# Patient Record
Sex: FEMALE | Race: WHITE | NOT HISPANIC OR LATINO | Employment: FULL TIME | URBAN - METROPOLITAN AREA
[De-identification: names, ages, dates, MRNs, and addresses within clinical notes are randomized per-mention and may not be internally consistent; named-entity substitution may affect disease eponyms.]

---

## 2018-01-15 ENCOUNTER — OFFICE VISIT (OUTPATIENT)
Dept: URGENT CARE | Facility: CLINIC | Age: 43
End: 2018-01-15
Payer: COMMERCIAL

## 2018-01-15 VITALS
BODY MASS INDEX: 27.32 KG/M2 | WEIGHT: 170 LBS | RESPIRATION RATE: 16 BRPM | HEART RATE: 88 BPM | TEMPERATURE: 98 F | OXYGEN SATURATION: 99 % | DIASTOLIC BLOOD PRESSURE: 84 MMHG | SYSTOLIC BLOOD PRESSURE: 129 MMHG | HEIGHT: 66 IN

## 2018-01-15 DIAGNOSIS — Z20.828 EXPOSURE TO THE FLU: Primary | ICD-10-CM

## 2018-01-15 PROCEDURE — 99214 OFFICE O/P EST MOD 30 MIN: CPT | Mod: S$GLB,,, | Performed by: FAMILY MEDICINE

## 2018-01-15 RX ORDER — BENZONATATE 100 MG/1
100 CAPSULE ORAL EVERY 6 HOURS PRN
Qty: 30 CAPSULE | Refills: 1 | Status: SHIPPED | OUTPATIENT
Start: 2018-01-15 | End: 2019-01-15

## 2018-01-15 RX ORDER — GABAPENTIN 400 MG/1
CAPSULE ORAL
Refills: 1 | COMMUNITY
Start: 2017-12-24 | End: 2020-12-18 | Stop reason: CLARIF

## 2018-01-15 RX ORDER — OSELTAMIVIR PHOSPHATE 75 MG/1
75 CAPSULE ORAL 2 TIMES DAILY
Qty: 10 CAPSULE | Refills: 0 | Status: SHIPPED | OUTPATIENT
Start: 2018-01-15 | End: 2018-01-25

## 2018-01-15 RX ORDER — PROMETHAZINE HYDROCHLORIDE 6.25 MG/5ML
SYRUP ORAL
Qty: 120 ML | Refills: 1 | Status: SHIPPED | OUTPATIENT
Start: 2018-01-15 | End: 2020-12-18 | Stop reason: CLARIF

## 2018-01-15 NOTE — PROGRESS NOTES
"Subjective:       Patient ID: Thai Villalba is a 42 y.o. female.    Vitals:  height is 5' 6" (1.676 m) and weight is 77.1 kg (170 lb). Her oral temperature is 97.6 °F (36.4 °C). Her blood pressure is 129/84 and her pulse is 88. Her respiration is 16 and oxygen saturation is 99%.     Chief Complaint: Cold Exposure (exposure to flu)    Influenza   This is a new problem. The current episode started yesterday. The problem has been unchanged. Pertinent negatives include no abdominal pain, chest pain, chills, congestion, coughing, fever, headaches, myalgias, nausea or sore throat. Nothing aggravates the symptoms. She has tried nothing for the symptoms.     Review of Systems   Constitution: Negative for chills, fever and malaise/fatigue.   HENT: Negative for congestion, ear pain, hoarse voice and sore throat.    Eyes: Negative for discharge and redness.   Cardiovascular: Negative for chest pain, dyspnea on exertion and leg swelling.   Respiratory: Negative for cough, shortness of breath, sputum production and wheezing.    Musculoskeletal: Negative for myalgias.   Gastrointestinal: Negative for abdominal pain and nausea.   Neurological: Negative for headaches.       Objective:      Physical Exam   Constitutional: She is oriented to person, place, and time. She appears well-developed and well-nourished. She is cooperative.  Non-toxic appearance. She does not appear ill. No distress.   HENT:   Head: Normocephalic and atraumatic.   Right Ear: Hearing, tympanic membrane, external ear and ear canal normal.   Left Ear: Hearing, tympanic membrane, external ear and ear canal normal.   Nose: Nose normal. No mucosal edema, rhinorrhea or nasal deformity. No epistaxis. Right sinus exhibits no maxillary sinus tenderness and no frontal sinus tenderness. Left sinus exhibits no maxillary sinus tenderness and no frontal sinus tenderness.   Mouth/Throat: Uvula is midline, oropharynx is clear and moist and mucous membranes are normal. No " trismus in the jaw. Normal dentition. No uvula swelling. No posterior oropharyngeal erythema.   Eyes: Conjunctivae and lids are normal. No scleral icterus.   Sclera clear bilat   Neck: Trachea normal, full passive range of motion without pain and phonation normal. Neck supple.   Cardiovascular: Normal rate, regular rhythm, normal heart sounds, intact distal pulses and normal pulses.    Pulmonary/Chest: Effort normal and breath sounds normal. No respiratory distress.   Abdominal: Normal appearance. She exhibits no distension. There is no tenderness.   Musculoskeletal: Normal range of motion. She exhibits no edema or deformity.   Neurological: She is alert and oriented to person, place, and time. She exhibits normal muscle tone. Coordination normal.   Skin: Skin is warm, dry and intact. She is not diaphoretic. No pallor.   Psychiatric: She has a normal mood and affect. Her speech is normal and behavior is normal. Judgment and thought content normal. Cognition and memory are normal.   Nursing note and vitals reviewed.      Assessment:       1. Exposure to the flu        Plan:         Exposure to the flu    Other orders  -     oseltamivir (TAMIFLU) 75 MG capsule; Take 1 capsule (75 mg total) by mouth 2 (two) times daily.  Dispense: 10 capsule; Refill: 0  -     promethazine (PHENERGAN) 6.25 mg/5 mL syrup; 10mL at night as needed  Dispense: 120 mL; Refill: 1  -     benzonatate (TESSALON PERLES) 100 MG capsule; Take 1 capsule (100 mg total) by mouth every 6 (six) hours as needed for Cough.  Dispense: 30 capsule; Refill: 1

## 2020-12-22 PROBLEM — D25.9 UTERINE LEIOMYOMA: Status: ACTIVE | Noted: 2020-12-22

## 2020-12-23 PROBLEM — K66.1 HEMOPERITONEUM: Status: ACTIVE | Noted: 2020-12-23

## 2021-05-06 ENCOUNTER — PATIENT MESSAGE (OUTPATIENT)
Dept: RESEARCH | Facility: HOSPITAL | Age: 46
End: 2021-05-06

## 2021-07-01 ENCOUNTER — PATIENT MESSAGE (OUTPATIENT)
Dept: ADMINISTRATIVE | Facility: OTHER | Age: 46
End: 2021-07-01

## 2022-07-07 ENCOUNTER — TELEPHONE (OUTPATIENT)
Dept: FAMILY MEDICINE | Facility: CLINIC | Age: 47
End: 2022-07-07

## 2022-07-07 NOTE — TELEPHONE ENCOUNTER
----- Message from Basilia Rubio sent at 7/7/2022 10:15 AM CDT -----  Contact: Giuliana Walters in [place], [place]  Type:  RX Refill Request    Who Called:  Giuliana Walters    Refill or New Rx:  New    RX Name and Strength:  EEMT FS    Preferred Pharmacy with phone number:    [facility]  [place], [place]    Best Call Back Number:  [phone]    Additional Information:  Please call back.  Thanks.

## 2023-12-08 ENCOUNTER — TELEPHONE (OUTPATIENT)
Dept: HEMATOLOGY/ONCOLOGY | Facility: CLINIC | Age: 48
End: 2023-12-08
Payer: COMMERCIAL

## 2023-12-08 ENCOUNTER — PATIENT MESSAGE (OUTPATIENT)
Dept: HEMATOLOGY/ONCOLOGY | Facility: CLINIC | Age: 48
End: 2023-12-08
Payer: COMMERCIAL

## 2023-12-08 NOTE — TELEPHONE ENCOUNTER
----- Message from Christopher Prasad sent at 12/8/2023 12:01 PM CST -----  Type: Need Medical Advice  Who Called: Giuliana Harden callback number:   Additional Information: Called to see if the patient can follow up with in a week of finishing up her treatments in [place], she had transplant 09/21 and her last treatment will be 12/19 patient will then return to [place] and have to follow up with Dr Hurt call to further assist, Thanks.

## 2023-12-08 NOTE — TELEPHONE ENCOUNTER
Returned call to Giuliana , Post Transplant nurse coordinator at [facility].  She stated she was helping the pt get set up appts with BMT here in [place].  The pt has last appt in [place] Dec 19th. Pt then moving back to [place].   Informed her we do not have a BMT team here in [facility]; that we re-faxed the referral to the BMT at [facility] for proper scheduling.    From there they can coordinate the pt's care plan, even if she is able to have a hemonc provider here in [place].    She verbalized understanding.  I gave her the phone number to [facility] BMT dept.

## 2023-12-11 ENCOUNTER — TELEPHONE (OUTPATIENT)
Dept: HEMATOLOGY/ONCOLOGY | Facility: CLINIC | Age: 48
End: 2023-12-11
Payer: COMMERCIAL

## 2023-12-11 DIAGNOSIS — C92.00 AML (ACUTE MYELOID LEUKEMIA): Primary | ICD-10-CM

## 2023-12-11 NOTE — TELEPHONE ENCOUNTER
----- Message from Josh Ballesteros sent at 12/8/2023  3:42 PM CST -----  Regarding: RE: POST ALLO TRANSPLANT NEEDING EST CARE  I have verified medical and transplant benefit for patient at [facility] only.    [facility]   ----- Message -----  From: Saran Pina, ROSY  Sent: 12/8/2023   3:03 PM CST  To: Josh Favian  Subject: RE: POST ALLO TRANSPLANT NEEDING EST CARE        2021  ----- Message -----  From: Josh Ballesteros  Sent: 12/8/2023   2:24 PM CST  To: Saran Pina RN  Subject: RE: POST ALLO TRANSPLANT NEEDING EST CARE        When did patient received his Allo transplant?    [facility]   ----- Message -----  From: Saran Pina, ROSY  Sent: 12/8/2023   2:09 PM CST  To: Josh Favian  Subject: RE: POST ALLO TRANSPLANT NEEDING EST CARE        Please verify allo benefits  ----- Message -----  From: Hernan Mckeon  Sent: 12/7/2023   4:04 PM CST  To: Saran Pina RN  Subject: POST ALLO TRANSPLANT NEEDING EST CARE            Referral in Media

## 2023-12-13 ENCOUNTER — TELEPHONE (OUTPATIENT)
Dept: HEMATOLOGY/ONCOLOGY | Facility: CLINIC | Age: 48
End: 2023-12-13
Payer: COMMERCIAL

## 2024-01-08 ENCOUNTER — OFFICE VISIT (OUTPATIENT)
Dept: HEMATOLOGY/ONCOLOGY | Facility: CLINIC | Age: 49
End: 2024-01-08
Payer: COMMERCIAL

## 2024-01-08 DIAGNOSIS — Z94.84 HISTORY OF ALLOGENEIC STEM CELL TRANSPLANT: Primary | ICD-10-CM

## 2024-01-08 DIAGNOSIS — C92.01 ACUTE MYELOID LEUKEMIA IN REMISSION: ICD-10-CM

## 2024-01-08 DIAGNOSIS — Z79.899 IMMUNODEFICIENCY DUE TO DRUG THERAPY: ICD-10-CM

## 2024-01-08 DIAGNOSIS — D69.6 THROMBOCYTOPENIA: ICD-10-CM

## 2024-01-08 DIAGNOSIS — D84.822 IMMUNODEFICIENCY DUE TO EXTERNAL CAUSE: ICD-10-CM

## 2024-01-08 DIAGNOSIS — D84.821 IMMUNODEFICIENCY DUE TO DRUG THERAPY: ICD-10-CM

## 2024-01-08 PROCEDURE — 99205 OFFICE O/P NEW HI 60 MIN: CPT | Mod: 95,,, | Performed by: INTERNAL MEDICINE

## 2024-01-08 PROCEDURE — 1160F RVW MEDS BY RX/DR IN RCRD: CPT | Mod: CPTII,95,, | Performed by: INTERNAL MEDICINE

## 2024-01-08 PROCEDURE — 1159F MED LIST DOCD IN RCRD: CPT | Mod: CPTII,95,, | Performed by: INTERNAL MEDICINE

## 2024-01-08 RX ORDER — PANTOPRAZOLE SODIUM 40 MG/1
40 TABLET, DELAYED RELEASE ORAL DAILY
COMMUNITY

## 2024-01-08 RX ORDER — SULFAMETHOXAZOLE AND TRIMETHOPRIM 800; 160 MG/1; MG/1
1 TABLET ORAL
COMMUNITY
Start: 2023-10-21 | End: 2024-02-20

## 2024-01-08 RX ORDER — ISAVUCONAZONIUM SULFATE 186 MG/1
2 CAPSULE ORAL
COMMUNITY
Start: 2023-11-15 | End: 2024-01-08

## 2024-01-08 RX ORDER — LEVOFLOXACIN 500 MG/1
500 TABLET, FILM COATED ORAL
COMMUNITY
Start: 2023-08-11 | End: 2024-01-08

## 2024-01-08 RX ORDER — ALLOPURINOL 300 MG/1
300 TABLET ORAL
COMMUNITY
Start: 2023-11-24

## 2024-01-08 RX ORDER — ACYCLOVIR 400 MG/1
800 TABLET ORAL 2 TIMES DAILY
COMMUNITY
Start: 2023-08-11 | End: 2024-02-20

## 2024-01-08 RX ORDER — LANOLIN ALCOHOL/MO/W.PET/CERES
400 CREAM (GRAM) TOPICAL 2 TIMES DAILY
COMMUNITY

## 2024-01-08 RX ORDER — CEFPODOXIME PROXETIL 200 MG/1
200 TABLET, FILM COATED ORAL 2 TIMES DAILY
COMMUNITY
Start: 2023-11-15 | End: 2024-01-08

## 2024-01-08 RX ORDER — ESTERIFIED ESTROGEN AND METHYLTESTOSTERONE 1.25; 2.5 MG/1; MG/1
1 TABLET ORAL DAILY
COMMUNITY
Start: 2023-07-05

## 2024-01-08 RX ORDER — CYCLOSPORINE 100 MG/1
75 CAPSULE, GELATIN COATED ORAL EVERY 12 HOURS
COMMUNITY
End: 2024-02-20

## 2024-01-08 RX ORDER — MIRTAZAPINE 15 MG/1
15 TABLET, FILM COATED ORAL
COMMUNITY
Start: 2023-11-28 | End: 2024-01-22

## 2024-01-08 RX ORDER — FOLIC ACID/MULTIVIT,IRON,MINER 0.4MG-18MG
1000 TABLET ORAL
COMMUNITY

## 2024-01-08 NOTE — PROGRESS NOTES
"HEMATOLOGIC MALIGNANCIES CONSULT NOTE    IDENTIFYING STATEMENT   Thai Coburn) is a 48 y.o. female with a  of 1975 from [place], [place], referred by Dr. 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         Each patient to whom he or she provides medical services by telemedicine is:  (1) informed of the relationship between the physician and patient and the respective role of any other health care provider with respect to management of the patient; and (2) notified that he or she may decline to receive medical services by telemedicine and may withdraw from such care at any time.    Notes:       HISTORY OF PRESENT ILLNESS:      Ms. Villalba is 48 and presents to establish care for follow-up of AML and history of allogeneic stem cell transplant.     Had moved to [place] for 's job - was already planning to go back to [place], but then she got diagnosed with AML.     See detailed summary in "impression" of AML clinical course.    Currently, she reports the following:  - Stomach is doing better  - cyclosporine - 75 mg PO BID with planned taper  - gilteritinib is causing trouble sleeping (planned 2 years maintenance) and also neuropathy; more trouble with " cold    - appetite/nutrition - decreased appetite overall but eating 3x/day  - no pruritis, jaundice, rash, diarrhea  - brisk walks around house and yard    - 16-month old grandchild  - son is  in [place], [place]    - no more central lines    Past Medical History:   Diagnosis Date    ADHD (attention deficit hyperactivity disorder)     Anxiety     General anesthetics causing adverse effect in therapeutic use     REFLUX WITH SURGERY AND ASPIRATED, ZOFRAN GIVEN IV       Family History   Problem Relation Age of Onset    Cancer Mother         Breast    Breast cancer Mother         age unknown    Cancer Maternal Grandmother         Breast    Breast cancer Maternal Grandmother         70s and 80s    COPD Maternal Grandfather     Heart disease Paternal Grandfather        Social History     Socioeconomic History    Marital status:    Tobacco Use    Smoking status: Former     Current packs/day: 0.00     Types: Cigarettes     Quit date: 2011     Years since quittin.5    Smokeless tobacco: Never   Substance and Sexual Activity    Alcohol use: Yes     Alcohol/week: 0.0 - 0.8 standard drinks of alcohol     Comment: socially    Drug use: No     Social Determinants of Health     Financial Resource Strain: Patient Declined (2024)    Overall Financial Resource Strain (CARDIA)     Difficulty of Paying Living Expenses: Patient declined   Food Insecurity: Patient Declined (2024)    Hunger Vital Sign     Worried About Running Out of Food in the Last Year: Patient declined     Ran Out of Food in the Last Year: Patient declined   Transportation Needs: No Transportation Needs (2024)    PRAPARE - Transportation     Lack of Transportation (Medical): No     Lack of Transportation (Non-Medical): No   Physical Activity: Unknown (2024)    Exercise Vital Sign     Days of Exercise per Week: 2 days   Stress: No Stress Concern Present (2024)    [employer] - Occupational  Stress Questionnaire     Feeling of Stress : Only a little   Social Connections: Unknown (1/8/2024)    Social Connection and Isolation Panel [NHANES]     Frequency of Communication with Friends and Family: Once a week     Frequency of Social Gatherings with Friends and Family: Patient declined     Active Member of Clubs or Organizations: No     Attends Club or Organization Meetings: Never     Marital Status:    Housing Stability: High Risk (1/8/2024)    Housing Stability Vital Sign     Unable to Pay for Housing in the Last Year: No     Number of Places Lived in the Last Year: 3     Unstable Housing in the Last Year: No         MEDICATIONS:     Current Outpatient Medications on File Prior to Visit   Medication Sig Dispense Refill    acyclovir (ZOVIRAX) 400 MG tablet Take 800 mg by mouth 2 (two) times daily.      allopurinoL (ZYLOPRIM) 300 MG tablet Take 300 mg by mouth.      estrogens,esterified,-methyltestosterone 1.25-2.5mg (ESTRATEST) per tablet Take 1 tablet by mouth once daily.      gilteritinib 40 mg Tab Take 80 mg by mouth Daily.      letermovir 240 mg Tab Take 240 mg by mouth.      mirtazapine (REMERON) 15 MG tablet Take 15 mg by mouth.      sulfamethoxazole-trimethoprim 800-160mg (BACTRIM DS) 800-160 mg Tab Take 1 tablet by mouth.      cholecalciferol, vitamin D3, 10 mcg (400 unit) Chew Take 1,000 Int'l Units by mouth.      cycloSPORINE (SANDIMMUNE) 100 MG Cap Take 75 mg by mouth every 12 (twelve) hours.      gabapentin (NEURONTIN) 300 MG capsule Take 1 capsule by mouth three times a day 90 capsule 3    magnesium oxide (MAG-OX) 400 mg (241.3 mg magnesium) tablet Take 400 mg by mouth 2 (two) times daily.      [START ON 2/2/2024] oxyCODONE (ROXICODONE) 5 MG immediate release tablet 1 tablet by mouth three times a day as needed for pain 90 tablet 0    oxyCODONE (ROXICODONE) 5 MG immediate release tablet Take 1 tablet by mouth three times a day as needed for pain 90 tablet 0    oxyCODONE-acetaminophen  (PERCOCET) 5-325 mg per tablet Take 1 tablet by mouth every 4 (four) hours as needed. 30 tablet 0    pantoprazole (PROTONIX) 40 MG tablet Take 40 mg by mouth once daily.       No current facility-administered medications on file prior to visit.       ALLERGIES:   Review of patient's allergies indicates:   Allergen Reactions    Penicillins      Other reaction(s): Unknown        ROS:       Review of Systems   Constitutional:  Positive for appetite change. Negative for diaphoresis, fatigue, fever and unexpected weight change.   HENT:   Negative for lump/mass and sore throat.    Eyes:  Negative for icterus.   Respiratory:  Negative for cough and shortness of breath.    Cardiovascular:  Negative for chest pain and palpitations.   Gastrointestinal:  Negative for abdominal distention, constipation, diarrhea, nausea and vomiting.   Genitourinary:  Negative for dysuria and frequency.    Musculoskeletal:  Negative for arthralgias, gait problem and myalgias.   Skin:  Negative for rash.   Neurological:  Negative for dizziness, gait problem and headaches.   Hematological:  Negative for adenopathy. Does not bruise/bleed easily.   Psychiatric/Behavioral:  The patient is not nervous/anxious.        PHYSICAL EXAM:  There were no vitals filed for this visit.    Physical Exam  Head and neck visualized. No abnormalities seen.     LAB:   Results for orders placed or performed in visit on 12/18/23   Specimen to Pathology Other (bmt)   Result Value Ref Range    Final Pathologic Diagnosis       Received from the outside are 8 slides labeled  [id number]:   Uterus and cervix weighing 437 g showing:   Weakly proliferative endometrium with adenomyosis  The cervix shows focal chronic inflammation and atrophic changes  Multiple submucosal and intramural leiomyomas are identified      Gross See outside report for gross description     Disclaimer       Unless the case is a 'gross only' or additional testing only, the final diagnosis for each  specimen is based on a microscopic examination of appropriate tissue sections.       PROBLEMS ASSESSED THIS VISIT:    1. History of allogeneic stem cell transplant    2. Acute myeloid leukemia in remission    3. Immunodeficiency due to drug therapy    4. Immunodeficiency due to external cause    5. Thrombocytopenia        IMPRESSION:    1. Acute myeloid leukemia s/p allogeneic stem cell transplant (dual cord blood transplant)   A. 5/26/2023: Presented to hospital in [place] with 2 week h/o fevers, night sweats, weight loss, lymphadenopathy, myalgias, stomatitis, bruising, sore throat and found to have WBC 333K with 80% blasts c/f AML vs CML in acute blast crisis    B. 5/28/2023: Bone marrow biopsy - AML, FLT3 positive.    C. 5/30/2023: Began 7+3+midostaurin induction. Subsequent D14 and D28 BMBxs both showed no evidence of persistent leukemia, in CR1    D. 8/4/2023: BMBx showed recurrent AML with flow showing 23% myeloblasts.    E. 8/5/2023: Began azacitidine-venetoclax-gilteritinib (off protocol)   F. 8/31/2023: Bone marrow biopsy showed CR2   G. 9/21/2023: Dual cord blood allogeneic stem cell transplant with Flu/Cy/TT/TBI (4 Gy) conditioning. GVHD ppx with cyclosporine/MMF.    H. 10/19/2023: Bone marrow biopsy on day +28 - IRENE by path and hematologics flow.    I. 12/5/2023: Day +75 bone marrow biopsy - MRD-neg by flow, NPM1 ddPCR, FLT3 pcr. Full donor chimerisms     2. Attention deficit disorder  3. Uterine leiomyoma    PLAN:       Acute myeloid leukemia  Diagnosed with FLT3 mutated AML. She achieved CR1 with 7+3+midostaurin induction but relapsed prior to allogeneic stem cell transplant. She achieved CR2 with aza/ameya/gilteritinib. Ms. Villalba received dual cord blood allogeneic stem cell transplant and has maintained CR on follow-up bone marrow biopsy.   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 due to anorexia/nausea, treated with prednisone (now off)  - No symptoms reported of aGVHD at this time  - On cyclosporine with the following taper planned:  - ~1/15/24: decrease CSA to 50 mg BID  - ~2/5/24: decrease to CSA 25 mg BID  - ~2/26/24: decrease to CSA 25 mg daily   - ~3/21/24: stop CSA     Infectious Disease  - see GVHD above for current immunosuppressive regimen  - antimicrobial ppx:   - Acyclovir 800 mg BID until 12 months post tx, then decrease to 400 mg BID until 5 year post-transplant jose  - Bactrim DS 1 tab BID on M/Th until 12 months post tx and off IST  - Letermovir ppx until 6 months post tx.   - we discussed consideration of antifungal ppx as per our institutional standard - will prescribe posaconazole   - Prior/resolved infections:   - 9/29/2023: C. Diff - treated with fidaxomicin   - 10/7/2023: Suspected fungal infection based on abnormal CT, treated with isavuconazonium sulfate   - 11/6/2023: UTI - treated with levofloxacin   - 11/8/2023: BK virus   - 11/13/2023: Pneumonia diagnosed on CXR - treated with cefpodoxime    Cytopenias  - Last labs showed resolution of all cytopenias except thrombocytopenia (plt 149)  - Continue to monitor with routine labs monitoring    Chronic pain  - Neuropathic pain secondary to lumbar radiculopathy    - gabapentin 300 mg PO TID   - Oxycodone 10-15 mg q4prn   - will need pain specialist and/or neurologist    Hormone replacement therapy  - on oral estrogen    Follow-up  Resume weekly lab  monitoring  Return to clinic in 2 weeks    Shemar Franco MD  Hematologic Malignancies, Stem Cell Transplant, and Cellular Therapy    Advance Care Planning     Date: 01/08/2024    [facility]  I engaged the patient and family in a voluntary conversation about advance care planning and we specifically addressed what the goals of care would be moving forward, in light of the patient's history of allogeneic stem cell transplant.  We did specifically address the patient's likely prognosis, which is fair .  We explored the patient's values and preferences for future care.  The patient and family endorses that what is most important right now is to focus on curative/life-prolongation (regardless of treatment burdens)    Accordingly, we have decided that the best plan to meet the patient's goals includes continuing with treatment    I did not explain the role for hospice care at this stage of the patient's illness, including its ability to help the patient live with the best quality of life possible.  We will not be making a hospice referral.    I spent a total of 5 minutes engaging the patient in this advance care planning discussion.

## 2024-01-08 NOTE — PROGRESS NOTES
Route Chart for Scheduling    BMT Chart Routing  Urgent    Follow up with physician . 1/16 at 3:40 PM (ok to overbook) and on 1/22 and 1/29.   Follow up with KENDLAL    Provider visit type Malignant hem   Infusion scheduling note    Injection scheduling note    Labs CBC, CMP, magnesium, phosphorus, CMV and EBV   Scheduling:  Preferred lab:  Lab interval: once a week  Please also include cyclosporine level. All labs in [place]. Must be in AM because of cyclosporine level.   Imaging    Pharmacy appointment    Other referrals

## 2024-01-11 PROBLEM — C92.00 AML (ACUTE MYELOBLASTIC LEUKEMIA): Status: ACTIVE | Noted: 2023-05-30

## 2024-01-11 PROBLEM — K66.1 HEMOPERITONEUM: Status: RESOLVED | Noted: 2020-12-23 | Resolved: 2024-01-11

## 2024-01-11 RX ORDER — POSACONAZOLE 100 MG/1
300 TABLET, DELAYED RELEASE ORAL DAILY
Qty: 90 TABLET | Refills: 5 | Status: SHIPPED | OUTPATIENT
Start: 2024-01-11 | End: 2024-01-17 | Stop reason: DRUGHIGH

## 2024-01-16 ENCOUNTER — LAB VISIT (OUTPATIENT)
Dept: LAB | Facility: HOSPITAL | Age: 49
End: 2024-01-16
Attending: INTERNAL MEDICINE
Payer: COMMERCIAL

## 2024-01-16 DIAGNOSIS — Z94.84 HISTORY OF ALLOGENEIC STEM CELL TRANSPLANT: ICD-10-CM

## 2024-01-16 LAB
ALBUMIN SERPL BCP-MCNC: 3.3 G/DL (ref 3.5–5.2)
ALP SERPL-CCNC: 73 U/L (ref 55–135)
ALT SERPL W/O P-5'-P-CCNC: 29 U/L (ref 10–44)
ANION GAP SERPL CALC-SCNC: 8 MMOL/L (ref 8–16)
AST SERPL-CCNC: 22 U/L (ref 10–40)
BASOPHILS # BLD AUTO: 0.03 K/UL (ref 0–0.2)
BASOPHILS NFR BLD: 0.4 % (ref 0–1.9)
BILIRUB SERPL-MCNC: 0.3 MG/DL (ref 0.1–1)
BUN SERPL-MCNC: 22 MG/DL (ref 6–20)
CALCIUM SERPL-MCNC: 8.9 MG/DL (ref 8.7–10.5)
CHLORIDE SERPL-SCNC: 111 MMOL/L (ref 95–110)
CO2 SERPL-SCNC: 21 MMOL/L (ref 23–29)
CREAT SERPL-MCNC: 1.3 MG/DL (ref 0.5–1.4)
DIFFERENTIAL METHOD BLD: ABNORMAL
EOSINOPHIL # BLD AUTO: 0.8 K/UL (ref 0–0.5)
EOSINOPHIL NFR BLD: 11.9 % (ref 0–8)
ERYTHROCYTE [DISTWIDTH] IN BLOOD BY AUTOMATED COUNT: 12.9 % (ref 11.5–14.5)
EST. GFR  (NO RACE VARIABLE): 50.4 ML/MIN/1.73 M^2
GLUCOSE SERPL-MCNC: 85 MG/DL (ref 70–110)
HCT VFR BLD AUTO: 38.3 % (ref 37–48.5)
HGB BLD-MCNC: 13.6 G/DL (ref 12–16)
IMM GRANULOCYTES # BLD AUTO: 0.03 K/UL (ref 0–0.04)
IMM GRANULOCYTES NFR BLD AUTO: 0.4 % (ref 0–0.5)
LYMPHOCYTES # BLD AUTO: 1.3 K/UL (ref 1–4.8)
LYMPHOCYTES NFR BLD: 18.6 % (ref 18–48)
MAGNESIUM SERPL-MCNC: 1.9 MG/DL (ref 1.6–2.6)
MCH RBC QN AUTO: 36.8 PG (ref 27–31)
MCHC RBC AUTO-ENTMCNC: 35.5 G/DL (ref 32–36)
MCV RBC AUTO: 104 FL (ref 82–98)
MONOCYTES # BLD AUTO: 0.9 K/UL (ref 0.3–1)
MONOCYTES NFR BLD: 13.1 % (ref 4–15)
NEUTROPHILS # BLD AUTO: 3.8 K/UL (ref 1.8–7.7)
NEUTROPHILS NFR BLD: 55.6 % (ref 38–73)
NRBC BLD-RTO: 0 /100 WBC
PHOSPHATE SERPL-MCNC: 4.2 MG/DL (ref 2.7–4.5)
PLATELET # BLD AUTO: 217 K/UL (ref 150–450)
PMV BLD AUTO: 8.9 FL (ref 9.2–12.9)
POTASSIUM SERPL-SCNC: 4.8 MMOL/L (ref 3.5–5.1)
PROT SERPL-MCNC: 6 G/DL (ref 6–8.4)
RBC # BLD AUTO: 3.7 M/UL (ref 4–5.4)
SODIUM SERPL-SCNC: 140 MMOL/L (ref 136–145)
WBC # BLD AUTO: 6.87 K/UL (ref 3.9–12.7)

## 2024-01-16 PROCEDURE — 80053 COMPREHEN METABOLIC PANEL: CPT | Mod: PN | Performed by: INTERNAL MEDICINE

## 2024-01-16 PROCEDURE — 85025 COMPLETE CBC W/AUTO DIFF WBC: CPT | Mod: PN | Performed by: INTERNAL MEDICINE

## 2024-01-16 PROCEDURE — 83735 ASSAY OF MAGNESIUM: CPT | Mod: PN | Performed by: INTERNAL MEDICINE

## 2024-01-16 PROCEDURE — 36415 COLL VENOUS BLD VENIPUNCTURE: CPT | Mod: PN | Performed by: INTERNAL MEDICINE

## 2024-01-16 PROCEDURE — 87799 DETECT AGENT NOS DNA QUANT: CPT | Performed by: INTERNAL MEDICINE

## 2024-01-16 PROCEDURE — 80158 DRUG ASSAY CYCLOSPORINE: CPT | Performed by: INTERNAL MEDICINE

## 2024-01-16 PROCEDURE — 84100 ASSAY OF PHOSPHORUS: CPT | Mod: PN | Performed by: INTERNAL MEDICINE

## 2024-01-17 DIAGNOSIS — C92.01 ACUTE MYELOID LEUKEMIA IN REMISSION: Primary | ICD-10-CM

## 2024-01-17 DIAGNOSIS — Z94.84 HISTORY OF ALLOGENEIC STEM CELL TRANSPLANT: Primary | ICD-10-CM

## 2024-01-17 DIAGNOSIS — D69.6 THROMBOCYTOPENIA: ICD-10-CM

## 2024-01-17 LAB
CMV DNA SPEC QL NAA+PROBE: NORMAL
CYCLOSPORINE BLD LC/MS/MS-MCNC: 100 NG/ML (ref 100–400)
CYTOMEGALOVIRUS PCR, QUANT: NOT DETECTED IU/ML
EPSTEIN-BARR VIRUS DNA: NORMAL
EPSTEIN-BARR VIRUS PCR, QUANT: NOT DETECTED IU/ML

## 2024-01-18 RX ORDER — CYCLOSPORINE 25 MG/1
CAPSULE, LIQUID FILLED ORAL
Qty: 60 CAPSULE | Refills: 0 | Status: SHIPPED | OUTPATIENT
Start: 2024-01-18 | End: 2024-02-20 | Stop reason: SDUPTHER

## 2024-01-22 ENCOUNTER — OFFICE VISIT (OUTPATIENT)
Dept: HEMATOLOGY/ONCOLOGY | Facility: CLINIC | Age: 49
End: 2024-01-22
Payer: COMMERCIAL

## 2024-01-22 DIAGNOSIS — Z94.84 HISTORY OF ALLOGENEIC STEM CELL TRANSPLANT: Primary | ICD-10-CM

## 2024-01-22 DIAGNOSIS — C92.01 ACUTE MYELOID LEUKEMIA IN REMISSION: ICD-10-CM

## 2024-01-22 PROCEDURE — 1160F RVW MEDS BY RX/DR IN RCRD: CPT | Mod: CPTII,95,, | Performed by: INTERNAL MEDICINE

## 2024-01-22 PROCEDURE — 99214 OFFICE O/P EST MOD 30 MIN: CPT | Mod: 95,,, | Performed by: INTERNAL MEDICINE

## 2024-01-22 PROCEDURE — 1159F MED LIST DOCD IN RCRD: CPT | Mod: CPTII,95,, | Performed by: INTERNAL MEDICINE

## 2024-01-22 NOTE — PROGRESS NOTES
HEMATOLOGIC MALIGNANCIES PROGRESS NOTE    IDENTIFYING STATEMENT   Thai Coburn) is a 48 y.o. female with a  of 1975 from [place], [place] with the diagnosis of acute myeloie leukemia.      TELEMEDICINE DOCUMENTATION     The patient location is: [place]  The chief complaint leading to consultation is: acute myeloid leukemia s/p allogeneic stem cell transplant    Visit type: audiovisual    Face to Face time with patient: 8 minutes  30 minutes of total time spent on the encounter, which includes face to face time and non-face to face time preparing to see the patient (eg, review of tests), Obtaining and/or reviewing separately obtained history, Documenting clinical information in the electronic or other health record, Independently interpreting results (not separately reported) and communicating results to the patient/family/caregiver, or Care coordination (not separately reported).         Each patient to whom he or she provides medical services by telemedicine is:  (1) informed of the relationship between the physician and patient and the respective role of any other health care provider with respect to management of the patient; and (2) notified that he or she may decline to receive medical services by telemedicine and may withdraw from such care at any time.    Notes:       ONCOLOGY HISTORY:    1. 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 2023: Presented to hospital in [place] with 2 week h/o fevers, night sweats, weight loss, lymphadenopathy, myalgias, stomatitis, bruising, sore throat and found to have WBC 333K with 80% blasts c/f AML vs CML in acute blast crisis               B. 2023: Bone marrow biopsy - AML, FLT3 positive.               C. 2023: Began 7+3+midostaurin induction. Subsequent D14 and D28 BMBxs both showed no evidence of persistent leukemia, in CR1               D. 2023: BMBx showed recurrent AML with flow  showing 23% myeloblasts.               E. 8/5/2023: Began azacitidine-venetoclax-gilteritinib (off protocol)              F. 8/31/2023: Bone marrow biopsy showed CR2              G. 9/21/2023: Dual cord blood allogeneic stem cell transplant with Flu/Cy/TT/TBI (4 Gy) conditioning. GVHD ppx with cyclosporine/MMF.               H. 10/19/2023: Bone marrow biopsy on day +28 - IRENE by path and hematologics flow.               I. 12/5/2023: Day +75 bone marrow biopsy - MRD-neg by flow, NPM1 ddPCR, FLT3 pcr. Full donor chimerisms      2. Attention deficit disorder  3. Uterine leiomyoma    INTERVAL HISTORY:      Ms. Villalba is seen in follow-up of AML with history of dual cord blood allogeneic stem cell transplant. She reports the following:    - redness on face that developed yesterday within an hour of new facial moisturizer - not like prior GVHD rash  - stopped mirtazapine and still having good appetite    Past Medical History, Past Social History and Past Family History have been reviewed and are unchanged except as noted in the interval history.    MEDICATIONS:     Current Outpatient Medications on File Prior to Visit   Medication Sig Dispense Refill    acyclovir (ZOVIRAX) 200 MG capsule Take 4 capsules twice a day for prophylaxis 240 capsule 9    acyclovir (ZOVIRAX) 400 MG tablet Take 800 mg by mouth 2 (two) times daily.      allopurinoL (ZYLOPRIM) 300 MG tablet Take 300 mg by mouth.      cholecalciferol, vitamin D3, 10 mcg (400 unit) Chew Take 1,000 Int'l Units by mouth.      cycloSPORINE (SANDIMMUNE) 100 MG Cap Take 75 mg by mouth every 12 (twelve) hours.      cycloSPORINE modified, NEORAL, (NEORAL) 25 MG capsule Take 3 capsules twice a day for prevention of graft vesus host disease 120 capsule 4    cycloSPORINE modified, NEORAL, (NEORAL) 25 MG capsule Take 3 capsules twice a day for prevention of graft vesus host disease 60 capsule 0    estrogens,esterified,-methyltestosterone 1.25-2.5mg (ESTRATEST) per tablet Take 1  tablet by mouth once daily.      gabapentin (NEURONTIN) 300 MG capsule Take 1 capsule by mouth three times a day 90 capsule 3    gilteritinib 40 mg Tab Take 2 tablets (80 mg) by mouth Daily. 60 tablet 11    isavuconazonium sulfate (CRESEMBA) 186 mg Cap Take 2 capsules by mouth three times a day for the first 2 days, then take 2 capsules by mouth once a day. 70 capsule 0    letermovir (PREVYMIS) 240 mg Tab Take 1 tablet daily for prevention of cytomegalovirus 28 tablet 2    letermovir 240 mg Tab Take 240 mg by mouth.      magnesium oxide (MAG-OX) 400 mg (241.3 mg magnesium) tablet Take 400 mg by mouth 2 (two) times daily.      magnesium oxide (MAG-OX) 400 mg (241.3 mg magnesium) tablet Take 1 tablet twice a day 180 tablet 2    mirtazapine (REMERON) 15 MG tablet Take 15 mg by mouth.      mirtazapine (REMERON) 15 MG tablet Take 1 tablet at bedtime for appetite stimulant 90 tablet 3    [START ON 2/2/2024] oxyCODONE (ROXICODONE) 5 MG immediate release tablet 1 tablet by mouth three times a day as needed for pain 90 tablet 0    oxyCODONE (ROXICODONE) 5 MG immediate release tablet Take 1 tablet by mouth three times a day as needed for pain 90 tablet 0    oxyCODONE-acetaminophen (PERCOCET) 5-325 mg per tablet Take 1 tablet by mouth every 4 (four) hours as needed. 30 tablet 0    pantoprazole (PROTONIX) 40 MG tablet Take 40 mg by mouth once daily.      pantoprazole (PROTONIX) 40 MG tablet Take 1 tablet twice a day for gerd. 180 tablet 1    sulfamethoxazole-trimethoprim 800-160mg (BACTRIM DS) 800-160 mg Tab Take 1 tablet by mouth.      sulfamethoxazole-trimethoprim 800-160mg (BACTRIM DS) 800-160 mg Tab Take 1 tablet twice a day for  2 days a week for pneumocyctis jiroveci pneumonia prevention 12 tablet 15     No current facility-administered medications on file prior to visit.       ALLERGIES:   Review of patient's allergies indicates:   Allergen Reactions    Penicillins      Other reaction(s): Unknown        ROS:       Review  of Systems   Constitutional:  Negative for appetite change, diaphoresis, fatigue, fever and unexpected weight change.   HENT:   Negative for lump/mass and sore throat.    Eyes:  Negative for icterus.   Respiratory:  Negative for cough and shortness of breath.    Cardiovascular:  Negative for chest pain and palpitations.   Gastrointestinal:  Negative for abdominal distention, constipation, diarrhea, nausea and vomiting.   Genitourinary:  Negative for dysuria and frequency.    Musculoskeletal:  Negative for arthralgias, gait problem and myalgias.   Skin:  Negative for rash.   Neurological:  Negative for dizziness, gait problem and headaches.   Hematological:  Negative for adenopathy. Does not bruise/bleed easily.   Psychiatric/Behavioral:  The patient is not nervous/anxious.        PHYSICAL EXAM:  There were no vitals filed for this visit.    Physical Exam  Head and neck visualized and no abnormalities    LAB:   Results for orders placed or performed in visit on 01/16/24   CBC Auto Differential   Result Value Ref Range    WBC 6.87 3.90 - 12.70 K/uL    RBC 3.70 (L) 4.00 - 5.40 M/uL    Hemoglobin 13.6 12.0 - 16.0 g/dL    Hematocrit 38.3 37.0 - 48.5 %     (H) 82 - 98 fL    MCH 36.8 (H) 27.0 - 31.0 pg    MCHC 35.5 32.0 - 36.0 g/dL    RDW 12.9 11.5 - 14.5 %    Platelets 217 150 - 450 K/uL    MPV 8.9 (L) 9.2 - 12.9 fL    Immature Granulocytes 0.4 0.0 - 0.5 %    Gran # (ANC) 3.8 1.8 - 7.7 K/uL    Immature Grans (Abs) 0.03 0.00 - 0.04 K/uL    Lymph # 1.3 1.0 - 4.8 K/uL    Mono # 0.9 0.3 - 1.0 K/uL    Eos # 0.8 (H) 0.0 - 0.5 K/uL    Baso # 0.03 0.00 - 0.20 K/uL    nRBC 0 0 /100 WBC    Gran % 55.6 38.0 - 73.0 %    Lymph % 18.6 18.0 - 48.0 %    Mono % 13.1 4.0 - 15.0 %    Eosinophil % 11.9 (H) 0.0 - 8.0 %    Basophil % 0.4 0.0 - 1.9 %    Differential Method Automated    Comprehensive Metabolic Panel   Result Value Ref Range    Sodium 140 136 - 145 mmol/L    Potassium 4.8 3.5 - 5.1 mmol/L    Chloride 111 (H) 95 - 110  mmol/L    CO2 21 (L) 23 - 29 mmol/L    Glucose 85 70 - 110 mg/dL    BUN 22 (H) 6 - 20 mg/dL    Creatinine 1.3 0.5 - 1.4 mg/dL    Calcium 8.9 8.7 - 10.5 mg/dL    Total Protein 6.0 6.0 - 8.4 g/dL    Albumin 3.3 (L) 3.5 - 5.2 g/dL    Total Bilirubin 0.3 0.1 - 1.0 mg/dL    Alkaline Phosphatase 73 55 - 135 U/L    AST 22 10 - 40 U/L    ALT 29 10 - 44 U/L    eGFR 50.4 (A) >60 mL/min/1.73 m^2    Anion Gap 8 8 - 16 mmol/L   Magnesium   Result Value Ref Range    Magnesium 1.9 1.6 - 2.6 mg/dL   PHOSPHORUS   Result Value Ref Range    Phosphorus 4.2 2.7 - 4.5 mg/dL   CMV DNA, QUANTITATIVE, PCR   Result Value Ref Range    Cytomegalovirus PCR, Quant Not Detected <50 IU/mL    Cytomegalovirus DNA CMV DNA not detected Not Detected   EMERITA-BALDWIN VIRUS DNA, QUANTITATIVE (PCR)   Result Value Ref Range    Emerita-Barr Virus PCR, Quant Not Detected <12 IU/mL    Emerita-Barr Virus DNA EBV DNA not detected Not Detected   CYCLOSPORINE LEVEL   Result Value Ref Range    Cyclosporine, LC/ 100 - 400 ng/mL       PROBLEMS ASSESSED THIS VISIT:    No diagnosis found.    PLAN:       Acute myeloid leukemia  Diagnosed with FLT3 mutated AML. She achieved CR1 with 7+3+midostaurin induction but relapsed prior to allogeneic stem cell transplant. She achieved CR2 with aza/ameya/gilteritinib. Ms. Villalba received dual cord blood allogeneic stem cell transplant and has maintained CR on follow-up bone marrow biopsy.   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 due to anorexia/nausea, treated with prednisone (now off)  - No symptoms reported of aGVHD at this time  - On cyclosporine with the following taper planned:  - ~1/15/24: decrease CSA to 50 mg BID  - ~2/5/24: decrease to CSA 25 mg BID  - ~2/26/24: decrease to CSA 25 mg daily   - ~3/21/24: stop CSA      Infectious Disease  - see GVHD above for current immunosuppressive regimen  - antimicrobial ppx:   - Acyclovir 800 mg BID until 12 months post tx, then decrease to 400 mg BID until 5 year post-transplant jose  - Bactrim DS 1 tab BID on M/Th until 12 months post tx and off IST  - Letermovir ppx until 6 months post tx.   - we discussed consideration of antifungal ppx as per our institutional standard - will prescribe posaconazole   - Prior/resolved infections:              - 9/29/2023: C. Diff - treated with fidaxomicin              - 10/7/2023: Suspected fungal infection based on abnormal CT, treated with isavuconazonium sulfate              - 11/6/2023: UTI - treated with levofloxacin              - 11/8/2023: BK virus              - 11/13/2023: Pneumonia diagnosed on CXR - treated with cefpodoxime     Cytopenias  - Last labs showed resolution of all cytopenias   - Continue to monitor with routine labs monitoring     Chronic pain  - Neuropathic pain secondary to lumbar radiculopathy               - gabapentin 300 mg PO TID              - Oxycodone 10-15 mg q4prn              - will need pain specialist and/or neurologist     Hormone replacement therapy  - on oral estrogen     Follow-up  Weekly labs monitoring  - Visit in person on 1/29  - Multi-D clinic on 2/8    Route Chart for Scheduling    BMT Chart Routing  Urgent    Follow up with physician . Already scheduled   Follow up with KENDALL    Provider visit type Malignant hem   Infusion scheduling note    Injection scheduling note    Labs CBC, CMP, magnesium, phosphorus, CMV and EBV    Scheduling:  Preferred lab:  Lab interval:  Please also include cyclosporine level. Labs before visit on 1/29.   Imaging    Pharmacy appointment    Other referrals                       Shemar Franco MD  Hematology and Stem Cell Transplant

## 2024-01-23 ENCOUNTER — LAB VISIT (OUTPATIENT)
Dept: LAB | Facility: HOSPITAL | Age: 49
End: 2024-01-23
Attending: INTERNAL MEDICINE
Payer: COMMERCIAL

## 2024-01-23 DIAGNOSIS — Z94.84 HISTORY OF ALLOGENEIC STEM CELL TRANSPLANT: ICD-10-CM

## 2024-01-23 LAB
ALBUMIN SERPL BCP-MCNC: 3.5 G/DL (ref 3.5–5.2)
ALP SERPL-CCNC: 76 U/L (ref 55–135)
ALT SERPL W/O P-5'-P-CCNC: 49 U/L (ref 10–44)
ANION GAP SERPL CALC-SCNC: 9 MMOL/L (ref 8–16)
AST SERPL-CCNC: 38 U/L (ref 10–40)
BASOPHILS # BLD AUTO: 0.02 K/UL (ref 0–0.2)
BASOPHILS NFR BLD: 0.3 % (ref 0–1.9)
BILIRUB SERPL-MCNC: 0.2 MG/DL (ref 0.1–1)
BUN SERPL-MCNC: 20 MG/DL (ref 6–20)
CALCIUM SERPL-MCNC: 9.1 MG/DL (ref 8.7–10.5)
CHLORIDE SERPL-SCNC: 108 MMOL/L (ref 95–110)
CO2 SERPL-SCNC: 21 MMOL/L (ref 23–29)
CREAT SERPL-MCNC: 1.3 MG/DL (ref 0.5–1.4)
DIFFERENTIAL METHOD BLD: ABNORMAL
EOSINOPHIL # BLD AUTO: 0.8 K/UL (ref 0–0.5)
EOSINOPHIL NFR BLD: 11.8 % (ref 0–8)
ERYTHROCYTE [DISTWIDTH] IN BLOOD BY AUTOMATED COUNT: 12.9 % (ref 11.5–14.5)
EST. GFR  (NO RACE VARIABLE): 50 ML/MIN/1.73 M^2
GLUCOSE SERPL-MCNC: 79 MG/DL (ref 70–110)
HCT VFR BLD AUTO: 39.2 % (ref 37–48.5)
HGB BLD-MCNC: 13.7 G/DL (ref 12–16)
IMM GRANULOCYTES # BLD AUTO: 0.02 K/UL (ref 0–0.04)
IMM GRANULOCYTES NFR BLD AUTO: 0.3 % (ref 0–0.5)
LYMPHOCYTES # BLD AUTO: 1.3 K/UL (ref 1–4.8)
LYMPHOCYTES NFR BLD: 20.8 % (ref 18–48)
MAGNESIUM SERPL-MCNC: 1.8 MG/DL (ref 1.6–2.6)
MCH RBC QN AUTO: 35.8 PG (ref 27–31)
MCHC RBC AUTO-ENTMCNC: 34.9 G/DL (ref 32–36)
MCV RBC AUTO: 102 FL (ref 82–98)
MONOCYTES # BLD AUTO: 1 K/UL (ref 0.3–1)
MONOCYTES NFR BLD: 16.2 % (ref 4–15)
NEUTROPHILS # BLD AUTO: 3.2 K/UL (ref 1.8–7.7)
NEUTROPHILS NFR BLD: 50.6 % (ref 38–73)
NRBC BLD-RTO: 0 /100 WBC
PHOSPHATE SERPL-MCNC: 3.7 MG/DL (ref 2.7–4.5)
PLATELET # BLD AUTO: 243 K/UL (ref 150–450)
PMV BLD AUTO: 9.5 FL (ref 9.2–12.9)
POTASSIUM SERPL-SCNC: 4.4 MMOL/L (ref 3.5–5.1)
PROT SERPL-MCNC: 6.1 G/DL (ref 6–8.4)
RBC # BLD AUTO: 3.83 M/UL (ref 4–5.4)
SODIUM SERPL-SCNC: 138 MMOL/L (ref 136–145)
WBC # BLD AUTO: 6.36 K/UL (ref 3.9–12.7)

## 2024-01-23 PROCEDURE — 85025 COMPLETE CBC W/AUTO DIFF WBC: CPT | Mod: PN | Performed by: INTERNAL MEDICINE

## 2024-01-23 PROCEDURE — 84100 ASSAY OF PHOSPHORUS: CPT | Mod: PO | Performed by: INTERNAL MEDICINE

## 2024-01-23 PROCEDURE — 87799 DETECT AGENT NOS DNA QUANT: CPT | Performed by: INTERNAL MEDICINE

## 2024-01-23 PROCEDURE — 83735 ASSAY OF MAGNESIUM: CPT | Mod: PO | Performed by: INTERNAL MEDICINE

## 2024-01-23 PROCEDURE — 36415 COLL VENOUS BLD VENIPUNCTURE: CPT | Mod: PN | Performed by: INTERNAL MEDICINE

## 2024-01-23 PROCEDURE — 80158 DRUG ASSAY CYCLOSPORINE: CPT | Performed by: INTERNAL MEDICINE

## 2024-01-23 PROCEDURE — 80053 COMPREHEN METABOLIC PANEL: CPT | Mod: PO | Performed by: INTERNAL MEDICINE

## 2024-01-24 LAB
CMV DNA SPEC QL NAA+PROBE: NORMAL
CYCLOSPORINE BLD LC/MS/MS-MCNC: 72 NG/ML (ref 100–400)
CYTOMEGALOVIRUS PCR, QUANT: NOT DETECTED IU/ML
EPSTEIN-BARR VIRUS DNA: NORMAL
EPSTEIN-BARR VIRUS PCR, QUANT: NOT DETECTED IU/ML

## 2024-01-25 PROBLEM — Z94.84 HISTORY OF ALLOGENEIC STEM CELL TRANSPLANT: Status: ACTIVE | Noted: 2024-01-25

## 2024-01-26 ENCOUNTER — LAB VISIT (OUTPATIENT)
Dept: LAB | Facility: HOSPITAL | Age: 49
End: 2024-01-26
Attending: INTERNAL MEDICINE
Payer: COMMERCIAL

## 2024-01-26 DIAGNOSIS — Z94.84 HISTORY OF ALLOGENEIC STEM CELL TRANSPLANT: ICD-10-CM

## 2024-01-26 LAB
ALBUMIN SERPL BCP-MCNC: 3.6 G/DL (ref 3.5–5.2)
ALP SERPL-CCNC: 92 U/L (ref 55–135)
ALT SERPL W/O P-5'-P-CCNC: 98 U/L (ref 10–44)
ANION GAP SERPL CALC-SCNC: 7 MMOL/L (ref 8–16)
AST SERPL-CCNC: 63 U/L (ref 10–40)
BASOPHILS # BLD AUTO: 0.01 K/UL (ref 0–0.2)
BASOPHILS NFR BLD: 0.1 % (ref 0–1.9)
BILIRUB SERPL-MCNC: 0.4 MG/DL (ref 0.1–1)
BUN SERPL-MCNC: 23 MG/DL (ref 6–20)
CALCIUM SERPL-MCNC: 9 MG/DL (ref 8.7–10.5)
CHLORIDE SERPL-SCNC: 109 MMOL/L (ref 95–110)
CO2 SERPL-SCNC: 23 MMOL/L (ref 23–29)
CREAT SERPL-MCNC: 1.4 MG/DL (ref 0.5–1.4)
DIFFERENTIAL METHOD BLD: ABNORMAL
EOSINOPHIL # BLD AUTO: 0.8 K/UL (ref 0–0.5)
EOSINOPHIL NFR BLD: 10.9 % (ref 0–8)
ERYTHROCYTE [DISTWIDTH] IN BLOOD BY AUTOMATED COUNT: 12.8 % (ref 11.5–14.5)
EST. GFR  (NO RACE VARIABLE): 46.1 ML/MIN/1.73 M^2
GLUCOSE SERPL-MCNC: 90 MG/DL (ref 70–110)
HCT VFR BLD AUTO: 39.8 % (ref 37–48.5)
HGB BLD-MCNC: 14.2 G/DL (ref 12–16)
IMM GRANULOCYTES # BLD AUTO: 0.02 K/UL (ref 0–0.04)
IMM GRANULOCYTES NFR BLD AUTO: 0.3 % (ref 0–0.5)
LYMPHOCYTES # BLD AUTO: 1.6 K/UL (ref 1–4.8)
LYMPHOCYTES NFR BLD: 22.7 % (ref 18–48)
MAGNESIUM SERPL-MCNC: 2.1 MG/DL (ref 1.6–2.6)
MCH RBC QN AUTO: 35.8 PG (ref 27–31)
MCHC RBC AUTO-ENTMCNC: 35.7 G/DL (ref 32–36)
MCV RBC AUTO: 100 FL (ref 82–98)
MONOCYTES # BLD AUTO: 1.2 K/UL (ref 0.3–1)
MONOCYTES NFR BLD: 16.5 % (ref 4–15)
NEUTROPHILS # BLD AUTO: 3.5 K/UL (ref 1.8–7.7)
NEUTROPHILS NFR BLD: 49.5 % (ref 38–73)
NRBC BLD-RTO: 0 /100 WBC
PHOSPHATE SERPL-MCNC: 3.9 MG/DL (ref 2.7–4.5)
PLATELET # BLD AUTO: 261 K/UL (ref 150–450)
PMV BLD AUTO: 9.3 FL (ref 9.2–12.9)
POTASSIUM SERPL-SCNC: 4.9 MMOL/L (ref 3.5–5.1)
PROT SERPL-MCNC: 6.3 G/DL (ref 6–8.4)
RBC # BLD AUTO: 3.97 M/UL (ref 4–5.4)
SODIUM SERPL-SCNC: 139 MMOL/L (ref 136–145)
WBC # BLD AUTO: 6.97 K/UL (ref 3.9–12.7)

## 2024-01-26 PROCEDURE — 80158 DRUG ASSAY CYCLOSPORINE: CPT | Performed by: INTERNAL MEDICINE

## 2024-01-26 PROCEDURE — 87799 DETECT AGENT NOS DNA QUANT: CPT | Performed by: INTERNAL MEDICINE

## 2024-01-26 PROCEDURE — 83735 ASSAY OF MAGNESIUM: CPT | Mod: PN | Performed by: INTERNAL MEDICINE

## 2024-01-26 PROCEDURE — 80053 COMPREHEN METABOLIC PANEL: CPT | Mod: PN | Performed by: INTERNAL MEDICINE

## 2024-01-26 PROCEDURE — 85025 COMPLETE CBC W/AUTO DIFF WBC: CPT | Mod: PN | Performed by: INTERNAL MEDICINE

## 2024-01-26 PROCEDURE — 84100 ASSAY OF PHOSPHORUS: CPT | Mod: PN | Performed by: INTERNAL MEDICINE

## 2024-01-27 LAB — CYCLOSPORINE BLD LC/MS/MS-MCNC: 75 NG/ML (ref 100–400)

## 2024-01-29 ENCOUNTER — OFFICE VISIT (OUTPATIENT)
Dept: HEMATOLOGY/ONCOLOGY | Facility: CLINIC | Age: 49
End: 2024-01-29
Payer: COMMERCIAL

## 2024-01-29 VITALS
WEIGHT: 150.69 LBS | SYSTOLIC BLOOD PRESSURE: 117 MMHG | BODY MASS INDEX: 25.73 KG/M2 | OXYGEN SATURATION: 99 % | HEIGHT: 64 IN | DIASTOLIC BLOOD PRESSURE: 68 MMHG | TEMPERATURE: 98 F | HEART RATE: 70 BPM

## 2024-01-29 DIAGNOSIS — Z94.84 HISTORY OF ALLOGENEIC STEM CELL TRANSPLANT: Primary | ICD-10-CM

## 2024-01-29 DIAGNOSIS — C92.01 ACUTE MYELOID LEUKEMIA IN REMISSION: ICD-10-CM

## 2024-01-29 LAB
CMV DNA SPEC QL NAA+PROBE: NORMAL
CYTOMEGALOVIRUS PCR, QUANT: NOT DETECTED IU/ML
EPSTEIN-BARR VIRUS DNA: NORMAL
EPSTEIN-BARR VIRUS PCR, QUANT: NOT DETECTED IU/ML

## 2024-01-29 PROCEDURE — 3008F BODY MASS INDEX DOCD: CPT | Mod: CPTII,S$GLB,, | Performed by: INTERNAL MEDICINE

## 2024-01-29 PROCEDURE — 99214 OFFICE O/P EST MOD 30 MIN: CPT | Mod: S$GLB,,, | Performed by: INTERNAL MEDICINE

## 2024-01-29 PROCEDURE — 99999 PR PBB SHADOW E&M-EST. PATIENT-LVL IV: CPT | Mod: PBBFAC,,, | Performed by: INTERNAL MEDICINE

## 2024-01-29 PROCEDURE — 3078F DIAST BP <80 MM HG: CPT | Mod: CPTII,S$GLB,, | Performed by: INTERNAL MEDICINE

## 2024-01-29 PROCEDURE — 1159F MED LIST DOCD IN RCRD: CPT | Mod: CPTII,S$GLB,, | Performed by: INTERNAL MEDICINE

## 2024-01-29 PROCEDURE — 3074F SYST BP LT 130 MM HG: CPT | Mod: CPTII,S$GLB,, | Performed by: INTERNAL MEDICINE

## 2024-01-29 NOTE — PROGRESS NOTES
Route Chart for Scheduling    BMT Chart Routing      Follow up with physician 3 weeks. In person.   Follow up with KENDALL    Provider visit type Malignant hem   Infusion scheduling note    Injection scheduling note    Labs CBC, CMP, magnesium, phosphorus, CMV and EBV   Scheduling:  Preferred lab:  Lab interval: once a week  Please also include cyclosporine. Labs must be in AM. All labs at [place].   Imaging    Pharmacy appointment    Other referrals

## 2024-01-29 NOTE — PROGRESS NOTES
HEMATOLOGIC MALIGNANCIES PROGRESS NOTE    IDENTIFYING STATEMENT   Thai Coburn) is a 48 y.o. female with a  of 1975 from [place], [place] with the diagnosis of acute myeloie leukemia.      ONCOLOGY HISTORY:    1. 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 2023: Presented to hospital in [place] with 2 week h/o fevers, night sweats, weight loss, lymphadenopathy, myalgias, stomatitis, bruising, sore throat and found to have WBC 333K with 80% blasts c/f AML vs CML in acute blast crisis               B. 2023: Bone marrow biopsy - AML, FLT3 positive.               C. 2023: Began 7+3+midostaurin induction. Subsequent D14 and D28 BMBxs both showed no evidence of persistent leukemia, in CR1               D. 2023: BMBx showed recurrent AML with flow showing 23% myeloblasts.               E. 2023: Began azacitidine-venetoclax-gilteritinib (off protocol)              F. 2023: Bone marrow biopsy showed CR2              G. 2023: Dual cord blood allogeneic stem cell transplant with Flu/Cy/TT/TBI (4 Gy) conditioning. GVHD ppx with cyclosporine/MMF.               H. 10/19/2023: Bone marrow biopsy on day +28 - IRENE by path and hematologics flow.               I. 2023: Day +75 bone marrow biopsy - MRD-neg by flow, NPM1 ddPCR, FLT3 pcr. Full donor chimerisms      2. Attention deficit disorder  3. Uterine leiomyoma    INTERVAL HISTORY:      Ms. Villalba is seen in follow-up of AML with history of dual cord blood allogeneic stem cell transplant. Today is day +130 after alloSCT.     No new symptoms. She is feeling well overall.     Past Medical History, Past Social History and Past Family History have been reviewed and are unchanged except as noted in the interval history.    MEDICATIONS:     Current Outpatient Medications on File Prior to Visit   Medication Sig Dispense Refill    acyclovir (ZOVIRAX) 200 MG capsule Take 4 capsules  twice a day for prophylaxis 240 capsule 9    acyclovir (ZOVIRAX) 400 MG tablet Take 800 mg by mouth 2 (two) times daily.      allopurinoL (ZYLOPRIM) 300 MG tablet Take 300 mg by mouth.      cholecalciferol, vitamin D3, 10 mcg (400 unit) Chew Take 1,000 Int'l Units by mouth.      cycloSPORINE (SANDIMMUNE) 100 MG Cap Take 75 mg by mouth every 12 (twelve) hours.      cycloSPORINE modified, NEORAL, (NEORAL) 25 MG capsule Take 3 capsules twice a day for prevention of graft vesus host disease 120 capsule 4    cycloSPORINE modified, NEORAL, (NEORAL) 25 MG capsule Take 3 capsules twice a day for prevention of graft vesus host disease 60 capsule 0    estrogens,esterified,-methyltestosterone 1.25-2.5mg (ESTRATEST) per tablet Take 1 tablet by mouth once daily.      gabapentin (NEURONTIN) 300 MG capsule Take 1 capsule by mouth three times a day 90 capsule 3    gilteritinib 40 mg Tab Take 2 tablets (80 mg) by mouth Daily. 60 tablet 11    isavuconazonium sulfate (CRESEMBA) 186 mg Cap Take 2 capsules by mouth three times a day for the first 2 days, then take 2 capsules by mouth once a day. 70 capsule 0    letermovir (PREVYMIS) 240 mg Tab Take 1 tablet daily for prevention of cytomegalovirus 28 tablet 2    letermovir 240 mg Tab Take 240 mg by mouth.      magnesium oxide (MAG-OX) 400 mg (241.3 mg magnesium) tablet Take 400 mg by mouth 2 (two) times daily.      magnesium oxide (MAG-OX) 400 mg (241.3 mg magnesium) tablet Take 1 tablet twice a day 180 tablet 2    [START ON 2/2/2024] oxyCODONE (ROXICODONE) 5 MG immediate release tablet 1 tablet by mouth three times a day as needed for pain 90 tablet 0    oxyCODONE (ROXICODONE) 5 MG immediate release tablet Take 1 tablet by mouth three times a day as needed for pain 90 tablet 0    oxyCODONE-acetaminophen (PERCOCET) 5-325 mg per tablet Take 1 tablet by mouth every 4 (four) hours as needed. 30 tablet 0    pantoprazole (PROTONIX) 40 MG tablet Take 40 mg by mouth once daily.       "pantoprazole (PROTONIX) 40 MG tablet Take 1 tablet twice a day for gerd. 180 tablet 1    sulfamethoxazole-trimethoprim 800-160mg (BACTRIM DS) 800-160 mg Tab Take 1 tablet twice a day for  2 days a week for pneumocyctis jiroveci pneumonia prevention 12 tablet 15    sulfamethoxazole-trimethoprim 800-160mg (BACTRIM DS) 800-160 mg Tab Take 1 tablet by mouth.       No current facility-administered medications on file prior to visit.       ALLERGIES:   Review of patient's allergies indicates:   Allergen Reactions    Promethazine Nausea And Vomiting     Other Reaction(s): VOMITING    Penicillin Hives    Penicillins      Other reaction(s): Unknown    Melatonin Anxiety     Other Reaction(s): FLUSHING        ROS:       Review of Systems   Constitutional:  Negative for appetite change, diaphoresis, fatigue, fever and unexpected weight change.   HENT:   Negative for lump/mass and sore throat.    Eyes:  Negative for icterus.   Respiratory:  Negative for cough and shortness of breath.    Cardiovascular:  Negative for chest pain and palpitations.   Gastrointestinal:  Negative for abdominal distention, constipation, diarrhea, nausea and vomiting.   Genitourinary:  Negative for dysuria and frequency.    Musculoskeletal:  Negative for arthralgias, gait problem and myalgias.   Skin:  Negative for rash.   Neurological:  Negative for dizziness, gait problem and headaches.   Hematological:  Negative for adenopathy. Does not bruise/bleed easily.   Psychiatric/Behavioral:  The patient is not nervous/anxious.        PHYSICAL EXAM:  Vitals:    01/29/24 1535   BP: 117/68   Pulse: 70   Temp: 97.9 °F (36.6 °C)   TempSrc: Oral   SpO2: 99%   Weight: 68.3 kg (150 lb 11 oz)   Height: 5' 4" (1.626 m)   PainSc:   4   PainLoc: Back       Physical Exam  Constitutional:       General: She is not in acute distress.     Appearance: She is well-developed.   HENT:      Head: Normocephalic and atraumatic.      Mouth/Throat:      Mouth: No oral lesions. "   Eyes:      Conjunctiva/sclera: Conjunctivae normal.   Neck:      Thyroid: No thyromegaly.   Cardiovascular:      Rate and Rhythm: Normal rate and regular rhythm.      Heart sounds: Normal heart sounds. No murmur heard.  Pulmonary:      Breath sounds: Normal breath sounds. No wheezing or rales.   Abdominal:      General: There is no distension.      Palpations: Abdomen is soft. There is no hepatomegaly, splenomegaly or mass.      Tenderness: There is no abdominal tenderness.   Lymphadenopathy:      Cervical: No cervical adenopathy.      Right cervical: No deep cervical adenopathy.     Left cervical: No deep cervical adenopathy.   Skin:     Findings: No rash.   Neurological:      Mental Status: She is alert and oriented to person, place, and time.      Cranial Nerves: No cranial nerve deficit.      Coordination: Coordination normal.      Deep Tendon Reflexes: Reflexes are normal and symmetric.         LAB:   Results for orders placed or performed in visit on 01/26/24   CBC Auto Differential   Result Value Ref Range    WBC 6.97 3.90 - 12.70 K/uL    RBC 3.97 (L) 4.00 - 5.40 M/uL    Hemoglobin 14.2 12.0 - 16.0 g/dL    Hematocrit 39.8 37.0 - 48.5 %     (H) 82 - 98 fL    MCH 35.8 (H) 27.0 - 31.0 pg    MCHC 35.7 32.0 - 36.0 g/dL    RDW 12.8 11.5 - 14.5 %    Platelets 261 150 - 450 K/uL    MPV 9.3 9.2 - 12.9 fL    Immature Granulocytes 0.3 0.0 - 0.5 %    Gran # (ANC) 3.5 1.8 - 7.7 K/uL    Immature Grans (Abs) 0.02 0.00 - 0.04 K/uL    Lymph # 1.6 1.0 - 4.8 K/uL    Mono # 1.2 (H) 0.3 - 1.0 K/uL    Eos # 0.8 (H) 0.0 - 0.5 K/uL    Baso # 0.01 0.00 - 0.20 K/uL    nRBC 0 0 /100 WBC    Gran % 49.5 38.0 - 73.0 %    Lymph % 22.7 18.0 - 48.0 %    Mono % 16.5 (H) 4.0 - 15.0 %    Eosinophil % 10.9 (H) 0.0 - 8.0 %    Basophil % 0.1 0.0 - 1.9 %    Differential Method Automated    Comprehensive Metabolic Panel   Result Value Ref Range    Sodium 139 136 - 145 mmol/L    Potassium 4.9 3.5 - 5.1 mmol/L    Chloride 109 95 - 110 mmol/L     CO2 23 23 - 29 mmol/L    Glucose 90 70 - 110 mg/dL    BUN 23 (H) 6 - 20 mg/dL    Creatinine 1.4 0.5 - 1.4 mg/dL    Calcium 9.0 8.7 - 10.5 mg/dL    Total Protein 6.3 6.0 - 8.4 g/dL    Albumin 3.6 3.5 - 5.2 g/dL    Total Bilirubin 0.4 0.1 - 1.0 mg/dL    Alkaline Phosphatase 92 55 - 135 U/L    AST 63 (H) 10 - 40 U/L    ALT 98 (H) 10 - 44 U/L    eGFR 46.1 (A) >60 mL/min/1.73 m^2    Anion Gap 7 (L) 8 - 16 mmol/L   Magnesium   Result Value Ref Range    Magnesium 2.1 1.6 - 2.6 mg/dL   PHOSPHORUS   Result Value Ref Range    Phosphorus 3.9 2.7 - 4.5 mg/dL   CYCLOSPORINE LEVEL   Result Value Ref Range    Cyclosporine, LC/MS 75 (L) 100 - 400 ng/mL       PROBLEMS ASSESSED THIS VISIT:    No diagnosis found.    PLAN:       Acute myeloid leukemia  Diagnosed with FLT3 mutated AML. She achieved CR1 with 7+3+midostaurin induction but relapsed prior to allogeneic stem cell transplant. She achieved CR2 with aza/ameya/gilteritinib. Ms. Villalba received dual cord blood allogeneic stem cell transplant and has maintained CR on follow-up bone marrow biopsy.   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 due to anorexia/nausea, treated with prednisone (now off)  - No symptoms reported of aGVHD at this time  - On cyclosporine with the following taper planned:  - ~1/15/24: decrease CSA to 50 mg BID  - ~2/5/24: decrease to CSA 25 mg BID  - ~2/26/24: decrease to CSA  25 mg daily   - ~3/21/24: stop CSA      Infectious Disease  - see GVHD above for current immunosuppressive regimen  - antimicrobial ppx:   - Acyclovir 800 mg BID until 12 months post tx, then decrease to 400 mg BID until 5 year post-transplant jose  - Bactrim DS 1 tab BID on M/Th until 12 months post tx and off IST  - Letermovir ppx until 6 months post tx.   - we discussed consideration of antifungal ppx as per our institutional standard - will prescribe posaconazole   - Prior/resolved infections:              - 9/29/2023: C. Diff - treated with fidaxomicin              - 10/7/2023: Suspected fungal infection based on abnormal CT, treated with isavuconazonium sulfate              - 11/6/2023: UTI - treated with levofloxacin              - 11/8/2023: BK virus              - 11/13/2023: Pneumonia diagnosed on CXR - treated with cefpodoxime     Cytopenias  - Last labs showed resolution of all cytopenias   - Continue to monitor with routine labs monitoring     Chronic pain  - Neuropathic pain secondary to lumbar radiculopathy               - gabapentin 300 mg PO TID              - Oxycodone 10-15 mg q4prn              - will need pain specialist and/or neurologist     Hormone replacement therapy  - on oral estrogen     Follow-up  Weekly labs monitoring  Clinic visit in 3 weeks    Shemar Franco MD  Hematology and Stem Cell Transplant

## 2024-02-01 ENCOUNTER — LAB VISIT (OUTPATIENT)
Dept: LAB | Facility: HOSPITAL | Age: 49
End: 2024-02-01
Attending: INTERNAL MEDICINE
Payer: COMMERCIAL

## 2024-02-01 DIAGNOSIS — Z94.84 HISTORY OF ALLOGENEIC STEM CELL TRANSPLANT: ICD-10-CM

## 2024-02-01 LAB
ALBUMIN SERPL BCP-MCNC: 3.6 G/DL (ref 3.5–5.2)
ALP SERPL-CCNC: 176 U/L (ref 55–135)
ALT SERPL W/O P-5'-P-CCNC: 395 U/L (ref 10–44)
ANION GAP SERPL CALC-SCNC: 6 MMOL/L (ref 8–16)
AST SERPL-CCNC: 200 U/L (ref 10–40)
BASOPHILS # BLD AUTO: 0.02 K/UL (ref 0–0.2)
BASOPHILS NFR BLD: 0.3 % (ref 0–1.9)
BILIRUB SERPL-MCNC: 0.4 MG/DL (ref 0.1–1)
BUN SERPL-MCNC: 20 MG/DL (ref 6–20)
CALCIUM SERPL-MCNC: 8.9 MG/DL (ref 8.7–10.5)
CHLORIDE SERPL-SCNC: 109 MMOL/L (ref 95–110)
CO2 SERPL-SCNC: 24 MMOL/L (ref 23–29)
CREAT SERPL-MCNC: 1.3 MG/DL (ref 0.5–1.4)
DIFFERENTIAL METHOD BLD: ABNORMAL
EOSINOPHIL # BLD AUTO: 0.7 K/UL (ref 0–0.5)
EOSINOPHIL NFR BLD: 10.5 % (ref 0–8)
ERYTHROCYTE [DISTWIDTH] IN BLOOD BY AUTOMATED COUNT: 12.8 % (ref 11.5–14.5)
EST. GFR  (NO RACE VARIABLE): 50.4 ML/MIN/1.73 M^2
GLUCOSE SERPL-MCNC: 107 MG/DL (ref 70–110)
HCT VFR BLD AUTO: 40.6 % (ref 37–48.5)
HGB BLD-MCNC: 14.3 G/DL (ref 12–16)
IMM GRANULOCYTES # BLD AUTO: 0.02 K/UL (ref 0–0.04)
IMM GRANULOCYTES NFR BLD AUTO: 0.3 % (ref 0–0.5)
LYMPHOCYTES # BLD AUTO: 1.5 K/UL (ref 1–4.8)
LYMPHOCYTES NFR BLD: 22.4 % (ref 18–48)
MAGNESIUM SERPL-MCNC: 2.1 MG/DL (ref 1.6–2.6)
MCH RBC QN AUTO: 35.7 PG (ref 27–31)
MCHC RBC AUTO-ENTMCNC: 35.2 G/DL (ref 32–36)
MCV RBC AUTO: 101 FL (ref 82–98)
MONOCYTES # BLD AUTO: 1 K/UL (ref 0.3–1)
MONOCYTES NFR BLD: 16.1 % (ref 4–15)
NEUTROPHILS # BLD AUTO: 3.3 K/UL (ref 1.8–7.7)
NEUTROPHILS NFR BLD: 50.4 % (ref 38–73)
NRBC BLD-RTO: 0 /100 WBC
PHOSPHATE SERPL-MCNC: 3.9 MG/DL (ref 2.7–4.5)
PLATELET # BLD AUTO: 258 K/UL (ref 150–450)
PMV BLD AUTO: 9.4 FL (ref 9.2–12.9)
POTASSIUM SERPL-SCNC: 4.6 MMOL/L (ref 3.5–5.1)
PROT SERPL-MCNC: 6.3 G/DL (ref 6–8.4)
RBC # BLD AUTO: 4.01 M/UL (ref 4–5.4)
SODIUM SERPL-SCNC: 139 MMOL/L (ref 136–145)
WBC # BLD AUTO: 6.46 K/UL (ref 3.9–12.7)

## 2024-02-01 PROCEDURE — 85025 COMPLETE CBC W/AUTO DIFF WBC: CPT | Mod: PN | Performed by: INTERNAL MEDICINE

## 2024-02-01 PROCEDURE — 84100 ASSAY OF PHOSPHORUS: CPT | Mod: PN | Performed by: INTERNAL MEDICINE

## 2024-02-01 PROCEDURE — 87799 DETECT AGENT NOS DNA QUANT: CPT | Performed by: INTERNAL MEDICINE

## 2024-02-01 PROCEDURE — 80053 COMPREHEN METABOLIC PANEL: CPT | Mod: PN | Performed by: INTERNAL MEDICINE

## 2024-02-01 PROCEDURE — 80158 DRUG ASSAY CYCLOSPORINE: CPT | Performed by: INTERNAL MEDICINE

## 2024-02-01 PROCEDURE — 83735 ASSAY OF MAGNESIUM: CPT | Mod: PN | Performed by: INTERNAL MEDICINE

## 2024-02-02 ENCOUNTER — LAB VISIT (OUTPATIENT)
Dept: LAB | Facility: HOSPITAL | Age: 49
End: 2024-02-02
Attending: INTERNAL MEDICINE
Payer: COMMERCIAL

## 2024-02-02 ENCOUNTER — TELEPHONE (OUTPATIENT)
Dept: HEMATOLOGY/ONCOLOGY | Facility: CLINIC | Age: 49
End: 2024-02-02
Payer: COMMERCIAL

## 2024-02-02 DIAGNOSIS — R74.01 TRANSAMINITIS: ICD-10-CM

## 2024-02-02 DIAGNOSIS — Z94.84 HISTORY OF ALLOGENEIC STEM CELL TRANSPLANT: ICD-10-CM

## 2024-02-02 DIAGNOSIS — Z94.84 HISTORY OF ALLOGENEIC STEM CELL TRANSPLANT: Primary | ICD-10-CM

## 2024-02-02 LAB
ALBUMIN SERPL BCP-MCNC: 3.6 G/DL (ref 3.5–5.2)
ALP SERPL-CCNC: 170 U/L (ref 55–135)
ALT SERPL W/O P-5'-P-CCNC: 348 U/L (ref 10–44)
ANION GAP SERPL CALC-SCNC: 7 MMOL/L (ref 8–16)
AST SERPL-CCNC: 160 U/L (ref 10–40)
BILIRUB SERPL-MCNC: 0.4 MG/DL (ref 0.1–1)
BUN SERPL-MCNC: 18 MG/DL (ref 6–20)
CALCIUM SERPL-MCNC: 8.7 MG/DL (ref 8.7–10.5)
CHLORIDE SERPL-SCNC: 107 MMOL/L (ref 95–110)
CMV DNA SPEC QL NAA+PROBE: NORMAL
CO2 SERPL-SCNC: 23 MMOL/L (ref 23–29)
CREAT SERPL-MCNC: 1.2 MG/DL (ref 0.5–1.4)
CYCLOSPORINE BLD LC/MS/MS-MCNC: 49 NG/ML (ref 100–400)
CYTOMEGALOVIRUS PCR, QUANT: NOT DETECTED IU/ML
EPSTEIN-BARR VIRUS DNA: NORMAL
EPSTEIN-BARR VIRUS PCR, QUANT: NOT DETECTED IU/ML
EST. GFR  (NO RACE VARIABLE): 55.5 ML/MIN/1.73 M^2
FERRITIN SERPL-MCNC: 1618 NG/ML (ref 20–300)
GLUCOSE SERPL-MCNC: 103 MG/DL (ref 70–110)
IRON SERPL-MCNC: 85 UG/DL (ref 30–160)
POTASSIUM SERPL-SCNC: 4.7 MMOL/L (ref 3.5–5.1)
PROT SERPL-MCNC: 6.3 G/DL (ref 6–8.4)
SATURATED IRON: 21 % (ref 20–50)
SODIUM SERPL-SCNC: 137 MMOL/L (ref 136–145)
TOTAL IRON BINDING CAPACITY: 413 UG/DL (ref 250–450)
TRANSFERRIN SERPL-MCNC: 279 MG/DL (ref 200–375)

## 2024-02-02 PROCEDURE — 87532 HHV-6 DNA AMP PROBE: CPT | Performed by: INTERNAL MEDICINE

## 2024-02-02 PROCEDURE — 87798 DETECT AGENT NOS DNA AMP: CPT | Performed by: INTERNAL MEDICINE

## 2024-02-02 PROCEDURE — 80053 COMPREHEN METABOLIC PANEL: CPT | Mod: PN | Performed by: INTERNAL MEDICINE

## 2024-02-02 PROCEDURE — 36415 COLL VENOUS BLD VENIPUNCTURE: CPT | Mod: PN | Performed by: INTERNAL MEDICINE

## 2024-02-02 PROCEDURE — 82728 ASSAY OF FERRITIN: CPT | Performed by: INTERNAL MEDICINE

## 2024-02-02 PROCEDURE — 83540 ASSAY OF IRON: CPT | Performed by: INTERNAL MEDICINE

## 2024-02-02 NOTE — TELEPHONE ENCOUNTER
"----- Message from Fabricio Wilde sent at 2/2/2024  2:37 PM CST -----  Consult/Advisory:      Name Of Caller: Self      Contact Preference?: [phone]       What is the nature of the call?: Returning call to office about going in for blood work today at [facility]      Additional Notes:  "Thank you for all that you do for our patients"         "

## 2024-02-06 LAB — HADV DNA SERPL QL NAA+PROBE: NEGATIVE

## 2024-02-07 LAB — HHV6 DNA SER QL NAA+PROBE: NEGATIVE

## 2024-02-08 ENCOUNTER — LAB VISIT (OUTPATIENT)
Dept: LAB | Facility: HOSPITAL | Age: 49
End: 2024-02-08
Attending: INTERNAL MEDICINE
Payer: COMMERCIAL

## 2024-02-08 DIAGNOSIS — Z94.84 HISTORY OF ALLOGENEIC STEM CELL TRANSPLANT: ICD-10-CM

## 2024-02-08 LAB
ALBUMIN SERPL BCP-MCNC: 3.5 G/DL (ref 3.5–5.2)
ALP SERPL-CCNC: 180 U/L (ref 55–135)
ALT SERPL W/O P-5'-P-CCNC: 264 U/L (ref 10–44)
ANION GAP SERPL CALC-SCNC: 8 MMOL/L (ref 8–16)
AST SERPL-CCNC: 90 U/L (ref 10–40)
BASOPHILS # BLD AUTO: 0.02 K/UL (ref 0–0.2)
BASOPHILS NFR BLD: 0.3 % (ref 0–1.9)
BILIRUB SERPL-MCNC: 0.5 MG/DL (ref 0.1–1)
BUN SERPL-MCNC: 13 MG/DL (ref 6–20)
CALCIUM SERPL-MCNC: 8.8 MG/DL (ref 8.7–10.5)
CHLORIDE SERPL-SCNC: 108 MMOL/L (ref 95–110)
CO2 SERPL-SCNC: 23 MMOL/L (ref 23–29)
CREAT SERPL-MCNC: 1.2 MG/DL (ref 0.5–1.4)
DIFFERENTIAL METHOD BLD: ABNORMAL
EOSINOPHIL # BLD AUTO: 0.7 K/UL (ref 0–0.5)
EOSINOPHIL NFR BLD: 12.2 % (ref 0–8)
ERYTHROCYTE [DISTWIDTH] IN BLOOD BY AUTOMATED COUNT: 12.9 % (ref 11.5–14.5)
EST. GFR  (NO RACE VARIABLE): 55.5 ML/MIN/1.73 M^2
GLUCOSE SERPL-MCNC: 89 MG/DL (ref 70–110)
HCT VFR BLD AUTO: 40.8 % (ref 37–48.5)
HGB BLD-MCNC: 14.1 G/DL (ref 12–16)
IMM GRANULOCYTES # BLD AUTO: 0.02 K/UL (ref 0–0.04)
IMM GRANULOCYTES NFR BLD AUTO: 0.3 % (ref 0–0.5)
LYMPHOCYTES # BLD AUTO: 1.3 K/UL (ref 1–4.8)
LYMPHOCYTES NFR BLD: 21.9 % (ref 18–48)
MAGNESIUM SERPL-MCNC: 2 MG/DL (ref 1.6–2.6)
MCH RBC QN AUTO: 34.7 PG (ref 27–31)
MCHC RBC AUTO-ENTMCNC: 34.6 G/DL (ref 32–36)
MCV RBC AUTO: 101 FL (ref 82–98)
MONOCYTES # BLD AUTO: 0.8 K/UL (ref 0.3–1)
MONOCYTES NFR BLD: 14 % (ref 4–15)
NEUTROPHILS # BLD AUTO: 3 K/UL (ref 1.8–7.7)
NEUTROPHILS NFR BLD: 51.3 % (ref 38–73)
NRBC BLD-RTO: 0 /100 WBC
PHOSPHATE SERPL-MCNC: 3.6 MG/DL (ref 2.7–4.5)
PLATELET # BLD AUTO: 260 K/UL (ref 150–450)
PMV BLD AUTO: 9 FL (ref 9.2–12.9)
POTASSIUM SERPL-SCNC: 4.8 MMOL/L (ref 3.5–5.1)
PROT SERPL-MCNC: 6.1 G/DL (ref 6–8.4)
RBC # BLD AUTO: 4.06 M/UL (ref 4–5.4)
SODIUM SERPL-SCNC: 139 MMOL/L (ref 136–145)
WBC # BLD AUTO: 5.8 K/UL (ref 3.9–12.7)

## 2024-02-08 PROCEDURE — 84100 ASSAY OF PHOSPHORUS: CPT | Mod: PN | Performed by: INTERNAL MEDICINE

## 2024-02-08 PROCEDURE — 36415 COLL VENOUS BLD VENIPUNCTURE: CPT | Mod: PN | Performed by: INTERNAL MEDICINE

## 2024-02-08 PROCEDURE — 83735 ASSAY OF MAGNESIUM: CPT | Mod: PN | Performed by: INTERNAL MEDICINE

## 2024-02-08 PROCEDURE — 87799 DETECT AGENT NOS DNA QUANT: CPT | Performed by: INTERNAL MEDICINE

## 2024-02-08 PROCEDURE — 80053 COMPREHEN METABOLIC PANEL: CPT | Mod: PN | Performed by: INTERNAL MEDICINE

## 2024-02-08 PROCEDURE — 85025 COMPLETE CBC W/AUTO DIFF WBC: CPT | Mod: PN | Performed by: INTERNAL MEDICINE

## 2024-02-08 PROCEDURE — 80158 DRUG ASSAY CYCLOSPORINE: CPT | Performed by: INTERNAL MEDICINE

## 2024-02-09 LAB
CMV DNA SPEC QL NAA+PROBE: NORMAL
CYCLOSPORINE BLD LC/MS/MS-MCNC: 44 NG/ML (ref 100–400)
CYTOMEGALOVIRUS PCR, QUANT: NOT DETECTED IU/ML
EPSTEIN-BARR VIRUS DNA: NORMAL
EPSTEIN-BARR VIRUS PCR, QUANT: NOT DETECTED IU/ML

## 2024-02-15 ENCOUNTER — LAB VISIT (OUTPATIENT)
Dept: LAB | Facility: HOSPITAL | Age: 49
End: 2024-02-15
Attending: INTERNAL MEDICINE

## 2024-02-15 DIAGNOSIS — Z94.84 HISTORY OF ALLOGENEIC STEM CELL TRANSPLANT: ICD-10-CM

## 2024-02-15 LAB
ALBUMIN SERPL BCP-MCNC: 3.7 G/DL (ref 3.5–5.2)
ALP SERPL-CCNC: 225 U/L (ref 55–135)
ALT SERPL W/O P-5'-P-CCNC: 200 U/L (ref 10–44)
ANION GAP SERPL CALC-SCNC: 9 MMOL/L (ref 8–16)
AST SERPL-CCNC: 58 U/L (ref 10–40)
BASOPHILS # BLD AUTO: 0.01 K/UL (ref 0–0.2)
BASOPHILS NFR BLD: 0.1 % (ref 0–1.9)
BILIRUB SERPL-MCNC: 0.4 MG/DL (ref 0.1–1)
BUN SERPL-MCNC: 21 MG/DL (ref 6–20)
CALCIUM SERPL-MCNC: 8.8 MG/DL (ref 8.7–10.5)
CHLORIDE SERPL-SCNC: 107 MMOL/L (ref 95–110)
CO2 SERPL-SCNC: 23 MMOL/L (ref 23–29)
CREAT SERPL-MCNC: 1.2 MG/DL (ref 0.5–1.4)
DIFFERENTIAL METHOD BLD: ABNORMAL
EOSINOPHIL # BLD AUTO: 0.6 K/UL (ref 0–0.5)
EOSINOPHIL NFR BLD: 8.8 % (ref 0–8)
ERYTHROCYTE [DISTWIDTH] IN BLOOD BY AUTOMATED COUNT: 12.7 % (ref 11.5–14.5)
EST. GFR  (NO RACE VARIABLE): 55.5 ML/MIN/1.73 M^2
GLUCOSE SERPL-MCNC: 101 MG/DL (ref 70–110)
HCT VFR BLD AUTO: 40.5 % (ref 37–48.5)
HGB BLD-MCNC: 14.3 G/DL (ref 12–16)
IMM GRANULOCYTES # BLD AUTO: 0.03 K/UL (ref 0–0.04)
IMM GRANULOCYTES NFR BLD AUTO: 0.4 % (ref 0–0.5)
LYMPHOCYTES # BLD AUTO: 1.7 K/UL (ref 1–4.8)
LYMPHOCYTES NFR BLD: 24.9 % (ref 18–48)
MAGNESIUM SERPL-MCNC: 2 MG/DL (ref 1.6–2.6)
MCH RBC QN AUTO: 35.4 PG (ref 27–31)
MCHC RBC AUTO-ENTMCNC: 35.3 G/DL (ref 32–36)
MCV RBC AUTO: 100 FL (ref 82–98)
MONOCYTES # BLD AUTO: 1 K/UL (ref 0.3–1)
MONOCYTES NFR BLD: 14.8 % (ref 4–15)
NEUTROPHILS # BLD AUTO: 3.4 K/UL (ref 1.8–7.7)
NEUTROPHILS NFR BLD: 51 % (ref 38–73)
NRBC BLD-RTO: 0 /100 WBC
PHOSPHATE SERPL-MCNC: 3.9 MG/DL (ref 2.7–4.5)
PLATELET # BLD AUTO: 258 K/UL (ref 150–450)
PMV BLD AUTO: 9.3 FL (ref 9.2–12.9)
POTASSIUM SERPL-SCNC: 4.3 MMOL/L (ref 3.5–5.1)
PROT SERPL-MCNC: 6.4 G/DL (ref 6–8.4)
RBC # BLD AUTO: 4.04 M/UL (ref 4–5.4)
SODIUM SERPL-SCNC: 139 MMOL/L (ref 136–145)
WBC # BLD AUTO: 6.74 K/UL (ref 3.9–12.7)

## 2024-02-15 PROCEDURE — 80053 COMPREHEN METABOLIC PANEL: CPT | Mod: PN | Performed by: INTERNAL MEDICINE

## 2024-02-15 PROCEDURE — 83735 ASSAY OF MAGNESIUM: CPT | Mod: PN | Performed by: INTERNAL MEDICINE

## 2024-02-15 PROCEDURE — 87799 DETECT AGENT NOS DNA QUANT: CPT | Performed by: INTERNAL MEDICINE

## 2024-02-15 PROCEDURE — 80158 DRUG ASSAY CYCLOSPORINE: CPT | Performed by: INTERNAL MEDICINE

## 2024-02-15 PROCEDURE — 85025 COMPLETE CBC W/AUTO DIFF WBC: CPT | Mod: PN | Performed by: INTERNAL MEDICINE

## 2024-02-15 PROCEDURE — 84100 ASSAY OF PHOSPHORUS: CPT | Mod: PN | Performed by: INTERNAL MEDICINE

## 2024-02-16 DIAGNOSIS — Z94.84 HISTORY OF ALLOGENEIC STEM CELL TRANSPLANT: ICD-10-CM

## 2024-02-16 LAB
CMV DNA SPEC QL NAA+PROBE: NORMAL
CYCLOSPORINE BLD LC/MS/MS-MCNC: 43 NG/ML (ref 100–400)
CYTOMEGALOVIRUS PCR, QUANT: NOT DETECTED IU/ML
EPSTEIN-BARR VIRUS DNA: NORMAL
EPSTEIN-BARR VIRUS PCR, QUANT: NOT DETECTED IU/ML

## 2024-02-16 RX ORDER — ISAVUCONAZONIUM SULFATE 186 MG/1
372 CAPSULE ORAL SEE ADMIN INSTRUCTIONS
Qty: 70 CAPSULE | Refills: 0 | Status: SHIPPED | OUTPATIENT
Start: 2024-02-16 | End: 2024-02-20 | Stop reason: SDUPTHER

## 2024-02-16 RX ORDER — ISAVUCONAZONIUM SULFATE 186 MG/1
372 CAPSULE ORAL SEE ADMIN INSTRUCTIONS
Qty: 70 CAPSULE | Refills: 0 | Status: CANCELLED | OUTPATIENT
Start: 2024-02-16

## 2024-02-20 ENCOUNTER — OFFICE VISIT (OUTPATIENT)
Dept: HEMATOLOGY/ONCOLOGY | Facility: CLINIC | Age: 49
End: 2024-02-20
Payer: COMMERCIAL

## 2024-02-20 VITALS
HEART RATE: 76 BPM | TEMPERATURE: 98 F | RESPIRATION RATE: 18 BRPM | BODY MASS INDEX: 25.18 KG/M2 | SYSTOLIC BLOOD PRESSURE: 107 MMHG | WEIGHT: 147.5 LBS | HEIGHT: 64 IN | OXYGEN SATURATION: 99 % | DIASTOLIC BLOOD PRESSURE: 75 MMHG

## 2024-02-20 DIAGNOSIS — K13.79 MOUTH SORES: ICD-10-CM

## 2024-02-20 DIAGNOSIS — R74.01 TRANSAMINITIS: ICD-10-CM

## 2024-02-20 DIAGNOSIS — D89.813 GVHD (GRAFT VERSUS HOST DISEASE): ICD-10-CM

## 2024-02-20 DIAGNOSIS — Z94.84 HISTORY OF ALLOGENEIC STEM CELL TRANSPLANT: Primary | ICD-10-CM

## 2024-02-20 DIAGNOSIS — C92.01 ACUTE MYELOID LEUKEMIA IN REMISSION: ICD-10-CM

## 2024-02-20 PROCEDURE — 3008F BODY MASS INDEX DOCD: CPT | Mod: CPTII,S$GLB,, | Performed by: INTERNAL MEDICINE

## 2024-02-20 PROCEDURE — 3078F DIAST BP <80 MM HG: CPT | Mod: CPTII,S$GLB,, | Performed by: INTERNAL MEDICINE

## 2024-02-20 PROCEDURE — 99215 OFFICE O/P EST HI 40 MIN: CPT | Mod: S$GLB,,, | Performed by: INTERNAL MEDICINE

## 2024-02-20 PROCEDURE — 1159F MED LIST DOCD IN RCRD: CPT | Mod: CPTII,S$GLB,, | Performed by: INTERNAL MEDICINE

## 2024-02-20 PROCEDURE — 1160F RVW MEDS BY RX/DR IN RCRD: CPT | Mod: CPTII,S$GLB,, | Performed by: INTERNAL MEDICINE

## 2024-02-20 PROCEDURE — 3074F SYST BP LT 130 MM HG: CPT | Mod: CPTII,S$GLB,, | Performed by: INTERNAL MEDICINE

## 2024-02-20 PROCEDURE — 99999 PR PBB SHADOW E&M-EST. PATIENT-LVL IV: CPT | Mod: PBBFAC,,, | Performed by: INTERNAL MEDICINE

## 2024-02-20 RX ORDER — TRIAMCINOLONE ACETONIDE 1 MG/G
CREAM TOPICAL 2 TIMES DAILY
Qty: 80 G | Refills: 2 | Status: SHIPPED | OUTPATIENT
Start: 2024-02-20

## 2024-02-20 RX ORDER — ISAVUCONAZONIUM SULFATE 186 MG/1
372 CAPSULE ORAL SEE ADMIN INSTRUCTIONS
Qty: 70 CAPSULE | Refills: 5 | Status: SHIPPED | OUTPATIENT
Start: 2024-02-20

## 2024-02-20 RX ORDER — DEXAMETHASONE 0.5 MG/5ML
1 SOLUTION ORAL EVERY 12 HOURS
Qty: 500 ML | Refills: 1 | Status: SHIPPED | OUTPATIENT
Start: 2024-02-20

## 2024-02-20 NOTE — PROGRESS NOTES
HEMATOLOGIC MALIGNANCIES PROGRESS NOTE    IDENTIFYING STATEMENT   Thai Villalba (Thai) is a 48 y.o. female with a  of 1975 from [place], [place] with the diagnosis of acute myeloie leukemia.      ONCOLOGY HISTORY:    1. 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 2023: Presented to hospital in [place] with 2 week h/o fevers, night sweats, weight loss, lymphadenopathy, myalgias, stomatitis, bruising, sore throat and found to have WBC 333K with 80% blasts c/f AML vs CML in acute blast crisis               B. 2023: Bone marrow biopsy - AML, FLT3 positive.               C. 2023: Began 7+3+midostaurin induction. Subsequent D14 and D28 BMBxs both showed no evidence of persistent leukemia, in CR1               D. 2023: BMBx showed recurrent AML with flow showing 23% myeloblasts.               E. 2023: Began azacitidine-venetoclax-gilteritinib (off protocol)              F. 2023: Bone marrow biopsy showed CR2              G. 2023: Dual cord blood allogeneic stem cell transplant with Flu/Cy/TT/TBI (4 Gy) conditioning. GVHD ppx with cyclosporine/MMF.               H. 10/19/2023: Bone marrow biopsy on day +28 - IRENE by path and hematologics flow.               I. 2023: Day +75 bone marrow biopsy - MRD-neg by flow, NPM1 ddPCR, FLT3 pcr. Full donor chimerisms      2. Attention deficit disorder  3. Uterine leiomyoma    INTERVAL HISTORY:      Ms. Villalba is seen in follow-up of AML with history of dual cord blood allogeneic stem cell transplant. Today is day +152 after alloSCT. She reports the following:    -Dizzy spells - equilibrium off balance daily - had fainting spell in [place]  - Dry patches/rash on skin (near neck)  - Blisters on tongue  - Chapped lips   - has questions about     Past Medical History, Past Social History and Past Family History have been reviewed and are unchanged except as noted in the interval  history.    MEDICATIONS:     Current Outpatient Medications on File Prior to Visit   Medication Sig Dispense Refill    acyclovir (ZOVIRAX) 200 MG capsule Take 4 capsules twice a day for prophylaxis 240 capsule 9    allopurinoL (ZYLOPRIM) 300 MG tablet Take 300 mg by mouth.      cholecalciferol, vitamin D3, 10 mcg (400 unit) Chew Take 1,000 Int'l Units by mouth.      cycloSPORINE (SANDIMMUNE) 100 MG Cap Take 75 mg by mouth every 12 (twelve) hours.      cycloSPORINE modified, NEORAL, (NEORAL) 25 MG capsule Take 3 capsules twice a day for prevention of graft vesus host disease 120 capsule 4    estradioL (VIVELLE-DOT) 0.1 mg/24 hr PTSW Place 1 patch onto the skin twice a week. 28 patch 0    gabapentin (NEURONTIN) 300 MG capsule Take 1 capsule by mouth three times a day 90 capsule 3    gabapentin (NEURONTIN) 300 MG capsule Take 2 capsule by mouth three times a day 180 capsule 1    gilteritinib 40 mg Tab Take 2 tablets (80 mg) by mouth Daily. 60 tablet 11    letermovir (PREVYMIS) 240 mg Tab Take 1 tablet daily for prevention of cytomegalovirus 28 tablet 2    magnesium oxide (MAG-OX) 400 mg (241.3 mg magnesium) tablet Take 400 mg by mouth 2 (two) times daily.      oxyCODONE (ROXICODONE) 5 MG immediate release tablet 1 tablet by mouth three times a day as needed for pain 90 tablet 0    pantoprazole (PROTONIX) 40 MG tablet Take 40 mg by mouth once daily.      [DISCONTINUED] sulfamethoxazole-trimethoprim 800-160mg (BACTRIM DS) 800-160 mg Tab Take 1 tablet by mouth.      acyclovir (ZOVIRAX) 400 MG tablet Take 800 mg by mouth 2 (two) times daily.      cycloSPORINE modified, NEORAL, (NEORAL) 25 MG capsule Take 3 capsules twice a day for prevention of graft vesus host disease 60 capsule 0    estrogens,esterified,-methyltestosterone 1.25-2.5mg (ESTRATEST) per tablet Take 1 tablet by mouth once daily.      oxyCODONE-acetaminophen (PERCOCET) 5-325 mg per tablet Take 1 tablet by mouth every 4 (four) hours as needed.  30 tablet 0    sulfamethoxazole-trimethoprim 800-160mg (BACTRIM DS) 800-160 mg Tab Take 1 tablet twice a day for  2 days a week for pneumocyctis jiroveci pneumonia prevention 12 tablet 15    [DISCONTINUED] isavuconazonium sulfate (CRESEMBA) 186 mg Cap Take 2 capsules by mouth three times a day for the first 2 days, then 2 capsules once a day thereafter (Patient not taking: Reported on 2/20/2024) 70 capsule 0    [DISCONTINUED] letermovir 240 mg Tab Take 240 mg by mouth.      [DISCONTINUED] magnesium oxide (MAG-OX) 400 mg (241.3 mg magnesium) tablet Take 1 tablet twice a day 180 tablet 2    [DISCONTINUED] oxyCODONE (ROXICODONE) 5 MG immediate release tablet Take 1 tablet by mouth three times a day as needed for pain 90 tablet 0    [DISCONTINUED] oxyCODONE (ROXICODONE) 5 MG immediate release tablet 1 tablet by mouth three times a day as needed for pain 90 tablet 0    [DISCONTINUED] oxyCODONE (ROXICODONE) 5 MG immediate release tablet 1 tablet by mouth three times a day as needed for pain 90 tablet 0    [DISCONTINUED] pantoprazole (PROTONIX) 40 MG tablet Take 1 tablet twice a day for gerd. 180 tablet 1     No current facility-administered medications on file prior to visit.       ALLERGIES:   Review of patient's allergies indicates:   Allergen Reactions    Promethazine Nausea And Vomiting     Other Reaction(s): VOMITING    Penicillin Hives    Penicillins      Other reaction(s): Unknown    Melatonin Anxiety     Other Reaction(s): FLUSHING        ROS:       Review of Systems   Constitutional:  Negative for appetite change, diaphoresis, fatigue, fever and unexpected weight change.   HENT:   Negative for lump/mass and sore throat.    Eyes:  Negative for icterus.   Respiratory:  Negative for cough and shortness of breath.    Cardiovascular:  Negative for chest pain and palpitations.   Gastrointestinal:  Negative for abdominal distention, constipation, diarrhea, nausea and vomiting.   Genitourinary:  Negative for  "dysuria and frequency.    Musculoskeletal:  Negative for arthralgias, gait problem and myalgias.   Skin:  Negative for rash.   Neurological:  Negative for dizziness, gait problem and headaches.   Hematological:  Negative for adenopathy. Does not bruise/bleed easily.   Psychiatric/Behavioral:  The patient is not nervous/anxious.        PHYSICAL EXAM:  Vitals:    02/20/24 1318   BP: 107/75   Pulse: 76   Resp: 18   Temp: 97.9 °F (36.6 °C)   TempSrc: Oral   SpO2: 99%   Weight: 66.9 kg (147 lb 7.8 oz)   Height: 5' 4" (1.626 m)   PainSc: 0-No pain       Physical Exam  Constitutional:       General: She is not in acute distress.     Appearance: She is well-developed.   HENT:      Head: Normocephalic and atraumatic.      Mouth/Throat:      Mouth: No oral lesions.   Eyes:      Conjunctiva/sclera: Conjunctivae normal.   Neck:      Thyroid: No thyromegaly.   Cardiovascular:      Rate and Rhythm: Normal rate and regular rhythm.      Heart sounds: Normal heart sounds. No murmur heard.  Pulmonary:      Breath sounds: Normal breath sounds. No wheezing or rales.   Abdominal:      General: There is no distension.      Palpations: Abdomen is soft. There is no hepatomegaly, splenomegaly or mass.      Tenderness: There is no abdominal tenderness.   Lymphadenopathy:      Cervical: No cervical adenopathy.      Right cervical: No deep cervical adenopathy.     Left cervical: No deep cervical adenopathy.   Skin:     Findings: No rash.   Neurological:      Mental Status: She is alert and oriented to person, place, and time.      Cranial Nerves: No cranial nerve deficit.      Coordination: Coordination normal.      Deep Tendon Reflexes: Reflexes are normal and symmetric.       LAB:   Results for orders placed or performed in visit on 02/15/24   CBC Auto Differential   Result Value Ref Range    WBC 6.74 3.90 - 12.70 K/uL    RBC 4.04 4.00 - 5.40 M/uL    Hemoglobin 14.3 12.0 - 16.0 g/dL    Hematocrit 40.5 37.0 - 48.5 %     (H) 82 - 98 fL "    MCH 35.4 (H) 27.0 - 31.0 pg    MCHC 35.3 32.0 - 36.0 g/dL    RDW 12.7 11.5 - 14.5 %    Platelets 258 150 - 450 K/uL    MPV 9.3 9.2 - 12.9 fL    Immature Granulocytes 0.4 0.0 - 0.5 %    Gran # (ANC) 3.4 1.8 - 7.7 K/uL    Immature Grans (Abs) 0.03 0.00 - 0.04 K/uL    Lymph # 1.7 1.0 - 4.8 K/uL    Mono # 1.0 0.3 - 1.0 K/uL    Eos # 0.6 (H) 0.0 - 0.5 K/uL    Baso # 0.01 0.00 - 0.20 K/uL    nRBC 0 0 /100 WBC    Gran % 51.0 38.0 - 73.0 %    Lymph % 24.9 18.0 - 48.0 %    Mono % 14.8 4.0 - 15.0 %    Eosinophil % 8.8 (H) 0.0 - 8.0 %    Basophil % 0.1 0.0 - 1.9 %    Differential Method Automated    Comprehensive Metabolic Panel   Result Value Ref Range    Sodium 139 136 - 145 mmol/L    Potassium 4.3 3.5 - 5.1 mmol/L    Chloride 107 95 - 110 mmol/L    CO2 23 23 - 29 mmol/L    Glucose 101 70 - 110 mg/dL    BUN 21 (H) 6 - 20 mg/dL    Creatinine 1.2 0.5 - 1.4 mg/dL    Calcium 8.8 8.7 - 10.5 mg/dL    Total Protein 6.4 6.0 - 8.4 g/dL    Albumin 3.7 3.5 - 5.2 g/dL    Total Bilirubin 0.4 0.1 - 1.0 mg/dL    Alkaline Phosphatase 225 (H) 55 - 135 U/L    AST 58 (H) 10 - 40 U/L     (H) 10 - 44 U/L    eGFR 55.5 (A) >60 mL/min/1.73 m^2    Anion Gap 9 8 - 16 mmol/L   Magnesium   Result Value Ref Range    Magnesium 2.0 1.6 - 2.6 mg/dL   PHOSPHORUS   Result Value Ref Range    Phosphorus 3.9 2.7 - 4.5 mg/dL   CMV DNA, QUANTITATIVE, PCR   Result Value Ref Range    Cytomegalovirus PCR, Quant Not Detected <50 IU/mL    Cytomegalovirus DNA CMV DNA not detected Not Detected   EMERITA-BALDWIN VIRUS DNA, QUANTITATIVE (PCR)   Result Value Ref Range    Emerita-Barr Virus PCR, Quant Not Detected <12 IU/mL    Emerita-Barr Virus DNA EBV DNA not detected Not Detected   CYCLOSPORINE LEVEL   Result Value Ref Range    Cyclosporine, LC/MS 43 (L) 100 - 400 ng/mL       PROBLEMS ASSESSED THIS VISIT:    1. History of allogeneic stem cell transplant        PLAN:       Acute myeloid leukemia  Diagnosed with FLT3 mutated AML. She achieved CR1 with  7+3+midostaurin induction but relapsed prior to allogeneic stem cell transplant. She achieved CR2 with aza/ameya/gilteritinib. Ms. Villalba received dual cord blood allogeneic stem cell transplant and has maintained CR on follow-up bone marrow biopsy.   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 due to anorexia/nausea, treated with prednisone (now off)  - Now with findings that may represent cGVHD (oral ulcers, dry rash, hyperpigmentation, transaminitis)  - increase cyclosporine to 50 mg PO BID  - will consider glucocorticoids if symptoms worsen  - obtain RUQ U/S for transaminitis and consider hepatology consult (ddx of transaminitis also includes medication effect as she is on isavuconazonium sulfate)     Infectious Disease  - see GVHD above for current immunosuppressive regimen  - antimicrobial ppx:   - Acyclovir 800 mg BID until 12 months post tx, then decrease to 400 mg BID until 5 year post-transplant jose  - Bactrim DS 1 tab BID on M/Th until 12 months post tx and off IST  - Letermovir ppx until 6 months post tx.   - we discussed consideration of antifungal ppx as per our institutional standard - continue isavuconazonium sulfate  - Prior/resolved infections:              - 9/29/2023: C. Diff - treated with fidaxomicin              - 10/7/2023: Suspected fungal infection based on abnormal CT, treated with isavuconazonium sulfate               - 11/6/2023: UTI - treated with levofloxacin              - 11/8/2023: BK virus              - 11/13/2023: Pneumonia diagnosed on CXR - treated with cefpodoxime     Cytopenias  - Last labs showed resolution of all cytopenias   - Continue to monitor with routine labs monitoring     Chronic pain  - Neuropathic pain secondary to lumbar radiculopathy               - gabapentin 300 mg PO TID              - Oxycodone 10-15 mg q4prn              - will need pain specialist and/or neurologist     Hormone replacement therapy  - on oral estrogen     Follow-up  Weekly labs monitoring  Bone marrow biopsy at ~day+180  Follow-up after bone marrow biopsy    Route Chart for Scheduling    BMT Chart Routing      Follow up with physician    Follow up with KENDALL    Provider visit type    Infusion scheduling note    Injection scheduling note    Labs    Imaging   Please schedule abdominal ultrasound as soon as possible on [facility]   Pharmacy appointment    Other referrals                 Shemar Franco MD  Hematology and Stem Cell Transplant

## 2024-02-20 NOTE — PROGRESS NOTES
Route Chart for Scheduling    BMT Chart Routing      Follow up with physician . Please schedule bone marrow biopsy in endoscopy on March 6th. Follow-up appt any time after March 12th   Follow up with KENDALL    Provider visit type Malignant hem   Infusion scheduling note    Injection scheduling note    Labs CBC, CMP, magnesium, phosphorus, CMV and EBV   Scheduling:  Preferred lab:  Lab interval: once a week  Please include cyclosporine as well. Please schedule all labs at [facility] in the am because of cyclosporine level   Imaging    Pharmacy appointment    Other referrals

## 2024-02-22 ENCOUNTER — LAB VISIT (OUTPATIENT)
Dept: LAB | Facility: HOSPITAL | Age: 49
End: 2024-02-22
Attending: INTERNAL MEDICINE
Payer: COMMERCIAL

## 2024-02-22 DIAGNOSIS — Z94.84 HISTORY OF ALLOGENEIC STEM CELL TRANSPLANT: ICD-10-CM

## 2024-02-22 LAB
ALBUMIN SERPL BCP-MCNC: 3.7 G/DL (ref 3.5–5.2)
ALP SERPL-CCNC: 244 U/L (ref 55–135)
ALT SERPL W/O P-5'-P-CCNC: 253 U/L (ref 10–44)
ANION GAP SERPL CALC-SCNC: 10 MMOL/L (ref 8–16)
AST SERPL-CCNC: 128 U/L (ref 10–40)
BASOPHILS # BLD AUTO: 0.01 K/UL (ref 0–0.2)
BASOPHILS NFR BLD: 0.2 % (ref 0–1.9)
BILIRUB SERPL-MCNC: 0.4 MG/DL (ref 0.1–1)
BUN SERPL-MCNC: 21 MG/DL (ref 6–20)
CALCIUM SERPL-MCNC: 9.2 MG/DL (ref 8.7–10.5)
CHLORIDE SERPL-SCNC: 107 MMOL/L (ref 95–110)
CO2 SERPL-SCNC: 21 MMOL/L (ref 23–29)
CREAT SERPL-MCNC: 1.1 MG/DL (ref 0.5–1.4)
DIFFERENTIAL METHOD BLD: ABNORMAL
EOSINOPHIL # BLD AUTO: 0.5 K/UL (ref 0–0.5)
EOSINOPHIL NFR BLD: 8.7 % (ref 0–8)
ERYTHROCYTE [DISTWIDTH] IN BLOOD BY AUTOMATED COUNT: 12.6 % (ref 11.5–14.5)
EST. GFR  (NO RACE VARIABLE): >60 ML/MIN/1.73 M^2
GLUCOSE SERPL-MCNC: 76 MG/DL (ref 70–110)
HCT VFR BLD AUTO: 40.4 % (ref 37–48.5)
HGB BLD-MCNC: 14.2 G/DL (ref 12–16)
IMM GRANULOCYTES # BLD AUTO: 0.02 K/UL (ref 0–0.04)
IMM GRANULOCYTES NFR BLD AUTO: 0.4 % (ref 0–0.5)
LYMPHOCYTES # BLD AUTO: 1.5 K/UL (ref 1–4.8)
LYMPHOCYTES NFR BLD: 28 % (ref 18–48)
MAGNESIUM SERPL-MCNC: 1.9 MG/DL (ref 1.6–2.6)
MCH RBC QN AUTO: 35.3 PG (ref 27–31)
MCHC RBC AUTO-ENTMCNC: 35.1 G/DL (ref 32–36)
MCV RBC AUTO: 101 FL (ref 82–98)
MONOCYTES # BLD AUTO: 0.8 K/UL (ref 0.3–1)
MONOCYTES NFR BLD: 13.8 % (ref 4–15)
NEUTROPHILS # BLD AUTO: 2.7 K/UL (ref 1.8–7.7)
NEUTROPHILS NFR BLD: 48.9 % (ref 38–73)
NRBC BLD-RTO: 0 /100 WBC
PHOSPHATE SERPL-MCNC: 3.6 MG/DL (ref 2.7–4.5)
PLATELET # BLD AUTO: 269 K/UL (ref 150–450)
PMV BLD AUTO: 9.6 FL (ref 9.2–12.9)
POTASSIUM SERPL-SCNC: 4.8 MMOL/L (ref 3.5–5.1)
PROT SERPL-MCNC: 6.5 G/DL (ref 6–8.4)
RBC # BLD AUTO: 4.02 M/UL (ref 4–5.4)
SODIUM SERPL-SCNC: 138 MMOL/L (ref 136–145)
WBC # BLD AUTO: 5.43 K/UL (ref 3.9–12.7)

## 2024-02-22 PROCEDURE — 84100 ASSAY OF PHOSPHORUS: CPT | Mod: PN | Performed by: INTERNAL MEDICINE

## 2024-02-22 PROCEDURE — 80158 DRUG ASSAY CYCLOSPORINE: CPT | Performed by: INTERNAL MEDICINE

## 2024-02-22 PROCEDURE — 87799 DETECT AGENT NOS DNA QUANT: CPT | Performed by: INTERNAL MEDICINE

## 2024-02-22 PROCEDURE — 83735 ASSAY OF MAGNESIUM: CPT | Mod: PN | Performed by: INTERNAL MEDICINE

## 2024-02-22 PROCEDURE — 85025 COMPLETE CBC W/AUTO DIFF WBC: CPT | Mod: PN | Performed by: INTERNAL MEDICINE

## 2024-02-22 PROCEDURE — 80053 COMPREHEN METABOLIC PANEL: CPT | Mod: PN | Performed by: INTERNAL MEDICINE

## 2024-02-23 LAB
CMV DNA SPEC QL NAA+PROBE: NORMAL
CYCLOSPORINE BLD LC/MS/MS-MCNC: 32 NG/ML (ref 100–400)
CYTOMEGALOVIRUS PCR, QUANT: NOT DETECTED IU/ML
EPSTEIN-BARR VIRUS DNA: NORMAL
EPSTEIN-BARR VIRUS PCR, QUANT: NOT DETECTED IU/ML

## 2024-02-29 ENCOUNTER — LAB VISIT (OUTPATIENT)
Dept: LAB | Facility: HOSPITAL | Age: 49
End: 2024-02-29
Attending: INTERNAL MEDICINE
Payer: COMMERCIAL

## 2024-02-29 DIAGNOSIS — Z94.84 HISTORY OF ALLOGENEIC STEM CELL TRANSPLANT: ICD-10-CM

## 2024-02-29 LAB
ALBUMIN SERPL BCP-MCNC: 3.6 G/DL (ref 3.5–5.2)
ALP SERPL-CCNC: 294 U/L (ref 55–135)
ALT SERPL W/O P-5'-P-CCNC: 297 U/L (ref 10–44)
ANION GAP SERPL CALC-SCNC: 9 MMOL/L (ref 8–16)
AST SERPL-CCNC: 120 U/L (ref 10–40)
BASOPHILS # BLD AUTO: 0.01 K/UL (ref 0–0.2)
BASOPHILS NFR BLD: 0.2 % (ref 0–1.9)
BILIRUB SERPL-MCNC: 0.5 MG/DL (ref 0.1–1)
BUN SERPL-MCNC: 23 MG/DL (ref 6–20)
CALCIUM SERPL-MCNC: 9.1 MG/DL (ref 8.7–10.5)
CHLORIDE SERPL-SCNC: 107 MMOL/L (ref 95–110)
CO2 SERPL-SCNC: 23 MMOL/L (ref 23–29)
CREAT SERPL-MCNC: 1.2 MG/DL (ref 0.5–1.4)
DIFFERENTIAL METHOD BLD: ABNORMAL
EOSINOPHIL # BLD AUTO: 0.5 K/UL (ref 0–0.5)
EOSINOPHIL NFR BLD: 10.1 % (ref 0–8)
ERYTHROCYTE [DISTWIDTH] IN BLOOD BY AUTOMATED COUNT: 12.7 % (ref 11.5–14.5)
EST. GFR  (NO RACE VARIABLE): 55.5 ML/MIN/1.73 M^2
GLUCOSE SERPL-MCNC: 109 MG/DL (ref 70–110)
HCT VFR BLD AUTO: 39.1 % (ref 37–48.5)
HGB BLD-MCNC: 13.5 G/DL (ref 12–16)
IMM GRANULOCYTES # BLD AUTO: 0.01 K/UL (ref 0–0.04)
IMM GRANULOCYTES NFR BLD AUTO: 0.2 % (ref 0–0.5)
LYMPHOCYTES # BLD AUTO: 1.6 K/UL (ref 1–4.8)
LYMPHOCYTES NFR BLD: 33.3 % (ref 18–48)
MAGNESIUM SERPL-MCNC: 1.8 MG/DL (ref 1.6–2.6)
MCH RBC QN AUTO: 34.2 PG (ref 27–31)
MCHC RBC AUTO-ENTMCNC: 34.5 G/DL (ref 32–36)
MCV RBC AUTO: 99 FL (ref 82–98)
MONOCYTES # BLD AUTO: 0.6 K/UL (ref 0.3–1)
MONOCYTES NFR BLD: 13.1 % (ref 4–15)
NEUTROPHILS # BLD AUTO: 2.1 K/UL (ref 1.8–7.7)
NEUTROPHILS NFR BLD: 43.1 % (ref 38–73)
NRBC BLD-RTO: 0 /100 WBC
PHOSPHATE SERPL-MCNC: 4 MG/DL (ref 2.7–4.5)
PLATELET # BLD AUTO: 267 K/UL (ref 150–450)
PMV BLD AUTO: 9.5 FL (ref 9.2–12.9)
POTASSIUM SERPL-SCNC: 4.3 MMOL/L (ref 3.5–5.1)
PROT SERPL-MCNC: 6.2 G/DL (ref 6–8.4)
RBC # BLD AUTO: 3.95 M/UL (ref 4–5.4)
SODIUM SERPL-SCNC: 139 MMOL/L (ref 136–145)
WBC # BLD AUTO: 4.87 K/UL (ref 3.9–12.7)

## 2024-02-29 PROCEDURE — 80053 COMPREHEN METABOLIC PANEL: CPT | Mod: PN | Performed by: INTERNAL MEDICINE

## 2024-02-29 PROCEDURE — 84100 ASSAY OF PHOSPHORUS: CPT | Mod: PN | Performed by: INTERNAL MEDICINE

## 2024-02-29 PROCEDURE — 85025 COMPLETE CBC W/AUTO DIFF WBC: CPT | Mod: PN | Performed by: INTERNAL MEDICINE

## 2024-02-29 PROCEDURE — 83735 ASSAY OF MAGNESIUM: CPT | Mod: PN | Performed by: INTERNAL MEDICINE

## 2024-02-29 PROCEDURE — 87799 DETECT AGENT NOS DNA QUANT: CPT | Performed by: INTERNAL MEDICINE

## 2024-02-29 PROCEDURE — 80158 DRUG ASSAY CYCLOSPORINE: CPT | Performed by: INTERNAL MEDICINE

## 2024-03-01 LAB
CMV DNA SPEC QL NAA+PROBE: NORMAL
CYCLOSPORINE BLD LC/MS/MS-MCNC: 41 NG/ML (ref 100–400)
CYTOMEGALOVIRUS PCR, QUANT: NOT DETECTED IU/ML
EPSTEIN-BARR VIRUS DNA: NORMAL
EPSTEIN-BARR VIRUS PCR, QUANT: NOT DETECTED IU/ML

## 2024-03-05 ENCOUNTER — PATIENT MESSAGE (OUTPATIENT)
Dept: HEMATOLOGY/ONCOLOGY | Facility: CLINIC | Age: 49
End: 2024-03-05
Payer: COMMERCIAL

## 2024-03-05 ENCOUNTER — TELEPHONE (OUTPATIENT)
Dept: HEMATOLOGY/ONCOLOGY | Facility: CLINIC | Age: 49
End: 2024-03-05
Payer: COMMERCIAL

## 2024-03-05 NOTE — TELEPHONE ENCOUNTER
Justina- You have never prescribed for pt. Please refill as you see fit.     Spoke with pt in regards to bone marrow biopsy that was scheduled for tomorrow. Pt is aware that her insurance coverage is not currently active. Pt decided to cancel appt for tmrw due to possible out of pocket cost. She will get with her insurance and keep us updated once her coverage is active, will reschedule after.

## 2024-03-06 ENCOUNTER — TELEPHONE (OUTPATIENT)
Dept: HEMATOLOGY/ONCOLOGY | Facility: CLINIC | Age: 49
End: 2024-03-06
Payer: COMMERCIAL

## 2024-03-06 NOTE — TELEPHONE ENCOUNTER
"----- Message from Fabricio Wilde sent at 3/6/2024  9:40 AM CST -----  Reschedule Existing Appointment      Appt Date: 3/6    Type of appt: pamela    Physician: Salvador    Reason for rescheduling? Requesting to r/s for soonest available    Caller: Quinn Villalba (Spouse)    Contact Preference?: [phone]      Additional Information:  "Thank you for all that you do for our patients"      "

## 2024-03-07 ENCOUNTER — LAB VISIT (OUTPATIENT)
Dept: LAB | Facility: HOSPITAL | Age: 49
End: 2024-03-07
Attending: INTERNAL MEDICINE
Payer: COMMERCIAL

## 2024-03-07 DIAGNOSIS — Z94.84 HISTORY OF ALLOGENEIC STEM CELL TRANSPLANT: ICD-10-CM

## 2024-03-07 LAB
ALBUMIN SERPL BCP-MCNC: 3.7 G/DL (ref 3.5–5.2)
ALP SERPL-CCNC: 298 U/L (ref 55–135)
ALT SERPL W/O P-5'-P-CCNC: 215 U/L (ref 10–44)
ANION GAP SERPL CALC-SCNC: 10 MMOL/L (ref 8–16)
AST SERPL-CCNC: 104 U/L (ref 10–40)
BASOPHILS # BLD AUTO: 0.01 K/UL (ref 0–0.2)
BASOPHILS NFR BLD: 0.2 % (ref 0–1.9)
BILIRUB SERPL-MCNC: 0.4 MG/DL (ref 0.1–1)
BUN SERPL-MCNC: 23 MG/DL (ref 6–20)
CALCIUM SERPL-MCNC: 9.2 MG/DL (ref 8.7–10.5)
CHLORIDE SERPL-SCNC: 106 MMOL/L (ref 95–110)
CO2 SERPL-SCNC: 22 MMOL/L (ref 23–29)
CREAT SERPL-MCNC: 1.2 MG/DL (ref 0.5–1.4)
DIFFERENTIAL METHOD BLD: ABNORMAL
EOSINOPHIL # BLD AUTO: 0.5 K/UL (ref 0–0.5)
EOSINOPHIL NFR BLD: 9.4 % (ref 0–8)
ERYTHROCYTE [DISTWIDTH] IN BLOOD BY AUTOMATED COUNT: 12.8 % (ref 11.5–14.5)
EST. GFR  (NO RACE VARIABLE): 55.5 ML/MIN/1.73 M^2
GLUCOSE SERPL-MCNC: 96 MG/DL (ref 70–110)
HCT VFR BLD AUTO: 39.5 % (ref 37–48.5)
HGB BLD-MCNC: 13.9 G/DL (ref 12–16)
IMM GRANULOCYTES # BLD AUTO: 0 K/UL (ref 0–0.04)
IMM GRANULOCYTES NFR BLD AUTO: 0 % (ref 0–0.5)
LYMPHOCYTES # BLD AUTO: 1.6 K/UL (ref 1–4.8)
LYMPHOCYTES NFR BLD: 28.3 % (ref 18–48)
MAGNESIUM SERPL-MCNC: 1.9 MG/DL (ref 1.6–2.6)
MCH RBC QN AUTO: 35.1 PG (ref 27–31)
MCHC RBC AUTO-ENTMCNC: 35.2 G/DL (ref 32–36)
MCV RBC AUTO: 100 FL (ref 82–98)
MONOCYTES # BLD AUTO: 0.9 K/UL (ref 0.3–1)
MONOCYTES NFR BLD: 16.4 % (ref 4–15)
NEUTROPHILS # BLD AUTO: 2.6 K/UL (ref 1.8–7.7)
NEUTROPHILS NFR BLD: 45.7 % (ref 38–73)
NRBC BLD-RTO: 0 /100 WBC
PHOSPHATE SERPL-MCNC: 3.8 MG/DL (ref 2.7–4.5)
PLATELET # BLD AUTO: 262 K/UL (ref 150–450)
PMV BLD AUTO: 9.1 FL (ref 9.2–12.9)
POTASSIUM SERPL-SCNC: 4.3 MMOL/L (ref 3.5–5.1)
PROT SERPL-MCNC: 6.5 G/DL (ref 6–8.4)
RBC # BLD AUTO: 3.96 M/UL (ref 4–5.4)
SODIUM SERPL-SCNC: 138 MMOL/L (ref 136–145)
WBC # BLD AUTO: 5.66 K/UL (ref 3.9–12.7)

## 2024-03-07 PROCEDURE — 80158 DRUG ASSAY CYCLOSPORINE: CPT | Performed by: INTERNAL MEDICINE

## 2024-03-07 PROCEDURE — 87799 DETECT AGENT NOS DNA QUANT: CPT | Performed by: INTERNAL MEDICINE

## 2024-03-07 PROCEDURE — 80053 COMPREHEN METABOLIC PANEL: CPT | Mod: PN | Performed by: INTERNAL MEDICINE

## 2024-03-07 PROCEDURE — 36415 COLL VENOUS BLD VENIPUNCTURE: CPT | Mod: PN | Performed by: INTERNAL MEDICINE

## 2024-03-07 PROCEDURE — 84100 ASSAY OF PHOSPHORUS: CPT | Mod: PN | Performed by: INTERNAL MEDICINE

## 2024-03-07 PROCEDURE — 85025 COMPLETE CBC W/AUTO DIFF WBC: CPT | Mod: PN | Performed by: INTERNAL MEDICINE

## 2024-03-07 PROCEDURE — 83735 ASSAY OF MAGNESIUM: CPT | Mod: PN | Performed by: INTERNAL MEDICINE

## 2024-03-08 ENCOUNTER — HOSPITAL ENCOUNTER (OUTPATIENT)
Dept: RADIOLOGY | Facility: HOSPITAL | Age: 49
Discharge: HOME | End: 2024-03-08
Attending: INTERNAL MEDICINE
Payer: COMMERCIAL

## 2024-03-08 DIAGNOSIS — D89.813 GVHD (GRAFT VERSUS HOST DISEASE): ICD-10-CM

## 2024-03-08 DIAGNOSIS — R74.01 TRANSAMINITIS: ICD-10-CM

## 2024-03-08 LAB
CMV DNA SPEC QL NAA+PROBE: NORMAL
CYCLOSPORINE BLD LC/MS/MS-MCNC: 33 NG/ML (ref 100–400)
CYTOMEGALOVIRUS PCR, QUANT: NOT DETECTED IU/ML
EPSTEIN-BARR VIRUS DNA: NORMAL
EPSTEIN-BARR VIRUS PCR, QUANT: NOT DETECTED IU/ML

## 2024-03-08 PROCEDURE — 76700 US EXAM ABDOM COMPLETE: CPT | Mod: 26,,, | Performed by: RADIOLOGY

## 2024-03-08 PROCEDURE — 76700 US EXAM ABDOM COMPLETE: CPT | Mod: TC,PO

## 2024-03-12 DIAGNOSIS — K83.8 ACQUIRED DILATION OF BILE DUCT: Primary | ICD-10-CM

## 2024-03-12 NOTE — PROGRESS NOTES
Route Chart for Scheduling    BMT Chart Routing  Urgent    Follow up with physician    Follow up with KENDALL    Provider visit type    Infusion scheduling note    Injection scheduling note    Labs    Imaging   Please schedule CT abdomen/pelvis on [facility] - next available.   Pharmacy appointment    Other referrals

## 2024-03-15 ENCOUNTER — TELEPHONE (OUTPATIENT)
Dept: HEMATOLOGY/ONCOLOGY | Facility: CLINIC | Age: 49
End: 2024-03-15
Payer: COMMERCIAL

## 2024-03-15 DIAGNOSIS — Z94.84 HISTORY OF ALLOGENEIC STEM CELL TRANSPLANT: Primary | ICD-10-CM

## 2024-03-15 DIAGNOSIS — C92.00 ACUTE MYELOID LEUKEMIA NOT HAVING ACHIEVED REMISSION: ICD-10-CM

## 2024-03-15 NOTE — TELEPHONE ENCOUNTER
----- Message from Aimee Flores sent at 3/15/2024  9:48 AM CDT -----  Regarding: FW: Appt  Contact: Pt  [phone]  Please advise    ----- Message -----  From: Paola Allen  Sent: 3/15/2024   9:21 AM CDT  To: [facility]  Pool  Subject: Appt                                                         Current Appt date:  03/18/24     Type of Appt: Biopsy     Physician: Shemar Franco MD     Reason for rescheduling:  Wants appt scheduled for a Wed to be able to get sedation     Caller: Quinn lopez's spouse     Contact Preference:  [phone]

## 2024-03-17 ENCOUNTER — PATIENT MESSAGE (OUTPATIENT)
Dept: HEMATOLOGY/ONCOLOGY | Facility: CLINIC | Age: 49
End: 2024-03-17
Payer: COMMERCIAL

## 2024-03-20 ENCOUNTER — LAB VISIT (OUTPATIENT)
Dept: LAB | Facility: HOSPITAL | Age: 49
End: 2024-03-20
Attending: INTERNAL MEDICINE
Payer: COMMERCIAL

## 2024-03-20 DIAGNOSIS — Z94.84 HISTORY OF ALLOGENEIC STEM CELL TRANSPLANT: ICD-10-CM

## 2024-03-20 LAB
ALBUMIN SERPL BCP-MCNC: 3.6 G/DL (ref 3.5–5.2)
ALP SERPL-CCNC: 329 U/L (ref 55–135)
ALT SERPL W/O P-5'-P-CCNC: 222 U/L (ref 10–44)
ANION GAP SERPL CALC-SCNC: 8 MMOL/L (ref 8–16)
AST SERPL-CCNC: 145 U/L (ref 10–40)
BASOPHILS # BLD AUTO: 0.01 K/UL (ref 0–0.2)
BASOPHILS NFR BLD: 0.2 % (ref 0–1.9)
BILIRUB SERPL-MCNC: 0.4 MG/DL (ref 0.1–1)
BUN SERPL-MCNC: 21 MG/DL (ref 6–20)
CALCIUM SERPL-MCNC: 9.4 MG/DL (ref 8.7–10.5)
CHLORIDE SERPL-SCNC: 108 MMOL/L (ref 95–110)
CO2 SERPL-SCNC: 23 MMOL/L (ref 23–29)
CREAT SERPL-MCNC: 1.5 MG/DL (ref 0.5–1.4)
DIFFERENTIAL METHOD BLD: ABNORMAL
EOSINOPHIL # BLD AUTO: 0.3 K/UL (ref 0–0.5)
EOSINOPHIL NFR BLD: 6.8 % (ref 0–8)
ERYTHROCYTE [DISTWIDTH] IN BLOOD BY AUTOMATED COUNT: 13 % (ref 11.5–14.5)
EST. GFR  (NO RACE VARIABLE): 42.5 ML/MIN/1.73 M^2
GLUCOSE SERPL-MCNC: 102 MG/DL (ref 70–110)
HCT VFR BLD AUTO: 41.5 % (ref 37–48.5)
HGB BLD-MCNC: 14 G/DL (ref 12–16)
IMM GRANULOCYTES # BLD AUTO: 0.01 K/UL (ref 0–0.04)
IMM GRANULOCYTES NFR BLD AUTO: 0.2 % (ref 0–0.5)
LYMPHOCYTES # BLD AUTO: 1.7 K/UL (ref 1–4.8)
LYMPHOCYTES NFR BLD: 33 % (ref 18–48)
MAGNESIUM SERPL-MCNC: 1.9 MG/DL (ref 1.6–2.6)
MCH RBC QN AUTO: 33.4 PG (ref 27–31)
MCHC RBC AUTO-ENTMCNC: 33.7 G/DL (ref 32–36)
MCV RBC AUTO: 99 FL (ref 82–98)
MONOCYTES # BLD AUTO: 0.8 K/UL (ref 0.3–1)
MONOCYTES NFR BLD: 16.1 % (ref 4–15)
NEUTROPHILS # BLD AUTO: 2.2 K/UL (ref 1.8–7.7)
NEUTROPHILS NFR BLD: 43.7 % (ref 38–73)
NRBC BLD-RTO: 0 /100 WBC
PHOSPHATE SERPL-MCNC: 3.9 MG/DL (ref 2.7–4.5)
PLATELET # BLD AUTO: 264 K/UL (ref 150–450)
PMV BLD AUTO: 9.2 FL (ref 9.2–12.9)
POTASSIUM SERPL-SCNC: 4.4 MMOL/L (ref 3.5–5.1)
PROT SERPL-MCNC: 6.4 G/DL (ref 6–8.4)
RBC # BLD AUTO: 4.19 M/UL (ref 4–5.4)
SODIUM SERPL-SCNC: 139 MMOL/L (ref 136–145)
WBC # BLD AUTO: 5.03 K/UL (ref 3.9–12.7)

## 2024-03-20 PROCEDURE — 36415 COLL VENOUS BLD VENIPUNCTURE: CPT | Mod: PN | Performed by: INTERNAL MEDICINE

## 2024-03-20 PROCEDURE — 80053 COMPREHEN METABOLIC PANEL: CPT | Mod: PN | Performed by: INTERNAL MEDICINE

## 2024-03-20 PROCEDURE — 84100 ASSAY OF PHOSPHORUS: CPT | Mod: PN | Performed by: INTERNAL MEDICINE

## 2024-03-20 PROCEDURE — 85025 COMPLETE CBC W/AUTO DIFF WBC: CPT | Mod: PN | Performed by: INTERNAL MEDICINE

## 2024-03-20 PROCEDURE — 80158 DRUG ASSAY CYCLOSPORINE: CPT | Performed by: INTERNAL MEDICINE

## 2024-03-20 PROCEDURE — 83735 ASSAY OF MAGNESIUM: CPT | Mod: PN | Performed by: INTERNAL MEDICINE

## 2024-03-20 PROCEDURE — 87799 DETECT AGENT NOS DNA QUANT: CPT | Performed by: INTERNAL MEDICINE

## 2024-03-21 ENCOUNTER — TELEPHONE (OUTPATIENT)
Dept: ENDOSCOPY | Facility: HOSPITAL | Age: 49
End: 2024-03-21
Payer: COMMERCIAL

## 2024-03-21 LAB
CMV DNA SPEC QL NAA+PROBE: NORMAL
CYCLOSPORINE BLD LC/MS/MS-MCNC: 39 NG/ML (ref 100–400)
CYTOMEGALOVIRUS PCR, QUANT: NOT DETECTED IU/ML
EPSTEIN-BARR VIRUS DNA: NORMAL
EPSTEIN-BARR VIRUS PCR, QUANT: NOT DETECTED IU/ML

## 2024-03-24 ENCOUNTER — PATIENT MESSAGE (OUTPATIENT)
Dept: HEMATOLOGY/ONCOLOGY | Facility: CLINIC | Age: 49
End: 2024-03-24
Payer: COMMERCIAL

## 2024-03-25 ENCOUNTER — TELEPHONE (OUTPATIENT)
Dept: ENDOSCOPY | Facility: HOSPITAL | Age: 49
End: 2024-03-25
Payer: COMMERCIAL

## 2024-03-25 RX ORDER — LETERMOVIR 240 MG/1
TABLET, FILM COATED ORAL
Qty: 28 TABLET | Refills: 2 | Status: CANCELLED | OUTPATIENT
Start: 2024-03-25

## 2024-03-25 NOTE — TELEPHONE ENCOUNTER
Contacted Pt for endoscopy pre-call for upcoming procedure. Pre-call complete, Pt does not have any further questions.

## 2024-03-26 ENCOUNTER — LAB VISIT (OUTPATIENT)
Dept: LAB | Facility: HOSPITAL | Age: 49
End: 2024-03-26
Attending: INTERNAL MEDICINE
Payer: COMMERCIAL

## 2024-03-26 DIAGNOSIS — C92.00 ACUTE MYELOID LEUKEMIA NOT HAVING ACHIEVED REMISSION: Primary | ICD-10-CM

## 2024-03-26 DIAGNOSIS — Z94.84 HISTORY OF ALLOGENEIC STEM CELL TRANSPLANT: ICD-10-CM

## 2024-03-26 LAB
ALBUMIN SERPL BCP-MCNC: 3.7 G/DL (ref 3.5–5.2)
ALP SERPL-CCNC: 333 U/L (ref 55–135)
ALT SERPL W/O P-5'-P-CCNC: 245 U/L (ref 10–44)
ANION GAP SERPL CALC-SCNC: 11 MMOL/L (ref 8–16)
AST SERPL-CCNC: 168 U/L (ref 10–40)
BASOPHILS # BLD AUTO: 0.02 K/UL (ref 0–0.2)
BASOPHILS NFR BLD: 0.4 % (ref 0–1.9)
BILIRUB SERPL-MCNC: 0.3 MG/DL (ref 0.1–1)
BUN SERPL-MCNC: 21 MG/DL (ref 6–20)
CALCIUM SERPL-MCNC: 9.1 MG/DL (ref 8.7–10.5)
CHLORIDE SERPL-SCNC: 105 MMOL/L (ref 95–110)
CO2 SERPL-SCNC: 22 MMOL/L (ref 23–29)
CREAT SERPL-MCNC: 1.4 MG/DL (ref 0.5–1.4)
DIFFERENTIAL METHOD BLD: ABNORMAL
EOSINOPHIL # BLD AUTO: 0.3 K/UL (ref 0–0.5)
EOSINOPHIL NFR BLD: 5 % (ref 0–8)
ERYTHROCYTE [DISTWIDTH] IN BLOOD BY AUTOMATED COUNT: 13 % (ref 11.5–14.5)
EST. GFR  (NO RACE VARIABLE): 46.1 ML/MIN/1.73 M^2
GLUCOSE SERPL-MCNC: 84 MG/DL (ref 70–110)
HCT VFR BLD AUTO: 39.1 % (ref 37–48.5)
HGB BLD-MCNC: 13.6 G/DL (ref 12–16)
IMM GRANULOCYTES # BLD AUTO: 0.01 K/UL (ref 0–0.04)
IMM GRANULOCYTES NFR BLD AUTO: 0.2 % (ref 0–0.5)
LYMPHOCYTES # BLD AUTO: 1.6 K/UL (ref 1–4.8)
LYMPHOCYTES NFR BLD: 30.1 % (ref 18–48)
MAGNESIUM SERPL-MCNC: 1.7 MG/DL (ref 1.6–2.6)
MCH RBC QN AUTO: 34.3 PG (ref 27–31)
MCHC RBC AUTO-ENTMCNC: 34.8 G/DL (ref 32–36)
MCV RBC AUTO: 99 FL (ref 82–98)
MONOCYTES # BLD AUTO: 0.8 K/UL (ref 0.3–1)
MONOCYTES NFR BLD: 16.2 % (ref 4–15)
NEUTROPHILS # BLD AUTO: 2.5 K/UL (ref 1.8–7.7)
NEUTROPHILS NFR BLD: 48.1 % (ref 38–73)
NRBC BLD-RTO: 0 /100 WBC
PHOSPHATE SERPL-MCNC: 4 MG/DL (ref 2.7–4.5)
PLATELET # BLD AUTO: 225 K/UL (ref 150–450)
PMV BLD AUTO: 9.7 FL (ref 9.2–12.9)
POTASSIUM SERPL-SCNC: 4.2 MMOL/L (ref 3.5–5.1)
PROT SERPL-MCNC: 6.5 G/DL (ref 6–8.4)
RBC # BLD AUTO: 3.97 M/UL (ref 4–5.4)
SODIUM SERPL-SCNC: 138 MMOL/L (ref 136–145)
WBC # BLD AUTO: 5.19 K/UL (ref 3.9–12.7)

## 2024-03-26 PROCEDURE — 84100 ASSAY OF PHOSPHORUS: CPT | Mod: PN | Performed by: INTERNAL MEDICINE

## 2024-03-26 PROCEDURE — 80053 COMPREHEN METABOLIC PANEL: CPT | Mod: PN | Performed by: INTERNAL MEDICINE

## 2024-03-26 PROCEDURE — 85025 COMPLETE CBC W/AUTO DIFF WBC: CPT | Mod: PN | Performed by: INTERNAL MEDICINE

## 2024-03-26 PROCEDURE — 80158 DRUG ASSAY CYCLOSPORINE: CPT | Performed by: INTERNAL MEDICINE

## 2024-03-26 PROCEDURE — 83735 ASSAY OF MAGNESIUM: CPT | Mod: PN | Performed by: INTERNAL MEDICINE

## 2024-03-26 PROCEDURE — 87799 DETECT AGENT NOS DNA QUANT: CPT | Performed by: INTERNAL MEDICINE

## 2024-03-27 ENCOUNTER — ANESTHESIA (OUTPATIENT)
Dept: ENDOSCOPY | Facility: HOSPITAL | Age: 49
End: 2024-03-27
Payer: COMMERCIAL

## 2024-03-27 ENCOUNTER — ANESTHESIA EVENT (OUTPATIENT)
Dept: ENDOSCOPY | Facility: HOSPITAL | Age: 49
End: 2024-03-27
Payer: COMMERCIAL

## 2024-03-27 ENCOUNTER — HOSPITAL ENCOUNTER (OUTPATIENT)
Facility: HOSPITAL | Age: 49
Discharge: HOME | End: 2024-03-27
Attending: INTERNAL MEDICINE | Admitting: INTERNAL MEDICINE
Payer: COMMERCIAL

## 2024-03-27 VITALS
TEMPERATURE: 98 F | RESPIRATION RATE: 18 BRPM | SYSTOLIC BLOOD PRESSURE: 91 MMHG | OXYGEN SATURATION: 99 % | HEIGHT: 64 IN | HEART RATE: 61 BPM | WEIGHT: 145 LBS | DIASTOLIC BLOOD PRESSURE: 54 MMHG | BODY MASS INDEX: 24.75 KG/M2

## 2024-03-27 DIAGNOSIS — C92.00 ACUTE MYELOID LEUKEMIA NOT HAVING ACHIEVED REMISSION: ICD-10-CM

## 2024-03-27 DIAGNOSIS — C92.01 ACUTE MYELOID LEUKEMIA IN REMISSION: Primary | ICD-10-CM

## 2024-03-27 LAB
CMV DNA SPEC QL NAA+PROBE: NORMAL
CYCLOSPORINE BLD LC/MS/MS-MCNC: 300 NG/ML (ref 100–400)
CYTOMEGALOVIRUS PCR, QUANT: NOT DETECTED IU/ML
EPSTEIN-BARR VIRUS DNA: NORMAL
EPSTEIN-BARR VIRUS PCR, QUANT: NOT DETECTED IU/ML

## 2024-03-27 PROCEDURE — 37000009 HC ANESTHESIA EA ADD 15 MINS: Performed by: INTERNAL MEDICINE

## 2024-03-27 PROCEDURE — 38222 DX BONE MARROW BX & ASPIR: CPT | Mod: RT,,, | Performed by: NURSE PRACTITIONER

## 2024-03-27 PROCEDURE — 88184 FLOWCYTOMETRY/ TC 1 MARKER: CPT | Performed by: PATHOLOGY

## 2024-03-27 PROCEDURE — 85097 BONE MARROW INTERPRETATION: CPT | Mod: ,,, | Performed by: PATHOLOGY

## 2024-03-27 PROCEDURE — 63600175 PHARM REV CODE 636 W HCPCS: Performed by: NURSE ANESTHETIST, CERTIFIED REGISTERED

## 2024-03-27 PROCEDURE — 88185 FLOWCYTOMETRY/TC ADD-ON: CPT | Mod: 59 | Performed by: PATHOLOGY

## 2024-03-27 PROCEDURE — E9220 PRA ENDO ANESTHESIA: HCPCS | Mod: ,,, | Performed by: NURSE ANESTHETIST, CERTIFIED REGISTERED

## 2024-03-27 PROCEDURE — 81245 FLT3 GENE: CPT | Mod: 59 | Performed by: NURSE PRACTITIONER

## 2024-03-27 PROCEDURE — 88342 IMHCHEM/IMCYTCHM 1ST ANTB: CPT | Mod: 26,59,, | Performed by: PATHOLOGY

## 2024-03-27 PROCEDURE — 88311 DECALCIFY TISSUE: CPT | Performed by: PATHOLOGY

## 2024-03-27 PROCEDURE — 88264 CHROMOSOME ANALYSIS 20-25: CPT | Performed by: NURSE PRACTITIONER

## 2024-03-27 PROCEDURE — 88275 CYTOGENETICS 100-300: CPT | Mod: 59 | Performed by: NURSE PRACTITIONER

## 2024-03-27 PROCEDURE — 88305 TISSUE EXAM BY PATHOLOGIST: CPT | Mod: 26,,, | Performed by: PATHOLOGY

## 2024-03-27 PROCEDURE — 88313 SPECIAL STAINS GROUP 2: CPT | Performed by: PATHOLOGY

## 2024-03-27 PROCEDURE — 88305 TISSUE EXAM BY PATHOLOGIST: CPT | Mod: 59 | Performed by: PATHOLOGY

## 2024-03-27 PROCEDURE — 88341 IMHCHEM/IMCYTCHM EA ADD ANTB: CPT | Mod: 26,,, | Performed by: PATHOLOGY

## 2024-03-27 PROCEDURE — 25000003 PHARM REV CODE 250: Performed by: NURSE ANESTHETIST, CERTIFIED REGISTERED

## 2024-03-27 PROCEDURE — 81450 HL NEO GSAP 5-50DNA/DNA&RNA: CPT | Performed by: NURSE PRACTITIONER

## 2024-03-27 PROCEDURE — 88341 IMHCHEM/IMCYTCHM EA ADD ANTB: CPT | Performed by: PATHOLOGY

## 2024-03-27 PROCEDURE — 37000008 HC ANESTHESIA 1ST 15 MINUTES: Performed by: INTERNAL MEDICINE

## 2024-03-27 PROCEDURE — 88189 FLOWCYTOMETRY/READ 16 & >: CPT | Mod: ,,, | Performed by: PATHOLOGY

## 2024-03-27 PROCEDURE — 38222 DX BONE MARROW BX & ASPIR: CPT | Performed by: INTERNAL MEDICINE

## 2024-03-27 PROCEDURE — 88313 SPECIAL STAINS GROUP 2: CPT | Mod: 26,,, | Performed by: PATHOLOGY

## 2024-03-27 PROCEDURE — 88237 TISSUE CULTURE BONE MARROW: CPT | Performed by: NURSE PRACTITIONER

## 2024-03-27 PROCEDURE — 88342 IMHCHEM/IMCYTCHM 1ST ANTB: CPT | Performed by: PATHOLOGY

## 2024-03-27 RX ORDER — PROPOFOL 10 MG/ML
VIAL (ML) INTRAVENOUS
Status: DISCONTINUED | OUTPATIENT
Start: 2024-03-27 | End: 2024-03-27

## 2024-03-27 RX ORDER — PROPOFOL 10 MG/ML
VIAL (ML) INTRAVENOUS CONTINUOUS PRN
Status: DISCONTINUED | OUTPATIENT
Start: 2024-03-27 | End: 2024-03-27

## 2024-03-27 RX ORDER — SODIUM CHLORIDE 9 MG/ML
INJECTION, SOLUTION INTRAVENOUS CONTINUOUS
Status: DISCONTINUED | OUTPATIENT
Start: 2024-03-27 | End: 2024-03-27 | Stop reason: HOSPADM

## 2024-03-27 RX ORDER — LIDOCAINE HYDROCHLORIDE 20 MG/ML
INJECTION INTRAVENOUS
Status: DISCONTINUED | OUTPATIENT
Start: 2024-03-27 | End: 2024-03-27

## 2024-03-27 RX ADMIN — PROPOFOL 50 MG: 10 INJECTION, EMULSION INTRAVENOUS at 09:03

## 2024-03-27 RX ADMIN — LIDOCAINE HYDROCHLORIDE 30 MG: 20 INJECTION INTRAVENOUS at 09:03

## 2024-03-27 RX ADMIN — SODIUM CHLORIDE: 0.9 INJECTION, SOLUTION INTRAVENOUS at 09:03

## 2024-03-27 RX ADMIN — PROPOFOL 150 MCG/KG/MIN: 10 INJECTION, EMULSION INTRAVENOUS at 09:03

## 2024-03-27 NOTE — ANESTHESIA POSTPROCEDURE EVALUATION
Anesthesia Post Evaluation    Patient: Thai Villalba    Procedure(s) Performed: Procedure(s) (LRB):  Biopsy-bone marrow (Left)    Final Anesthesia Type: general      Patient location during evaluation: PACU  Patient participation: Yes- Able to Participate  Level of consciousness: awake and alert and oriented  Post-procedure vital signs: reviewed and stable  Pain management: adequate  Airway patency: patent    PONV status at discharge: No PONV  Anesthetic complications: no      Cardiovascular status: stable  Respiratory status: unassisted, spontaneous ventilation and room air  Hydration status: euvolemic  Follow-up not needed.              Vitals Value Taken Time   BP 91/54 03/27/24 1003   Temp 36.5 °C (97.7 °F) 03/27/24 0944   Pulse 61 03/27/24 1003   Resp 18 03/27/24 1003   SpO2 99 % 03/27/24 1003         No case tracking events are documented in the log.      Pain/Maritza Score: Maritza Score: 10 (3/27/2024 10:11 AM)

## 2024-03-27 NOTE — DISCHARGE INSTRUCTIONS
Discharge instructions for having a Bone Marrow Aspiration / Biopsy    Keep Bandage in place for 24 hours.  - Do not shower or take a tube bath during this time. (you may sponge bathe).  - Call the nurse or physician for excessive bleeding or pain at biopsy site.  - You may take Tylenol as needed for pain.    You have received medication to sedate you.  - Do not drive a car or operate heavy machinery for the rest of the day.  - You may resume other activities as tolerated.    You can call [phone] for any problems during the hours of 8:00 AM-5:00PM.    For an emergency after 5:00 PM you can call [phone] and have the  page the Hematologist / Oncologist on call.

## 2024-03-27 NOTE — DISCHARGE SUMMARY
[facility] 4th Floor  Hematology  Bone Marrow Transplant  Discharge Summary      Patient Name: Thai Villalba  MRN: [id number]  Admission Date: 3/27/2024  Hospital Length of Stay: 0 days  Discharge Date and Time: 3/27/2024 10:19 AM  Attending Physician: Evangelina att. providers found   Discharging Provider: Savanah Keita NP  Primary Care Provider: Evangelina, Primary Doctor    HPI: 48 yr old female with history of AML in remission presents for day +180 post cord blood transplant restaging with bone marrow aspiration and biopsy with sedation.    Procedure(s) (LRB):  Biopsy-bone marrow (Left)     Hospital Course: Patient admitted to endoscopy today for a bone marrow aspiration and biopsy. Consent was obtained for a bone marrow biopsy. Patient was sedated per anesthesia and a bone marrow biopsy and aspiration was performed in endo suite 5. Patient was then transferred to post op and discharged home when appropriate per anesthesia.      Goals of Care Treatment Preferences:             What is most important right now is to focus on curative/life-prolongation (regardless of treatment burdens).  Accordingly, we have decided that the best plan to meet the patient's goals includes continuing with treatment.          Significant Diagnostic Studies: N/A    Pending Diagnostic Studies:       Procedure Component Value Units Date/Time    AML FISH, Bone Marrow (Ages over 30 yrs) [[id number]] Collected: 03/27/24 0913    Order Status: Sent Lab Status: In process Updated: 03/27/24 0913    Specimen: Bone Marrow     Chromosome Analysis, Bone Marrow Left Posterior Iliac Crest [[id number]] Collected: 03/27/24 0913    Order Status: Sent Lab Status: In process Updated: 03/27/24 0913    Specimen: Bone Marrow     FLT3 Mutation Testing, Bone Marrow [[id number]] Collected: 03/27/24 0913    Order Status: Sent Lab Status: In process Updated: 03/27/24 0913    Specimen: Bone Marrow     Leukemia/Lymphoma Screen - Bone Marrow Left Posterior Iliac  Crest [[id number]] Collected: 03/27/24 0913    Order Status: Sent Lab Status: In process Updated: 03/27/24 0913    Specimen: Bone Marrow     OncoHeme (NGS) Hematologic Neoplasms, BM Diagnosis or Indication for test: AML s/p allo Day +167 [[id number]] Collected: 03/27/24 0913    Order Status: Sent Lab Status: In process Updated: 03/27/24 0913    Specimen: Bone Marrow     Specimen to Pathology, Bone Marrow Aspiration/Biopsy [[id number]] Collected: 03/27/24 0913    Order Status: Sent Lab Status: In process Updated: 03/27/24 0913    Specimen: Bone Marrow           There are no hospital problems to display for this patient.     Discharged Condition: good    Disposition: Home or Self Care    Follow Up:   Future Appointments   Date Time Provider Department Center   4/3/2024  8:45 AM LAB, [facility]HS DRAW STATION [facility] LAB [facility] at [facility]   4/4/2024 10:45 AM [facility] CT1 LIMIT 500 LBS [facility] CT SCAN [place]        Patient Instructions:      Notify your health care provider if you experience any of the following:  temperature >100.4     Notify your health care provider if you experience any of the following:  persistent nausea and vomiting or diarrhea     Notify your health care provider if you experience any of the following:  severe uncontrolled pain     Notify your health care provider if you experience any of the following:  redness, tenderness, or signs of infection (pain, swelling, redness, odor or green/yellow discharge around incision site)     Notify your health care provider if you experience any of the following:  difficulty breathing or increased cough     Notify your health care provider if you experience any of the following:  severe persistent headache     Notify your health care provider if you experience any of the following:  persistent dizziness, light-headedness, or visual disturbances     Remove dressing in 24 hours     Activity as tolerated     Shower on day dressing removed (No bath)     Medications:  Reconciled  Home Medications:      Medication List        Ask your doctor about these medications      acyclovir 200 MG capsule  Commonly known as: ZOVIRAX  Take 4 capsules twice a day for prophylaxis     allopurinoL 300 MG tablet  Commonly known as: ZYLOPRIM  Take 300 mg by mouth.     cholecalciferol (vitamin D3) 10 mcg (400 unit) Chew  Take 1,000 Int'l Units by mouth.     CRESEMBA 186 mg Cap  Generic drug: isavuconazonium sulfate  Take 2 capsules by mouth three times a day for the first 2 days, then 2 capsules once a day thereafter     cycloSPORINE modified (NEORAL) 25 MG capsule  Commonly known as: NEORAL  Take 3 capsules twice a day for prevention of graft vesus host disease     dexAMETHasone 0.5 mg/5 mL Soln solution  Take 10 mLs (1 mg total) by mouth every 12 (twelve) hours. Swish and spit. Do not swallow.     ALVINO 0.1 mg/24 hr Ptsw  Generic drug: estradioL  Place 1 patch onto the skin twice a week.     DUKE'S SOLUTION (BENADRYL 30 ML, MYLANTA 30 ML, LIDOCAINE 30 ML, NYSTATIN 30 ML)  Take 10 mLs by mouth 4 (four) times daily.     estrogens(esterified)-methyltestosterone 1.25-2.5mg per tablet  Commonly known as: ESTRATEST  Take 1 tablet by mouth once daily.     * gabapentin 300 MG capsule  Commonly known as: NEURONTIN  Take 1 capsule by mouth three times a day     * gabapentin 300 MG capsule  Commonly known as: NEURONTIN  Take 2 capsule by mouth three times a day     * magnesium oxide 400 mg (241.3 mg magnesium) tablet  Commonly known as: MAG-OX  Take 400 mg by mouth 2 (two) times daily.     * magnesium oxide 400 mg (241.3 mg magnesium) tablet  Commonly known as: MAG-OX  Take 1 tablet twice a day     * oxyCODONE 5 MG immediate release tablet  Commonly known as: ROXICODONE  1 tablet by mouth three times a day as needed for pain     * oxyCODONE 5 MG immediate release tablet  Commonly known as: ROXICODONE  1 tablet by mouth three times a day as needed for pain     * oxyCODONE 5 MG immediate release tablet  Commonly known as:  ROXICODONE  1 tablet by mouth three times a day as needed for pain  Start taking on: March 28, 2024     oxyCODONE-acetaminophen 5-325 mg per tablet  Commonly known as: PERCOCET  Take 1 tablet by mouth every 4 (four) hours as needed.     * pantoprazole 40 MG tablet  Commonly known as: PROTONIX  Take 40 mg by mouth once daily.     * pantoprazole 40 MG tablet  Commonly known as: PROTONIX  Take 1 tablet twice a day for gerd.     PREVYMIS 240 mg Tab  Generic drug: letermovir  Take 1 tablet by mouth daily for prevention of cytomegalovirus     sulfamethoxazole-trimethoprim 800-160mg 800-160 mg Tab  Commonly known as: BACTRIM DS  Take 1 tablet twice a day for  2 days a week for pneumocyctis jiroveci pneumonia prevention     tacrolimus 0.1 % ointment  Commonly known as: PROTOPIC  Apply topically 2 times daily for Atopic Dermatitis, can apply to lips or inside mouth if needed.     triamcinolone acetonide 0.1% 0.1 % cream  Commonly known as: KENALOG  Apply topically 2 (two) times daily. To areas of rash/dry skin.     XOSPATA 40 mg Tab  Generic drug: gilteritinib  Take 2 tablets (80 mg) by mouth Daily.      * This list has 9 medication(s) that are the same as other medications prescribed for you. Read the directions carefully, and ask your doctor or other care provider to review them with you.         Savanah Keita NP  Bone Marrow Transplant  [facility] 4th [facility]

## 2024-03-27 NOTE — H&P
[facility] 4th Floor  Hematology/Oncology  H&P    Patient Name: Thai Villalba  MRN: [id number]  Admission Date: 3/27/2024  Code Status: No Order   Attending Provider: Shemar Franco MD  Primary Care Physician: No, Primary Doctor  Principal Problem:<principal problem not specified>    Subjective:     HPI: Ms. 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 Today is day +188 after alloSCT. Presents today for day +180 restaging bone marrow biopsy with sedation. She has no complaints today.     Oncology Treatment Plan:   [No matching plan found]    Medications:  Continuous Infusions:   sodium chloride 0.9%       Scheduled Meds:  PRN Meds:     Review of patient's allergies indicates:   Allergen Reactions    Promethazine Nausea And Vomiting     Other Reaction(s): VOMITING    Penicillin Hives    Penicillins      Other reaction(s): Unknown    Melatonin Anxiety     Other Reaction(s): FLUSHING        Past Medical History:   Diagnosis Date    ADHD (attention deficit hyperactivity disorder)     Anxiety     General anesthetics causing adverse effect in therapeutic use     REFLUX WITH SURGERY AND ASPIRATED, ZOFRAN GIVEN IV     Past Surgical History:   Procedure Laterality Date    BREAST RECONSTRUCTION Bilateral 2007    BREAST SURGERY      CHOLECYSTECTOMY      DIAGNOSTIC LAPAROSCOPY N/A 12/23/2020    Procedure: LAPAROSCOPY, DIAGNOSTIC;  Surgeon: Ed Troy MD;  Location: [facility] OR;  Service: OB/GYN;  Laterality: N/A;    DILATION AND CURETTAGE OF UTERUS      EVACUATION OF HEMATOMA Left 12/23/2020    Procedure: EVACUATION, HEMATOMA;  Surgeon: Ed Troy MD;  Location: [facility] OR;  Service: OB/GYN;  Laterality: Left;    HYSTERECTOMY  12/2020    partial    LAPAROSCOPY-ASSISTED VAGINAL HYSTERECTOMY N/A 12/22/2020    Procedure: HYSTERECTOMY, VAGINAL, LAPAROSCOPY-ASSISTED;  Surgeon: Ed Troy MD;  Location: [facility] OR;  Service: OB/GYN;  Laterality:  N/A;    LEFT KNEE      LEFT MENISCUS REPAIR    TONSILLECTOMY       Family History       Problem Relation (Age of Onset)    Breast cancer Mother, Maternal Grandmother    COPD Maternal Grandfather    Cancer Mother, Maternal Grandmother    Heart disease Paternal Grandfather          Tobacco Use    Smoking status: Former     Current packs/day: 0.00     Types: Cigarettes     Quit date: 2011     Years since quittin.7    Smokeless tobacco: Never   Substance and Sexual Activity    Alcohol use: Yes     Alcohol/week: 0.0 - 0.8 standard drinks of alcohol     Comment: socially    Drug use: No    Sexual activity: Not on file       Review of Systems   Constitutional:  Negative for activity change, appetite change, chills, diaphoresis, fatigue, fever and unexpected weight change.   HENT:  Negative for congestion, dental problem, mouth sores, nosebleeds, postnasal drip, rhinorrhea, sinus pressure, sore throat and trouble swallowing.    Eyes:  Negative for photophobia and visual disturbance.   Respiratory:  Negative for cough and shortness of breath.    Cardiovascular:  Negative for chest pain, palpitations and leg swelling.   Gastrointestinal:  Negative for abdominal distention, abdominal pain, blood in stool, constipation, diarrhea, nausea and vomiting.   Genitourinary:  Negative for difficulty urinating, dysuria, frequency, hematuria, menstrual problem, urgency and vaginal bleeding.   Musculoskeletal:  Negative for arthralgias, back pain and myalgias.   Skin:  Negative for pallor and rash.   Allergic/Immunologic: Positive for immunocompromised state.   Neurological:  Negative for dizziness, syncope, weakness, numbness and headaches.   Hematological:  Negative for adenopathy. Does not bruise/bleed easily.   Psychiatric/Behavioral:  Negative for confusion. The patient is not nervous/anxious.      Objective:     Vital Signs (Most Recent):  Temp: 97.7 °F (36.5 °C) (2439)  Pulse: 65 (24)  Resp: 16  (03/27/24 0839)  BP: (Abnormal) 104/57 (03/27/24 0839)  SpO2: 99 % (03/27/24 0839) Vital Signs (24h Range):  Temp:  [97.7 °F (36.5 °C)] 97.7 °F (36.5 °C)  Pulse:  [65] 65  Resp:  [16] 16  SpO2:  [99 %] 99 %  BP: (104)/(57) 104/57     Weight: 65.8 kg (145 lb)  Body mass index is 24.89 kg/m².  Body surface area is 1.72 meters squared.    No intake or output data in the 24 hours ending 03/27/24 0907    Physical Exam  Vitals reviewed.   Constitutional:       General: She is not in acute distress.     Appearance: She is well-developed.   HENT:      Mouth/Throat:      Pharynx: No oropharyngeal exudate or posterior oropharyngeal erythema.   Eyes:      General: No scleral icterus.     Conjunctiva/sclera: Conjunctivae normal.      Pupils: Pupils are equal, round, and reactive to light.   Neck:      Thyroid: No thyromegaly.   Cardiovascular:      Rate and Rhythm: Normal rate and regular rhythm.   Pulmonary:      Effort: Pulmonary effort is normal. No respiratory distress.   Abdominal:      General: There is no distension.      Palpations: Abdomen is soft. There is no hepatomegaly or splenomegaly.   Musculoskeletal:         General: No tenderness. Normal range of motion.      Cervical back: Normal range of motion and neck supple.   Skin:     Coloration: Skin is not pale.      Findings: No rash.      Nails: There is no clubbing.   Neurological:      Mental Status: She is alert and oriented to person, place, and time.      Cranial Nerves: No cranial nerve deficit.      Coordination: Coordination normal.   Psychiatric:         Behavior: Behavior normal.         Thought Content: Thought content normal.         Significant Labs:   CBC:   Recent Labs   Lab 03/26/24  0918   WBC 5.19   HGB 13.6   HCT 39.1       and CMP:   Recent Labs   Lab 03/26/24 0918      K 4.2      CO2 22*   GLU 84   BUN 21*   CREATININE 1.4   CALCIUM 9.1   PROT 6.5   ALBUMIN 3.7   BILITOT 0.3   ALKPHOS 333*   *   *   ANIONGAP 11        Diagnostic Results:  None    Assessment/Plan:     No contraindications to proceeding with bone marrow aspiration and biopsy today. Consent obtained. Risk vs benefit explained. Questions answered to her satisfaction.     There are no hospital problems to display for this patient.        Savanah Keita NP  Hematology/Oncology  [facility] 4[facility]

## 2024-03-27 NOTE — TRANSFER OF CARE
"Anesthesia Transfer of Care Note    Patient: Thai Villalba    Procedure(s) Performed: Procedure(s) (LRB):  Biopsy-bone marrow (Left)    Patient location: PACU    Anesthesia Type: general    Transport from OR: Transported from OR on room air with adequate spontaneous ventilation    Post pain: adequate analgesia    Post assessment: no apparent anesthetic complications and tolerated procedure well    Post vital signs: stable    Level of consciousness: awake, alert and oriented    Nausea/Vomiting: no nausea/vomiting    Complications: none    Transfer of care protocol was followed      Last vitals: Visit Vitals  BP (!) 83/49   Pulse (!) 59   Temp 36.5 °C (97.7 °F)   Resp 18   Ht 5' 4" (1.626 m)   Wt 65.8 kg (145 lb)   LMP  (LMP Unknown)   SpO2 95%   Breastfeeding No   BMI 24.89 kg/m²     "

## 2024-03-27 NOTE — ANESTHESIA PREPROCEDURE EVALUATION
03/27/2024  Thai Villalba is a 48 y.o., female.    Pre-operative evaluation for Procedure(s) (LRB):  Biopsy-bone marrow (Left)      No diagnosis found.    Review of patient's allergies indicates:   Allergen Reactions    Promethazine Nausea And Vomiting     Other Reaction(s): VOMITING    Penicillin Hives    Penicillins      Other reaction(s): Unknown    Melatonin Anxiety     Other Reaction(s): FLUSHING       No medications prior to admission.            No current facility-administered medications on file prior to encounter.     Current Outpatient Medications on File Prior to Encounter   Medication Sig Dispense Refill    acyclovir (ZOVIRAX) 200 MG capsule Take 4 capsules twice a day for prophylaxis 240 capsule 9    allopurinoL (ZYLOPRIM) 300 MG tablet Take 300 mg by mouth.      cholecalciferol, vitamin D3, 10 mcg (400 unit) Chew Take 1,000 Int'l Units by mouth.      cycloSPORINE modified, NEORAL, (NEORAL) 25 MG capsule Take 3 capsules twice a day for prevention of graft vesus host disease 120 capsule 4    dexAMETHasone 0.5 mg/5 mL Soln solution Take 10 mLs (1 mg total) by mouth every 12 (twelve) hours. Swish and spit. Do not swallow. 500 mL 1    duke's soln (benadryl 30 mL, mylanta 30 mL, LIDOcaine 30 mL, nystatin 30 mL) 120mL Take 10 mLs by mouth 4 (four) times daily. 120 mL 0    estradioL (VIVELLE-DOT) 0.1 mg/24 hr PTSW Place 1 patch onto the skin twice a week. 28 patch 0    estrogens,esterified,-methyltestosterone 1.25-2.5mg (ESTRATEST) per tablet Take 1 tablet by mouth once daily.      gabapentin (NEURONTIN) 300 MG capsule Take 1 capsule by mouth three times a day 90 capsule 3    gabapentin (NEURONTIN) 300 MG capsule Take 2 capsule by mouth three times a day 180 capsule 1    gilteritinib 40 mg Tab Take 2 tablets (80 mg) by mouth Daily. 60 tablet 11    isavuconazonium sulfate (CRESEMBA) 186 mg Cap Take 2  capsules by mouth three times a day for the first 2 days, then 2 capsules once a day thereafter 70 capsule 5    letermovir (PREVYMIS) 240 mg Tab Take 1 tablet by mouth daily for prevention of cytomegalovirus 28 tablet 2    magnesium oxide (MAG-OX) 400 mg (241.3 mg magnesium) tablet Take 400 mg by mouth 2 (two) times daily.      magnesium oxide (MAG-OX) 400 mg (241.3 mg magnesium) tablet Take 1 tablet twice a day 180 tablet 2    oxyCODONE (ROXICODONE) 5 MG immediate release tablet 1 tablet by mouth three times a day as needed for pain 90 tablet 0    [START ON 3/28/2024] oxyCODONE (ROXICODONE) 5 MG immediate release tablet 1 tablet by mouth three times a day as needed for pain 90 tablet 0    oxyCODONE (ROXICODONE) 5 MG immediate release tablet 1 tablet by mouth three times a day as needed for pain 90 tablet 0    oxyCODONE-acetaminophen (PERCOCET) 5-325 mg per tablet Take 1 tablet by mouth every 4 (four) hours as needed. 30 tablet 0    pantoprazole (PROTONIX) 40 MG tablet Take 40 mg by mouth once daily.      pantoprazole (PROTONIX) 40 MG tablet Take 1 tablet twice a day for gerd. 180 tablet 1    sulfamethoxazole-trimethoprim 800-160mg (BACTRIM DS) 800-160 mg Tab Take 1 tablet twice a day for  2 days a week for pneumocyctis jiroveci pneumonia prevention 12 tablet 15    tacrolimus (PROTOPIC) 0.1 % ointment Apply topically 2 times daily for Atopic Dermatitis, can apply to lips or inside mouth if needed. 100 g 1    triamcinolone acetonide 0.1% (KENALOG) 0.1 % cream Apply topically 2 (two) times daily. To areas of rash/dry skin. 80 g 2       Past Medical History:  No date: ADHD (attention deficit hyperactivity disorder)  No date: Anxiety  No date: General anesthetics causing adverse effect in therapeutic use      Comment:  REFLUX WITH SURGERY AND ASPIRATED, ZOFRAN GIVEN IV    Past Surgical History:   Procedure Laterality Date    BREAST RECONSTRUCTION Bilateral 2007    BREAST SURGERY      CHOLECYSTECTOMY      DIAGNOSTIC  "LAPAROSCOPY N/A 2020    Procedure: LAPAROSCOPY, DIAGNOSTIC;  Surgeon: Ed Troy MD;  Location: [facility] OR;  Service: OB/GYN;  Laterality: N/A;    DILATION AND CURETTAGE OF UTERUS      EVACUATION OF HEMATOMA Left 2020    Procedure: EVACUATION, HEMATOMA;  Surgeon: Ed Troy MD;  Location: [facility] OR;  Service: OB/GYN;  Laterality: Left;    HYSTERECTOMY  2020    partial    LAPAROSCOPY-ASSISTED VAGINAL HYSTERECTOMY N/A 2020    Procedure: HYSTERECTOMY, VAGINAL, LAPAROSCOPY-ASSISTED;  Surgeon: Ed Troy MD;  Location: [facility] OR;  Service: OB/GYN;  Laterality: N/A;    LEFT KNEE      LEFT MENISCUS REPAIR    TONSILLECTOMY         Social History     Tobacco Use   Smoking Status Former    Current packs/day: 0.00    Types: Cigarettes    Quit date: 2011    Years since quittin.7   Smokeless Tobacco Never       Social History     Substance and Sexual Activity   Alcohol Use Yes    Alcohol/week: 0.0 - 0.8 standard drinks of alcohol    Comment: socially       Physical Activity: Unknown (2024)    Exercise Vital Sign     Days of Exercise per Week: 2 days     Minutes of Exercise per Session: Not on file         Recent Labs     24  0918   HCT 39.1     Recent Labs     24  0918        Recent Labs     24  0918   K 4.2     Recent Labs     24  0918   CREATININE 1.4     Recent Labs     24  0918   GLU 84     No results for input(s): "PT" in the last 72 hours.  There were no vitals filed for this visit.                    Pre-op Assessment          Review of Systems  Anesthesia Hx:  No problems with previous Anesthesia                Hematology/Oncology:  Hematology Normal   Oncology Normal                               Oncology Comments: THIS Only     Cardiovascular:  Cardiovascular Normal     Denies Hypertension.   Denies MI.        Denies Angina.                                  Pulmonary:  Pulmonary Normal   Denies COPD.  Denies Asthma.   Denies " Shortness of breath.                  Renal/:   Denies Chronic Renal Disease.                Hepatic/GI:      Denies Liver Disease.            Neurological:  Denies TIA.  Denies CVA.    Denies Seizures.                                Endocrine:  Denies Diabetes.           Psych:  Psychiatric History                  Physical Exam  General: Well nourished, Cooperative, Alert and Oriented    Airway:  Mallampati: II   Mouth Opening: Normal  TM Distance: Normal  Tongue: Normal  Neck ROM: Normal ROM        Anesthesia Plan  Type of Anesthesia, risks & benefits discussed:    Anesthesia Type: Gen Natural Airway  Intra-op Monitoring Plan: Standard ASA Monitors  Post Op Pain Control Plan: IV/PO Opioids PRN  Induction:  IV  Informed Consent: Informed consent signed with the Patient and all parties understand the risks and agree with anesthesia plan.  All questions answered.   ASA Score: 2  Day of Surgery Review of History & Physical: H&P Update referred to the surgeon/provider.    Ready For Surgery From Anesthesia Perspective.     .  ntubation  Performed by: Suzanna Barnard CRNA  Authorized by: Jacqueline Volpi-Abadie, MD      Intubation:     Induction:  Intravenous    Intubated:  Postinduction    Mask Ventilation:  Easy with oral airway    Attempts:  1    Attempted By:  CRNA    Method of Intubation:  Video laryngoscopy    Blade:  [place] 3    Laryngeal View Grade: Grade I - full view of chords      Difficult Airway Encountered?: No      Complications:  None    Airway Device:  Oral endotracheal tube    Airway Device Size:  7.5    Style/Cuff Inflation:  Cuffed    Inflation Amount (mL):  6    Tube secured:  20    Secured at:  The lips    Placement Verified By:  Capnometry    Complicating Factors:  None    Findings Post-Intubation:  BS equal bilateral

## 2024-03-27 NOTE — PROCEDURES
"Thai Villalba is a 48 y.o. female patient.    Temp: 97.7 °F (36.5 °C) (03/27/24 0944)  Pulse: 61 (03/27/24 1003)  Resp: 18 (03/27/24 1003)  BP: (Abnormal) 91/54 (03/27/24 1003)  SpO2: 99 % (03/27/24 1003)  Weight: 65.8 kg (145 lb) (03/27/24 0839)  Height: 5' 4" (162.6 cm) (03/27/24 0839)       Bone marrow    Date/Time: 3/27/2024 11:00 AM    Performed by: Savanah Keita NP  Authorized by: Savanah Keita NP    Aspiration?: Yes   Biopsy?: Yes      PROCEDURE NOTE:  Bone Marrow aspiration and biopsy  Indication:  day +180 restaging AML  Consent: Informed consent was obtained from patient.  Timeout: Done and documented.  Position: left lateral   Site: right posterior illiac crest.  Prep: Betadine.  Needle used: 11 gauge Jamshidi needle.  Anesthetic: 2% lidocaine 5 cc.  Biopsy: The biopsy needle was introduced into the marrow cavity and 25 cc's of aspirate was obtained without complications and sent for flow, DNA hold, NGS, AML FISH, FLT 3. Core biopsy obtained without difficulty and sent for routine histologic examination.  Complications: None.  EBL: minimal  Disposition: The patient was discharged home per anesthesia protocol.    Savanah Keita NP  Hematology/BM  3/27/2024    "

## 2024-03-28 RX ORDER — LETERMOVIR 240 MG/1
TABLET, FILM COATED ORAL
Qty: 28 TABLET | Refills: 2 | Status: SHIPPED | OUTPATIENT
Start: 2024-03-28 | End: 2024-04-01 | Stop reason: SDUPTHER

## 2024-03-29 LAB
AML FISH REASON FOR REFERRAL (BM): NORMAL
ANNOTATION COMMENT IMP: NORMAL
CELLS W CYTOGENETIC ABNL BLD/T: NORMAL
CHROM ANALY RESULT (ISCN): NORMAL
CLINICAL CYTOGENETICIST REVIEW: NORMAL
LAB TEST METHOD: NORMAL
MOL DX INTERP BLD/T QL: NORMAL
PROVIDER SIGNING NAME: NORMAL
SPECIMEN SOURCE: NORMAL
TEST PERFORMANCE INFO SPEC: NORMAL

## 2024-03-30 LAB
BODY SITE - BONE MARROW: NORMAL
CLINICAL DIAGNOSIS - BONE MARROW: NORMAL
FLOW CYTOMETRY ANTIBODIES ANALYZED - BONE MARROW: NORMAL
FLOW CYTOMETRY COMMENT - BONE MARROW: NORMAL
FLOW CYTOMETRY INTERPRETATION - BONE MARROW: NORMAL
FLT3 RESULT: NORMAL
PATH REPORT.FINAL DX SPEC: NORMAL
SPECIMEN TYPE: NORMAL

## 2024-04-01 LAB
CHROM BANDING METHOD: NORMAL
CHROMOSOME ANALYSIS BM ADDITIONAL INFORMATION: NORMAL
CHROMOSOME ANALYSIS BM RELEASED BY: NORMAL
CHROMOSOME ANALYSIS BM RESULT SUMMARY: NORMAL
CLINICAL CYTOGENETICIST REVIEW: NORMAL
KARYOTYP MAR: NORMAL
REASON FOR REFERRAL (NARRATIVE): NORMAL
REF LAB TEST METHOD: NORMAL
SPECIMEN SOURCE: NORMAL
SPECIMEN: NORMAL

## 2024-04-01 RX ORDER — LETERMOVIR 240 MG/1
240 TABLET, FILM COATED ORAL DAILY
Qty: 28 TABLET | Refills: 2 | Status: ACTIVE | OUTPATIENT
Start: 2024-04-01 | End: 2024-05-24 | Stop reason: ALTCHOICE

## 2024-04-03 ENCOUNTER — LAB VISIT (OUTPATIENT)
Dept: LAB | Facility: HOSPITAL | Age: 49
End: 2024-04-03
Attending: INTERNAL MEDICINE
Payer: COMMERCIAL

## 2024-04-03 DIAGNOSIS — Z94.84 HISTORY OF ALLOGENEIC STEM CELL TRANSPLANT: ICD-10-CM

## 2024-04-03 LAB
ALBUMIN SERPL BCP-MCNC: 3.5 G/DL (ref 3.5–5.2)
ALP SERPL-CCNC: 334 U/L (ref 55–135)
ALT SERPL W/O P-5'-P-CCNC: 212 U/L (ref 10–44)
ANION GAP SERPL CALC-SCNC: 8 MMOL/L (ref 8–16)
AST SERPL-CCNC: 134 U/L (ref 10–40)
BASOPHILS # BLD AUTO: 0.01 K/UL (ref 0–0.2)
BASOPHILS NFR BLD: 0.2 % (ref 0–1.9)
BILIRUB SERPL-MCNC: 0.3 MG/DL (ref 0.1–1)
BUN SERPL-MCNC: 22 MG/DL (ref 6–20)
CALCIUM SERPL-MCNC: 8.9 MG/DL (ref 8.7–10.5)
CHLORIDE SERPL-SCNC: 106 MMOL/L (ref 95–110)
CO2 SERPL-SCNC: 23 MMOL/L (ref 23–29)
CREAT SERPL-MCNC: 1.3 MG/DL (ref 0.5–1.4)
DIFFERENTIAL METHOD BLD: ABNORMAL
EOSINOPHIL # BLD AUTO: 0.4 K/UL (ref 0–0.5)
EOSINOPHIL NFR BLD: 7.7 % (ref 0–8)
ERYTHROCYTE [DISTWIDTH] IN BLOOD BY AUTOMATED COUNT: 13 % (ref 11.5–14.5)
EST. GFR  (NO RACE VARIABLE): 50.4 ML/MIN/1.73 M^2
GLUCOSE SERPL-MCNC: 96 MG/DL (ref 70–110)
HCT VFR BLD AUTO: 38.5 % (ref 37–48.5)
HGB BLD-MCNC: 13.5 G/DL (ref 12–16)
IMM GRANULOCYTES # BLD AUTO: 0.02 K/UL (ref 0–0.04)
IMM GRANULOCYTES NFR BLD AUTO: 0.4 % (ref 0–0.5)
LYMPHOCYTES # BLD AUTO: 1.4 K/UL (ref 1–4.8)
LYMPHOCYTES NFR BLD: 27.5 % (ref 18–48)
MAGNESIUM SERPL-MCNC: 1.8 MG/DL (ref 1.6–2.6)
MCH RBC QN AUTO: 34.4 PG (ref 27–31)
MCHC RBC AUTO-ENTMCNC: 35.1 G/DL (ref 32–36)
MCV RBC AUTO: 98 FL (ref 82–98)
MONOCYTES # BLD AUTO: 0.8 K/UL (ref 0.3–1)
MONOCYTES NFR BLD: 16 % (ref 4–15)
NEUTROPHILS # BLD AUTO: 2.4 K/UL (ref 1.8–7.7)
NEUTROPHILS NFR BLD: 48.2 % (ref 38–73)
NRBC BLD-RTO: 0 /100 WBC
PHOSPHATE SERPL-MCNC: 3.4 MG/DL (ref 2.7–4.5)
PLATELET # BLD AUTO: 232 K/UL (ref 150–450)
PMV BLD AUTO: 9.6 FL (ref 9.2–12.9)
POTASSIUM SERPL-SCNC: 5 MMOL/L (ref 3.5–5.1)
PROT SERPL-MCNC: 6.1 G/DL (ref 6–8.4)
RBC # BLD AUTO: 3.93 M/UL (ref 4–5.4)
SODIUM SERPL-SCNC: 137 MMOL/L (ref 136–145)
WBC # BLD AUTO: 4.95 K/UL (ref 3.9–12.7)

## 2024-04-03 PROCEDURE — 83735 ASSAY OF MAGNESIUM: CPT | Mod: PN | Performed by: INTERNAL MEDICINE

## 2024-04-03 PROCEDURE — 87799 DETECT AGENT NOS DNA QUANT: CPT | Performed by: INTERNAL MEDICINE

## 2024-04-03 PROCEDURE — 80158 DRUG ASSAY CYCLOSPORINE: CPT | Performed by: INTERNAL MEDICINE

## 2024-04-03 PROCEDURE — 85025 COMPLETE CBC W/AUTO DIFF WBC: CPT | Mod: PN | Performed by: INTERNAL MEDICINE

## 2024-04-03 PROCEDURE — 80053 COMPREHEN METABOLIC PANEL: CPT | Mod: PN | Performed by: INTERNAL MEDICINE

## 2024-04-03 PROCEDURE — 84100 ASSAY OF PHOSPHORUS: CPT | Mod: PN | Performed by: INTERNAL MEDICINE

## 2024-04-04 ENCOUNTER — HOSPITAL ENCOUNTER (OUTPATIENT)
Dept: RADIOLOGY | Facility: HOSPITAL | Age: 49
Discharge: HOME | End: 2024-04-04
Attending: INTERNAL MEDICINE
Payer: COMMERCIAL

## 2024-04-04 DIAGNOSIS — K83.8 ACQUIRED DILATION OF BILE DUCT: ICD-10-CM

## 2024-04-04 LAB
CMV DNA SPEC QL NAA+PROBE: NORMAL
CYCLOSPORINE BLD LC/MS/MS-MCNC: 25 NG/ML (ref 100–400)
CYTOMEGALOVIRUS PCR, QUANT: NOT DETECTED IU/ML
EPSTEIN-BARR VIRUS DNA: NORMAL
EPSTEIN-BARR VIRUS PCR, QUANT: NOT DETECTED IU/ML

## 2024-04-04 PROCEDURE — 25500020 PHARM REV CODE 255: Mod: PO | Performed by: INTERNAL MEDICINE

## 2024-04-04 PROCEDURE — 74177 CT ABD & PELVIS W/CONTRAST: CPT | Mod: TC,PO

## 2024-04-04 PROCEDURE — 74177 CT ABD & PELVIS W/CONTRAST: CPT | Mod: 26,,, | Performed by: RADIOLOGY

## 2024-04-04 RX ADMIN — IOHEXOL 75 ML: 350 INJECTION, SOLUTION INTRAVENOUS at 10:04

## 2024-04-08 ENCOUNTER — PATIENT MESSAGE (OUTPATIENT)
Dept: HEMATOLOGY/ONCOLOGY | Facility: CLINIC | Age: 49
End: 2024-04-08
Payer: COMMERCIAL

## 2024-04-11 ENCOUNTER — LAB VISIT (OUTPATIENT)
Dept: LAB | Facility: HOSPITAL | Age: 49
End: 2024-04-11
Attending: INTERNAL MEDICINE
Payer: COMMERCIAL

## 2024-04-11 DIAGNOSIS — Z94.84 HISTORY OF ALLOGENEIC STEM CELL TRANSPLANT: ICD-10-CM

## 2024-04-11 LAB
ALBUMIN SERPL BCP-MCNC: 3.8 G/DL (ref 3.5–5.2)
ALP SERPL-CCNC: 320 U/L (ref 55–135)
ALT SERPL W/O P-5'-P-CCNC: 216 U/L (ref 10–44)
ANION GAP SERPL CALC-SCNC: 10 MMOL/L (ref 8–16)
AST SERPL-CCNC: 107 U/L (ref 10–40)
BILIRUB SERPL-MCNC: 0.4 MG/DL (ref 0.1–1)
BUN SERPL-MCNC: 21 MG/DL (ref 6–20)
CALCIUM SERPL-MCNC: 9.6 MG/DL (ref 8.7–10.5)
CHLORIDE SERPL-SCNC: 106 MMOL/L (ref 95–110)
CO2 SERPL-SCNC: 24 MMOL/L (ref 23–29)
CREAT SERPL-MCNC: 1.4 MG/DL (ref 0.5–1.4)
EST. GFR  (NO RACE VARIABLE): 46.1 ML/MIN/1.73 M^2
GLUCOSE SERPL-MCNC: 89 MG/DL (ref 70–110)
POTASSIUM SERPL-SCNC: 5.1 MMOL/L (ref 3.5–5.1)
PROT SERPL-MCNC: 6.8 G/DL (ref 6–8.4)
SODIUM SERPL-SCNC: 140 MMOL/L (ref 136–145)

## 2024-04-11 PROCEDURE — 80053 COMPREHEN METABOLIC PANEL: CPT | Mod: PN | Performed by: INTERNAL MEDICINE

## 2024-04-11 PROCEDURE — 36415 COLL VENOUS BLD VENIPUNCTURE: CPT | Mod: PN | Performed by: INTERNAL MEDICINE

## 2024-04-14 ENCOUNTER — PATIENT MESSAGE (OUTPATIENT)
Dept: HEMATOLOGY/ONCOLOGY | Facility: CLINIC | Age: 49
End: 2024-04-14
Payer: COMMERCIAL

## 2024-04-17 ENCOUNTER — LAB VISIT (OUTPATIENT)
Dept: LAB | Facility: HOSPITAL | Age: 49
End: 2024-04-17
Attending: INTERNAL MEDICINE
Payer: COMMERCIAL

## 2024-04-17 DIAGNOSIS — Z94.84 HISTORY OF ALLOGENEIC STEM CELL TRANSPLANT: ICD-10-CM

## 2024-04-17 LAB
ALBUMIN SERPL BCP-MCNC: 3.4 G/DL (ref 3.5–5.2)
ALP SERPL-CCNC: 312 U/L (ref 55–135)
ALT SERPL W/O P-5'-P-CCNC: 154 U/L (ref 10–44)
ANION GAP SERPL CALC-SCNC: 8 MMOL/L (ref 8–16)
AST SERPL-CCNC: 73 U/L (ref 10–40)
BASOPHILS # BLD AUTO: 0.01 K/UL (ref 0–0.2)
BASOPHILS NFR BLD: 0.2 % (ref 0–1.9)
BILIRUB SERPL-MCNC: 0.3 MG/DL (ref 0.1–1)
BUN SERPL-MCNC: 24 MG/DL (ref 6–20)
CALCIUM SERPL-MCNC: 9.3 MG/DL (ref 8.7–10.5)
CHLORIDE SERPL-SCNC: 105 MMOL/L (ref 95–110)
CO2 SERPL-SCNC: 25 MMOL/L (ref 23–29)
CREAT SERPL-MCNC: 1.3 MG/DL (ref 0.5–1.4)
DIFFERENTIAL METHOD BLD: ABNORMAL
EOSINOPHIL # BLD AUTO: 0.3 K/UL (ref 0–0.5)
EOSINOPHIL NFR BLD: 7.5 % (ref 0–8)
ERYTHROCYTE [DISTWIDTH] IN BLOOD BY AUTOMATED COUNT: 13.6 % (ref 11.5–14.5)
EST. GFR  (NO RACE VARIABLE): 50.4 ML/MIN/1.73 M^2
GLUCOSE SERPL-MCNC: 87 MG/DL (ref 70–110)
HCT VFR BLD AUTO: 38.3 % (ref 37–48.5)
HGB BLD-MCNC: 13.1 G/DL (ref 12–16)
IMM GRANULOCYTES # BLD AUTO: 0.01 K/UL (ref 0–0.04)
IMM GRANULOCYTES NFR BLD AUTO: 0.2 % (ref 0–0.5)
LYMPHOCYTES # BLD AUTO: 1.2 K/UL (ref 1–4.8)
LYMPHOCYTES NFR BLD: 26.9 % (ref 18–48)
MAGNESIUM SERPL-MCNC: 1.9 MG/DL (ref 1.6–2.6)
MCH RBC QN AUTO: 33.5 PG (ref 27–31)
MCHC RBC AUTO-ENTMCNC: 34.2 G/DL (ref 32–36)
MCV RBC AUTO: 98 FL (ref 82–98)
MONOCYTES # BLD AUTO: 0.7 K/UL (ref 0.3–1)
MONOCYTES NFR BLD: 16 % (ref 4–15)
NEUTROPHILS # BLD AUTO: 2.2 K/UL (ref 1.8–7.7)
NEUTROPHILS NFR BLD: 49.2 % (ref 38–73)
NRBC BLD-RTO: 0 /100 WBC
PHOSPHATE SERPL-MCNC: 3.8 MG/DL (ref 2.7–4.5)
PLATELET # BLD AUTO: 235 K/UL (ref 150–450)
PMV BLD AUTO: 9.5 FL (ref 9.2–12.9)
POTASSIUM SERPL-SCNC: 4.4 MMOL/L (ref 3.5–5.1)
PROT SERPL-MCNC: 6.3 G/DL (ref 6–8.4)
RBC # BLD AUTO: 3.91 M/UL (ref 4–5.4)
SODIUM SERPL-SCNC: 138 MMOL/L (ref 136–145)
WBC # BLD AUTO: 4.38 K/UL (ref 3.9–12.7)

## 2024-04-17 PROCEDURE — 80053 COMPREHEN METABOLIC PANEL: CPT | Mod: PN | Performed by: INTERNAL MEDICINE

## 2024-04-17 PROCEDURE — 84100 ASSAY OF PHOSPHORUS: CPT | Mod: PN | Performed by: INTERNAL MEDICINE

## 2024-04-17 PROCEDURE — 80158 DRUG ASSAY CYCLOSPORINE: CPT | Performed by: INTERNAL MEDICINE

## 2024-04-17 PROCEDURE — 83735 ASSAY OF MAGNESIUM: CPT | Mod: PN | Performed by: INTERNAL MEDICINE

## 2024-04-17 PROCEDURE — 87799 DETECT AGENT NOS DNA QUANT: CPT | Performed by: INTERNAL MEDICINE

## 2024-04-17 PROCEDURE — 85025 COMPLETE CBC W/AUTO DIFF WBC: CPT | Mod: PN | Performed by: INTERNAL MEDICINE

## 2024-04-18 LAB
ANNOTATION COMMENT IMP: NORMAL
CMV DNA SPEC QL NAA+PROBE: NORMAL
COMMENT: NORMAL
CYCLOSPORINE BLD LC/MS/MS-MCNC: 35 NG/ML (ref 100–400)
CYTOMEGALOVIRUS PCR, QUANT: NOT DETECTED IU/ML
EPSTEIN-BARR VIRUS DNA: NORMAL
EPSTEIN-BARR VIRUS PCR, QUANT: NOT DETECTED IU/ML
FINAL PATHOLOGIC DIAGNOSIS: NORMAL
GROSS: NORMAL
Lab: NORMAL
MICROSCOPIC EXAM: NORMAL
NGS CLINCIAL TRIALS: NORMAL
NGS INDICATION OF TEST: NORMAL
NGS INTERPRETATION: NORMAL
NGS ONCOHEME PANEL GENE LIST: NORMAL
NGS PATHOGENIC MUTATIONS DETECTED: NORMAL
NGS REVIEWED BY:: NORMAL
NGS VARIANTS OF UNKNOWN SIGNIFICANCE: NORMAL
NGSHM RESULT, BONE MARROW: NORMAL
REF LAB TEST METHOD: NORMAL
SPECIMEN SOURCE: NORMAL
SUPPLEMENTAL DIAGNOSIS: NORMAL
TEST PERFORMANCE INFO SPEC: NORMAL

## 2024-04-22 ENCOUNTER — PATIENT MESSAGE (OUTPATIENT)
Dept: HEMATOLOGY/ONCOLOGY | Facility: CLINIC | Age: 49
End: 2024-04-22
Payer: COMMERCIAL

## 2024-04-23 ENCOUNTER — LAB VISIT (OUTPATIENT)
Dept: LAB | Facility: HOSPITAL | Age: 49
End: 2024-04-23
Attending: INTERNAL MEDICINE
Payer: COMMERCIAL

## 2024-04-23 DIAGNOSIS — Z94.84 HISTORY OF ALLOGENEIC STEM CELL TRANSPLANT: ICD-10-CM

## 2024-04-23 LAB
ALBUMIN SERPL BCP-MCNC: 3.4 G/DL (ref 3.5–5.2)
ALP SERPL-CCNC: 330 U/L (ref 55–135)
ALT SERPL W/O P-5'-P-CCNC: 277 U/L (ref 10–44)
ANION GAP SERPL CALC-SCNC: 7 MMOL/L (ref 8–16)
AST SERPL-CCNC: 197 U/L (ref 10–40)
BASOPHILS # BLD AUTO: 0.01 K/UL (ref 0–0.2)
BASOPHILS NFR BLD: 0.3 % (ref 0–1.9)
BILIRUB SERPL-MCNC: 0.3 MG/DL (ref 0.1–1)
BUN SERPL-MCNC: 14 MG/DL (ref 6–20)
CALCIUM SERPL-MCNC: 9 MG/DL (ref 8.7–10.5)
CHLORIDE SERPL-SCNC: 106 MMOL/L (ref 95–110)
CO2 SERPL-SCNC: 24 MMOL/L (ref 23–29)
CREAT SERPL-MCNC: 1.2 MG/DL (ref 0.5–1.4)
DIFFERENTIAL METHOD BLD: ABNORMAL
EOSINOPHIL # BLD AUTO: 0.3 K/UL (ref 0–0.5)
EOSINOPHIL NFR BLD: 8.6 % (ref 0–8)
ERYTHROCYTE [DISTWIDTH] IN BLOOD BY AUTOMATED COUNT: 13.6 % (ref 11.5–14.5)
EST. GFR  (NO RACE VARIABLE): 55.5 ML/MIN/1.73 M^2
GLUCOSE SERPL-MCNC: 91 MG/DL (ref 70–110)
HCT VFR BLD AUTO: 37.1 % (ref 37–48.5)
HGB BLD-MCNC: 13.4 G/DL (ref 12–16)
IMM GRANULOCYTES # BLD AUTO: 0.01 K/UL (ref 0–0.04)
IMM GRANULOCYTES NFR BLD AUTO: 0.3 % (ref 0–0.5)
LYMPHOCYTES # BLD AUTO: 1.2 K/UL (ref 1–4.8)
LYMPHOCYTES NFR BLD: 30.9 % (ref 18–48)
MAGNESIUM SERPL-MCNC: 2 MG/DL (ref 1.6–2.6)
MCH RBC QN AUTO: 34.4 PG (ref 27–31)
MCHC RBC AUTO-ENTMCNC: 36.1 G/DL (ref 32–36)
MCV RBC AUTO: 95 FL (ref 82–98)
MONOCYTES # BLD AUTO: 0.8 K/UL (ref 0.3–1)
MONOCYTES NFR BLD: 20.8 % (ref 4–15)
NEUTROPHILS # BLD AUTO: 1.5 K/UL (ref 1.8–7.7)
NEUTROPHILS NFR BLD: 39.1 % (ref 38–73)
NRBC BLD-RTO: 0 /100 WBC
PHOSPHATE SERPL-MCNC: 4 MG/DL (ref 2.7–4.5)
PLATELET # BLD AUTO: 218 K/UL (ref 150–450)
PMV BLD AUTO: 9.5 FL (ref 9.2–12.9)
POTASSIUM SERPL-SCNC: 4.5 MMOL/L (ref 3.5–5.1)
PROT SERPL-MCNC: 6.2 G/DL (ref 6–8.4)
RBC # BLD AUTO: 3.89 M/UL (ref 4–5.4)
SODIUM SERPL-SCNC: 137 MMOL/L (ref 136–145)
WBC # BLD AUTO: 3.85 K/UL (ref 3.9–12.7)

## 2024-04-23 PROCEDURE — 36415 COLL VENOUS BLD VENIPUNCTURE: CPT | Mod: PN | Performed by: INTERNAL MEDICINE

## 2024-04-23 PROCEDURE — 84100 ASSAY OF PHOSPHORUS: CPT | Mod: PN | Performed by: INTERNAL MEDICINE

## 2024-04-23 PROCEDURE — 87799 DETECT AGENT NOS DNA QUANT: CPT | Performed by: INTERNAL MEDICINE

## 2024-04-23 PROCEDURE — 85025 COMPLETE CBC W/AUTO DIFF WBC: CPT | Mod: PN | Performed by: INTERNAL MEDICINE

## 2024-04-23 PROCEDURE — 80158 DRUG ASSAY CYCLOSPORINE: CPT | Performed by: INTERNAL MEDICINE

## 2024-04-23 PROCEDURE — 80053 COMPREHEN METABOLIC PANEL: CPT | Mod: PN | Performed by: INTERNAL MEDICINE

## 2024-04-23 PROCEDURE — 83735 ASSAY OF MAGNESIUM: CPT | Mod: PN | Performed by: INTERNAL MEDICINE

## 2024-04-24 ENCOUNTER — OFFICE VISIT (OUTPATIENT)
Dept: HEMATOLOGY/ONCOLOGY | Facility: CLINIC | Age: 49
End: 2024-04-24
Payer: COMMERCIAL

## 2024-04-24 VITALS
SYSTOLIC BLOOD PRESSURE: 108 MMHG | TEMPERATURE: 99 F | WEIGHT: 159.31 LBS | HEART RATE: 94 BPM | RESPIRATION RATE: 18 BRPM | DIASTOLIC BLOOD PRESSURE: 68 MMHG | OXYGEN SATURATION: 99 % | BODY MASS INDEX: 27.2 KG/M2 | HEIGHT: 64 IN

## 2024-04-24 DIAGNOSIS — C92.01 ACUTE MYELOID LEUKEMIA IN REMISSION: Primary | ICD-10-CM

## 2024-04-24 DIAGNOSIS — G89.29 OTHER CHRONIC PAIN: ICD-10-CM

## 2024-04-24 DIAGNOSIS — Z94.84 HISTORY OF ALLOGENEIC STEM CELL TRANSPLANT: ICD-10-CM

## 2024-04-24 DIAGNOSIS — Z79.899 IMMUNODEFICIENCY DUE TO DRUG THERAPY: ICD-10-CM

## 2024-04-24 DIAGNOSIS — D89.813 GVHD (GRAFT VERSUS HOST DISEASE): ICD-10-CM

## 2024-04-24 DIAGNOSIS — R74.01 TRANSAMINITIS: ICD-10-CM

## 2024-04-24 DIAGNOSIS — D84.821 IMMUNODEFICIENCY DUE TO DRUG THERAPY: ICD-10-CM

## 2024-04-24 DIAGNOSIS — Z79.890 HORMONE REPLACEMENT THERAPY: ICD-10-CM

## 2024-04-24 LAB
CMV DNA SPEC QL NAA+PROBE: NORMAL
CYCLOSPORINE BLD LC/MS/MS-MCNC: 54 NG/ML (ref 100–400)
CYTOMEGALOVIRUS PCR, QUANT: NOT DETECTED IU/ML
EPSTEIN-BARR VIRUS DNA: NORMAL
EPSTEIN-BARR VIRUS PCR, QUANT: NOT DETECTED IU/ML

## 2024-04-24 PROCEDURE — 99999 PR PBB SHADOW E&M-EST. PATIENT-LVL V: CPT | Mod: PBBFAC,,, | Performed by: INTERNAL MEDICINE

## 2024-04-24 PROCEDURE — 3074F SYST BP LT 130 MM HG: CPT | Mod: CPTII,S$GLB,, | Performed by: INTERNAL MEDICINE

## 2024-04-24 PROCEDURE — 3078F DIAST BP <80 MM HG: CPT | Mod: CPTII,S$GLB,, | Performed by: INTERNAL MEDICINE

## 2024-04-24 PROCEDURE — 99215 OFFICE O/P EST HI 40 MIN: CPT | Mod: S$GLB,,, | Performed by: INTERNAL MEDICINE

## 2024-04-24 PROCEDURE — 3008F BODY MASS INDEX DOCD: CPT | Mod: CPTII,S$GLB,, | Performed by: INTERNAL MEDICINE

## 2024-04-24 PROCEDURE — 1159F MED LIST DOCD IN RCRD: CPT | Mod: CPTII,S$GLB,, | Performed by: INTERNAL MEDICINE

## 2024-04-24 PROCEDURE — 1160F RVW MEDS BY RX/DR IN RCRD: CPT | Mod: CPTII,S$GLB,, | Performed by: INTERNAL MEDICINE

## 2024-04-24 NOTE — PROGRESS NOTES
HEMATOLOGIC MALIGNANCIES PROGRESS NOTE    IDENTIFYING STATEMENT   Thai Villalba (Thai) is a 48 y.o. female with a  of 1975 from [place], [place] with the diagnosis of acute myeloie leukemia.      ONCOLOGY HISTORY:    1. 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 2023: Presented to hospital in [place] with 2 week h/o fevers, night sweats, weight loss, lymphadenopathy, myalgias, stomatitis, bruising, sore throat and found to have WBC 333K with 80% blasts c/f AML vs CML in acute blast crisis               B. 2023: Bone marrow biopsy - AML, FLT3 positive.               C. 2023: Began 7+3+midostaurin induction. Subsequent D14 and D28 BMBxs both showed no evidence of persistent leukemia, in CR1               D. 2023: BMBx showed recurrent AML with flow showing 23% myeloblasts.               E. 2023: Began azacitidine-venetoclax-gilteritinib (off protocol)              F. 2023: Bone marrow biopsy showed CR2              G. 2023: Dual cord blood allogeneic stem cell transplant with Flu/Cy/TT/TBI (4 Gy) conditioning. GVHD ppx with cyclosporine/MMF.               H. 10/19/2023: Bone marrow biopsy on day +28 - IRENE by path and hematologics flow.               I. 2023: Day +75 bone marrow biopsy - MRD-neg by flow, NPM1 ddPCR, FLT3 pcr. Full donor chimerisms   J. 3/27/2024: Day +180 bone marrow biopsy - no definitive morphologic or immunophenotypic evidence of residual AML. No pathogenic mutations detected. VUS MPL      2. Attention deficit disorder  3. Uterine leiomyoma    INTERVAL HISTORY:      Ms. Villalba is seen in follow-up of AML with history of dual cord blood allogeneic stem cell transplant. Today is day +216 after alloSCT. She reports significant improvement in previously reported symptoms.  She notes new headaches that occurred with change in her hormonal treatment from pills to patch, this has since resolved since  returning to patch.  Mouth sores and skin rashes greatly improved.  Other than occasional periods of fatigue, she feels close to baseline functional status.     Past Medical History, Past Social History and Past Family History have been reviewed and are unchanged except as noted in the interval history.    MEDICATIONS:     Current Outpatient Medications on File Prior to Visit   Medication Sig Dispense Refill    acyclovir (ZOVIRAX) 200 MG capsule Take 4 capsules twice a day for prophylaxis 240 capsule 9    allopurinoL (ZYLOPRIM) 300 MG tablet Take 300 mg by mouth.      cholecalciferol, vitamin D3, 10 mcg (400 unit) Chew Take 1,000 Int'l Units by mouth.      cycloSPORINE modified, NEORAL, (NEORAL) 25 MG capsule Take 3 capsules twice a day for prevention of graft vesus host disease 120 capsule 4    dexAMETHasone 0.5 mg/5 mL Soln solution Take 10 mLs (1 mg total) by mouth every 12 (twelve) hours. Swish and spit. Do not swallow. 500 mL 1    estrogens,esterified,-methyltestosterone 1.25-2.5mg (ESTRATEST) per tablet Take 1 tablet by mouth once daily.      gabapentin (NEURONTIN) 300 MG capsule Take 1 capsule by mouth three times a day 90 capsule 3    gilteritinib 40 mg Tab Take 2 tablets (80 mg) by mouth Daily. 60 tablet 11    isavuconazonium sulfate (CRESEMBA) 186 mg Cap Take 2 capsules by mouth three times a day for the first 2 days, then 2 capsules once a day thereafter 70 capsule 5    letermovir (PREVYMIS) 240 mg Tab Take 1 tablet (240 mg) by mouth once daily. 28 tablet 2    magnesium oxide (MAG-OX) 400 mg (241.3 mg magnesium) tablet Take 400 mg by mouth 2 (two) times daily.      oxyCODONE (ROXICODONE) 10 mg Tab immediate release tablet 1 tablet by mouth three times a day as needed for pain 90 tablet 0    pantoprazole (PROTONIX) 40 MG tablet Take 40 mg by mouth once daily.      sulfamethoxazole-trimethoprim 800-160mg (BACTRIM DS) 800-160 mg Tab Take 1 tablet twice a day for  2 days a week for pneumocyctis jiroveci  pneumonia prevention 12 tablet 15    tacrolimus (PROTOPIC) 0.1 % ointment Apply topically 2 times daily for Atopic Dermatitis, can apply to lips or inside mouth if needed. 100 g 1    triamcinolone acetonide 0.1% (KENALOG) 0.1 % cream Apply topically 2 (two) times daily. To areas of rash/dry skin. 80 g 2    duke's soln (benadryl 30 mL, mylanta 30 mL, LIDOcaine 30 mL, nystatin 30 mL) 120mL Take 10 mLs by mouth 4 (four) times daily. (Patient not taking: Reported on 4/24/2024) 120 mL 0    [DISCONTINUED] estradioL (VIVELLE-DOT) 0.1 mg/24 hr PTSW Place 1 patch onto the skin twice a week. 28 patch 0    [DISCONTINUED] gabapentin (NEURONTIN) 300 MG capsule Take 2 capsule by mouth three times a day 180 capsule 1    [DISCONTINUED] gabapentin (NEURONTIN) 300 MG capsule Take 1 capsule by mouth three times a day 90 capsule 0    [DISCONTINUED] magnesium oxide (MAG-OX) 400 mg (241.3 mg magnesium) tablet Take 1 tablet twice a day 180 tablet 2    [DISCONTINUED] oxyCODONE (ROXICODONE) 5 MG immediate release tablet 1 tablet by mouth three times a day as needed for pain 90 tablet 0    [DISCONTINUED] oxyCODONE (ROXICODONE) 5 MG immediate release tablet 1 tablet by mouth three times a day as needed for pain 90 tablet 0    [DISCONTINUED] oxyCODONE (ROXICODONE) 5 MG immediate release tablet 1 tablet by mouth three times a day as needed for pain 90 tablet 0    [DISCONTINUED] oxyCODONE-acetaminophen (PERCOCET) 5-325 mg per tablet Take 1 tablet by mouth every 4 (four) hours as needed. 30 tablet 0    [DISCONTINUED] pantoprazole (PROTONIX) 40 MG tablet Take 1 tablet twice a day for gerd. 180 tablet 1     No current facility-administered medications on file prior to visit.       ALLERGIES:   Review of patient's allergies indicates:   Allergen Reactions    Promethazine Nausea And Vomiting     Other Reaction(s): VOMITING    Penicillin Hives    Penicillins      Other reaction(s): Unknown    Melatonin Anxiety     Other Reaction(s): FLUSHING     "    ROS:       Review of Systems   Constitutional:  Negative for appetite change, diaphoresis, fatigue, fever and unexpected weight change.   HENT:   Negative for lump/mass and sore throat.    Eyes:  Negative for icterus.   Respiratory:  Negative for cough and shortness of breath.    Cardiovascular:  Negative for chest pain and palpitations.   Gastrointestinal:  Negative for abdominal distention, constipation, diarrhea, nausea and vomiting.   Genitourinary:  Negative for dysuria and frequency.    Musculoskeletal:  Negative for arthralgias, gait problem and myalgias.   Skin:  Negative for rash.   Neurological:  Negative for dizziness, gait problem and headaches.   Hematological:  Negative for adenopathy. Does not bruise/bleed easily.   Psychiatric/Behavioral:  The patient is not nervous/anxious.        PHYSICAL EXAM:  Vitals:    04/24/24 0759   BP: 108/68   Pulse: 94   Resp: 18   Temp: 98.7 °F (37.1 °C)   TempSrc: Oral   SpO2: 99%   Weight: 72.3 kg (159 lb 4.5 oz)   Height: 5' 4" (1.626 m)   PainSc: 0-No pain       Physical Exam  Constitutional:       General: She is not in acute distress.     Appearance: She is well-developed.   HENT:      Head: Normocephalic and atraumatic.      Mouth/Throat:      Mouth: No oral lesions.   Eyes:      Conjunctiva/sclera: Conjunctivae normal.   Neck:      Thyroid: No thyromegaly.   Cardiovascular:      Rate and Rhythm: Normal rate and regular rhythm.      Heart sounds: Normal heart sounds. No murmur heard.  Pulmonary:      Breath sounds: Normal breath sounds. No wheezing or rales.   Abdominal:      General: There is no distension.      Palpations: Abdomen is soft. There is no hepatomegaly, splenomegaly or mass.      Tenderness: There is no abdominal tenderness.   Lymphadenopathy:      Cervical: No cervical adenopathy.      Right cervical: No deep cervical adenopathy.     Left cervical: No deep cervical adenopathy.   Skin:     Findings: No rash.   Neurological:      Mental Status: She " is alert and oriented to person, place, and time.      Cranial Nerves: No cranial nerve deficit.      Coordination: Coordination normal.      Deep Tendon Reflexes: Reflexes are normal and symmetric.         LAB:   Results for orders placed or performed in visit on 04/23/24   CBC Auto Differential   Result Value Ref Range    WBC 3.85 (L) 3.90 - 12.70 K/uL    RBC 3.89 (L) 4.00 - 5.40 M/uL    Hemoglobin 13.4 12.0 - 16.0 g/dL    Hematocrit 37.1 37.0 - 48.5 %    MCV 95 82 - 98 fL    MCH 34.4 (H) 27.0 - 31.0 pg    MCHC 36.1 (H) 32.0 - 36.0 g/dL    RDW 13.6 11.5 - 14.5 %    Platelets 218 150 - 450 K/uL    MPV 9.5 9.2 - 12.9 fL    Immature Granulocytes 0.3 0.0 - 0.5 %    Gran # (ANC) 1.5 (L) 1.8 - 7.7 K/uL    Immature Grans (Abs) 0.01 0.00 - 0.04 K/uL    Lymph # 1.2 1.0 - 4.8 K/uL    Mono # 0.8 0.3 - 1.0 K/uL    Eos # 0.3 0.0 - 0.5 K/uL    Baso # 0.01 0.00 - 0.20 K/uL    nRBC 0 0 /100 WBC    Gran % 39.1 38.0 - 73.0 %    Lymph % 30.9 18.0 - 48.0 %    Mono % 20.8 (H) 4.0 - 15.0 %    Eosinophil % 8.6 (H) 0.0 - 8.0 %    Basophil % 0.3 0.0 - 1.9 %    Differential Method Automated    Comprehensive Metabolic Panel   Result Value Ref Range    Sodium 137 136 - 145 mmol/L    Potassium 4.5 3.5 - 5.1 mmol/L    Chloride 106 95 - 110 mmol/L    CO2 24 23 - 29 mmol/L    Glucose 91 70 - 110 mg/dL    BUN 14 6 - 20 mg/dL    Creatinine 1.2 0.5 - 1.4 mg/dL    Calcium 9.0 8.7 - 10.5 mg/dL    Total Protein 6.2 6.0 - 8.4 g/dL    Albumin 3.4 (L) 3.5 - 5.2 g/dL    Total Bilirubin 0.3 0.1 - 1.0 mg/dL    Alkaline Phosphatase 330 (H) 55 - 135 U/L     (H) 10 - 40 U/L     (H) 10 - 44 U/L    eGFR 55.5 (A) >60 mL/min/1.73 m^2    Anion Gap 7 (L) 8 - 16 mmol/L   Magnesium   Result Value Ref Range    Magnesium 2.0 1.6 - 2.6 mg/dL   PHOSPHORUS   Result Value Ref Range    Phosphorus 4.0 2.7 - 4.5 mg/dL       PROBLEMS ASSESSED THIS VISIT:    1. Acute myeloid leukemia in remission    2. History of allogeneic stem cell transplant    3. GVHD (graft  versus host disease)    4. Immunodeficiency due to drug therapy    5. Other chronic pain    6. Hormone replacement therapy        PLAN:       Acute myeloid leukemia  Diagnosed with FLT3 mutated AML. She achieved CR1 with 7+3+midostaurin induction but relapsed prior to allogeneic stem cell transplant. She achieved CR2 with aza/ameya/gilteritinib. Ms. Villalba received dual cord blood allogeneic stem cell transplant and has maintained CR on follow-up bone marrow biopsy.   - Continue maintenance gilteritinib; plan for 2 years total of maintenance  - Day +180 bone marrow biopsy reviewed, no evidence of residual AML  - 12 month bone marrow will be done at [facility]     Status post allogeneic stem cell transplantation  - Currently day +216 of Flu/Cy/TT/TBI (4Gy) dual cord blood allogeneic stem cell transplant  - Complete remission and full donor chimerism (100% donor 2) assessed on day +75 bone marrow biopsy     Graft versus host disease/Immunosuppresion  - suspected upper GI acute GVHD on 12/1/2023 due to anorexia/nausea, treated with prednisone (now off)  - Improvement in symptoms of oral ulcers and dry rash, however persistent transaminitis   - Currently on cyclosporine to 50 mg PO BID, awaiting cyclosporine level to see if need for dose adjustment  - Will refer to hepatology due to persistently elevated LFT     Infectious Disease  - see GVHD above for current immunosuppressive regimen  - antimicrobial ppx:   - Acyclovir 800 mg BID until 12 months post tx, then decrease to 400 mg BID until 5 year post-transplant jose  - Bactrim DS 1 tab BID on M/Th until 12 months post tx and off IST  - Letermovir ppx until 6 months post tx.   - we discussed consideration of antifungal ppx as per our institutional standard - continue isavuconazonium sulfate  - Prior/resolved infections:              - 9/29/2023: C. Diff - treated with fidaxomicin              - 10/7/2023: Suspected fungal infection based on abnormal CT,  treated with isavuconazonium sulfate              - 11/6/2023: UTI - treated with levofloxacin              - 11/8/2023: BK virus              - 11/13/2023: Pneumonia diagnosed on CXR - treated with cefpodoxime     Cytopenias  - Last labs showed mild neutropenia, no other significant cytopenia  - Continue to monitor with routine labs monitoring     Chronic pain  - Neuropathic pain secondary to lumbar radiculopathy               - gabapentin 300 mg PO TID              - Oxycodone 10-15 mg q4prn     Hormone replacement therapy  - on oral estrogen     Follow-up  Weekly labs monitoring  12 month bone marrow to be done at [facility]  Follow up in 1 month         Zaheer Hardy MD  Hematology and Stem Cell Transplant

## 2024-04-24 NOTE — PROGRESS NOTES
Route Chart for Scheduling    BMT Chart Routing      Follow up with physician 1 month. in person   Follow up with KENDALL    Provider visit type Malignant hem   Infusion scheduling note    Injection scheduling note    Labs CBC, CMP, magnesium, phosphorus, CMV and EBV   Scheduling:  Preferred lab:  Lab interval: once a week  Please also include cyclosporine level. Labs weekly on the morning in [place].   Imaging    Pharmacy appointment    Other referrals

## 2024-04-25 DIAGNOSIS — Z94.84 HISTORY OF ALLOGENEIC STEM CELL TRANSPLANT: Primary | ICD-10-CM

## 2024-04-25 RX ORDER — CYCLOSPORINE 25 MG/1
CAPSULE, LIQUID FILLED ORAL
Qty: 240 CAPSULE | Refills: 10 | Status: SHIPPED | OUTPATIENT
Start: 2024-04-25 | End: 2024-05-02 | Stop reason: SDUPTHER

## 2024-04-30 ENCOUNTER — LAB VISIT (OUTPATIENT)
Dept: LAB | Facility: HOSPITAL | Age: 49
End: 2024-04-30
Attending: INTERNAL MEDICINE
Payer: COMMERCIAL

## 2024-04-30 DIAGNOSIS — Z94.84 HISTORY OF ALLOGENEIC STEM CELL TRANSPLANT: ICD-10-CM

## 2024-04-30 LAB
ALBUMIN SERPL BCP-MCNC: 3.6 G/DL (ref 3.5–5.2)
ALP SERPL-CCNC: 264 U/L (ref 55–135)
ALT SERPL W/O P-5'-P-CCNC: 85 U/L (ref 10–44)
ANION GAP SERPL CALC-SCNC: 9 MMOL/L (ref 8–16)
AST SERPL-CCNC: 37 U/L (ref 10–40)
BASOPHILS # BLD AUTO: 0.01 K/UL (ref 0–0.2)
BASOPHILS NFR BLD: 0.3 % (ref 0–1.9)
BILIRUB SERPL-MCNC: 0.3 MG/DL (ref 0.1–1)
BUN SERPL-MCNC: 12 MG/DL (ref 6–20)
CALCIUM SERPL-MCNC: 9.3 MG/DL (ref 8.7–10.5)
CHLORIDE SERPL-SCNC: 105 MMOL/L (ref 95–110)
CO2 SERPL-SCNC: 23 MMOL/L (ref 23–29)
CREAT SERPL-MCNC: 1.3 MG/DL (ref 0.5–1.4)
DIFFERENTIAL METHOD BLD: ABNORMAL
EOSINOPHIL # BLD AUTO: 0.3 K/UL (ref 0–0.5)
EOSINOPHIL NFR BLD: 7.5 % (ref 0–8)
ERYTHROCYTE [DISTWIDTH] IN BLOOD BY AUTOMATED COUNT: 13.4 % (ref 11.5–14.5)
EST. GFR  (NO RACE VARIABLE): 50.4 ML/MIN/1.73 M^2
GLUCOSE SERPL-MCNC: 100 MG/DL (ref 70–110)
HCT VFR BLD AUTO: 38.1 % (ref 37–48.5)
HGB BLD-MCNC: 13.6 G/DL (ref 12–16)
IMM GRANULOCYTES # BLD AUTO: 0.01 K/UL (ref 0–0.04)
IMM GRANULOCYTES NFR BLD AUTO: 0.3 % (ref 0–0.5)
LYMPHOCYTES # BLD AUTO: 1.1 K/UL (ref 1–4.8)
LYMPHOCYTES NFR BLD: 28 % (ref 18–48)
MAGNESIUM SERPL-MCNC: 1.7 MG/DL (ref 1.6–2.6)
MCH RBC QN AUTO: 33.7 PG (ref 27–31)
MCHC RBC AUTO-ENTMCNC: 35.7 G/DL (ref 32–36)
MCV RBC AUTO: 94 FL (ref 82–98)
MONOCYTES # BLD AUTO: 0.5 K/UL (ref 0.3–1)
MONOCYTES NFR BLD: 13.2 % (ref 4–15)
NEUTROPHILS # BLD AUTO: 2 K/UL (ref 1.8–7.7)
NEUTROPHILS NFR BLD: 50.7 % (ref 38–73)
NRBC BLD-RTO: 0 /100 WBC
PHOSPHATE SERPL-MCNC: 3.5 MG/DL (ref 2.7–4.5)
PLATELET # BLD AUTO: 239 K/UL (ref 150–450)
PMV BLD AUTO: 9.7 FL (ref 9.2–12.9)
POTASSIUM SERPL-SCNC: 4.9 MMOL/L (ref 3.5–5.1)
PROT SERPL-MCNC: 6.5 G/DL (ref 6–8.4)
RBC # BLD AUTO: 4.04 M/UL (ref 4–5.4)
SODIUM SERPL-SCNC: 137 MMOL/L (ref 136–145)
WBC # BLD AUTO: 3.86 K/UL (ref 3.9–12.7)

## 2024-04-30 PROCEDURE — 87799 DETECT AGENT NOS DNA QUANT: CPT | Performed by: INTERNAL MEDICINE

## 2024-04-30 PROCEDURE — 84100 ASSAY OF PHOSPHORUS: CPT | Mod: PN | Performed by: INTERNAL MEDICINE

## 2024-04-30 PROCEDURE — 85025 COMPLETE CBC W/AUTO DIFF WBC: CPT | Mod: PN | Performed by: INTERNAL MEDICINE

## 2024-04-30 PROCEDURE — 83735 ASSAY OF MAGNESIUM: CPT | Mod: PN | Performed by: INTERNAL MEDICINE

## 2024-04-30 PROCEDURE — 80158 DRUG ASSAY CYCLOSPORINE: CPT | Performed by: INTERNAL MEDICINE

## 2024-04-30 PROCEDURE — 36415 COLL VENOUS BLD VENIPUNCTURE: CPT | Mod: PN | Performed by: INTERNAL MEDICINE

## 2024-04-30 PROCEDURE — 80053 COMPREHEN METABOLIC PANEL: CPT | Mod: PN | Performed by: INTERNAL MEDICINE

## 2024-05-02 DIAGNOSIS — Z94.84 HISTORY OF ALLOGENEIC STEM CELL TRANSPLANT: ICD-10-CM

## 2024-05-02 RX ORDER — CYCLOSPORINE 25 MG/1
CAPSULE, LIQUID FILLED ORAL
Qty: 240 CAPSULE | Refills: 10 | Status: SHIPPED | OUTPATIENT
Start: 2024-05-02

## 2024-05-02 RX ORDER — CYCLOSPORINE 100 MG/1
CAPSULE, LIQUID FILLED ORAL
Qty: 60 CAPSULE | Refills: 11 | Status: SHIPPED | OUTPATIENT
Start: 2024-05-02

## 2024-05-02 NOTE — PROGRESS NOTES
BMT Pharmacist Immunosuppression Note:    Reviewed patient's cyclosporine level with Dr. Franco and it is below goal range. Discussed with patient and reviewed current dose of four 25mg capsules (100mg) twice a day. Instructed her we will need to increase to 150mg twice a day. She mentioned she has leftover 100mg capsules; to help reduce pill burden I gave instructions to do one 100mg capsule and two 25mg capsules to make 150mg; she will do this twice a day.       Nel Luo PharmCeciliaD., [facility]  Clinical Pharmacy Specialist, Bone Marrow Transplant/Cellular Therapy  [facility]  SpectraLink: [phone]

## 2024-05-07 ENCOUNTER — LAB VISIT (OUTPATIENT)
Dept: LAB | Facility: HOSPITAL | Age: 49
End: 2024-05-07
Attending: INTERNAL MEDICINE
Payer: COMMERCIAL

## 2024-05-07 DIAGNOSIS — Z94.84 HISTORY OF ALLOGENEIC STEM CELL TRANSPLANT: ICD-10-CM

## 2024-05-07 LAB
ALBUMIN SERPL BCP-MCNC: 3.6 G/DL (ref 3.5–5.2)
ALP SERPL-CCNC: 239 U/L (ref 55–135)
ALT SERPL W/O P-5'-P-CCNC: 84 U/L (ref 10–44)
ANION GAP SERPL CALC-SCNC: 10 MMOL/L (ref 8–16)
AST SERPL-CCNC: 41 U/L (ref 10–40)
BASOPHILS # BLD AUTO: 0.01 K/UL (ref 0–0.2)
BASOPHILS NFR BLD: 0.2 % (ref 0–1.9)
BILIRUB SERPL-MCNC: 0.3 MG/DL (ref 0.1–1)
BUN SERPL-MCNC: 15 MG/DL (ref 6–20)
CALCIUM SERPL-MCNC: 8.8 MG/DL (ref 8.7–10.5)
CHLORIDE SERPL-SCNC: 101 MMOL/L (ref 95–110)
CO2 SERPL-SCNC: 22 MMOL/L (ref 23–29)
CREAT SERPL-MCNC: 1.3 MG/DL (ref 0.5–1.4)
DIFFERENTIAL METHOD BLD: ABNORMAL
EOSINOPHIL # BLD AUTO: 0.3 K/UL (ref 0–0.5)
EOSINOPHIL NFR BLD: 6.9 % (ref 0–8)
ERYTHROCYTE [DISTWIDTH] IN BLOOD BY AUTOMATED COUNT: 13.3 % (ref 11.5–14.5)
EST. GFR  (NO RACE VARIABLE): 50.4 ML/MIN/1.73 M^2
GLUCOSE SERPL-MCNC: 84 MG/DL (ref 70–110)
HCT VFR BLD AUTO: 36.9 % (ref 37–48.5)
HGB BLD-MCNC: 13.2 G/DL (ref 12–16)
IMM GRANULOCYTES # BLD AUTO: 0.02 K/UL (ref 0–0.04)
IMM GRANULOCYTES NFR BLD AUTO: 0.5 % (ref 0–0.5)
LYMPHOCYTES # BLD AUTO: 1.2 K/UL (ref 1–4.8)
LYMPHOCYTES NFR BLD: 28.6 % (ref 18–48)
MAGNESIUM SERPL-MCNC: 1.7 MG/DL (ref 1.6–2.6)
MCH RBC QN AUTO: 33.6 PG (ref 27–31)
MCHC RBC AUTO-ENTMCNC: 35.8 G/DL (ref 32–36)
MCV RBC AUTO: 94 FL (ref 82–98)
MONOCYTES # BLD AUTO: 0.7 K/UL (ref 0.3–1)
MONOCYTES NFR BLD: 16 % (ref 4–15)
NEUTROPHILS # BLD AUTO: 1.9 K/UL (ref 1.8–7.7)
NEUTROPHILS NFR BLD: 47.8 % (ref 38–73)
NRBC BLD-RTO: 0 /100 WBC
PHOSPHATE SERPL-MCNC: 3 MG/DL (ref 2.7–4.5)
PLATELET # BLD AUTO: 252 K/UL (ref 150–450)
PMV BLD AUTO: 9.8 FL (ref 9.2–12.9)
POTASSIUM SERPL-SCNC: 4.8 MMOL/L (ref 3.5–5.1)
PROT SERPL-MCNC: 6.2 G/DL (ref 6–8.4)
RBC # BLD AUTO: 3.93 M/UL (ref 4–5.4)
SODIUM SERPL-SCNC: 133 MMOL/L (ref 136–145)
WBC # BLD AUTO: 4.05 K/UL (ref 3.9–12.7)

## 2024-05-07 PROCEDURE — 87799 DETECT AGENT NOS DNA QUANT: CPT | Performed by: INTERNAL MEDICINE

## 2024-05-07 PROCEDURE — 83735 ASSAY OF MAGNESIUM: CPT | Mod: PN | Performed by: INTERNAL MEDICINE

## 2024-05-07 PROCEDURE — 85025 COMPLETE CBC W/AUTO DIFF WBC: CPT | Mod: PN | Performed by: INTERNAL MEDICINE

## 2024-05-07 PROCEDURE — 84100 ASSAY OF PHOSPHORUS: CPT | Mod: PN | Performed by: INTERNAL MEDICINE

## 2024-05-07 PROCEDURE — 80053 COMPREHEN METABOLIC PANEL: CPT | Mod: PN | Performed by: INTERNAL MEDICINE

## 2024-05-07 PROCEDURE — 80158 DRUG ASSAY CYCLOSPORINE: CPT | Performed by: INTERNAL MEDICINE

## 2024-05-08 LAB
CMV DNA SPEC QL NAA+PROBE: NORMAL
CYCLOSPORINE BLD LC/MS/MS-MCNC: 171 NG/ML (ref 100–400)
CYTOMEGALOVIRUS PCR, QUANT: NOT DETECTED IU/ML
EPSTEIN-BARR VIRUS DNA: NORMAL
EPSTEIN-BARR VIRUS PCR, QUANT: NOT DETECTED IU/ML

## 2024-05-09 ENCOUNTER — PATIENT MESSAGE (OUTPATIENT)
Dept: HEMATOLOGY/ONCOLOGY | Facility: CLINIC | Age: 49
End: 2024-05-09
Payer: COMMERCIAL

## 2024-05-09 NOTE — PROGRESS NOTES
BMT Pharmacist Immunosuppression Note:    Reviewed patient's cyclosporine level with Dr. Franco. Instructed her to continue your current regimen of 150mg twice a day (one 100mg capsule and two 25mg capsules).       Nel Luo Pharm.D., [facility]  Clinical Pharmacy Specialist, Bone Marrow Transplant/Cellular Therapy  [facility]  SpectraLink: [phone]

## 2024-05-12 ENCOUNTER — PATIENT MESSAGE (OUTPATIENT)
Dept: HEMATOLOGY/ONCOLOGY | Facility: CLINIC | Age: 49
End: 2024-05-12
Payer: COMMERCIAL

## 2024-05-14 ENCOUNTER — LAB VISIT (OUTPATIENT)
Dept: LAB | Facility: HOSPITAL | Age: 49
End: 2024-05-14
Attending: INTERNAL MEDICINE
Payer: COMMERCIAL

## 2024-05-14 DIAGNOSIS — Z94.84 HISTORY OF ALLOGENEIC STEM CELL TRANSPLANT: ICD-10-CM

## 2024-05-14 LAB
ALBUMIN SERPL BCP-MCNC: 3.6 G/DL (ref 3.5–5.2)
ALP SERPL-CCNC: 245 U/L (ref 55–135)
ALT SERPL W/O P-5'-P-CCNC: 206 U/L (ref 10–44)
ANION GAP SERPL CALC-SCNC: 9 MMOL/L (ref 8–16)
AST SERPL-CCNC: 101 U/L (ref 10–40)
BASOPHILS # BLD AUTO: 0.01 K/UL (ref 0–0.2)
BASOPHILS NFR BLD: 0.3 % (ref 0–1.9)
BILIRUB SERPL-MCNC: 0.7 MG/DL (ref 0.1–1)
BUN SERPL-MCNC: 13 MG/DL (ref 6–20)
CALCIUM SERPL-MCNC: 8.7 MG/DL (ref 8.7–10.5)
CHLORIDE SERPL-SCNC: 101 MMOL/L (ref 95–110)
CO2 SERPL-SCNC: 19 MMOL/L (ref 23–29)
CREAT SERPL-MCNC: 1.3 MG/DL (ref 0.5–1.4)
DIFFERENTIAL METHOD BLD: ABNORMAL
EOSINOPHIL # BLD AUTO: 0.2 K/UL (ref 0–0.5)
EOSINOPHIL NFR BLD: 5.8 % (ref 0–8)
ERYTHROCYTE [DISTWIDTH] IN BLOOD BY AUTOMATED COUNT: 13.6 % (ref 11.5–14.5)
EST. GFR  (NO RACE VARIABLE): 50.4 ML/MIN/1.73 M^2
GLUCOSE SERPL-MCNC: 106 MG/DL (ref 70–110)
HCT VFR BLD AUTO: 37.1 % (ref 37–48.5)
HGB BLD-MCNC: 13.4 G/DL (ref 12–16)
IMM GRANULOCYTES # BLD AUTO: 0.01 K/UL (ref 0–0.04)
IMM GRANULOCYTES NFR BLD AUTO: 0.3 % (ref 0–0.5)
LYMPHOCYTES # BLD AUTO: 1.1 K/UL (ref 1–4.8)
LYMPHOCYTES NFR BLD: 30.1 % (ref 18–48)
MAGNESIUM SERPL-MCNC: 1.6 MG/DL (ref 1.6–2.6)
MCH RBC QN AUTO: 33.8 PG (ref 27–31)
MCHC RBC AUTO-ENTMCNC: 36.1 G/DL (ref 32–36)
MCV RBC AUTO: 94 FL (ref 82–98)
MONOCYTES # BLD AUTO: 0.6 K/UL (ref 0.3–1)
MONOCYTES NFR BLD: 16.2 % (ref 4–15)
NEUTROPHILS # BLD AUTO: 1.7 K/UL (ref 1.8–7.7)
NEUTROPHILS NFR BLD: 47.3 % (ref 38–73)
NRBC BLD-RTO: 0 /100 WBC
PHOSPHATE SERPL-MCNC: 2.5 MG/DL (ref 2.7–4.5)
PLATELET # BLD AUTO: 228 K/UL (ref 150–450)
PMV BLD AUTO: 9.8 FL (ref 9.2–12.9)
POTASSIUM SERPL-SCNC: 5 MMOL/L (ref 3.5–5.1)
PROT SERPL-MCNC: 6.2 G/DL (ref 6–8.4)
RBC # BLD AUTO: 3.97 M/UL (ref 4–5.4)
SODIUM SERPL-SCNC: 129 MMOL/L (ref 136–145)
WBC # BLD AUTO: 3.65 K/UL (ref 3.9–12.7)

## 2024-05-14 PROCEDURE — 83735 ASSAY OF MAGNESIUM: CPT | Mod: PN | Performed by: INTERNAL MEDICINE

## 2024-05-14 PROCEDURE — 80053 COMPREHEN METABOLIC PANEL: CPT | Mod: PN | Performed by: INTERNAL MEDICINE

## 2024-05-14 PROCEDURE — 80158 DRUG ASSAY CYCLOSPORINE: CPT | Performed by: INTERNAL MEDICINE

## 2024-05-14 PROCEDURE — 84100 ASSAY OF PHOSPHORUS: CPT | Mod: PN | Performed by: INTERNAL MEDICINE

## 2024-05-14 PROCEDURE — 87799 DETECT AGENT NOS DNA QUANT: CPT | Performed by: INTERNAL MEDICINE

## 2024-05-14 PROCEDURE — 85025 COMPLETE CBC W/AUTO DIFF WBC: CPT | Mod: PN | Performed by: INTERNAL MEDICINE

## 2024-05-14 PROCEDURE — 36415 COLL VENOUS BLD VENIPUNCTURE: CPT | Mod: PN | Performed by: INTERNAL MEDICINE

## 2024-05-15 ENCOUNTER — HOSPITAL ENCOUNTER (INPATIENT)
Facility: HOSPITAL | Age: 49
LOS: 3 days | Discharge: HOME | DRG: 644 | End: 2024-05-18
Attending: EMERGENCY MEDICINE | Admitting: INTERNAL MEDICINE
Payer: COMMERCIAL

## 2024-05-15 ENCOUNTER — TELEPHONE (OUTPATIENT)
Dept: HEMATOLOGY/ONCOLOGY | Facility: CLINIC | Age: 49
End: 2024-05-15
Payer: COMMERCIAL

## 2024-05-15 DIAGNOSIS — E87.5 HYPERKALEMIA: ICD-10-CM

## 2024-05-15 DIAGNOSIS — C92.00 ACUTE MYELOID LEUKEMIA NOT HAVING ACHIEVED REMISSION: ICD-10-CM

## 2024-05-15 DIAGNOSIS — A41.9 SEPSIS: ICD-10-CM

## 2024-05-15 DIAGNOSIS — R06.02 SHORTNESS OF BREATH: ICD-10-CM

## 2024-05-15 DIAGNOSIS — E87.1 HYPONATREMIA: Primary | ICD-10-CM

## 2024-05-15 DIAGNOSIS — R07.9 CHEST PAIN: ICD-10-CM

## 2024-05-15 PROBLEM — R74.01 TRANSAMINITIS: Status: ACTIVE | Noted: 2024-05-15

## 2024-05-15 PROBLEM — D84.9 IMMUNOCOMPROMISED: Status: ACTIVE | Noted: 2024-05-15

## 2024-05-15 LAB
ALBUMIN SERPL BCP-MCNC: 4 G/DL (ref 3.5–5.2)
ALLENS TEST: ABNORMAL
ALP SERPL-CCNC: 232 U/L (ref 55–135)
ALT SERPL W/O P-5'-P-CCNC: 157 U/L (ref 10–44)
ANION GAP SERPL CALC-SCNC: 7 MMOL/L (ref 8–16)
AST SERPL-CCNC: 67 U/L (ref 10–40)
BASOPHILS # BLD AUTO: 0.01 K/UL (ref 0–0.2)
BASOPHILS NFR BLD: 0.2 % (ref 0–1.9)
BILIRUB SERPL-MCNC: 1 MG/DL (ref 0.1–1)
BILIRUB UR QL STRIP: NEGATIVE
BNP SERPL-MCNC: 147 PG/ML (ref 0–99)
BUN SERPL-MCNC: 15 MG/DL (ref 6–20)
CALCIUM SERPL-MCNC: 8.8 MG/DL (ref 8.7–10.5)
CHLORIDE SERPL-SCNC: 98 MMOL/L (ref 95–110)
CLARITY UR: ABNORMAL
CMV DNA SPEC QL NAA+PROBE: NORMAL
CO2 SERPL-SCNC: 22 MMOL/L (ref 23–29)
COLOR UR: YELLOW
CORTIS SERPL-MCNC: 21.3 UG/DL
CREAT SERPL-MCNC: 1.4 MG/DL (ref 0.5–1.4)
CYCLOSPORINE BLD LC/MS/MS-MCNC: 217 NG/ML (ref 100–400)
CYTOMEGALOVIRUS PCR, QUANT: NOT DETECTED IU/ML
DELSYS: ABNORMAL
DIFFERENTIAL METHOD BLD: ABNORMAL
EOSINOPHIL # BLD AUTO: 0.1 K/UL (ref 0–0.5)
EOSINOPHIL NFR BLD: 1.3 % (ref 0–8)
EPSTEIN-BARR VIRUS DNA: NORMAL
EPSTEIN-BARR VIRUS PCR, QUANT: NOT DETECTED IU/ML
ERYTHROCYTE [DISTWIDTH] IN BLOOD BY AUTOMATED COUNT: 13.9 % (ref 11.5–14.5)
EST. GFR  (NO RACE VARIABLE): 46.1 ML/MIN/1.73 M^2
GLUCOSE SERPL-MCNC: 91 MG/DL (ref 70–110)
GLUCOSE UR QL STRIP: NEGATIVE
HCO3 UR-SCNC: 20.8 MMOL/L (ref 24–28)
HCT VFR BLD AUTO: 39.6 % (ref 37–48.5)
HGB BLD-MCNC: 14 G/DL (ref 12–16)
HGB UR QL STRIP: NEGATIVE
IMM GRANULOCYTES # BLD AUTO: 0.02 K/UL (ref 0–0.04)
IMM GRANULOCYTES NFR BLD AUTO: 0.3 % (ref 0–0.5)
INFLUENZA A, MOLECULAR: NEGATIVE
INFLUENZA B, MOLECULAR: NEGATIVE
KETONES UR QL STRIP: ABNORMAL
LACTATE SERPL-SCNC: 0.5 MMOL/L (ref 0.5–1.9)
LACTATE SERPL-SCNC: 0.7 MMOL/L (ref 0.5–1.9)
LEUKOCYTE ESTERASE UR QL STRIP: NEGATIVE
LIPASE SERPL-CCNC: 17 U/L (ref 4–60)
LYMPHOCYTES # BLD AUTO: 0.8 K/UL (ref 1–4.8)
LYMPHOCYTES NFR BLD: 13.6 % (ref 18–48)
MAGNESIUM SERPL-MCNC: 1.7 MG/DL (ref 1.6–2.6)
MCH RBC QN AUTO: 33.5 PG (ref 27–31)
MCHC RBC AUTO-ENTMCNC: 35.4 G/DL (ref 32–36)
MCV RBC AUTO: 95 FL (ref 82–98)
MODE: ABNORMAL
MONOCYTES # BLD AUTO: 0.5 K/UL (ref 0.3–1)
MONOCYTES NFR BLD: 8.5 % (ref 4–15)
NEUTROPHILS # BLD AUTO: 4.6 K/UL (ref 1.8–7.7)
NEUTROPHILS NFR BLD: 76.1 % (ref 38–73)
NITRITE UR QL STRIP: NEGATIVE
NRBC BLD-RTO: 0 /100 WBC
OSMOLALITY SERPL: 273 MOSM/KG (ref 275–295)
OSMOLALITY UR: 466 MOSM/KG (ref 50–1200)
PCO2 BLDA: 37.1 MMHG (ref 35–45)
PH SMN: 7.36 [PH] (ref 7.35–7.45)
PH UR STRIP: 6 [PH] (ref 5–8)
PHOSPHATE SERPL-MCNC: 2.3 MG/DL (ref 2.7–4.5)
PLATELET # BLD AUTO: 237 K/UL (ref 150–450)
PMV BLD AUTO: 10.2 FL (ref 9.2–12.9)
PO2 BLDA: 26 MMHG (ref 40–60)
POC BE: -5 MMOL/L
POC SATURATED O2: 45 % (ref 95–100)
POC TCO2: 22 MMOL/L (ref 24–29)
POTASSIUM SERPL-SCNC: 5.2 MMOL/L (ref 3.5–5.1)
POTASSIUM SERPL-SCNC: 5.6 MMOL/L (ref 3.5–5.1)
PROT SERPL-MCNC: 6.4 G/DL (ref 6–8.4)
PROT UR QL STRIP: ABNORMAL
RBC # BLD AUTO: 4.18 M/UL (ref 4–5.4)
SAMPLE: ABNORMAL
SARS-COV-2 RDRP RESP QL NAA+PROBE: NEGATIVE
SITE: ABNORMAL
SODIUM SERPL-SCNC: 127 MMOL/L (ref 136–145)
SODIUM UR-SCNC: 63 MMOL/L (ref 20–250)
SP GR UR STRIP: 1.02 (ref 1–1.03)
SPECIMEN SOURCE: NORMAL
TRIGL SERPL-MCNC: 72 MG/DL (ref 30–150)
TROPONIN I SERPL HS-MCNC: 31.8 PG/ML (ref 0–14.9)
TSH SERPL DL<=0.005 MIU/L-ACNC: 0.63 UIU/ML (ref 0.34–5.6)
URN SPEC COLLECT METH UR: ABNORMAL
UROBILINOGEN UR STRIP-ACNC: NEGATIVE EU/DL
WBC # BLD AUTO: 6.03 K/UL (ref 3.9–12.7)

## 2024-05-15 PROCEDURE — 82533 TOTAL CORTISOL: CPT | Performed by: STUDENT IN AN ORGANIZED HEALTH CARE EDUCATION/TRAINING PROGRAM

## 2024-05-15 PROCEDURE — 12000002 HC ACUTE/MED SURGE SEMI-PRIVATE ROOM

## 2024-05-15 PROCEDURE — 96365 THER/PROPH/DIAG IV INF INIT: CPT

## 2024-05-15 PROCEDURE — 83880 ASSAY OF NATRIURETIC PEPTIDE: CPT | Performed by: STUDENT IN AN ORGANIZED HEALTH CARE EDUCATION/TRAINING PROGRAM

## 2024-05-15 PROCEDURE — 96361 HYDRATE IV INFUSION ADD-ON: CPT

## 2024-05-15 PROCEDURE — U0002 COVID-19 LAB TEST NON-CDC: HCPCS | Performed by: STUDENT IN AN ORGANIZED HEALTH CARE EDUCATION/TRAINING PROGRAM

## 2024-05-15 PROCEDURE — 83930 ASSAY OF BLOOD OSMOLALITY: CPT | Performed by: STUDENT IN AN ORGANIZED HEALTH CARE EDUCATION/TRAINING PROGRAM

## 2024-05-15 PROCEDURE — 83935 ASSAY OF URINE OSMOLALITY: CPT | Performed by: STUDENT IN AN ORGANIZED HEALTH CARE EDUCATION/TRAINING PROGRAM

## 2024-05-15 PROCEDURE — 96368 THER/DIAG CONCURRENT INF: CPT

## 2024-05-15 PROCEDURE — 87040 BLOOD CULTURE FOR BACTERIA: CPT | Performed by: STUDENT IN AN ORGANIZED HEALTH CARE EDUCATION/TRAINING PROGRAM

## 2024-05-15 PROCEDURE — 25000003 PHARM REV CODE 250: Performed by: STUDENT IN AN ORGANIZED HEALTH CARE EDUCATION/TRAINING PROGRAM

## 2024-05-15 PROCEDURE — 84443 ASSAY THYROID STIM HORMONE: CPT | Performed by: STUDENT IN AN ORGANIZED HEALTH CARE EDUCATION/TRAINING PROGRAM

## 2024-05-15 PROCEDURE — 36415 COLL VENOUS BLD VENIPUNCTURE: CPT | Performed by: STUDENT IN AN ORGANIZED HEALTH CARE EDUCATION/TRAINING PROGRAM

## 2024-05-15 PROCEDURE — 93005 ELECTROCARDIOGRAM TRACING: CPT | Performed by: INTERNAL MEDICINE

## 2024-05-15 PROCEDURE — 93010 ELECTROCARDIOGRAM REPORT: CPT | Mod: ,,, | Performed by: INTERNAL MEDICINE

## 2024-05-15 PROCEDURE — 25000003 PHARM REV CODE 250: Performed by: HOSPITALIST

## 2024-05-15 PROCEDURE — 96366 THER/PROPH/DIAG IV INF ADDON: CPT

## 2024-05-15 PROCEDURE — 84478 ASSAY OF TRIGLYCERIDES: CPT | Performed by: EMERGENCY MEDICINE

## 2024-05-15 PROCEDURE — 80053 COMPREHEN METABOLIC PANEL: CPT | Performed by: STUDENT IN AN ORGANIZED HEALTH CARE EDUCATION/TRAINING PROGRAM

## 2024-05-15 PROCEDURE — 84100 ASSAY OF PHOSPHORUS: CPT | Performed by: STUDENT IN AN ORGANIZED HEALTH CARE EDUCATION/TRAINING PROGRAM

## 2024-05-15 PROCEDURE — 82803 BLOOD GASES ANY COMBINATION: CPT

## 2024-05-15 PROCEDURE — 84300 ASSAY OF URINE SODIUM: CPT | Performed by: STUDENT IN AN ORGANIZED HEALTH CARE EDUCATION/TRAINING PROGRAM

## 2024-05-15 PROCEDURE — 63600175 PHARM REV CODE 636 W HCPCS: Performed by: STUDENT IN AN ORGANIZED HEALTH CARE EDUCATION/TRAINING PROGRAM

## 2024-05-15 PROCEDURE — 83735 ASSAY OF MAGNESIUM: CPT | Performed by: STUDENT IN AN ORGANIZED HEALTH CARE EDUCATION/TRAINING PROGRAM

## 2024-05-15 PROCEDURE — 83605 ASSAY OF LACTIC ACID: CPT | Mod: 91 | Performed by: STUDENT IN AN ORGANIZED HEALTH CARE EDUCATION/TRAINING PROGRAM

## 2024-05-15 PROCEDURE — 63600175 PHARM REV CODE 636 W HCPCS: Performed by: INTERNAL MEDICINE

## 2024-05-15 PROCEDURE — 99900035 HC TECH TIME PER 15 MIN (STAT)

## 2024-05-15 PROCEDURE — 85025 COMPLETE CBC W/AUTO DIFF WBC: CPT | Performed by: STUDENT IN AN ORGANIZED HEALTH CARE EDUCATION/TRAINING PROGRAM

## 2024-05-15 PROCEDURE — 96367 TX/PROPH/DG ADDL SEQ IV INF: CPT

## 2024-05-15 PROCEDURE — 84484 ASSAY OF TROPONIN QUANT: CPT | Performed by: STUDENT IN AN ORGANIZED HEALTH CARE EDUCATION/TRAINING PROGRAM

## 2024-05-15 PROCEDURE — 99285 EMERGENCY DEPT VISIT HI MDM: CPT | Mod: 25

## 2024-05-15 PROCEDURE — 81003 URINALYSIS AUTO W/O SCOPE: CPT | Performed by: STUDENT IN AN ORGANIZED HEALTH CARE EDUCATION/TRAINING PROGRAM

## 2024-05-15 PROCEDURE — 84132 ASSAY OF SERUM POTASSIUM: CPT | Performed by: EMERGENCY MEDICINE

## 2024-05-15 PROCEDURE — 87502 INFLUENZA DNA AMP PROBE: CPT | Performed by: STUDENT IN AN ORGANIZED HEALTH CARE EDUCATION/TRAINING PROGRAM

## 2024-05-15 PROCEDURE — 25000003 PHARM REV CODE 250: Performed by: INTERNAL MEDICINE

## 2024-05-15 PROCEDURE — 83690 ASSAY OF LIPASE: CPT | Performed by: STUDENT IN AN ORGANIZED HEALTH CARE EDUCATION/TRAINING PROGRAM

## 2024-05-15 RX ORDER — ACYCLOVIR 200 MG/1
800 CAPSULE ORAL 2 TIMES DAILY
Status: DISCONTINUED | OUTPATIENT
Start: 2024-05-15 | End: 2024-05-18 | Stop reason: HOSPADM

## 2024-05-15 RX ORDER — ALUMINUM HYDROXIDE, MAGNESIUM HYDROXIDE, AND SIMETHICONE 1200; 120; 1200 MG/30ML; MG/30ML; MG/30ML
30 SUSPENSION ORAL 4 TIMES DAILY PRN
Status: DISCONTINUED | OUTPATIENT
Start: 2024-05-15 | End: 2024-05-18 | Stop reason: HOSPADM

## 2024-05-15 RX ORDER — CYCLOSPORINE 25 MG/1
50 CAPSULE, LIQUID FILLED ORAL 2 TIMES DAILY
Status: DISCONTINUED | OUTPATIENT
Start: 2024-05-15 | End: 2024-05-18 | Stop reason: HOSPADM

## 2024-05-15 RX ORDER — SODIUM,POTASSIUM PHOSPHATES 280-250MG
2 POWDER IN PACKET (EA) ORAL
Status: DISCONTINUED | OUTPATIENT
Start: 2024-05-15 | End: 2024-05-18 | Stop reason: HOSPADM

## 2024-05-15 RX ORDER — HYDROCODONE BITARTRATE AND ACETAMINOPHEN 5; 325 MG/1; MG/1
1 TABLET ORAL EVERY 6 HOURS PRN
Status: DISCONTINUED | OUTPATIENT
Start: 2024-05-15 | End: 2024-05-15

## 2024-05-15 RX ORDER — PANTOPRAZOLE SODIUM 40 MG/1
40 TABLET, DELAYED RELEASE ORAL 2 TIMES DAILY
Status: DISCONTINUED | OUTPATIENT
Start: 2024-05-15 | End: 2024-05-18 | Stop reason: HOSPADM

## 2024-05-15 RX ORDER — SODIUM CHLORIDE 9 MG/ML
INJECTION, SOLUTION INTRAVENOUS CONTINUOUS
Status: DISCONTINUED | OUTPATIENT
Start: 2024-05-15 | End: 2024-05-16

## 2024-05-15 RX ORDER — ACETAMINOPHEN 325 MG/1
650 TABLET ORAL EVERY 4 HOURS PRN
Status: DISCONTINUED | OUTPATIENT
Start: 2024-05-15 | End: 2024-05-15

## 2024-05-15 RX ORDER — SULFAMETHOXAZOLE AND TRIMETHOPRIM 800; 160 MG/1; MG/1
1 TABLET ORAL 2 TIMES DAILY
Status: DISCONTINUED | OUTPATIENT
Start: 2024-05-15 | End: 2024-05-15

## 2024-05-15 RX ORDER — CALCIUM GLUCONATE 20 MG/ML
1 INJECTION, SOLUTION INTRAVENOUS ONCE
Status: COMPLETED | OUTPATIENT
Start: 2024-05-15 | End: 2024-05-15

## 2024-05-15 RX ORDER — LANOLIN ALCOHOL/MO/W.PET/CERES
800 CREAM (GRAM) TOPICAL
Status: DISCONTINUED | OUTPATIENT
Start: 2024-05-15 | End: 2024-05-18 | Stop reason: HOSPADM

## 2024-05-15 RX ORDER — LANOLIN ALCOHOL/MO/W.PET/CERES
400 CREAM (GRAM) TOPICAL 2 TIMES DAILY
Status: DISCONTINUED | OUTPATIENT
Start: 2024-05-15 | End: 2024-05-18 | Stop reason: HOSPADM

## 2024-05-15 RX ORDER — TRAZODONE HYDROCHLORIDE 50 MG/1
50 TABLET ORAL NIGHTLY PRN
Status: DISCONTINUED | OUTPATIENT
Start: 2024-05-15 | End: 2024-05-18 | Stop reason: HOSPADM

## 2024-05-15 RX ORDER — DEXAMETHASONE 0.5 MG/5ML
1 SOLUTION ORAL EVERY 12 HOURS
Status: DISCONTINUED | OUTPATIENT
Start: 2024-05-15 | End: 2024-05-15

## 2024-05-15 RX ORDER — OXYCODONE HYDROCHLORIDE 10 MG/1
10 TABLET ORAL EVERY 4 HOURS PRN
Status: DISCONTINUED | OUTPATIENT
Start: 2024-05-15 | End: 2024-05-18 | Stop reason: HOSPADM

## 2024-05-15 RX ORDER — SULFAMETHOXAZOLE AND TRIMETHOPRIM 800; 160 MG/1; MG/1
1 TABLET ORAL
Status: DISCONTINUED | OUTPATIENT
Start: 2024-05-20 | End: 2024-05-16

## 2024-05-15 RX ORDER — ONDANSETRON HYDROCHLORIDE 2 MG/ML
4 INJECTION, SOLUTION INTRAVENOUS EVERY 6 HOURS PRN
Status: DISCONTINUED | OUTPATIENT
Start: 2024-05-15 | End: 2024-05-18 | Stop reason: HOSPADM

## 2024-05-15 RX ORDER — CYCLOSPORINE 100 MG/1
100 CAPSULE, LIQUID FILLED ORAL 2 TIMES DAILY
Status: DISCONTINUED | OUTPATIENT
Start: 2024-05-15 | End: 2024-05-18 | Stop reason: HOSPADM

## 2024-05-15 RX ORDER — ACETAMINOPHEN 325 MG/1
650 TABLET ORAL EVERY 8 HOURS PRN
Status: DISCONTINUED | OUTPATIENT
Start: 2024-05-15 | End: 2024-05-15

## 2024-05-15 RX ADMIN — Medication 400 MG: at 08:05

## 2024-05-15 RX ADMIN — DEXAMETHASONE 1 MG: 0.5 SOLUTION ORAL at 08:05

## 2024-05-15 RX ADMIN — SODIUM CHLORIDE: 9 INJECTION, SOLUTION INTRAVENOUS at 07:05

## 2024-05-15 RX ADMIN — CEFEPIME 2 G: 2 INJECTION, POWDER, FOR SOLUTION INTRAVENOUS at 06:05

## 2024-05-15 RX ADMIN — ACYCLOVIR 800 MG: 200 CAPSULE ORAL at 08:05

## 2024-05-15 RX ADMIN — OXYCODONE HYDROCHLORIDE 10 MG: 10 TABLET ORAL at 09:05

## 2024-05-15 RX ADMIN — TRAZODONE HYDROCHLORIDE 50 MG: 50 TABLET ORAL at 10:05

## 2024-05-15 RX ADMIN — SODIUM ZIRCONIUM CYCLOSILICATE 5 G: 5 POWDER, FOR SUSPENSION ORAL at 08:05

## 2024-05-15 RX ADMIN — CALCIUM GLUCONATE 1 G: 20 INJECTION, SOLUTION INTRAVENOUS at 07:05

## 2024-05-15 RX ADMIN — VANCOMYCIN HYDROCHLORIDE 1500 MG: 1.5 INJECTION, POWDER, LYOPHILIZED, FOR SOLUTION INTRAVENOUS at 07:05

## 2024-05-15 NOTE — ED NOTES
Pt states she is a bone marrow transplant patient d/t CA and has been sick x few days. Pt MD called to notify her of a low Na. Pt endorses fatigue and some confusion, but is AAOx4 at this time.

## 2024-05-15 NOTE — ED PROVIDER NOTES
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    Patient reports that for the past several days she has felt very lethargic, chills, muscle & joint aches, nausea, mild SOB/chest discomfort with somewhat exertional component. She reports she has no appetite and has not eaten in several days.    Denies abdominal pain, increased/decreased urination, dysuria, diarrhea, skin rashes  Review of patient's allergies indicates:   Allergen Reactions    Promethazine Nausea And Vomiting     Other Reaction(s): VOMITING    Penicillin Hives    Penicillins      Other reaction(s): Unknown    Melatonin Anxiety     Other Reaction(s): FLUSHING     Past Medical History:   Diagnosis Date    ADHD (attention deficit hyperactivity disorder)     Anxiety     General anesthetics causing adverse effect in therapeutic use     REFLUX WITH SURGERY AND ASPIRATED, ZOFRAN GIVEN IV     Past Surgical History:   Procedure Laterality Date    BONE MARROW BIOPSY Left 3/27/2024    Procedure: Biopsy-bone marrow;  Surgeon: Shemar Franco MD;  Location: [address]);  Service: Oncology;  Laterality: Left;  3/21-LVM for precall-KPvt  3/25-precall complete-Kpvt    BREAST RECONSTRUCTION Bilateral 2007    BREAST SURGERY      CHOLECYSTECTOMY      DIAGNOSTIC LAPAROSCOPY N/A 12/23/2020    Procedure: LAPAROSCOPY, DIAGNOSTIC;  Surgeon: Ed Troy MD;  Location: [facility] OR;  Service: OB/GYN;  Laterality: N/A;    DILATION AND CURETTAGE OF UTERUS      EVACUATION OF HEMATOMA Left 12/23/2020    Procedure: EVACUATION, HEMATOMA;  Surgeon: Ed Troy MD;  Location: [facility] OR;  Service: OB/GYN;  Laterality: Left;    HYSTERECTOMY  12/2020     partial    LAPAROSCOPY-ASSISTED VAGINAL HYSTERECTOMY N/A 2020    Procedure: HYSTERECTOMY, VAGINAL, LAPAROSCOPY-ASSISTED;  Surgeon: Ed Troy MD;  Location: [facility];  Service: OB/GYN;  Laterality: N/A;    LEFT KNEE      LEFT MENISCUS REPAIR    TONSILLECTOMY       Family History   Problem Relation Name Age of Onset    Cancer Mother          Breast    Breast cancer Mother          age unknown    Cancer Maternal Grandmother          Breast    Breast cancer Maternal Grandmother          70s and 80s    COPD Maternal Grandfather      Heart disease Paternal Grandfather       Social History     Tobacco Use    Smoking status: Former     Current packs/day: 0.00     Types: Cigarettes     Quit date: 2011     Years since quittin.9    Smokeless tobacco: Never   Substance Use Topics    Alcohol use: Yes     Alcohol/week: 0.0 - 0.8 standard drinks of alcohol     Comment: socially    Drug use: No     Review of Systems   Constitutional:  Positive for chills and fatigue.   Respiratory:  Positive for shortness of breath. Negative for cough.    Cardiovascular:  Positive for chest pain. Negative for leg swelling.   Gastrointestinal:  Positive for nausea. Negative for abdominal pain, diarrhea and vomiting.   Endocrine: Negative for polyuria.   Genitourinary:  Negative for dysuria.   Musculoskeletal:  Positive for joint swelling and myalgias.   Skin:  Negative for rash and wound.       Physical Exam     Initial Vitals [05/15/24 1618]   BP Pulse Resp Temp SpO2   (!) 160/99 81 20 98.3 °F (36.8 °C) 98 %      MAP       --         Physical Exam    Nursing note and vitals reviewed.  Constitutional: Vital signs are normal. She is cooperative. She has a sickly appearance. She appears ill. No distress.   Eyes: Conjunctivae and EOM are normal.   Neck:   Normal range of motion.  Cardiovascular:  Normal rate and regular rhythm.           Pulmonary/Chest: Breath sounds normal. No respiratory distress. She has no wheezes.    Abdominal: Abdomen is soft. There is no abdominal tenderness. There is no rebound and no guarding.   Musculoskeletal:         General: Normal range of motion.      Cervical back: Normal range of motion.     Skin: Skin is warm. No rash noted.   Psychiatric: She has a normal mood and affect. Her behavior is normal.         ED Course   Procedures  Labs Reviewed   CBC W/ AUTO DIFFERENTIAL - Abnormal; Notable for the following components:       Result Value    MCH 33.5 (*)     Lymph # 0.8 (*)     Gran % 76.1 (*)     Lymph % 13.6 (*)     All other components within normal limits   COMPREHENSIVE METABOLIC PANEL - Abnormal; Notable for the following components:    Sodium 127 (*)     Potassium 5.6 (*)     CO2 22 (*)     Alkaline Phosphatase 232 (*)     AST 67 (*)      (*)     eGFR 46.1 (*)     Anion Gap 7 (*)     All other components within normal limits   B-TYPE NATRIURETIC PEPTIDE - Abnormal; Notable for the following components:     (*)     All other components within normal limits   PHOSPHORUS - Abnormal; Notable for the following components:    Phosphorus 2.3 (*)     All other components within normal limits   TROPONIN I HIGH SENSITIVITY - Abnormal; Notable for the following components:    Troponin I High Sensitivity 31.8 (*)     All other components within normal limits   URINALYSIS, REFLEX TO URINE CULTURE - Abnormal; Notable for the following components:    Appearance, UA Hazy (*)     Protein, UA Trace (*)     Ketones, UA 1+ (*)     All other components within normal limits    Narrative:     Specimen Source->Urine   OSMOLALITY, SERUM - Abnormal; Notable for the following components:    Osmolality 273 (*)     All other components within normal limits   POTASSIUM - Abnormal; Notable for the following components:    Potassium 5.2 (*)     All other components within normal limits   ISTAT PROCEDURE - Abnormal; Notable for the following components:    POC PO2 26 (*)     POC HCO3 20.8 (*)     POC BE -5 (*)      POC TCO2 22 (*)     All other components within normal limits   CULTURE, BLOOD    Narrative:     Collection has been rescheduled by [email or web address] at 05/15/2024 17:26 Reason:   Patient unavailable. Ultrasound  Collection has been rescheduled by [email or web address] at 05/15/2024 17:26 Reason:   Patient unavailable. Ultrasound   CULTURE, BLOOD    Narrative:     Collection has been rescheduled by [email or web address] at 05/15/2024 17:26 Reason:   Patient unavailable. Ultrasound  Collection has been rescheduled by [email or web address] at 05/15/2024 17:26 Reason:   Patient unavailable. Ultrasound   CULTURE, URINE   LACTIC ACID, PLASMA   LIPASE   MAGNESIUM   TSH   INFLUENZA A AND B ANTIGEN    Narrative:     Specimen Source->Nasopharyngeal Swab   SARS-COV-2 RNA AMPLIFICATION, QUAL   OSMOLALITY, URINE RANDOM    Narrative:     Specimen Source->Urine   SODIUM, URINE, RANDOM    Narrative:     Specimen Source->Urine   CORTISOL, RANDOM   LACTIC ACID, PLASMA   TRIGLYCERIDES          Imaging Results              X-Ray Chest AP Portable (Final result)  Result time 05/15/24 17:55:43      Final result by Arnie Barraza MD (05/15/24 17:55:43)                   Impression:      No acute radiographic abnormality in the chest.      Electronically signed by: Arnie Barraza  Date:    05/15/2024  Time:    17:55               Narrative:    EXAMINATION:  XR CHEST AP PORTABLE    CLINICAL HISTORY:  chest pain;.    TECHNIQUE:  Single frontal portable view of the chest was performed.    COMPARISON:  Thoracic spine radiographs 03/17/2010    FINDINGS:  Cardiac monitor wires overlie the chest.  Cardiomediastinal silhouette is within normal limits.Lungs appear grossly clear.  No definite focal consolidation, pleural effusion, or pneumothorax.  Bones are intact.                                       Medications   acyclovir capsule 800 mg (800 mg Oral Given 5/15/24 2010)   cycloSPORINE modified (NEORAL) (NEORAL) capsule 100 mg (100 mg Oral Not Given 5/15/24 2100)   cycloSPORINE modified (NEORAL) (NEORAL)  capsule 50 mg (50 mg Oral Not Given 5/15/24 2100)   gilteritinib Tab 80 mg (has no administration in time range)   isavuconazonium sulfate Cap 372 mg (has no administration in time range)   letermovir Tab 240 mg (has no administration in time range)   magnesium oxide tablet 400 mg (400 mg Oral Given 5/15/24 2010)   pantoprazole EC tablet 40 mg (40 mg Oral Not Given 5/15/24 1915)   ondansetron injection 4 mg (has no administration in time range)   aluminum-magnesium hydroxide-simethicone 200-200-20 mg/5 mL suspension 30 mL (has no administration in time range)   potassium bicarbonate disintegrating tablet 50 mEq (has no administration in time range)   potassium bicarbonate disintegrating tablet 35 mEq (has no administration in time range)   potassium bicarbonate disintegrating tablet 60 mEq (has no administration in time range)   magnesium oxide tablet 800 mg (has no administration in time range)   magnesium oxide tablet 800 mg (has no administration in time range)   potassium, sodium phosphates 280-160-250 mg packet 2 packet (has no administration in time range)   potassium, sodium phosphates 280-160-250 mg packet 2 packet (has no administration in time range)   potassium, sodium phosphates 280-160-250 mg packet 2 packet (has no administration in time range)   cefepime 2 g in dextrose 5% 100 mL IVPB (ready to mix) (has no administration in time range)   0.9%  NaCl infusion ( Intravenous New Bag 5/15/24 1957)   vancomycin - pharmacy to dose (has no administration in time range)   oxyCODONE immediate release tablet Tab 10 mg (10 mg Oral Given 5/15/24 2145)   sulfamethoxazole-trimethoprim 800-160mg per tablet 1 tablet (has no administration in time range)   traZODone tablet 50 mg (50 mg Oral Given 5/15/24 2254)   vancomycin 1.25 g in dextrose 5% 250 mL IVPB (ready to mix) (1,250 mg Intravenous Trough Due As Scheduled Before Dose 5/17/24 1900)   vancomycin 1.5 g in dextrose 5 % 250 mL IVPB (ready to mix) (0 mg  Intravenous Stopped 5/15/24 2129)   cefepime 2 g in dextrose 5% 100 mL IVPB (ready to mix) (0 g Intravenous Stopped 5/15/24 2009)   calcium gluconate 1 g in NS IVPB (premixed) (0 g Intravenous Stopped 5/15/24 2053)   sodium zirconium cyclosilicate packet 5 g (5 g Oral Given 5/15/24 2009)     Medical Decision Making  The patient is currently hemodynamically stable.  She is afebrile.  The patient's case has been discussed with the physician on-call for her heme Onc physician at [facility] as the patient has had a previous bone marrow transfer.  They felt that current admission at this facility was appropriate and did not feel that she needed emergent transfer to [facility] however if the patient's clinical scenario begins to decompensate they would recommend rediscussion of possible transfer.  This information has been given to the patient's admitting physician Dr. Mccord.  The patient has been evaluated in the emergency room by Dr. Mccord of the hospitalist service who has assumed care will admit.    Amount and/or Complexity of Data Reviewed  Independent Historian: spouse     Details:  bedside to assist with history.  External Data Reviewed: labs, radiology and notes.  Labs: ordered. Decision-making details documented in ED Course.     Details: See MDM  Radiology: ordered and independent interpretation performed.     Details: My independent analysis of the patient's chest xray is Adequate exposure. , Trachea, patricia, and mainstem bronchi visible without significant deviation, No apparent bony abnormality, Cardiac silhouette within normal limits, No evidence of pneumothorax, No evidence of pleural effusion, costophrenic angles sharp, No evidence of focal pneumonia  ECG/medicine tests: ordered and independent interpretation performed.     Details: My independent analysis of EKG from today is Sinus bradycardia, No ST elevations/depressions, no T-wave inversions, Normal WI interval, normal QRS duration, normal  QTC  Discussion of management or test interpretation with external provider(s): Discussed with hospital medicine regarding admission for hyponatremia.    Risk  Prescription drug management.  Decision regarding hospitalization.         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    On arrival patient vitals were   Initial Vitals [05/15/24 1618]  BP Pulse Resp Temp SpO2  (!) 160/99 81 20 98.3 °F (36.8 °C) 98 %    On examination, patient appears ill/sickly, Normal volume heart sounds, normal lung sounds, Abdomen soft/nontender/nondistended, Extremities without swelling or calf tenderness    My independent analysis of EKG from today is Sinus bradycardia, No ST elevations/depressions, no T-wave inversions, Normal TN interval, normal QRS duration, normal QTC    My independent analysis of the patient's chest xray is Adequate exposure. , Trachea, patricia, and mainstem bronchi visible without significant deviation, No apparent bony abnormality, Cardiac silhouette within normal limits, No evidence of pneumothorax, No evidence of pleural effusion, costophrenic angles sharp, No evidence of focal pneumonia    Labs ordered & notable for:   CBC: wnl  CMP: Na 127, K 5.6. ALK/ALT/AST elevated consistent with previous values.  Ma.7  Phos: 2.3  Lipase: wnl  TSH: wnl  Trop: 31.8!  BNP: 147  Cortisol: 21.3, likely excluding adrenal crisis  Flu/Covid: negative  Osmolality: 273    Patient overall appears euvolemic, given her hyponatremia/hyperkalemia/mild acidosis differential includes adrenal insufficiency or SIADH vs other cause. Unclear cause for hyponatremia at this time, patient would benefit from hospital admission and further workup/monitoring.    Patient was given the following in the  emergency department:  Medications  vancomycin 1.5 g in dextrose 5 % 250 mL IVPB (ready to mix) (has no administration in time range)  cefepime 2 g in dextrose 5% 100 mL IVPB (ready to mix) (2 g Intravenous New Bag 5/15/24 1801)    David Christian MD  [employer] [facility], PGY-4       Attending Attestation:   Physician Attestation Statement for Resident:  As the supervising MD   Physician Attestation Statement: I have personally seen and examined this patient.   I agree with the above history.  -:   As the supervising MD I agree with the above PE.     As the supervising MD I agree with the above treatment, course, plan, and disposition.    I have reviewed and agree with the residents interpretation of the following: lab data, x-rays and EKG.                 ED Course as of 05/16/24 0001   Wed May 15, 2024   2003 POC PH: 7.357 [AR]   2003 POC PCO2: 37.1 [AR]   2003 POC PO2(!!): 26 [AR]   2003 SARS-CoV-2 RNA, Amplification, Qual: Negative [AR]   2003 Influenza B, Molecular: Negative [AR]   2003 Flu A & B Source: Nasal swab [AR]   2003 Troponin I High Sensitivity(!): 31.8 [AR]   2003 TSH: 0.629 [AR]   2003 BNP(!): 147 [AR]   2003 Lipase: 17 [AR]   2003 Lactic Acid Level: 0.7 [AR]   2003 Sodium(!): 127 [AR]   2003 WBC: 6.03 [AR]   2003 Hemoglobin: 14.0 [AR]   2003 Hematocrit: 39.6 [AR]      ED Course User Index  [AR] Nadine Montano MD                           Clinical Impression:  Final diagnoses:  [R06.02] Shortness of breath  [A41.9] Sepsis  [E87.1] Hyponatremia (Primary)  [E87.5] Hyperkalemia          ED Disposition Condition    Admit                 David Christian MD  Resident  05/15/24 1905       Nadine Montano MD  05/16/24 0001

## 2024-05-15 NOTE — H&P
[facility] - Emergency Dept  Hospital Medicine  History & Physical    Patient Name: Thai Villalba  MRN: [id number]  Patient Class: IP- Inpatient  Admission Date: 5/15/2024  Attending Physician: Darrion Mccord MD   Primary Care Provider: Evangelina Primary Doctor         Patient information was obtained from patient, past medical records, and ER records.     Subjective:     Principal Problem:Sepsis    Chief Complaint:   Chief Complaint   Patient presents with    Abnormal Lab     Low sodium, advised to come to ED        HPI: 48 year old pt getting admitted with possible sepsis/hyponatremia/GVHD Concern  Pt suffered from AML and s/p stem cell transplant last year  She was contacted by Her Hematologist to come to hospital because recent lab work showed hyponatremia  Pt has been suffering from low grade fever/lethargy/fatigue for pasts several days  Symptoms went worse yesterday and today and she was instructed to come to ER and got admitted  Pt was supposed to see Hematologist Dr Shahram Moreno MD tomorrow in clinic regarding GVHD  Pts own HematoOncologist is Dr Shemar Franco MD      Past Medical History:   Diagnosis Date    ADHD (attention deficit hyperactivity disorder)     Anxiety     General anesthetics causing adverse effect in therapeutic use     REFLUX WITH SURGERY AND ASPIRATED, ZOFRAN GIVEN IV       Past Surgical History:   Procedure Laterality Date    BONE MARROW BIOPSY Left 3/27/2024    Procedure: Biopsy-bone marrow;  Surgeon: Shemar Franco MD;  Location: [address];  Service: Oncology;  Laterality: Left;  3/21-LVM for precall-KPvt  3/25-precall complete-Kpvt    BREAST RECONSTRUCTION Bilateral 2007    BREAST SURGERY      CHOLECYSTECTOMY      DIAGNOSTIC LAPAROSCOPY N/A 12/23/2020    Procedure: LAPAROSCOPY, DIAGNOSTIC;  Surgeon: Ed Troy MD;  Location: [facility];  Service: OB/GYN;  Laterality: N/A;    DILATION AND CURETTAGE OF UTERUS      EVACUATION OF HEMATOMA Left 12/23/2020     Procedure: EVACUATION, HEMATOMA;  Surgeon: Ed Troy MD;  Location: [facility] OR;  Service: OB/GYN;  Laterality: Left;    HYSTERECTOMY  12/2020    partial    LAPAROSCOPY-ASSISTED VAGINAL HYSTERECTOMY N/A 12/22/2020    Procedure: HYSTERECTOMY, VAGINAL, LAPAROSCOPY-ASSISTED;  Surgeon: Ed Troy MD;  Location: [facility] OR;  Service: OB/GYN;  Laterality: N/A;    LEFT KNEE      LEFT MENISCUS REPAIR    TONSILLECTOMY         Review of patient's allergies indicates:   Allergen Reactions    Promethazine Nausea And Vomiting     Other Reaction(s): VOMITING    Penicillin Hives    Penicillins      Other reaction(s): Unknown    Melatonin Anxiety     Other Reaction(s): FLUSHING       No current facility-administered medications on file prior to encounter.     Current Outpatient Medications on File Prior to Encounter   Medication Sig    acyclovir (ZOVIRAX) 200 MG capsule Take 4 capsules twice a day for prophylaxis    cholecalciferol, vitamin D3, 10 mcg (400 unit) Chew Take 1,000 Int'l Units by mouth once daily.    cycloSPORINE modified, NEORAL, (NEORAL) 100 MG capsule Take 1 capsule (100mg) in addition to two 50mg capsules (to equal 150mg/dose) twice a day. (Patient taking differently: Take 100 mg by mouth 2 (two) times daily. Take 1 capsule (100mg) in addition to two 50mg capsules (to equal 150mg/dose) twice a day.)    cycloSPORINE modified, NEORAL, (NEORAL) 25 MG capsule Take 2 capsules (50mg) in addition to one 100mg capsule (to equal 150mg/dose) twice a day. (Patient taking differently: Take 50 mg by mouth 2 (two) times daily. Take 2 capsules (50mg) in addition to one 100mg capsule (to equal 150mg/dose) twice a day.)    estrogens,esterified,-methyltestosterone 1.25-2.5mg (EEMT) per tablet Take 1 tablet by mouth once daily.    gabapentin (NEURONTIN) 300 MG capsule Take 1 capsule by mouth three times a day    gilteritinib 40 mg Tab Take 2 tablets (80 mg) by mouth Daily.    isavuconazonium sulfate (CRESEMBA) 186 mg Cap  Take 2 capsules by mouth three times a day for the first 2 days, then 2 capsules once a day thereafter    letermovir (PREVYMIS) 240 mg Tab Take 1 tablet (240 mg) by mouth once daily.    magnesium oxide (MAG-OX) 400 mg (241.3 mg magnesium) tablet Take 400 mg by mouth 2 (two) times daily.    oxyCODONE (ROXICODONE) 10 mg Tab immediate release tablet 1 tablet by mouth three times a day as needed for pain (Patient taking differently: Take 10 mg by mouth 3 (three) times daily as needed.)    pantoprazole (PROTONIX) 40 MG tablet Take 40 mg by mouth every morning.    sulfamethoxazole-trimethoprim 800-160mg (BACTRIM DS) 800-160 mg Tab Take 1 tablet twice a day for  2 days a week for pneumocyctis jiroveci pneumonia prevention (Patient taking differently: Take 1 tablet by mouth 2 (two) times daily. For 2 days per week)    tacrolimus (PROTOPIC) 0.1 % ointment Apply topically 2 times daily for Atopic Dermatitis, can apply to lips or inside mouth if needed. (Patient taking differently: Apply 1 application  topically 2 (two) times daily.)    triamcinolone acetonide 0.1% (KENALOG) 0.1 % cream Apply topically 2 (two) times daily. To areas of rash/dry skin. (Patient taking differently: Apply 1 g topically 2 (two) times daily. To areas of rash/dry skin.)    dexAMETHasone 0.5 mg/5 mL Soln solution Take 10 mLs (1 mg total) by mouth every 12 (twelve) hours. Swish and spit. Do not swallow. (Patient not taking: Reported on 5/15/2024)    duke's soln (benadryl 30 mL, mylanta 30 mL, LIDOcaine 30 mL, nystatin 30 mL) 120mL Take 10 mLs by mouth 4 (four) times daily. (Patient not taking: Reported on 5/15/2024)    [DISCONTINUED] allopurinoL (ZYLOPRIM) 300 MG tablet Take 300 mg by mouth.    [DISCONTINUED] estrogens,esterified,-methyltestosterone 1.25-2.5mg (ESTRATEST) per tablet Take 1 tablet by mouth once daily.     Family History       Problem Relation (Age of Onset)    Breast cancer Mother, Maternal Grandmother    COPD Maternal Grandfather     Cancer Mother, Maternal Grandmother    Heart disease Paternal Grandfather          Tobacco Use    Smoking status: Former     Current packs/day: 0.00     Types: Cigarettes     Quit date: 2011     Years since quittin.8    Smokeless tobacco: Never   Substance and Sexual Activity    Alcohol use: Yes     Alcohol/week: 0.0 - 0.8 standard drinks of alcohol     Comment: socially    Drug use: No    Sexual activity: Not on file     Review of Systems   Constitutional:  Positive for fatigue. Negative for activity change and appetite change.   HENT:  Negative for congestion and dental problem.    Eyes:  Negative for discharge and itching.   Respiratory:  Negative for shortness of breath.    Cardiovascular:  Negative for chest pain.   Gastrointestinal:  Negative for abdominal distention and abdominal pain.   Endocrine: Negative for cold intolerance.   Genitourinary:  Negative for difficulty urinating and dysuria.   Musculoskeletal:  Negative for arthralgias and back pain.   Skin:  Negative for color change.   Neurological:  Positive for weakness. Negative for dizziness and facial asymmetry.   Hematological:  Negative for adenopathy.   Psychiatric/Behavioral:  Negative for agitation and behavioral problems.      Objective:     Vital Signs (Most Recent):  Temp: 98.3 °F (36.8 °C) (05/15/24 1618)  Pulse: 64 (05/15/24 1800)  Resp: 18 (05/15/24 1800)  BP: 139/80 (05/15/24 1800)  SpO2: 99 % (05/15/24 1800) Vital Signs (24h Range):  Temp:  [98.3 °F (36.8 °C)] 98.3 °F (36.8 °C)  Pulse:  [59-81] 64  Resp:  [12-20] 18  SpO2:  [98 %-99 %] 99 %  BP: (139-160)/(80-99) 139/80     Weight: 72.6 kg (160 lb)  Body mass index is 27.46 kg/m².     Physical Exam  Vitals and nursing note reviewed.   Constitutional:       General: She is not in acute distress.  HENT:      Head: Atraumatic.      Right Ear: External ear normal.      Left Ear: External ear normal.      Nose: Nose normal.      Mouth/Throat:      Mouth: Mucous membranes are dry.    Cardiovascular:      Rate and Rhythm: Normal rate.   Pulmonary:      Effort: Pulmonary effort is normal.   Musculoskeletal:         General: Normal range of motion.      Cervical back: Normal range of motion.   Skin:     General: Skin is warm.   Neurological:      Mental Status: She is alert and oriented to person, place, and time.   Psychiatric:         Behavior: Behavior normal.                Significant Labs: All pertinent labs within the past 24 hours have been reviewed.  CBC:   Recent Labs   Lab 05/14/24  1034 05/15/24  1723   WBC 3.65* 6.03   HGB 13.4 14.0   HCT 37.1 39.6    237     CMP:   Recent Labs   Lab 05/14/24  1034 05/15/24  1723   * 127*   K 5.0 5.6*    98   CO2 19* 22*    91   BUN 13 15   CREATININE 1.3 1.4   CALCIUM 8.7 8.8   PROT 6.2 6.4   ALBUMIN 3.6 4.0   BILITOT 0.7 1.0   ALKPHOS 245* 232*   * 67*   * 157*   ANIONGAP 9 7*       Significant Imaging: I have reviewed all pertinent imaging results/findings within the past 24 hours.    Assessment/Plan:     * Sepsis  Suspected sepsis  Pt is immunocompromised  Blood and urine cultures  Start on iv abx     Transaminitis  Unaware whether this is due to GVHD  Primary team has to contact Dr Moreno tomorrow AM       AML (acute myeloblastic leukemia)  S/p stem cell transplant   On Immunosuppressants   Maintain cyclosporine/gilteritinib/isavuconazonium/letermovir  Check cyclosporine levels  Also acyclovir/Bactrim    Immunocompromised  Aware       Hyperkalemia  Ca gluconate and lokelma  Recheck levels later again  Recent Labs   Lab 05/15/24  1723   K 5.6*             Hyponatremia  Maintain iv fluids   Recent Labs   Lab 05/15/24  1723   *       History of allogeneic stem cell transplant  Aware         VTE Risk Mitigation (From admission, onward)           Ordered     IP VTE HIGH RISK PATIENT  Once         05/15/24 1907     Place sequential compression device  Until discontinued         05/15/24 1907                                     Darrion Mccord MD  Department of Hospital Medicine  [facility] - Emergency Dept

## 2024-05-15 NOTE — TELEPHONE ENCOUNTER
Reached out to pt by phone regarding symptoms of fatigue and feeling cold while running low temp. Pt also reporting no appetite, worsening nausea, severe fatigue. Sodium level 129 yesterday 5/14. Advised pt to go to ER per Dr. Franco because of low sodium levels. Pt voiced understanding, stated that her son will bring her to [facility] ED.

## 2024-05-16 LAB
ALBUMIN SERPL BCP-MCNC: 3.5 G/DL (ref 3.5–5.2)
ALP SERPL-CCNC: 198 U/L (ref 55–135)
ALT SERPL W/O P-5'-P-CCNC: 123 U/L (ref 10–44)
ANION GAP SERPL CALC-SCNC: 6 MMOL/L (ref 8–16)
ANION GAP SERPL CALC-SCNC: 6 MMOL/L (ref 8–16)
ANION GAP SERPL CALC-SCNC: 7 MMOL/L (ref 8–16)
AST SERPL-CCNC: 48 U/L (ref 10–40)
BASOPHILS # BLD AUTO: 0.01 K/UL (ref 0–0.2)
BASOPHILS NFR BLD: 0.2 % (ref 0–1.9)
BILIRUB SERPL-MCNC: 0.8 MG/DL (ref 0.1–1)
BUN SERPL-MCNC: 15 MG/DL (ref 6–20)
BUN SERPL-MCNC: 15 MG/DL (ref 6–20)
BUN SERPL-MCNC: 16 MG/DL (ref 6–20)
CALCIUM SERPL-MCNC: 7.8 MG/DL (ref 8.7–10.5)
CALCIUM SERPL-MCNC: 7.8 MG/DL (ref 8.7–10.5)
CALCIUM SERPL-MCNC: 7.9 MG/DL (ref 8.7–10.5)
CHLORIDE SERPL-SCNC: 100 MMOL/L (ref 95–110)
CHLORIDE SERPL-SCNC: 102 MMOL/L (ref 95–110)
CHLORIDE SERPL-SCNC: 98 MMOL/L (ref 95–110)
CO2 SERPL-SCNC: 19 MMOL/L (ref 23–29)
CO2 SERPL-SCNC: 20 MMOL/L (ref 23–29)
CO2 SERPL-SCNC: 20 MMOL/L (ref 23–29)
CREAT SERPL-MCNC: 1.2 MG/DL (ref 0.5–1.4)
DIFFERENTIAL METHOD BLD: ABNORMAL
EOSINOPHIL # BLD AUTO: 0 K/UL (ref 0–0.5)
EOSINOPHIL NFR BLD: 0.2 % (ref 0–8)
ERYTHROCYTE [DISTWIDTH] IN BLOOD BY AUTOMATED COUNT: 13.8 % (ref 11.5–14.5)
EST. GFR  (NO RACE VARIABLE): 55.5 ML/MIN/1.73 M^2
GLUCOSE SERPL-MCNC: 102 MG/DL (ref 70–110)
GLUCOSE SERPL-MCNC: 134 MG/DL (ref 70–110)
GLUCOSE SERPL-MCNC: 161 MG/DL (ref 70–110)
HCT VFR BLD AUTO: 37 % (ref 37–48.5)
HGB BLD-MCNC: 13.2 G/DL (ref 12–16)
IMM GRANULOCYTES # BLD AUTO: 0.01 K/UL (ref 0–0.04)
IMM GRANULOCYTES NFR BLD AUTO: 0.2 % (ref 0–0.5)
LACTATE SERPL-SCNC: 0.5 MMOL/L (ref 0.5–1.9)
LYMPHOCYTES # BLD AUTO: 0.9 K/UL (ref 1–4.8)
LYMPHOCYTES NFR BLD: 16 % (ref 18–48)
MAGNESIUM SERPL-MCNC: 1.8 MG/DL (ref 1.6–2.6)
MCH RBC QN AUTO: 34.2 PG (ref 27–31)
MCHC RBC AUTO-ENTMCNC: 35.7 G/DL (ref 32–36)
MCV RBC AUTO: 96 FL (ref 82–98)
MONOCYTES # BLD AUTO: 0.5 K/UL (ref 0.3–1)
MONOCYTES NFR BLD: 8.5 % (ref 4–15)
NEUTROPHILS # BLD AUTO: 4.2 K/UL (ref 1.8–7.7)
NEUTROPHILS NFR BLD: 74.9 % (ref 38–73)
NRBC BLD-RTO: 0 /100 WBC
OSMOLALITY SERPL: 272 MOSM/KG (ref 275–295)
OSMOLALITY UR: 164 MOSM/KG (ref 50–1200)
PLATELET # BLD AUTO: 215 K/UL (ref 150–450)
PMV BLD AUTO: 10 FL (ref 9.2–12.9)
POTASSIUM SERPL-SCNC: 4.8 MMOL/L (ref 3.5–5.1)
POTASSIUM SERPL-SCNC: 5 MMOL/L (ref 3.5–5.1)
POTASSIUM SERPL-SCNC: 5.2 MMOL/L (ref 3.5–5.1)
PROCALCITONIN SERPL IA-MCNC: <0.05 NG/ML (ref 0–0.5)
PROT SERPL-MCNC: 5.7 G/DL (ref 6–8.4)
RBC # BLD AUTO: 3.86 M/UL (ref 4–5.4)
SODIUM SERPL-SCNC: 124 MMOL/L (ref 136–145)
SODIUM SERPL-SCNC: 126 MMOL/L (ref 136–145)
SODIUM SERPL-SCNC: 128 MMOL/L (ref 136–145)
SODIUM UR-SCNC: 36 MMOL/L (ref 20–250)
TROPONIN I SERPL HS-MCNC: 14.4 PG/ML (ref 0–14.9)
WBC # BLD AUTO: 5.63 K/UL (ref 3.9–12.7)

## 2024-05-16 PROCEDURE — 80048 BASIC METABOLIC PNL TOTAL CA: CPT | Mod: 91 | Performed by: INTERNAL MEDICINE

## 2024-05-16 PROCEDURE — 12000002 HC ACUTE/MED SURGE SEMI-PRIVATE ROOM

## 2024-05-16 PROCEDURE — 85025 COMPLETE CBC W/AUTO DIFF WBC: CPT | Performed by: INTERNAL MEDICINE

## 2024-05-16 PROCEDURE — 94761 N-INVAS EAR/PLS OXIMETRY MLT: CPT

## 2024-05-16 PROCEDURE — 83935 ASSAY OF URINE OSMOLALITY: CPT | Performed by: STUDENT IN AN ORGANIZED HEALTH CARE EDUCATION/TRAINING PROGRAM

## 2024-05-16 PROCEDURE — 83605 ASSAY OF LACTIC ACID: CPT | Performed by: INTERNAL MEDICINE

## 2024-05-16 PROCEDURE — 84484 ASSAY OF TROPONIN QUANT: CPT | Performed by: STUDENT IN AN ORGANIZED HEALTH CARE EDUCATION/TRAINING PROGRAM

## 2024-05-16 PROCEDURE — 99900031 HC PATIENT EDUCATION (STAT)

## 2024-05-16 PROCEDURE — 80053 COMPREHEN METABOLIC PANEL: CPT | Performed by: INTERNAL MEDICINE

## 2024-05-16 PROCEDURE — 80158 DRUG ASSAY CYCLOSPORINE: CPT | Performed by: INTERNAL MEDICINE

## 2024-05-16 PROCEDURE — 94760 N-INVAS EAR/PLS OXIMETRY 1: CPT

## 2024-05-16 PROCEDURE — 83930 ASSAY OF BLOOD OSMOLALITY: CPT | Performed by: STUDENT IN AN ORGANIZED HEALTH CARE EDUCATION/TRAINING PROGRAM

## 2024-05-16 PROCEDURE — 87086 URINE CULTURE/COLONY COUNT: CPT | Performed by: INTERNAL MEDICINE

## 2024-05-16 PROCEDURE — 84145 PROCALCITONIN (PCT): CPT | Performed by: INTERNAL MEDICINE

## 2024-05-16 PROCEDURE — 25000003 PHARM REV CODE 250: Performed by: INTERNAL MEDICINE

## 2024-05-16 PROCEDURE — 25000003 PHARM REV CODE 250: Performed by: HOSPITALIST

## 2024-05-16 PROCEDURE — 84300 ASSAY OF URINE SODIUM: CPT | Performed by: STUDENT IN AN ORGANIZED HEALTH CARE EDUCATION/TRAINING PROGRAM

## 2024-05-16 PROCEDURE — 80048 BASIC METABOLIC PNL TOTAL CA: CPT | Performed by: STUDENT IN AN ORGANIZED HEALTH CARE EDUCATION/TRAINING PROGRAM

## 2024-05-16 PROCEDURE — 96361 HYDRATE IV INFUSION ADD-ON: CPT

## 2024-05-16 PROCEDURE — 25000003 PHARM REV CODE 250: Performed by: STUDENT IN AN ORGANIZED HEALTH CARE EDUCATION/TRAINING PROGRAM

## 2024-05-16 PROCEDURE — 63600175 PHARM REV CODE 636 W HCPCS: Performed by: INTERNAL MEDICINE

## 2024-05-16 PROCEDURE — 36415 COLL VENOUS BLD VENIPUNCTURE: CPT | Performed by: INTERNAL MEDICINE

## 2024-05-16 PROCEDURE — 83735 ASSAY OF MAGNESIUM: CPT | Performed by: INTERNAL MEDICINE

## 2024-05-16 RX ORDER — SULFAMETHOXAZOLE AND TRIMETHOPRIM 800; 160 MG/1; MG/1
1 TABLET ORAL
Status: DISCONTINUED | OUTPATIENT
Start: 2024-05-16 | End: 2024-05-16

## 2024-05-16 RX ORDER — SULFAMETHOXAZOLE AND TRIMETHOPRIM 800; 160 MG/1; MG/1
1 TABLET ORAL
Status: DISCONTINUED | OUTPATIENT
Start: 2024-05-20 | End: 2024-05-16

## 2024-05-16 RX ORDER — GABAPENTIN 300 MG/1
300 CAPSULE ORAL 3 TIMES DAILY
Status: DISCONTINUED | OUTPATIENT
Start: 2024-05-16 | End: 2024-05-18 | Stop reason: HOSPADM

## 2024-05-16 RX ORDER — SULFAMETHOXAZOLE AND TRIMETHOPRIM 800; 160 MG/1; MG/1
1 TABLET ORAL
Status: DISCONTINUED | OUTPATIENT
Start: 2024-05-16 | End: 2024-05-18 | Stop reason: HOSPADM

## 2024-05-16 RX ADMIN — OXYCODONE HYDROCHLORIDE 10 MG: 10 TABLET ORAL at 08:05

## 2024-05-16 RX ADMIN — SULFAMETHOXAZOLE AND TRIMETHOPRIM 1 TABLET: 800; 160 TABLET ORAL at 08:05

## 2024-05-16 RX ADMIN — SULFAMETHOXAZOLE AND TRIMETHOPRIM 1 TABLET: 800; 160 TABLET ORAL at 12:05

## 2024-05-16 RX ADMIN — CEFEPIME HYDROCHLORIDE 2 G: 2 INJECTION, POWDER, FOR SOLUTION INTRAVENOUS at 04:05

## 2024-05-16 RX ADMIN — Medication 400 MG: at 08:05

## 2024-05-16 RX ADMIN — OXYCODONE HYDROCHLORIDE 10 MG: 10 TABLET ORAL at 09:05

## 2024-05-16 RX ADMIN — PANTOPRAZOLE SODIUM 40 MG: 40 TABLET, DELAYED RELEASE ORAL at 05:05

## 2024-05-16 RX ADMIN — SODIUM CHLORIDE: 9 INJECTION, SOLUTION INTRAVENOUS at 05:05

## 2024-05-16 RX ADMIN — ACYCLOVIR 800 MG: 200 CAPSULE ORAL at 08:05

## 2024-05-16 RX ADMIN — CYCLOSPORINE 50 MG: 25 CAPSULE, LIQUID FILLED ORAL at 09:05

## 2024-05-16 RX ADMIN — GILTERITINIB 80 MG: 40 TABLET ORAL at 05:05

## 2024-05-16 RX ADMIN — GABAPENTIN 300 MG: 300 CAPSULE ORAL at 08:05

## 2024-05-16 RX ADMIN — CYCLOSPORINE 50 MG: 25 CAPSULE, LIQUID FILLED ORAL at 08:05

## 2024-05-16 RX ADMIN — CYCLOSPORINE 100 MG: 100 CAPSULE, LIQUID FILLED ORAL at 09:05

## 2024-05-16 RX ADMIN — VANCOMYCIN HYDROCHLORIDE 1250 MG: 1.25 INJECTION, POWDER, LYOPHILIZED, FOR SOLUTION INTRAVENOUS at 08:05

## 2024-05-16 RX ADMIN — LETERMOVIR 240 MG: 240 TABLET, FILM COATED ORAL at 08:05

## 2024-05-16 RX ADMIN — CYCLOSPORINE 100 MG: 100 CAPSULE, LIQUID FILLED ORAL at 08:05

## 2024-05-16 RX ADMIN — CEFEPIME HYDROCHLORIDE 2 G: 2 INJECTION, POWDER, FOR SOLUTION INTRAVENOUS at 05:05

## 2024-05-16 RX ADMIN — ISAVUCONAZONIUM SULFATE 372 MG: 186 CAPSULE ORAL at 08:05

## 2024-05-16 RX ADMIN — TRAZODONE HYDROCHLORIDE 50 MG: 50 TABLET ORAL at 08:05

## 2024-05-16 NOTE — CARE UPDATE
05/16/24 0432   Patient Assessment/Suction   Level of Consciousness (AVPU) alert   Respiratory Effort Normal;Unlabored   Expansion/Accessory Muscles/Retractions no retractions;no use of accessory muscles   LAURENCE Breath Sounds clear   LLL Breath Sounds crackles, fine   RUL Breath Sounds clear   RML Breath Sounds clear   RLL Breath Sounds crackles, fine   Rhythm/Pattern, Respiratory unlabored;pattern regular   Cough Frequency no cough   PRE-TX-O2   Device (Oxygen Therapy) room air   SpO2 97 %   Pulse Oximetry Type Intermittent   $ Pulse Oximetry - Single Charge Pulse Oximetry - Single

## 2024-05-16 NOTE — ASSESSMENT & PLAN NOTE
S/p stem cell transplant   On Immunosuppressants   Maintain cyclosporine/gilteritinib/isavuconazonium/letermovir  Also acyclovir/Bactrim

## 2024-05-16 NOTE — SUBJECTIVE & OBJECTIVE
Past Medical History:   Diagnosis Date    ADHD (attention deficit hyperactivity disorder)     Anxiety     General anesthetics causing adverse effect in therapeutic use     REFLUX WITH SURGERY AND ASPIRATED, ZOFRAN GIVEN IV       Past Surgical History:   Procedure Laterality Date    BONE MARROW BIOPSY Left 3/27/2024    Procedure: Biopsy-bone marrow;  Surgeon: Shemar Franco MD;  [facility]  Service: Oncology;  Laterality: Left;  3/21-LVM for precall-KPvt  3/25-precall complete-Kpvt    BREAST RECONSTRUCTION Bilateral 2007    BREAST SURGERY      CHOLECYSTECTOMY      DIAGNOSTIC LAPAROSCOPY N/A 12/23/2020    Procedure: LAPAROSCOPY, DIAGNOSTIC;  Surgeon: Ed Troy MD;  Location: [facility];  Service: OB/GYN;  Laterality: N/A;    DILATION AND CURETTAGE OF UTERUS      EVACUATION OF HEMATOMA Left 12/23/2020    Procedure: EVACUATION, HEMATOMA;  Surgeon: Ed Troy MD;  Location: [facility];  Service: OB/GYN;  Laterality: Left;    HYSTERECTOMY  12/2020    partial    LAPAROSCOPY-ASSISTED VAGINAL HYSTERECTOMY N/A 12/22/2020    Procedure: HYSTERECTOMY, VAGINAL, LAPAROSCOPY-ASSISTED;  Surgeon: Ed Troy MD;  Location: [facility];  Service: OB/GYN;  Laterality: N/A;    LEFT KNEE      LEFT MENISCUS REPAIR    TONSILLECTOMY         Review of patient's allergies indicates:   Allergen Reactions    Promethazine Nausea And Vomiting     Other Reaction(s): VOMITING    Penicillin Hives    Penicillins      Other reaction(s): Unknown    Melatonin Anxiety     Other Reaction(s): FLUSHING       No current facility-administered medications on file prior to encounter.     Current Outpatient Medications on File Prior to Encounter   Medication Sig    acyclovir (ZOVIRAX) 200 MG capsule Take 4 capsules twice a day for prophylaxis    cholecalciferol, vitamin D3, 10 mcg (400 unit) Chew Take 1,000 Int'l Units by mouth once daily.    cycloSPORINE modified, NEORAL, (NEORAL) 100 MG capsule Take 1 capsule (100mg) in  addition to two 50mg capsules (to equal 150mg/dose) twice a day. (Patient taking differently: Take 100 mg by mouth 2 (two) times daily. Take 1 capsule (100mg) in addition to two 50mg capsules (to equal 150mg/dose) twice a day.)    cycloSPORINE modified, NEORAL, (NEORAL) 25 MG capsule Take 2 capsules (50mg) in addition to one 100mg capsule (to equal 150mg/dose) twice a day. (Patient taking differently: Take 50 mg by mouth 2 (two) times daily. Take 2 capsules (50mg) in addition to one 100mg capsule (to equal 150mg/dose) twice a day.)    estrogens,esterified,-methyltestosterone 1.25-2.5mg (EEMT) per tablet Take 1 tablet by mouth once daily.    gabapentin (NEURONTIN) 300 MG capsule Take 1 capsule by mouth three times a day    gilteritinib 40 mg Tab Take 2 tablets (80 mg) by mouth Daily.    isavuconazonium sulfate (CRESEMBA) 186 mg Cap Take 2 capsules by mouth three times a day for the first 2 days, then 2 capsules once a day thereafter    letermovir (PREVYMIS) 240 mg Tab Take 1 tablet (240 mg) by mouth once daily.    magnesium oxide (MAG-OX) 400 mg (241.3 mg magnesium) tablet Take 400 mg by mouth 2 (two) times daily.    oxyCODONE (ROXICODONE) 10 mg Tab immediate release tablet 1 tablet by mouth three times a day as needed for pain (Patient taking differently: Take 10 mg by mouth 3 (three) times daily as needed.)    pantoprazole (PROTONIX) 40 MG tablet Take 40 mg by mouth every morning.    sulfamethoxazole-trimethoprim 800-160mg (BACTRIM DS) 800-160 mg Tab Take 1 tablet twice a day for  2 days a week for pneumocyctis jiroveci pneumonia prevention (Patient taking differently: Take 1 tablet by mouth 2 (two) times daily. For 2 days per week)    tacrolimus (PROTOPIC) 0.1 % ointment Apply topically 2 times daily for Atopic Dermatitis, can apply to lips or inside mouth if needed. (Patient taking differently: Apply 1 application  topically 2 (two) times daily.)    triamcinolone acetonide 0.1% (KENALOG) 0.1 % cream Apply  topically 2 (two) times daily. To areas of rash/dry skin. (Patient taking differently: Apply 1 g topically 2 (two) times daily. To areas of rash/dry skin.)    dexAMETHasone 0.5 mg/5 mL Soln solution Take 10 mLs (1 mg total) by mouth every 12 (twelve) hours. Swish and spit. Do not swallow. (Patient not taking: Reported on 5/15/2024)    duke's soln (benadryl 30 mL, mylanta 30 mL, LIDOcaine 30 mL, nystatin 30 mL) 120mL Take 10 mLs by mouth 4 (four) times daily. (Patient not taking: Reported on 5/15/2024)    [DISCONTINUED] allopurinoL (ZYLOPRIM) 300 MG tablet Take 300 mg by mouth.    [DISCONTINUED] estrogens,esterified,-methyltestosterone 1.25-2.5mg (ESTRATEST) per tablet Take 1 tablet by mouth once daily.     Family History       Problem Relation (Age of Onset)    Breast cancer Mother, Maternal Grandmother    COPD Maternal Grandfather    Cancer Mother, Maternal Grandmother    Heart disease Paternal Grandfather          Tobacco Use    Smoking status: Former     Current packs/day: 0.00     Types: Cigarettes     Quit date: 2011     Years since quittin.8    Smokeless tobacco: Never   Substance and Sexual Activity    Alcohol use: Yes     Alcohol/week: 0.0 - 0.8 standard drinks of alcohol     Comment: socially    Drug use: No    Sexual activity: Not on file     Review of Systems   Constitutional:  Positive for fatigue. Negative for activity change and appetite change.   HENT:  Negative for congestion and dental problem.    Eyes:  Negative for discharge and itching.   Respiratory:  Negative for shortness of breath.    Cardiovascular:  Negative for chest pain.   Gastrointestinal:  Negative for abdominal distention and abdominal pain.   Endocrine: Negative for cold intolerance.   Genitourinary:  Negative for difficulty urinating and dysuria.   Musculoskeletal:  Negative for arthralgias and back pain.   Skin:  Negative for color change.   Neurological:  Positive for weakness. Negative for dizziness and facial  asymmetry.   Hematological:  Negative for adenopathy.   Psychiatric/Behavioral:  Negative for agitation and behavioral problems.      Objective:     Vital Signs (Most Recent):  Temp: 98.3 °F (36.8 °C) (05/15/24 1618)  Pulse: 64 (05/15/24 1800)  Resp: 18 (05/15/24 1800)  BP: 139/80 (05/15/24 1800)  SpO2: 99 % (05/15/24 1800) Vital Signs (24h Range):  Temp:  [98.3 °F (36.8 °C)] 98.3 °F (36.8 °C)  Pulse:  [59-81] 64  Resp:  [12-20] 18  SpO2:  [98 %-99 %] 99 %  BP: (139-160)/(80-99) 139/80     Weight: 72.6 kg (160 lb)  Body mass index is 27.46 kg/m².     Physical Exam  Vitals and nursing note reviewed.   Constitutional:       General: She is not in acute distress.  HENT:      Head: Atraumatic.      Right Ear: External ear normal.      Left Ear: External ear normal.      Nose: Nose normal.      Mouth/Throat:      Mouth: Mucous membranes are dry.   Cardiovascular:      Rate and Rhythm: Normal rate.   Pulmonary:      Effort: Pulmonary effort is normal.   Musculoskeletal:         General: Normal range of motion.      Cervical back: Normal range of motion.   Skin:     General: Skin is warm.   Neurological:      Mental Status: She is alert and oriented to person, place, and time.   Psychiatric:         Behavior: Behavior normal.                Significant Labs: All pertinent labs within the past 24 hours have been reviewed.  CBC:   Recent Labs   Lab 05/14/24  1034 05/15/24  1723   WBC 3.65* 6.03   HGB 13.4 14.0   HCT 37.1 39.6    237     CMP:   Recent Labs   Lab 05/14/24  1034 05/15/24  1723   * 127*   K 5.0 5.6*    98   CO2 19* 22*    91   BUN 13 15   CREATININE 1.3 1.4   CALCIUM 8.7 8.8   PROT 6.2 6.4   ALBUMIN 3.6 4.0   BILITOT 0.7 1.0   ALKPHOS 245* 232*   * 67*   * 157*   ANIONGAP 9 7*       Significant Imaging: I have reviewed all pertinent imaging results/findings within the past 24 hours.

## 2024-05-16 NOTE — PLAN OF CARE
Problem: Adult Inpatient Plan of Care  Goal: Plan of Care Review  5/16/2024 1710 by Ning Murry LPN  Outcome: Progressing  5/16/2024 1710 by Ning Murry LPN  Outcome: Progressing  Goal: Patient-Specific Goal (Individualized)  5/16/2024 1710 by Ning Murry LPN  Outcome: Progressing  5/16/2024 1710 by Ning Murry LPN  Outcome: Progressing  Goal: Absence of Hospital-Acquired Illness or Injury  5/16/2024 1710 by Ning Murry LPN  Outcome: Progressing  5/16/2024 1710 by Ning Murry LPN  Outcome: Progressing  Goal: Optimal Comfort and Wellbeing  5/16/2024 1710 by Ning Murry LPN  Outcome: Progressing  5/16/2024 1710 by Ning Murry LPN  Outcome: Progressing  Goal: Readiness for Transition of Care  5/16/2024 1710 by Ning Murry LPN  Outcome: Progressing  5/16/2024 1710 by Ning Murry LPN  Outcome: Progressing     Problem: Sepsis/Septic Shock  Goal: Optimal Coping  Outcome: Progressing  Goal: Optimal Nutrition Intake  Outcome: Progressing

## 2024-05-16 NOTE — HPI
48 year old pt getting admitted with possible sepsis/hyponatremia/GVHD Concern  Pt suffered from AML and s/p stem cell transplant last year  She was contacted by Her Hematologist to come to hospital because recent lab work showed hyponatremia  Pt has been suffering from low grade fever/lethargy/fatigue for pasts several days  Symptoms went worse yesterday and today and she was instructed to come to ER and got admitted  Pt was supposed to see Hematologist Dr Shahram Moreno MD tomorrow in clinic regarding GVHD  Pts own HematoOncologist is Dr Shemar Franco MD

## 2024-05-16 NOTE — HOSPITAL COURSE
Patient admitted for concern of sepsis, GVHD, hyponatremia.  Started on empiric antibiotics.  Patient reports overall improvement in symptoms.  Cultures negative to date. Empiric IV abx d/c'd.  Remains hyponatremic, Nephrology consulted.  Discussed with patient's oncologist Dr. Franco plan to follow up on cultures, and as long as hyponatremia improved and the patient remains afebrile we will plan on discharge with follow up with Oncology outpatient. Hyponatremia improved. Nephro ok'd to be d/c with bmp q weekly and lokelma daily. 1.5 L fluid restrictions. Deemed stable to be d/c.

## 2024-05-16 NOTE — PLAN OF CARE
[facility]  Initial Discharge Assessment       Primary Care Provider: No, Primary Doctor    Admission Diagnosis: Sepsis [A41.9]    Admission Date: 5/15/2024  Expected Discharge Date: 5/18/2024    Met with pt and spouse to complete discharge assessment, verified pharmacy and information on facesheet.  Pt has no PCP, would like to f/u w/ transplant MD.  No HH, DME or dialysis.  Spouse will drive pt home.  No needs identified at this time.    Transition of Care Barriers: None    Payor: [employer] / Plan: [facility] CHOICE PLUS / Product Type: Commercial /     Extended Emergency Contact Information  Primary Emergency Contact: Quinn Villalba  Address: [address] [address] [facility] [place]  Work Phone: [phone]  Mobile Phone: [phone]  Relation: Spouse    Discharge Plan A: Home with family  Discharge Plan B: Home with family      [facility]  [address]  [facility] [address]  Phone: [phone] Fax: [phone]      Initial Assessment (most recent)       Adult Discharge Assessment - 05/16/24 1255          Discharge Assessment    Assessment Type Discharge Planning Assessment     Confirmed/corrected address, phone number and insurance Yes     Confirmed Demographics Correct on Facesheet     Source of Information patient     Communicated ELLIOTT with patient/caregiver No     People in Home spouse   in laws    Do you expect to return to your current living situation? Yes     Prior to hospitilization cognitive status: Alert/Oriented     Current cognitive status: Alert/Oriented     Walking or Climbing Stairs Difficulty no     Dressing/Bathing Difficulty no     Home Accessibility not wheelchair accessible;stairs to enter home     Number of Stairs, Main Entrance --   16    Home Layout Bathroom on 2nd floor;Bedroom on 2nd floor     Equipment Currently Used at Home none     Readmission within 30 days? No     Patient currently being followed by  outpatient case management? No     Do you currently have service(s) that help you manage your care at home? No     Do you take prescription medications? Yes     Do you have prescription coverage? Yes     Coverage [facility]     Do you have any problems affording any of your prescribed medications? No     Is the patient taking medications as prescribed? yes     Who is going to help you get home at discharge? spouse     How do you get to doctors appointments? family or friend will provide     Are you on dialysis? No     Do you take coumadin? No     Discharge Plan A Home with family     Discharge Plan B Home with family     DME Needed Upon Discharge  none     Discharge Plan discussed with: Patient;Spouse/sig other     Transition of Care Barriers None

## 2024-05-16 NOTE — CONSULTS
INPATIENT NEPHROLOGY CONSULT   [facility]    Patient Name: Thai Villalba  Date: 05/16/2024    Reason for consultation: Hyponatremia    Chief Complaint:   Chief Complaint   Patient presents with    Abnormal Lab     Low sodium, advised to come to ED       History of Present Illness:  48 year old pt getting admitted with possible sepsis/hyponatremia/GVHD Concern  Pt suffered from AML and s/p stem cell transplant last year. She was contacted by her Hematologist to come to hospital because recent lab work showed hyponatremia. Pt has been suffering from low grade fever/lethargy/fatigue for pasts several days. Symptoms went worse yesterday and today and she was instructed to come to ER and got admitted. We are consulted for hyponatremia.    Interval History:  5/16- ate first meal after several days of minimal intake- nausea improved    Plan of Care:    Hyponatremia- looks like poor oral intake initially, now more SIADH  Hyperkalemia  Acidosis  CKD III  HypoMg    - stop IVFs- discussed eating well today- may need salt tabs- 1.5L fluid restriction- q4 BMP  - prone to hyperK/MAURA with bactrim- daily labs and may need a low K diet  - trend CO2 for now with above management  - dose meds for CrCl 30-60  - ok with standing Mg supplement    Thank you for allowing us to participate in this patient's care. We will continue to follow.    Vital Signs:  Temp Readings from Last 3 Encounters:   05/16/24 98.4 °F (36.9 °C)   04/24/24 98.7 °F (37.1 °C) (Oral)   03/27/24 97.7 °F (36.5 °C)       Pulse Readings from Last 3 Encounters:   05/16/24 (!) 57   04/24/24 94   03/27/24 61       BP Readings from Last 3 Encounters:   05/16/24 120/64   04/24/24 108/68   03/27/24 (!) 91/54       Weight:  Wt Readings from Last 3 Encounters:   05/16/24 73 kg (160 lb 15 oz)   04/24/24 72.3 kg (159 lb 4.5 oz)   03/27/24 65.8 kg (145 lb)       INS/OUTS:  I/O last 3 completed shifts:  In: 2147.5 [P.O.:360; I.V.:1587.5; IV Piggyback:200]  Out:  -   No intake/output data recorded.    Past Medical & Surgical History:  Past Medical History:   Diagnosis Date    ADHD (attention deficit hyperactivity disorder)     Anxiety     General anesthetics causing adverse effect in therapeutic use     REFLUX WITH SURGERY AND ASPIRATED, ZOFRAN GIVEN IV       Past Surgical History:   Procedure Laterality Date    BONE MARROW BIOPSY Left 3/27/2024    Procedure: Biopsy-bone marrow;  Surgeon: Shemar Franco MD;  Location: [address]);  Service: Oncology;  Laterality: Left;  3/21-LVM for precall-KPvt  3/25-precall complete-Kpvt    BREAST RECONSTRUCTION Bilateral     BREAST SURGERY      CHOLECYSTECTOMY      DIAGNOSTIC LAPAROSCOPY N/A 2020    Procedure: LAPAROSCOPY, DIAGNOSTIC;  Surgeon: Ed Troy MD;  Location: [facility] OR;  Service: OB/GYN;  Laterality: N/A;    DILATION AND CURETTAGE OF UTERUS      EVACUATION OF HEMATOMA Left 2020    Procedure: EVACUATION, HEMATOMA;  Surgeon: Ed Troy MD;  Location: [facility] OR;  Service: OB/GYN;  Laterality: Left;    HYSTERECTOMY  2020    partial    LAPAROSCOPY-ASSISTED VAGINAL HYSTERECTOMY N/A 2020    Procedure: HYSTERECTOMY, VAGINAL, LAPAROSCOPY-ASSISTED;  Surgeon: Ed Troy MD;  Location: [facility] OR;  Service: OB/GYN;  Laterality: N/A;    LEFT KNEE      LEFT MENISCUS REPAIR    TONSILLECTOMY         Past Social History:  Social History     Socioeconomic History    Marital status:    Tobacco Use    Smoking status: Former     Current packs/day: 0.00     Types: Cigarettes     Quit date: 2011     Years since quittin.9    Smokeless tobacco: Never   Substance and Sexual Activity    Alcohol use: Yes     Alcohol/week: 0.0 - 0.8 standard drinks of alcohol     Comment: socially    Drug use: No     Social Determinants of Health     Financial Resource Strain: Patient Declined (2024)    Overall Financial Resource Strain (CARDIA)     Difficulty of Paying Living Expenses: Patient  declined   Food Insecurity: Patient Declined (1/8/2024)    Hunger Vital Sign     Worried About Running Out of Food in the Last Year: Patient declined     Ran Out of Food in the Last Year: Patient declined   Transportation Needs: No Transportation Needs (1/8/2024)    PRAPARE - Transportation     Lack of Transportation (Medical): No     Lack of Transportation (Non-Medical): No   Physical Activity: Unknown (1/8/2024)    Exercise Vital Sign     Days of Exercise per Week: 2 days   Stress: No Stress Concern Present (1/8/2024)    [employer] [place] [employer] - Occupational Stress Questionnaire     Feeling of Stress : Only a little   Housing Stability: High Risk (1/8/2024)    Housing Stability Vital Sign     Unable to Pay for Housing in the Last Year: No     Number of Places Lived in the Last Year: 3     Unstable Housing in the Last Year: No       Medications:  Scheduled Meds:   acyclovir  800 mg Oral BID    ceFEPime IV (PEDS and ADULTS)  2 g Intravenous Q12H    cycloSPORINE modified (NEORAL)  100 mg Oral BID    cycloSPORINE modified (NEORAL)  50 mg Oral BID    gilteritinib  80 mg Oral Daily    isavuconazonium sulfate  372 mg Oral Daily    letermovir  240 mg Oral Daily    magnesium oxide  400 mg Oral BID    pantoprazole  40 mg Oral BID    [START ON 5/20/2024] sulfamethoxazole-trimethoprim 800-160mg  1 tablet Oral Twice Weekly    vancomycin (VANCOCIN) IV (PEDS and ADULTS)  1,250 mg Intravenous Q24H     Continuous Infusions:  PRN Meds:.  Current Facility-Administered Medications:     aluminum-magnesium hydroxide-simethicone, 30 mL, Oral, QID PRN    magnesium oxide, 800 mg, Oral, PRN    magnesium oxide, 800 mg, Oral, PRN    ondansetron, 4 mg, Intravenous, Q6H PRN    oxyCODONE, 10 mg, Oral, Q4H PRN    potassium bicarbonate, 35 mEq, Oral, PRN    potassium bicarbonate, 50 mEq, Oral, PRN    potassium bicarbonate, 60 mEq, Oral, PRN    potassium, sodium phosphates, 2 packet, Oral, PRN    potassium, sodium phosphates, 2  packet, Oral, PRN    potassium, sodium phosphates, 2 packet, Oral, PRN    traZODone, 50 mg, Oral, Nightly PRN    Pharmacy to dose Vancomycin consult, , , Once **AND** vancomycin - pharmacy to dose, , Intravenous, pharmacy to manage frequency  No current facility-administered medications on file prior to encounter.     Current Outpatient Medications on File Prior to Encounter   Medication Sig Dispense Refill    acyclovir (ZOVIRAX) 200 MG capsule Take 4 capsules twice a day for prophylaxis 240 capsule 9    cholecalciferol, vitamin D3, 10 mcg (400 unit) Chew Take 1,000 Int'l Units by mouth once daily.      cycloSPORINE modified, NEORAL, (NEORAL) 100 MG capsule Take 1 capsule (100mg) in addition to two 50mg capsules (to equal 150mg/dose) twice a day. (Patient taking differently: Take 100 mg by mouth 2 (two) times daily. Take 1 capsule (100mg) in addition to two 50mg capsules (to equal 150mg/dose) twice a day.) 60 capsule 11    cycloSPORINE modified, NEORAL, (NEORAL) 25 MG capsule Take 2 capsules (50mg) in addition to one 100mg capsule (to equal 150mg/dose) twice a day. (Patient taking differently: Take 50 mg by mouth 2 (two) times daily. Take 2 capsules (50mg) in addition to one 100mg capsule (to equal 150mg/dose) twice a day.) 240 capsule 10    estrogens,esterified,-methyltestosterone 1.25-2.5mg (EEMT) per tablet Take 1 tablet by mouth once daily. 90 tablet 1    gabapentin (NEURONTIN) 300 MG capsule Take 1 capsule by mouth three times a day 90 capsule 3    gilteritinib 40 mg Tab Take 2 tablets (80 mg) by mouth Daily. 60 tablet 11    isavuconazonium sulfate (CRESEMBA) 186 mg Cap Take 2 capsules by mouth three times a day for the first 2 days, then 2 capsules once a day thereafter 70 capsule 5    letermovir (PREVYMIS) 240 mg Tab Take 1 tablet (240 mg) by mouth once daily. 28 tablet 2    magnesium oxide (MAG-OX) 400 mg (241.3 mg magnesium) tablet Take 400 mg by mouth 2 (two) times daily.      oxyCODONE (ROXICODONE) 10 mg  "Tab immediate release tablet 1 tablet by mouth three times a day as needed for pain (Patient taking differently: Take 10 mg by mouth 3 (three) times daily as needed.) 90 tablet 0    pantoprazole (PROTONIX) 40 MG tablet Take 40 mg by mouth every morning.      sulfamethoxazole-trimethoprim 800-160mg (BACTRIM DS) 800-160 mg Tab Take 1 tablet twice a day for  2 days a week for pneumocyctis jiroveci pneumonia prevention (Patient taking differently: Take 1 tablet by mouth 2 (two) times daily. For 2 days per week) 12 tablet 15    tacrolimus (PROTOPIC) 0.1 % ointment Apply topically 2 times daily for Atopic Dermatitis, can apply to lips or inside mouth if needed. (Patient taking differently: Apply 1 application  topically 2 (two) times daily.) 100 g 1    triamcinolone acetonide 0.1% (KENALOG) 0.1 % cream Apply topically 2 (two) times daily. To areas of rash/dry skin. (Patient taking differently: Apply 1 g topically 2 (two) times daily. To areas of rash/dry skin.) 80 g 2    dexAMETHasone 0.5 mg/5 mL Soln solution Take 10 mLs (1 mg total) by mouth every 12 (twelve) hours. Swish and spit. Do not swallow. (Patient not taking: Reported on 5/15/2024) 500 mL 1    duke's soln (benadryl 30 mL, mylanta 30 mL, LIDOcaine 30 mL, nystatin 30 mL) 120mL Take 10 mLs by mouth 4 (four) times daily. (Patient not taking: Reported on 5/15/2024) 120 mL 0       Allergies:  Promethazine, Penicillin, Penicillins, and Melatonin    Past Family History:  Reviewed; refer to Hospitalist Admission Note    Review of Systems:  Review of Systems - All 14 systems reviewed and negative, except as noted in HPI    Physical Exam:  /64   Pulse (!) 57   Temp 98.4 °F (36.9 °C)   Resp 18   Ht 5' 4" (1.626 m)   Wt 73 kg (160 lb 15 oz)   LMP  (LMP Unknown)   SpO2 98%   Breastfeeding No   BMI 27.62 kg/m²     General Appearance:    NAD, AAO x 3, cooperative, appears stated age   Head:    Normocephalic, atraumatic   Eyes:    PER, EOMI, and " conjunctiva/sclera clear bilaterally        Mouth:   Moist mucus membranes, no thrush or oral lesions, normal      dentition   Back:     No CVA tenderness   Lungs:     Clear to auscultation bilaterally, no wheeze, crackles, rales      or rhonchi, symmetric air movement, respirations unlabored   Heart:    Regular rate and rhythm, S1 and S2 normal, no murmur, rub   or gallop   Abdomen:     Soft, non-tender, non-distended, bowel sounds active all four   quadrants, no RT or guarding, no masses, no organomegaly   Extremities:   Warm and well perfused, distal pulses intact, no cyanosis or    peripheral edema   MSK:   No joint or muscle swelling, tenderness or deformity   Skin:   Skin color, texture, turgor normal, no rashes or lesions   Neurologic/Psychiatric:   CNII-XII intact, normal strength and sensation       throughout, no asterixis; normal affect, memory, judgement     and insight     Results:  Lab Results   Component Value Date     (L) 05/16/2024    K 5.0 05/16/2024    CL 98 05/16/2024    CO2 20 (L) 05/16/2024    BUN 16 05/16/2024    CREATININE 1.2 05/16/2024    CALCIUM 7.8 (L) 05/16/2024    ANIONGAP 6 (L) 05/16/2024    ESTGFRAFRICA >60 12/18/2020    EGFRNONAA >60 12/18/2020       Lab Results   Component Value Date    CALCIUM 7.8 (L) 05/16/2024    PHOS 2.3 (L) 05/15/2024       Recent Labs   Lab 05/16/24  0707   WBC 5.63   RBC 3.86*   HGB 13.2   HCT 37.0      MCV 96   MCH 34.2*   MCHC 35.7       I have personally reviewed pertinent radiological imaging and reports.    I have spent > 70 minutes providing care for this patient for the above diagnoses. These services have included chart/data/imaging review, evaluation, exam, formulation of plan, , note preparation, and discussions with staff involved in this patient's care.    Skylar Brambila MD MPH  [facility] [address] [address]  [phone] (p)  [phone] (f)

## 2024-05-16 NOTE — ASSESSMENT & PLAN NOTE
S/p stem cell transplant   On Immunosuppressants   Maintain cyclosporine/gilteritinib/isavuconazonium/letermovir  Check cyclosporine levels  Also acyclovir/Bactrim

## 2024-05-16 NOTE — PROGRESS NOTES
Pharmacokinetic Initial Assessment: IV Vancomycin    Assessment/Plan:    Initiate intravenous vancomycin with loading dose of 1500 mg once followed by a maintenance dose of vancomycin 1250 mg IV every 24 hours  Desired empiric serum trough concentration is 15 to 20 mcg/mL  Draw vancomycin trough level 60 min prior to third dose on 05/17 at approximately 1900  Pharmacy will continue to follow and monitor vancomycin.      Please contact pharmacy at extension [phone] with any questions regarding this assessment.     Thank you for the consult,   Abelardo Cristela       Patient brief summary:  Thai Villalba is a 48 y.o. female initiated on antimicrobial therapy with IV Vancomycin for treatment of suspected bacteremia    Drug Allergies:   Review of patient's allergies indicates:   Allergen Reactions    Promethazine Nausea And Vomiting     Other Reaction(s): VOMITING    Penicillin Hives    Penicillins      Other reaction(s): Unknown    Melatonin Anxiety     Other Reaction(s): FLUSHING       Actual Body Weight:   72.6 kg    Renal Function:   Estimated Creatinine Clearance: 47.5 mL/min (based on SCr of 1.4 mg/dL).,     CBC (last 72 hours):  Recent Labs   Lab Result Units 05/14/24  1034 05/15/24  1723   WBC K/uL 3.65* 6.03   Hemoglobin g/dL 13.4 14.0   Hematocrit % 37.1 39.6   Platelets K/uL 228 237   Gran % % 47.3 76.1*   Lymph % % 30.1 13.6*   Mono % % 16.2* 8.5   Eosinophil % % 5.8 1.3   Basophil % % 0.3 0.2   Differential Method  Automated Automated       Metabolic Panel (last 72 hours):  Recent Labs   Lab Result Units 05/14/24  1034 05/15/24  1723 05/15/24  2018 05/15/24  2316   Sodium mmol/L 129* 127*  --   --    Potassium mmol/L 5.0 5.6* 5.2*  --    Chloride mmol/L 101 98  --   --    CO2 mmol/L 19* 22*  --   --    Glucose mg/dL 106 91  --   --    Glucose, UA   --   --   --  Negative   BUN mg/dL 13 15  --   --    Creatinine mg/dL 1.3 1.4  --   --    Albumin g/dL 3.6 4.0  --   --    Total Bilirubin mg/dL 0.7 1.0  --   --   "  Alkaline Phosphatase U/L 245* 232*  --   --    AST U/L 101* 67*  --   --    ALT U/L 206* 157*  --   --    Magnesium mg/dL 1.6 1.7  --   --    Phosphorus mg/dL 2.5* 2.3*  --   --        Drug levels (last 3 results):  No results for input(s): "VANCOMYCINRA", "VANCORANDOM", "VANCOMYCINPE", "VANCOPEAK", "VANCOMYCINTR", "VANCOTROUGH" in the last 72 hours.    Microbiologic Results:  Microbiology Results (last 7 days)       Procedure Component Value Units Date/Time    Urine Culture High Risk [[id number]]     Order Status: No result Specimen: Urine, Clean Catch     Blood culture #1 **CANNOT BE ORDERED STAT** [[id number]] Collected: 05/15/24 1758    Order Status: Sent Specimen: Blood from Peripheral, Antecubital, Left Updated: 05/15/24 1815    Narrative:      Collection has been rescheduled by [facility] at 05/15/2024 17:26 Reason:   Patient unavailable. Ultrasound  Collection has been rescheduled by [facility] at 05/15/2024 17:26 Reason:   Patient unavailable. Ultrasound    Blood culture #2 **CANNOT BE ORDERED STAT** [[id number]] Collected: 05/15/24 1757    Order Status: Sent Specimen: Blood from Peripheral, Antecubital, Right Updated: 05/15/24 1815    Narrative:      Collection has been rescheduled by [facility] at 05/15/2024 17:26 Reason:   Patient unavailable. Ultrasound  Collection has been rescheduled by [facility] at 05/15/2024 17:26 Reason:   Patient unavailable. Ultrasound            "

## 2024-05-16 NOTE — PROGRESS NOTES
[facility]  Progress Note    Patient Name: Thai Villalba  MRN: [id number]  Patient Class: IP- Inpatient   Admission Date: 5/15/2024  Length of Stay: 1 days  Attending Physician: Andres Ramirez MD  Primary Care Provider: Evangelina, Primary Doctor        Subjective:     Principal Problem:Sepsis        HPI:  48 year old pt getting admitted with possible sepsis/hyponatremia/GVHD Concern  Pt suffered from AML and s/p stem cell transplant last year  She was contacted by Her Hematologist to come to hospital because recent lab work showed hyponatremia  Pt has been suffering from low grade fever/lethargy/fatigue for pasts several days  Symptoms went worse yesterday and today and she was instructed to come to ER and got admitted  Pt was supposed to see Hematologist Dr Shahram Moreno MD tomorrow in clinic regarding GVHD  Pts own HematoOncologist is Dr Shemar Franco MD      Overview/Hospital Course:  Patient admitted for concern of sepsis, GVHD, hyponatremia.  Started on empiric antibiotics.  Patient reports overall improvement in symptoms.  Cultures pending.  Remains hyponatremic, Nephrology consulted.  Discussed with patient's oncologist Dr. Franco plan to follow up on cultures, and as long as hyponatremia improved and the patient remains afebrile we will plan on discharge with follow up with Oncology outpatient.    Interval History:  Patient seen and examined this morning, reports overall improvement in symptoms.  WBC 5.63.  Sodium 124.  Cultures no growth to date.  Discussed with patient's oncologist, we will continue to monitor.      Review of Systems   Constitutional:  Negative for chills and diaphoresis.   Respiratory:  Negative for choking and shortness of breath.    Cardiovascular:  Negative for chest pain and palpitations.     Objective:     Vital Signs (Most Recent):  Temp: 98.4 °F (36.9 °C) (05/16/24 0745)  Pulse: (!) 57 (05/16/24 0745)  Resp: 18 (05/16/24 0901)  BP: 120/64 (05/16/24  0745)  SpO2: 98 % (05/16/24 0745) Vital Signs (24h Range):  Temp:  [98.3 °F (36.8 °C)-98.4 °F (36.9 °C)] 98.4 °F (36.9 °C)  Pulse:  [57-81] 57  Resp:  [12-31] 18  SpO2:  [95 %-99 %] 98 %  BP: (120-160)/(64-99) 120/64     Weight: 73 kg (160 lb 15 oz)  Body mass index is 27.62 kg/m².    Intake/Output Summary (Last 24 hours) at 5/16/2024 1105  Last data filed at 5/16/2024 0632  Gross per 24 hour   Intake 2147.5 ml   Output --   Net 2147.5 ml         Physical Exam  Constitutional:       General: She is not in acute distress.  Cardiovascular:      Rate and Rhythm: Normal rate and regular rhythm.   Pulmonary:      Effort: No respiratory distress.      Breath sounds: No wheezing.   Abdominal:      General: There is no distension.   Skin:     General: Skin is warm and dry.      Findings: No rash.   Neurological:      Mental Status: She is alert.             Significant Labs: All pertinent labs within the past 24 hours have been reviewed.  CBC:   Recent Labs   Lab 05/15/24  1723 05/16/24  0707   WBC 6.03 5.63   HGB 14.0 13.2   HCT 39.6 37.0    215     CMP:   Recent Labs   Lab 05/15/24  1723 05/15/24  2018 05/16/24  0707   *  --  124*   K 5.6* 5.2* 5.0   CL 98  --  98   CO2 22*  --  20*   GLU 91  --  161*   BUN 15  --  16   CREATININE 1.4  --  1.2   CALCIUM 8.8  --  7.8*   PROT 6.4  --  5.7*   ALBUMIN 4.0  --  3.5   BILITOT 1.0  --  0.8   ALKPHOS 232*  --  198*   AST 67*  --  48*   *  --  123*   ANIONGAP 7*  --  6*       Significant Imaging: I have reviewed all pertinent imaging results/findings within the past 24 hours.    Assessment/Plan:      * Sepsis  Questionable sepsis, patient afebrile during admission  Pt is immunocompromised  Blood and urine cultures pending  Continue empiric antibiotics   Procalcitonin 0.05   Follow up ESR CRP  Discussed with patient's oncologist Dr. Franco, we will continue to monitor the patient and as long as patient afebrile, hyponatremia improves we will plan on discharge  with follow up with Oncology outpatient.    Transaminitis  Resolving   Continue to monitor CMP      Immunocompromised  Aware       Hyperkalemia  resolving  Recent Labs   Lab 05/16/24  0707   K 5.0               Hyponatremia  Maintain iv fluids   Recent Labs   Lab 05/16/24  0707   *     Nephrology consulted   Follow up urine osm, serum Osmo, urine sodium    History of allogeneic stem cell transplant  Aware       AML (acute myeloblastic leukemia)  S/p stem cell transplant   On Immunosuppressants   Maintain cyclosporine/gilteritinib/isavuconazonium/letermovir  Also acyclovir/Bactrim      VTE Risk Mitigation (From admission, onward)           Ordered     IP VTE HIGH RISK PATIENT  Once         05/15/24 1907     Place sequential compression device  Until discontinued         05/15/24 1907                    Discharge Planning   ELLIOTT:      Code Status: Full Code   Is the patient medically ready for discharge?:     Reason for patient still in hospital (select all that apply): Patient trending condition                     Andres Ramirez MD  Department of Hospital Medicine   [facility]

## 2024-05-16 NOTE — ASSESSMENT & PLAN NOTE
Ca gluconate and lokelma  Recheck levels later again  Recent Labs   Lab 05/15/24  1723   K 5.6*

## 2024-05-16 NOTE — CARE UPDATE
05/16/24 0757   Patient Assessment/Suction   Level of Consciousness (AVPU) alert   Respiratory Effort Unlabored;Normal   Expansion/Accessory Muscles/Retractions no use of accessory muscles   All Lung Fields Breath Sounds Anterior:;Lateral:;Posterior:   LAURENCE Breath Sounds clear;diminished   Rhythm/Pattern, Respiratory pattern regular;depth regular   Cough Frequency infrequent   Cough Type good;nonproductive   PRE-TX-O2   Device (Oxygen Therapy) room air   SpO2 98 %   Pulse Oximetry Type Intermittent   $ Pulse Oximetry - Multiple Charge Pulse Oximetry - Multiple   Pulse 62   Resp 20

## 2024-05-16 NOTE — CARE UPDATE
05/16/24 0732   Patient Assessment/Suction   Level of Consciousness (AVPU) alert   Respiratory Effort Unlabored;Normal   Expansion/Accessory Muscles/Retractions no use of accessory muscles   All Lung Fields Breath Sounds Anterior:;Lateral:;clear   Rhythm/Pattern, Respiratory pattern regular;depth regular   Cough Frequency no cough   PRE-TX-O2   Device (Oxygen Therapy) room air   SpO2 99 %   Pulse Oximetry Type Intermittent   $ Pulse Oximetry - Multiple Charge Pulse Oximetry - Multiple   Pulse 71   Resp 16   Education   $ Education DME Oxygen;15 min

## 2024-05-16 NOTE — SUBJECTIVE & OBJECTIVE
Interval History:  Patient seen and examined this morning, reports overall improvement in symptoms.  WBC 5.63.  Sodium 124.  Cultures no growth to date.  Discussed with patient's oncologist, we will continue to monitor.      Review of Systems   Constitutional:  Negative for chills and diaphoresis.   Respiratory:  Negative for choking and shortness of breath.    Cardiovascular:  Negative for chest pain and palpitations.     Objective:     Vital Signs (Most Recent):  Temp: 98.4 °F (36.9 °C) (05/16/24 0745)  Pulse: (!) 57 (05/16/24 0745)  Resp: 18 (05/16/24 0901)  BP: 120/64 (05/16/24 0745)  SpO2: 98 % (05/16/24 0745) Vital Signs (24h Range):  Temp:  [98.3 °F (36.8 °C)-98.4 °F (36.9 °C)] 98.4 °F (36.9 °C)  Pulse:  [57-81] 57  Resp:  [12-31] 18  SpO2:  [95 %-99 %] 98 %  BP: (120-160)/(64-99) 120/64     Weight: 73 kg (160 lb 15 oz)  Body mass index is 27.62 kg/m².    Intake/Output Summary (Last 24 hours) at 5/16/2024 1105  Last data filed at 5/16/2024 0632  Gross per 24 hour   Intake 2147.5 ml   Output --   Net 2147.5 ml         Physical Exam  Constitutional:       General: She is not in acute distress.  Cardiovascular:      Rate and Rhythm: Normal rate and regular rhythm.   Pulmonary:      Effort: No respiratory distress.      Breath sounds: No wheezing.   Abdominal:      General: There is no distension.   Skin:     General: Skin is warm and dry.      Findings: No rash.   Neurological:      Mental Status: She is alert.             Significant Labs: All pertinent labs within the past 24 hours have been reviewed.  CBC:   Recent Labs   Lab 05/15/24  1723 05/16/24  0707   WBC 6.03 5.63   HGB 14.0 13.2   HCT 39.6 37.0    215     CMP:   Recent Labs   Lab 05/15/24  1723 05/15/24  2018 05/16/24  0707   *  --  124*   K 5.6* 5.2* 5.0   CL 98  --  98   CO2 22*  --  20*   GLU 91  --  161*   BUN 15  --  16   CREATININE 1.4  --  1.2   CALCIUM 8.8  --  7.8*   PROT 6.4  --  5.7*   ALBUMIN 4.0  --  3.5   BILITOT 1.0  --   0.8   ALKPHOS 232*  --  198*   AST 67*  --  48*   *  --  123*   ANIONGAP 7*  --  6*       Significant Imaging: I have reviewed all pertinent imaging results/findings within the past 24 hours.

## 2024-05-16 NOTE — ASSESSMENT & PLAN NOTE
Questionable sepsis, patient afebrile during admission  Pt is immunocompromised  Blood and urine cultures pending  Continue empiric antibiotics   Procalcitonin 0.05   Follow up ESR CRP  Discussed with patient's oncologist Dr. Franco, we will continue to monitor the patient and as long as patient afebrile, hyponatremia improves we will plan on discharge with follow up with Oncology outpatient.

## 2024-05-17 LAB
ALBUMIN SERPL BCP-MCNC: 3.3 G/DL (ref 3.5–5.2)
ALP SERPL-CCNC: 162 U/L (ref 55–135)
ALT SERPL W/O P-5'-P-CCNC: 96 U/L (ref 10–44)
ANION GAP SERPL CALC-SCNC: 5 MMOL/L (ref 8–16)
ANION GAP SERPL CALC-SCNC: 6 MMOL/L (ref 8–16)
AST SERPL-CCNC: 32 U/L (ref 10–40)
BASOPHILS # BLD AUTO: 0.01 K/UL (ref 0–0.2)
BASOPHILS NFR BLD: 0.2 % (ref 0–1.9)
BILIRUB SERPL-MCNC: 0.5 MG/DL (ref 0.1–1)
BUN SERPL-MCNC: 14 MG/DL (ref 6–20)
BUN SERPL-MCNC: 14 MG/DL (ref 6–20)
CALCIUM SERPL-MCNC: 7.7 MG/DL (ref 8.7–10.5)
CALCIUM SERPL-MCNC: 8 MG/DL (ref 8.7–10.5)
CHLORIDE SERPL-SCNC: 106 MMOL/L (ref 95–110)
CHLORIDE SERPL-SCNC: 107 MMOL/L (ref 95–110)
CO2 SERPL-SCNC: 21 MMOL/L (ref 23–29)
CO2 SERPL-SCNC: 22 MMOL/L (ref 23–29)
CREAT SERPL-MCNC: 1.2 MG/DL (ref 0.5–1.4)
CREAT SERPL-MCNC: 1.3 MG/DL (ref 0.5–1.4)
DIFFERENTIAL METHOD BLD: ABNORMAL
EOSINOPHIL # BLD AUTO: 0.3 K/UL (ref 0–0.5)
EOSINOPHIL NFR BLD: 4.6 % (ref 0–8)
ERYTHROCYTE [DISTWIDTH] IN BLOOD BY AUTOMATED COUNT: 14.2 % (ref 11.5–14.5)
EST. GFR  (NO RACE VARIABLE): 50.4 ML/MIN/1.73 M^2
EST. GFR  (NO RACE VARIABLE): 55.5 ML/MIN/1.73 M^2
GLUCOSE SERPL-MCNC: 81 MG/DL (ref 70–110)
GLUCOSE SERPL-MCNC: 81 MG/DL (ref 70–110)
HCT VFR BLD AUTO: 36.4 % (ref 37–48.5)
HGB BLD-MCNC: 12.7 G/DL (ref 12–16)
IMM GRANULOCYTES # BLD AUTO: 0.02 K/UL (ref 0–0.04)
IMM GRANULOCYTES NFR BLD AUTO: 0.4 % (ref 0–0.5)
LYMPHOCYTES # BLD AUTO: 2 K/UL (ref 1–4.8)
LYMPHOCYTES NFR BLD: 35.6 % (ref 18–48)
MAGNESIUM SERPL-MCNC: 1.9 MG/DL (ref 1.6–2.6)
MCH RBC QN AUTO: 33.4 PG (ref 27–31)
MCHC RBC AUTO-ENTMCNC: 34.9 G/DL (ref 32–36)
MCV RBC AUTO: 96 FL (ref 82–98)
MONOCYTES # BLD AUTO: 0.8 K/UL (ref 0.3–1)
MONOCYTES NFR BLD: 13.6 % (ref 4–15)
NEUTROPHILS # BLD AUTO: 2.6 K/UL (ref 1.8–7.7)
NEUTROPHILS NFR BLD: 45.6 % (ref 38–73)
NRBC BLD-RTO: 0 /100 WBC
PLATELET # BLD AUTO: 212 K/UL (ref 150–450)
PMV BLD AUTO: 10.3 FL (ref 9.2–12.9)
POTASSIUM SERPL-SCNC: 4.8 MMOL/L (ref 3.5–5.1)
POTASSIUM SERPL-SCNC: 4.8 MMOL/L (ref 3.5–5.1)
PROT SERPL-MCNC: 5.3 G/DL (ref 6–8.4)
RBC # BLD AUTO: 3.8 M/UL (ref 4–5.4)
SODIUM SERPL-SCNC: 133 MMOL/L (ref 136–145)
SODIUM SERPL-SCNC: 134 MMOL/L (ref 136–145)
VANCOMYCIN TROUGH SERPL-MCNC: 8.7 UG/ML
WBC # BLD AUTO: 5.67 K/UL (ref 3.9–12.7)

## 2024-05-17 PROCEDURE — 80053 COMPREHEN METABOLIC PANEL: CPT | Performed by: INTERNAL MEDICINE

## 2024-05-17 PROCEDURE — 63600175 PHARM REV CODE 636 W HCPCS: Performed by: INTERNAL MEDICINE

## 2024-05-17 PROCEDURE — 83735 ASSAY OF MAGNESIUM: CPT | Performed by: INTERNAL MEDICINE

## 2024-05-17 PROCEDURE — 80202 ASSAY OF VANCOMYCIN: CPT | Performed by: INTERNAL MEDICINE

## 2024-05-17 PROCEDURE — 80048 BASIC METABOLIC PNL TOTAL CA: CPT | Performed by: INTERNAL MEDICINE

## 2024-05-17 PROCEDURE — 85025 COMPLETE CBC W/AUTO DIFF WBC: CPT | Performed by: INTERNAL MEDICINE

## 2024-05-17 PROCEDURE — 36415 COLL VENOUS BLD VENIPUNCTURE: CPT | Performed by: INTERNAL MEDICINE

## 2024-05-17 PROCEDURE — 12000002 HC ACUTE/MED SURGE SEMI-PRIVATE ROOM

## 2024-05-17 PROCEDURE — 25000003 PHARM REV CODE 250: Performed by: HOSPITALIST

## 2024-05-17 PROCEDURE — 25000003 PHARM REV CODE 250: Performed by: INTERNAL MEDICINE

## 2024-05-17 RX ADMIN — CEFEPIME HYDROCHLORIDE 2 G: 2 INJECTION, POWDER, FOR SOLUTION INTRAVENOUS at 06:05

## 2024-05-17 RX ADMIN — OXYCODONE HYDROCHLORIDE 10 MG: 10 TABLET ORAL at 02:05

## 2024-05-17 RX ADMIN — GABAPENTIN 300 MG: 300 CAPSULE ORAL at 08:05

## 2024-05-17 RX ADMIN — GILTERITINIB 80 MG: 40 TABLET ORAL at 05:05

## 2024-05-17 RX ADMIN — ACYCLOVIR 800 MG: 200 CAPSULE ORAL at 08:05

## 2024-05-17 RX ADMIN — CYCLOSPORINE 50 MG: 25 CAPSULE, LIQUID FILLED ORAL at 08:05

## 2024-05-17 RX ADMIN — Medication 400 MG: at 08:05

## 2024-05-17 RX ADMIN — PANTOPRAZOLE SODIUM 40 MG: 40 TABLET, DELAYED RELEASE ORAL at 06:05

## 2024-05-17 RX ADMIN — OXYCODONE HYDROCHLORIDE 10 MG: 10 TABLET ORAL at 08:05

## 2024-05-17 RX ADMIN — ISAVUCONAZONIUM SULFATE 372 MG: 186 CAPSULE ORAL at 05:05

## 2024-05-17 RX ADMIN — GABAPENTIN 300 MG: 300 CAPSULE ORAL at 02:05

## 2024-05-17 RX ADMIN — CYCLOSPORINE 100 MG: 100 CAPSULE, LIQUID FILLED ORAL at 08:05

## 2024-05-17 RX ADMIN — PANTOPRAZOLE SODIUM 40 MG: 40 TABLET, DELAYED RELEASE ORAL at 05:05

## 2024-05-17 RX ADMIN — LETERMOVIR 240 MG: 240 TABLET, FILM COATED ORAL at 08:05

## 2024-05-17 RX ADMIN — CEFEPIME HYDROCHLORIDE 2 G: 2 INJECTION, POWDER, FOR SOLUTION INTRAVENOUS at 05:05

## 2024-05-17 RX ADMIN — VANCOMYCIN HYDROCHLORIDE 1250 MG: 1.25 INJECTION, POWDER, LYOPHILIZED, FOR SOLUTION INTRAVENOUS at 08:05

## 2024-05-17 NOTE — ASSESSMENT & PLAN NOTE
Questionable sepsis, patient afebrile during admission  Pt is immunocompromised  Blood and urine cultures pending  Continue empiric antibiotics , if cultures remain negative and patient afebrile we will discontinue antibiotics  Procalcitonin 0.05     Discussed with patient's oncologist Dr. Franco, we will continue to monitor the patient and as long as patient afebrile, hyponatremia improves we will plan on discharge with follow up with Oncology outpatient.

## 2024-05-17 NOTE — CONSULTS
INPATIENT NEPHROLOGY CONSULT   [facility]    Patient Name: Thai Villalba  Date: 05/17/2024    Reason for consultation: Hyponatremia    Chief Complaint:   Chief Complaint   Patient presents with    Abnormal Lab     Low sodium, advised to come to ED       History of Present Illness:  48 year old pt getting admitted with possible sepsis/hyponatremia/GVHD Concern  Pt suffered from AML and s/p stem cell transplant last year. She was contacted by her Hematologist to come to hospital because recent lab work showed hyponatremia. Pt has been suffering from low grade fever/lethargy/fatigue for pasts several days. Symptoms went worse yesterday and today and she was instructed to come to ER and got admitted. We are consulted for hyponatremia.    Interval History:  5/16- ate first meal after several days of minimal intake- nausea improved  5/17- serum Na improving- no complaints    Plan of Care:    Hyponatremia- looks like poor oral intake initially, now more SIADH  CKD III  Hyperkalemia  Acidosis  HypoMg    - serum Na improving- continue adequate nutrition and 1.5L fluid restriction- daily labs  - prone to hyperK/MAURA with bactrim- daily labs and may need a low K diet- will see- small bump in SCr is noted but not outside her range- dose meds for CrCl 30-60  - trend CO2 for now with above management- improving    - continue standing Mg supplement    Thank you for allowing us to participate in this patient's care. We will continue to follow.    Vital Signs:  Temp Readings from Last 3 Encounters:   05/17/24 98 °F (36.7 °C)   04/24/24 98.7 °F (37.1 °C) (Oral)   03/27/24 97.7 °F (36.5 °C)       Pulse Readings from Last 3 Encounters:   05/17/24 (!) 58   04/24/24 94   03/27/24 61       BP Readings from Last 3 Encounters:   05/17/24 126/84   04/24/24 108/68   03/27/24 (!) 91/54       Weight:  Wt Readings from Last 3 Encounters:   05/16/24 73 kg (160 lb 15 oz)   04/24/24 72.3 kg (159 lb 4.5 oz)   03/27/24 65.8 kg (145  lb)       INS/OUTS:  I/O last 3 completed shifts:  In: 2737.6 [P.O.:600; I.V.:1587.5; IV Piggyback:550.1]  Out: 350 [Urine:350]  No intake/output data recorded.      Medications:  Scheduled Meds:   acyclovir  800 mg Oral BID    ceFEPime IV (PEDS and ADULTS)  2 g Intravenous Q12H    cycloSPORINE modified (NEORAL)  100 mg Oral BID    cycloSPORINE modified (NEORAL)  50 mg Oral BID    gabapentin  300 mg Oral TID    gilteritinib  80 mg Oral Daily    isavuconazonium sulfate  372 mg Oral Daily    letermovir  240 mg Oral QHS    magnesium oxide  400 mg Oral BID    pantoprazole  40 mg Oral BID    sulfamethoxazole-trimethoprim 800-160mg  1 tablet Oral 2 times per day on Monday Thursday    vancomycin (VANCOCIN) IV (PEDS and ADULTS)  1,250 mg Intravenous Q24H     Continuous Infusions:  PRN Meds:.  Current Facility-Administered Medications:     aluminum-magnesium hydroxide-simethicone, 30 mL, Oral, QID PRN    magnesium oxide, 800 mg, Oral, PRN    magnesium oxide, 800 mg, Oral, PRN    ondansetron, 4 mg, Intravenous, Q6H PRN    oxyCODONE, 10 mg, Oral, Q4H PRN    potassium bicarbonate, 35 mEq, Oral, PRN    potassium bicarbonate, 50 mEq, Oral, PRN    potassium bicarbonate, 60 mEq, Oral, PRN    potassium, sodium phosphates, 2 packet, Oral, PRN    potassium, sodium phosphates, 2 packet, Oral, PRN    potassium, sodium phosphates, 2 packet, Oral, PRN    traZODone, 50 mg, Oral, Nightly PRN    Pharmacy to dose Vancomycin consult, , , Once **AND** vancomycin - pharmacy to dose, , Intravenous, pharmacy to manage frequency  No current facility-administered medications on file prior to encounter.     Current Outpatient Medications on File Prior to Encounter   Medication Sig Dispense Refill    acyclovir (ZOVIRAX) 200 MG capsule Take 4 capsules twice a day for prophylaxis 240 capsule 9    cholecalciferol, vitamin D3, 10 mcg (400 unit) Chew Take 1,000 Int'l Units by mouth once daily.      cycloSPORINE modified, NEORAL, (NEORAL) 100 MG capsule  Take 1 capsule (100mg) in addition to two 50mg capsules (to equal 150mg/dose) twice a day. (Patient taking differently: Take 100 mg by mouth 2 (two) times daily. Take 1 capsule (100mg) in addition to two 50mg capsules (to equal 150mg/dose) twice a day.) 60 capsule 11    cycloSPORINE modified, NEORAL, (NEORAL) 25 MG capsule Take 2 capsules (50mg) in addition to one 100mg capsule (to equal 150mg/dose) twice a day. (Patient taking differently: Take 50 mg by mouth 2 (two) times daily. Take 2 capsules (50mg) in addition to one 100mg capsule (to equal 150mg/dose) twice a day.) 240 capsule 10    estrogens,esterified,-methyltestosterone 1.25-2.5mg (EEMT) per tablet Take 1 tablet by mouth once daily. 90 tablet 1    gabapentin (NEURONTIN) 300 MG capsule Take 1 capsule by mouth three times a day 90 capsule 3    gilteritinib 40 mg Tab Take 2 tablets (80 mg) by mouth Daily. 60 tablet 11    isavuconazonium sulfate (CRESEMBA) 186 mg Cap Take 2 capsules by mouth three times a day for the first 2 days, then 2 capsules once a day thereafter 70 capsule 5    letermovir (PREVYMIS) 240 mg Tab Take 1 tablet (240 mg) by mouth once daily. 28 tablet 2    magnesium oxide (MAG-OX) 400 mg (241.3 mg magnesium) tablet Take 400 mg by mouth 2 (two) times daily.      oxyCODONE (ROXICODONE) 10 mg Tab immediate release tablet 1 tablet by mouth three times a day as needed for pain (Patient taking differently: Take 10 mg by mouth 3 (three) times daily as needed.) 90 tablet 0    pantoprazole (PROTONIX) 40 MG tablet Take 40 mg by mouth every morning.      sulfamethoxazole-trimethoprim 800-160mg (BACTRIM DS) 800-160 mg Tab Take 1 tablet twice a day for  2 days a week for pneumocyctis jiroveci pneumonia prevention 12 tablet 15    tacrolimus (PROTOPIC) 0.1 % ointment Apply topically 2 times daily for Atopic Dermatitis, can apply to lips or inside mouth if needed. (Patient taking differently: Apply 1 application  topically 2 (two) times daily.) 100 g 1     "triamcinolone acetonide 0.1% (KENALOG) 0.1 % cream Apply topically 2 (two) times daily. To areas of rash/dry skin. (Patient taking differently: Apply 1 g topically 2 (two) times daily. To areas of rash/dry skin.) 80 g 2    dexAMETHasone 0.5 mg/5 mL Soln solution Take 10 mLs (1 mg total) by mouth every 12 (twelve) hours. Swish and spit. Do not swallow. (Patient not taking: Reported on 5/15/2024) 500 mL 1    duke's soln (benadryl 30 mL, mylanta 30 mL, LIDOcaine 30 mL, nystatin 30 mL) 120mL Take 10 mLs by mouth 4 (four) times daily. (Patient not taking: Reported on 5/15/2024) 120 mL 0     Review of Systems:  Neg    Physical Exam:  /84   Pulse (!) 58   Temp 98 °F (36.7 °C)   Resp 19   Ht 5' 4" (1.626 m)   Wt 73 kg (160 lb 15 oz)   LMP  (LMP Unknown)   SpO2 97%   Breastfeeding No   BMI 27.62 kg/m²     General Appearance:    NAD, AAO x 3, cooperative, appears stated age   Head:    Normocephalic, atraumatic   Eyes:    PER, EOMI, and conjunctiva/sclera clear bilaterally        Mouth:   Moist mucus membranes, no thrush or oral lesions, normal      dentition   Back:     No CVA tenderness   Lungs:     Clear to auscultation bilaterally, no wheeze, crackles, rales      or rhonchi, symmetric air movement, respirations unlabored   Heart:    Regular rate and rhythm, S1 and S2 normal, no murmur, rub   or gallop   Abdomen:     Soft, non-tender, non-distended, bowel sounds active all four   quadrants, no RT or guarding, no masses, no organomegaly   Extremities:   Warm and well perfused, distal pulses intact, no cyanosis or    peripheral edema   MSK:   No joint or muscle swelling, tenderness or deformity   Skin:   Skin color, texture, turgor normal, no rashes or lesions   Neurologic/Psychiatric:   CNII-XII intact, normal strength and sensation       throughout, no asterixis; normal affect, memory, judgement     and insight     Results:  Lab Results   Component Value Date     (L) 05/17/2024    K 4.8 05/17/2024    CL " 106 05/17/2024    CO2 22 (L) 05/17/2024    BUN 14 05/17/2024    CREATININE 1.3 05/17/2024    CALCIUM 8.0 (L) 05/17/2024    ANIONGAP 6 (L) 05/17/2024    ESTGFRAFRICA >60 12/18/2020    EGFRNONAA >60 12/18/2020       Lab Results   Component Value Date    CALCIUM 8.0 (L) 05/17/2024    PHOS 2.3 (L) 05/15/2024       Recent Labs   Lab 05/17/24  0605   WBC 5.67   RBC 3.80*   HGB 12.7   HCT 36.4*      MCV 96   MCH 33.4*   MCHC 34.9       I have personally reviewed pertinent radiological imaging and reports.    I have spent > 35 minutes providing care for this patient for the above diagnoses. These services have included chart/data/imaging review, evaluation, exam, formulation of plan, , note preparation, and discussions with staff involved in this patient's care.    Skylar Brambila MD MPH  [facility] [address]  [place] [address]  [phone] (p)  [phone] (f)

## 2024-05-17 NOTE — SUBJECTIVE & OBJECTIVE
Interval History:  Patient seen and examined this morning, reports overall improvement in symptoms.  Sodium 134 this morning.  Remains afebrile cultures remain no growth to date..    Review of Systems   Constitutional:  Negative for chills and diaphoresis.   Respiratory:  Negative for choking and shortness of breath.    Cardiovascular:  Negative for chest pain and palpitations.     Objective:     Vital Signs (Most Recent):  Temp: 98.2 °F (36.8 °C) (05/17/24 0725)  Pulse: (!) 53 (05/17/24 0740)  Resp: 18 (05/17/24 0724)  BP: 127/69 (05/17/24 0724)  SpO2: 98 % (05/17/24 0724) Vital Signs (24h Range):  Temp:  [97.9 °F (36.6 °C)-98.2 °F (36.8 °C)] 98.2 °F (36.8 °C)  Pulse:  [52-69] 53  Resp:  [17-20] 18  SpO2:  [97 %-99 %] 98 %  BP: (118-127)/(69-83) 127/69     Weight: 73 kg (160 lb 15 oz)  Body mass index is 27.62 kg/m².    Intake/Output Summary (Last 24 hours) at 5/17/2024 1006  Last data filed at 5/17/2024 0642  Gross per 24 hour   Intake 590.05 ml   Output 350 ml   Net 240.05 ml         Physical Exam  Constitutional:       General: She is not in acute distress.  Cardiovascular:      Rate and Rhythm: Normal rate and regular rhythm.   Pulmonary:      Effort: No respiratory distress.      Breath sounds: No wheezing.   Abdominal:      General: There is no distension.   Skin:     General: Skin is warm and dry.      Findings: No rash.   Neurological:      Mental Status: She is alert.             Significant Labs: All pertinent labs within the past 24 hours have been reviewed.  CBC:   Recent Labs   Lab 05/15/24  1723 05/16/24  0707 05/17/24  0605   WBC 6.03 5.63 5.67   HGB 14.0 13.2 12.7   HCT 39.6 37.0 36.4*    215 212     CMP:   Recent Labs   Lab 05/15/24  1723 05/15/24  2018 05/16/24  0707 05/16/24  1144 05/16/24  1755 05/17/24  0605 05/17/24  0808   *  --  124*   < > 128* 133* 134*   K 5.6*   < > 5.0   < > 4.8 4.8 4.8   CL 98  --  98   < > 102 107 106   CO2 22*  --  20*   < > 19* 21* 22*   GLU 91  --  161*    < > 134* 81 81   BUN 15  --  16   < > 15 14 14   CREATININE 1.4  --  1.2   < > 1.2 1.2 1.3   CALCIUM 8.8  --  7.8*   < > 7.9* 7.7* 8.0*   PROT 6.4  --  5.7*  --   --  5.3*  --    ALBUMIN 4.0  --  3.5  --   --  3.3*  --    BILITOT 1.0  --  0.8  --   --  0.5  --    ALKPHOS 232*  --  198*  --   --  162*  --    AST 67*  --  48*  --   --  32  --    *  --  123*  --   --  96*  --    ANIONGAP 7*  --  6*   < > 7* 5* 6*    < > = values in this interval not displayed.       Significant Imaging: I have reviewed all pertinent imaging results/findings within the past 24 hours.

## 2024-05-17 NOTE — PROGRESS NOTES
[facility] Medicine  Progress Note    Patient Name: Thai Villalba  MRN: [id number]  Patient Class: IP- Inpatient   Admission Date: 5/15/2024  Length of Stay: 2 days  Attending Physician: Andres Ramirez MD  Primary Care Provider: Evangelina, Primary Doctor        Subjective:     Principal Problem:Sepsis        HPI:  48 year old pt getting admitted with possible sepsis/hyponatremia/GVHD Concern  Pt suffered from AML and s/p stem cell transplant last year  She was contacted by Her Hematologist to come to hospital because recent lab work showed hyponatremia  Pt has been suffering from low grade fever/lethargy/fatigue for pasts several days  Symptoms went worse yesterday and today and she was instructed to come to ER and got admitted  Pt was supposed to see Hematologist Dr Shahram Moreno MD tomorrow in clinic regarding GVHD  Pts own HematoOncologist is Dr Shemar Franco MD      Overview/Hospital Course:  Patient admitted for concern of sepsis, GVHD, hyponatremia.  Started on empiric antibiotics.  Patient reports overall improvement in symptoms.  Cultures pending.  Remains hyponatremic, Nephrology consulted.  Discussed with patient's oncologist Dr. Franco plan to follow up on cultures, and as long as hyponatremia improved and the patient remains afebrile we will plan on discharge with follow up with Oncology outpatient.    Interval History:  Patient seen and examined this morning, reports overall improvement in symptoms.  Sodium 134 this morning.  Remains afebrile cultures remain no growth to date..    Review of Systems   Constitutional:  Negative for chills and diaphoresis.   Respiratory:  Negative for choking and shortness of breath.    Cardiovascular:  Negative for chest pain and palpitations.     Objective:     Vital Signs (Most Recent):  Temp: 98.2 °F (36.8 °C) (05/17/24 0725)  Pulse: (!) 53 (05/17/24 0740)  Resp: 18 (05/17/24 0724)  BP: 127/69 (05/17/24 0724)  SpO2: 98 % (05/17/24 0724) Vital Signs (24h  Range):  Temp:  [97.9 °F (36.6 °C)-98.2 °F (36.8 °C)] 98.2 °F (36.8 °C)  Pulse:  [52-69] 53  Resp:  [17-20] 18  SpO2:  [97 %-99 %] 98 %  BP: (118-127)/(69-83) 127/69     Weight: 73 kg (160 lb 15 oz)  Body mass index is 27.62 kg/m².    Intake/Output Summary (Last 24 hours) at 5/17/2024 1006  Last data filed at 5/17/2024 0642  Gross per 24 hour   Intake 590.05 ml   Output 350 ml   Net 240.05 ml         Physical Exam  Constitutional:       General: She is not in acute distress.  Cardiovascular:      Rate and Rhythm: Normal rate and regular rhythm.   Pulmonary:      Effort: No respiratory distress.      Breath sounds: No wheezing.   Abdominal:      General: There is no distension.   Skin:     General: Skin is warm and dry.      Findings: No rash.   Neurological:      Mental Status: She is alert.             Significant Labs: All pertinent labs within the past 24 hours have been reviewed.  CBC:   Recent Labs   Lab 05/15/24  1723 05/16/24  0707 05/17/24  0605   WBC 6.03 5.63 5.67   HGB 14.0 13.2 12.7   HCT 39.6 37.0 36.4*    215 212     CMP:   Recent Labs   Lab 05/15/24  1723 05/15/24  2018 05/16/24  0707 05/16/24  1144 05/16/24  1755 05/17/24  0605 05/17/24  0808   *  --  124*   < > 128* 133* 134*   K 5.6*   < > 5.0   < > 4.8 4.8 4.8   CL 98  --  98   < > 102 107 106   CO2 22*  --  20*   < > 19* 21* 22*   GLU 91  --  161*   < > 134* 81 81   BUN 15  --  16   < > 15 14 14   CREATININE 1.4  --  1.2   < > 1.2 1.2 1.3   CALCIUM 8.8  --  7.8*   < > 7.9* 7.7* 8.0*   PROT 6.4  --  5.7*  --   --  5.3*  --    ALBUMIN 4.0  --  3.5  --   --  3.3*  --    BILITOT 1.0  --  0.8  --   --  0.5  --    ALKPHOS 232*  --  198*  --   --  162*  --    AST 67*  --  48*  --   --  32  --    *  --  123*  --   --  96*  --    ANIONGAP 7*  --  6*   < > 7* 5* 6*    < > = values in this interval not displayed.       Significant Imaging: I have reviewed all pertinent imaging results/findings within the past 24  hours.    Assessment/Plan:      * Sepsis  Questionable sepsis, patient afebrile during admission  Pt is immunocompromised  Blood and urine cultures pending  Continue empiric antibiotics , if cultures remain negative and patient afebrile we will discontinue antibiotics  Procalcitonin 0.05     Discussed with patient's oncologist Dr. Franco, we will continue to monitor the patient and as long as patient afebrile, hyponatremia improves we will plan on discharge with follow up with Oncology outpatient.    Transaminitis  Resolving   Continue to monitor CMP      Immunocompromised  Aware       Hyperkalemia  resolving  Recent Labs   Lab 05/17/24  0808   K 4.8               Hyponatremia  Discontinue IV fluids  Recent Labs   Lab 05/17/24  0808   *     Nephrology consulted   Sodium improving, continue fluid restriction    History of allogeneic stem cell transplant  Aware       AML (acute myeloblastic leukemia)  S/p stem cell transplant   On Immunosuppressants   Maintain cyclosporine/gilteritinib/isavuconazonium/letermovir  Also acyclovir/Bactrim      VTE Risk Mitigation (From admission, onward)           Ordered     IP VTE HIGH RISK PATIENT  Once         05/15/24 1907     Place sequential compression device  Until discontinued         05/15/24 1907                    Discharge Planning   ELLIOTT: 5/18/2024     Code Status: Full Code   Is the patient medically ready for discharge?:     Reason for patient still in hospital (select all that apply): Patient trending condition  Discharge Plan A: Home with family                  Andres Ramirez MD  Department of Hospital Medicine   [facility]

## 2024-05-17 NOTE — PLAN OF CARE
Problem: Adult Inpatient Plan of Care  Goal: Plan of Care Review  Outcome: Progressing  Goal: Patient-Specific Goal (Individualized)  Outcome: Progressing  Goal: Absence of Hospital-Acquired Illness or Injury  Outcome: Progressing  Goal: Optimal Comfort and Wellbeing  Outcome: Progressing  Goal: Readiness for Transition of Care  Outcome: Progressing     Problem: Sepsis/Septic Shock  Goal: Optimal Coping  Outcome: Progressing  Goal: Optimal Nutrition Intake  Outcome: Progressing

## 2024-05-17 NOTE — ASSESSMENT & PLAN NOTE
Discontinue IV fluids  Recent Labs   Lab 05/17/24  0808   *     Nephrology consulted   Sodium improving, continue fluid restriction

## 2024-05-17 NOTE — NURSING
Nurses Note -- 4 Eyes      5/16/2024   4:15 AM      Skin assessed during: Admit      [x] No Altered Skin Integrity Present    [x]Prevention Measures Documented      [] Yes- Altered Skin Integrity Present or Discovered   [] LDA Added if Not in Epic (Describe Wound)   [] New Altered Skin Integrity was Present on Admit and Documented in LDA   [] Wound Image Taken    Wound Care Consulted? No    Attending Nurse:  Priyanka Anderson RN/Staff Member:   Ramos WYATT RN

## 2024-05-18 VITALS
HEART RATE: 59 BPM | OXYGEN SATURATION: 97 % | TEMPERATURE: 98 F | DIASTOLIC BLOOD PRESSURE: 71 MMHG | HEIGHT: 64 IN | SYSTOLIC BLOOD PRESSURE: 125 MMHG | RESPIRATION RATE: 18 BRPM | BODY MASS INDEX: 27.48 KG/M2 | WEIGHT: 160.94 LBS

## 2024-05-18 LAB
ALBUMIN SERPL BCP-MCNC: 3.3 G/DL (ref 3.5–5.2)
ALP SERPL-CCNC: 157 U/L (ref 55–135)
ALT SERPL W/O P-5'-P-CCNC: 91 U/L (ref 10–44)
ANION GAP SERPL CALC-SCNC: 4 MMOL/L (ref 8–16)
AST SERPL-CCNC: 36 U/L (ref 10–40)
BASOPHILS # BLD AUTO: 0.02 K/UL (ref 0–0.2)
BASOPHILS NFR BLD: 0.3 % (ref 0–1.9)
BILIRUB SERPL-MCNC: 0.5 MG/DL (ref 0.1–1)
BUN SERPL-MCNC: 15 MG/DL (ref 6–20)
CALCIUM SERPL-MCNC: 7.9 MG/DL (ref 8.7–10.5)
CHLORIDE SERPL-SCNC: 105 MMOL/L (ref 95–110)
CO2 SERPL-SCNC: 23 MMOL/L (ref 23–29)
CREAT SERPL-MCNC: 1.3 MG/DL (ref 0.5–1.4)
DIFFERENTIAL METHOD BLD: ABNORMAL
EOSINOPHIL # BLD AUTO: 0.3 K/UL (ref 0–0.5)
EOSINOPHIL NFR BLD: 5.9 % (ref 0–8)
ERYTHROCYTE [DISTWIDTH] IN BLOOD BY AUTOMATED COUNT: 14.7 % (ref 11.5–14.5)
EST. GFR  (NO RACE VARIABLE): 50.4 ML/MIN/1.73 M^2
GLUCOSE SERPL-MCNC: 80 MG/DL (ref 70–110)
HCT VFR BLD AUTO: 35.9 % (ref 37–48.5)
HGB BLD-MCNC: 12.3 G/DL (ref 12–16)
IMM GRANULOCYTES # BLD AUTO: 0.02 K/UL (ref 0–0.04)
IMM GRANULOCYTES NFR BLD AUTO: 0.3 % (ref 0–0.5)
LYMPHOCYTES # BLD AUTO: 1.8 K/UL (ref 1–4.8)
LYMPHOCYTES NFR BLD: 31.6 % (ref 18–48)
MCH RBC QN AUTO: 33.3 PG (ref 27–31)
MCHC RBC AUTO-ENTMCNC: 34.3 G/DL (ref 32–36)
MCV RBC AUTO: 97 FL (ref 82–98)
MONOCYTES # BLD AUTO: 0.9 K/UL (ref 0.3–1)
MONOCYTES NFR BLD: 15 % (ref 4–15)
NEUTROPHILS # BLD AUTO: 2.7 K/UL (ref 1.8–7.7)
NEUTROPHILS NFR BLD: 46.9 % (ref 38–73)
NRBC BLD-RTO: 0 /100 WBC
PLATELET # BLD AUTO: 193 K/UL (ref 150–450)
PMV BLD AUTO: 10.2 FL (ref 9.2–12.9)
POTASSIUM SERPL-SCNC: 5.2 MMOL/L (ref 3.5–5.1)
PROT SERPL-MCNC: 5.3 G/DL (ref 6–8.4)
RBC # BLD AUTO: 3.69 M/UL (ref 4–5.4)
SODIUM SERPL-SCNC: 132 MMOL/L (ref 136–145)
WBC # BLD AUTO: 5.72 K/UL (ref 3.9–12.7)

## 2024-05-18 PROCEDURE — 36415 COLL VENOUS BLD VENIPUNCTURE: CPT | Performed by: INTERNAL MEDICINE

## 2024-05-18 PROCEDURE — 85025 COMPLETE CBC W/AUTO DIFF WBC: CPT | Performed by: INTERNAL MEDICINE

## 2024-05-18 PROCEDURE — 63600175 PHARM REV CODE 636 W HCPCS: Performed by: INTERNAL MEDICINE

## 2024-05-18 PROCEDURE — 25000003 PHARM REV CODE 250: Performed by: HOSPITALIST

## 2024-05-18 PROCEDURE — 25000003 PHARM REV CODE 250: Performed by: INTERNAL MEDICINE

## 2024-05-18 PROCEDURE — 80053 COMPREHEN METABOLIC PANEL: CPT | Performed by: INTERNAL MEDICINE

## 2024-05-18 PROCEDURE — 25000003 PHARM REV CODE 250: Performed by: NURSE PRACTITIONER

## 2024-05-18 RX ADMIN — CYCLOSPORINE 100 MG: 100 CAPSULE, LIQUID FILLED ORAL at 08:05

## 2024-05-18 RX ADMIN — SODIUM ZIRCONIUM CYCLOSILICATE 5 G: 5 POWDER, FOR SUSPENSION ORAL at 10:05

## 2024-05-18 RX ADMIN — CYCLOSPORINE 50 MG: 25 CAPSULE, LIQUID FILLED ORAL at 08:05

## 2024-05-18 RX ADMIN — Medication 400 MG: at 08:05

## 2024-05-18 RX ADMIN — ACYCLOVIR 800 MG: 200 CAPSULE ORAL at 08:05

## 2024-05-18 RX ADMIN — TRAZODONE HYDROCHLORIDE 50 MG: 50 TABLET ORAL at 12:05

## 2024-05-18 RX ADMIN — GABAPENTIN 300 MG: 300 CAPSULE ORAL at 08:05

## 2024-05-18 RX ADMIN — PANTOPRAZOLE SODIUM 40 MG: 40 TABLET, DELAYED RELEASE ORAL at 06:05

## 2024-05-18 RX ADMIN — CEFEPIME HYDROCHLORIDE 2 G: 2 INJECTION, POWDER, FOR SOLUTION INTRAVENOUS at 06:05

## 2024-05-18 NOTE — PLAN OF CARE
Problem: Adult Inpatient Plan of Care  Goal: Plan of Care Review  5/18/2024 1223 by Tia Frye LPN  Outcome: Progressing  5/18/2024 0730 by Tia Frye LPN  Outcome: Progressing  Goal: Patient-Specific Goal (Individualized)  5/18/2024 1223 by Tia Frye LPN  Outcome: Progressing  5/18/2024 0730 by Tia Frye LPN  Outcome: Progressing  Goal: Absence of Hospital-Acquired Illness or Injury  5/18/2024 1223 by Tia Frye LPN  Outcome: Progressing  5/18/2024 0730 by Tia Frye LPN  Outcome: Progressing  Goal: Optimal Comfort and Wellbeing  5/18/2024 1223 by Tia Frye LPN  Outcome: Progressing  5/18/2024 0730 by Tia Frye LPN  Outcome: Progressing  Goal: Readiness for Transition of Care  5/18/2024 1223 by Tia Frye LPN  Outcome: Progressing  5/18/2024 0730 by Tia Frye LPN  Outcome: Progressing     Problem: Sepsis/Septic Shock  Goal: Optimal Coping  5/18/2024 1223 by Tia Frye LPN  Outcome: Progressing  5/18/2024 0730 by Tia Frye LPN  Outcome: Progressing  Goal: Optimal Nutrition Intake  5/18/2024 1223 by Tia Frye LPN  Outcome: Progressing  5/18/2024 0730 by Tia Frye LPN  Outcome: Progressing

## 2024-05-18 NOTE — PLAN OF CARE
Patient cleared for discharge from case management standpoint.    Follow up appointments scheduled and added to AVS.    Chart and discharge orders reviewed.  Patient discharged home with no further case management needs.         05/18/24 1216   Final Note   Assessment Type Final Discharge Note   Anticipated Discharge Disposition Home   What phone number can be called within the next 1-3 days to see how you are doing after discharge? [phone]   Post-Acute Status   Discharge Delays None known at this time

## 2024-05-18 NOTE — ASSESSMENT & PLAN NOTE
Discontinue IV fluids  Recent Labs   Lab 05/18/24  0526   *     Nephrology consulted   Sodium improving, continue fluid restriction     Hyponatremia improved. Nephro ok'd to be d/c with bmp q weekly and lokelma daily. 1.5 L fluid restrictions.

## 2024-05-18 NOTE — PROGRESS NOTES
Pharmacy Consult Discontinuation: Vancomycin    Thai Villalba [id number] is a 48 y.o. female was consulted for vancomycin pharmacotherapy management by pharmacy.    Pharmacy consult for vancomycin dosing is no longer required.  Vancomycin was discontinued 05/18/2024.    Thank you for allowing us to participate in this patient's care. Should you have any questions or concerns please feel free to contact the pharmacy department at [phone].    Jose Ugarte, LanceD

## 2024-05-18 NOTE — PROGRESS NOTES
Pharmacokinetic Assessment Follow Up: IV Vancomycin    Vancomycin serum concentration assessment(s):    The trough level was drawn correctly and can be used to guide therapy at this time. The measurement is below the desired definitive target range of 15 to 20 mcg/mL.    Vancomycin Regimen Plan:    Change regimen to Vancomycin 1250 mg IV every 18 hours with next serum trough concentration measured at 0700 prior to 3rd dose on 5/19/24    Drug levels (last 3 results):  Recent Labs   Lab Result Units 05/17/24  1958   Vancomycin-Trough ug/mL 8.7*       Pharmacy will continue to follow and monitor vancomycin.    Please contact pharmacy at extension [phone] for questions regarding this assessment.    Thank you for the consult,   Josh Kaur       Patient brief summary:  Thai Villalba is a 48 y.o. female initiated on antimicrobial therapy with IV Vancomycin for treatment of Sepis    The patient's current regimen is vancomycin 1250 mg Q24H    Drug Allergies:   Review of patient's allergies indicates:   Allergen Reactions    Promethazine Nausea And Vomiting     Other Reaction(s): VOMITING    Penicillin Hives    Penicillins      Other reaction(s): Unknown    Melatonin Anxiety     Other Reaction(s): FLUSHING       Actual Body Weight:   73 kg    Renal Function:   Estimated Creatinine Clearance: 51.2 mL/min (based on SCr of 1.3 mg/dL).,     Dialysis Method (if applicable):  N/A    CBC (last 72 hours):  Recent Labs   Lab Result Units 05/15/24  1723 05/16/24  0707 05/17/24  0605   WBC K/uL 6.03 5.63 5.67   Hemoglobin g/dL 14.0 13.2 12.7   Hematocrit % 39.6 37.0 36.4*   Platelets K/uL 237 215 212   Gran % % 76.1* 74.9* 45.6   Lymph % % 13.6* 16.0* 35.6   Mono % % 8.5 8.5 13.6   Eosinophil % % 1.3 0.2 4.6   Basophil % % 0.2 0.2 0.2   Differential Method  Automated Automated Automated       Metabolic Panel (last 72 hours):  Recent Labs   Lab Result Units 05/15/24  1723 05/15/24  2018 05/15/24  2316 05/16/24  0707 05/16/24  1144  05/16/24  1755 05/16/24  1931 05/17/24  0605 05/17/24  0808   Sodium mmol/L 127*  --   --  124* 126* 128*  --  133* 134*   Sodium, Urine mmol/L  --   --  63  --   --   --  36  --   --    Potassium mmol/L 5.6* 5.2*  --  5.0 5.2* 4.8  --  4.8 4.8   Chloride mmol/L 98  --   --  98 100 102  --  107 106   CO2 mmol/L 22*  --   --  20* 20* 19*  --  21* 22*   Glucose mg/dL 91  --   --  161* 102 134*  --  81 81   Glucose, UA   --   --  Negative  --   --   --   --   --   --    BUN mg/dL 15  --   --  16 15 15  --  14 14   Creatinine mg/dL 1.4  --   --  1.2 1.2 1.2  --  1.2 1.3   Albumin g/dL 4.0  --   --  3.5  --   --   --  3.3*  --    Total Bilirubin mg/dL 1.0  --   --  0.8  --   --   --  0.5  --    Alkaline Phosphatase U/L 232*  --   --  198*  --   --   --  162*  --    AST U/L 67*  --   --  48*  --   --   --  32  --    ALT U/L 157*  --   --  123*  --   --   --  96*  --    Magnesium mg/dL 1.7  --   --  1.8  --   --   --  1.9  --    Phosphorus mg/dL 2.3*  --   --   --   --   --   --   --   --        Vancomycin Administrations:  vancomycin given in the last 96 hours                     vancomycin 1.25 g in dextrose 5% 250 mL IVPB (ready to mix) (mg) 1,250 mg New Bag 05/17/24 2027     1,250 mg New Bag 05/16/24 2014    vancomycin 1.5 g in dextrose 5 % 250 mL IVPB (ready to mix) (mg) 1,500 mg New Bag 05/15/24 1959                    Microbiologic Results:  Microbiology Results (last 7 days)       Procedure Component Value Units Date/Time    Blood culture #1 **CANNOT BE ORDERED STAT** [[id number]] Collected: 05/15/24 8888    Order Status: Completed Specimen: Blood from Peripheral, Antecubital, Left Updated: 05/17/24 1832     Blood Culture, Routine No Growth to date      No Growth to date      No Growth to date    Narrative:      Collection has been rescheduled by ERA at 05/15/2024 17:26 Reason:   Patient unavailable. Ultrasound  Collection has been rescheduled by [email or web address] at 05/15/2024 17:26 Reason:   Patient unavailable. Ultrasound     Blood culture #2 **CANNOT BE ORDERED STAT** [[id number]] Collected: 05/15/24 1757    Order Status: Completed Specimen: Blood from Peripheral, Antecubital, Right Updated: 05/17/24 1832     Blood Culture, Routine No Growth to date      No Growth to date      No Growth to date    Narrative:      Collection has been rescheduled by [email or web address] at 05/15/2024 17:26 Reason:   Patient unavailable. Ultrasound  Collection has been rescheduled by [email or web address] at 05/15/2024 17:26 Reason:   Patient unavailable. Ultrasound    Urine Culture High Risk [[id number]] Collected: 05/16/24 1933    Order Status: Completed Specimen: Urine, Clean Catch Updated: 05/17/24 0932     Urine Culture, Routine No growth to date    Narrative:      Indicated criteria for high risk culture:->Other  Other (specify):->uti

## 2024-05-18 NOTE — DISCHARGE SUMMARY
[facility] Medicine  Discharge Summary      Patient Name: Thai Villalba  MRN: [id number]  ADOLFO: [id number]  Patient Class: IP- Inpatient  Admission Date: 5/15/2024  Hospital Length of Stay: 3 days  Discharge Date and Time:  05/18/2024 4:34 PM  Attending Physician: Evangelina att. providers found   Discharging Provider: Nicole Kaur MD  Primary Care Provider: Evangelina, Primary Doctor    Primary Care Team: Networked reference to record PCT     HPI:   48 year old pt getting admitted with possible sepsis/hyponatremia/GVHD Concern  Pt suffered from AML and s/p stem cell transplant last year  She was contacted by Her Hematologist to come to hospital because recent lab work showed hyponatremia  Pt has been suffering from low grade fever/lethargy/fatigue for pasts several days  Symptoms went worse yesterday and today and she was instructed to come to ER and got admitted  Pt was supposed to see Hematologist Dr Shahram Moreno MD tomorrow in clinic regarding GVHD  Pts own HematoOncologist is Dr Shemar Franco MD      * No surgery found *      Hospital Course:   Patient admitted for concern of sepsis, GVHD, hyponatremia.  Started on empiric antibiotics.  Patient reports overall improvement in symptoms.  Cultures negative to date. Empiric IV abx d/c'd.  Remains hyponatremic, Nephrology consulted.  Discussed with patient's oncologist Dr. Franco plan to follow up on cultures, and as long as hyponatremia improved and the patient remains afebrile we will plan on discharge with follow up with Oncology outpatient. Hyponatremia improved. Nephro ok'd to be d/c with bmp q weekly and lokelma daily. 1.5 L fluid restrictions. Deemed stable to be d/c.      Goals of Care Treatment Preferences:  Code Status: Full Code          What is most important right now is to focus on curative/life-prolongation (regardless of treatment burdens).  Accordingly, we have decided that the best plan to meet the patient's goals includes continuing  with treatment.      Consults:   Consults (From admission, onward)          Status Ordering Provider     Inpatient consult to Nephrology  Once        Provider:  Skylar Brambila MD    Completed JOHN WOODARD            Renal/  Hyperkalemia  resolving  Recent Labs   Lab 05/18/24  0526   K 5.2*     Lokelma daily          ID  * Sepsis  Questionable sepsis, patient afebrile during admission  Pt is immunocompromised  Blood and urine cultures pending  Continue empiric antibiotics , if cultures remain negative and patient afebrile we will discontinue antibiotics  Procalcitonin 0.05     Discussed with patient's oncologist Dr. Franco, we will continue to monitor the patient and as long as patient afebrile, hyponatremia improves we will plan on discharge with follow up with Oncology outpatient.    Oncology  History of allogeneic stem cell transplant  Aware       AML (acute myeloblastic leukemia)  S/p stem cell transplant   On Immunosuppressants   Maintain cyclosporine/gilteritinib/isavuconazonium/letermovir  Also acyclovir/Bactrim    Endocrine  Hyponatremia  Discontinue IV fluids  Recent Labs   Lab 05/18/24  0526   *     Nephrology consulted   Sodium improving, continue fluid restriction     Hyponatremia improved. Nephro ok'd to be d/c with bmp q weekly and lokelma daily. 1.5 L fluid restrictions.     GI  Transaminitis  Resolving   Continue to monitor CMP      Other  Immunocompromised  Aware         Final Active Diagnoses:    Diagnosis Date Noted POA    PRINCIPAL PROBLEM:  Sepsis [A41.9] 05/15/2024 Yes    Hyponatremia [E87.1] 05/15/2024 Yes    Hyperkalemia [E87.5] 05/15/2024 Yes    Immunocompromised [D84.9] 05/15/2024 Yes    Transaminitis [R74.01] 05/15/2024 Yes    History of allogeneic stem cell transplant [Z94.84] 01/25/2024 Not Applicable    AML (acute myeloblastic leukemia) [C92.00] 05/30/2023 Yes      Problems Resolved During this Admission:       Discharged Condition: stable    Disposition: Home or Self  Care    Follow Up:    Patient Instructions:      Diet Adult Regular     Order Specific Question Answer Comments   Fluid restriction: Fluid - 1500mL      Notify your health care provider if you experience any of the following:  temperature >100.4     Notify your health care provider if you experience any of the following:  persistent nausea and vomiting or diarrhea     Notify your health care provider if you experience any of the following:  severe uncontrolled pain     Activity as tolerated       Significant Diagnostic Studies: Labs: CMP   Recent Labs   Lab 05/17/24  0605 05/17/24  0808 05/18/24 0526   * 134* 132*   K 4.8 4.8 5.2*    106 105   CO2 21* 22* 23   GLU 81 81 80   BUN 14 14 15   CREATININE 1.2 1.3 1.3   CALCIUM 7.7* 8.0* 7.9*   PROT 5.3*  --  5.3*   ALBUMIN 3.3*  --  3.3*   BILITOT 0.5  --  0.5   ALKPHOS 162*  --  157*   AST 32  --  36   ALT 96*  --  91*   ANIONGAP 5* 6* 4*    and CBC   Recent Labs   Lab 05/17/24  0605 05/18/24 0526   WBC 5.67 5.72   HGB 12.7 12.3   HCT 36.4* 35.9*    193       Pending Diagnostic Studies:       Procedure Component Value Units Date/Time    Cyclosporine level [[id number]] Collected: 05/16/24 0707    Order Status: Sent Lab Status: In process Updated: 05/16/24 0714    Specimen: Blood            Medications:  Reconciled Home Medications:      Medication List        START taking these medications      sodium zirconium cyclosilicate 5 gram packet  Commonly known as: LOKELMA  Take 1 packet (5 g total) by mouth once daily. Mix entire contents of packet(s) into drinking glass containing 3 tablespoons of water; stir well and drink immediately. Add water and repeat until no powder remains to receive entire dose.            CHANGE how you take these medications      * cycloSPORINE modified (NEORAL) 100 MG capsule  Commonly known as: NEORAL  Take 1 capsule (100mg) in addition to two 50mg capsules (to equal 150mg/dose) twice a day.  What changed:   how much to  take  how to take this  when to take this     * cycloSPORINE modified (NEORAL) 25 MG capsule  Commonly known as: NEORAL  Take 2 capsules (50mg) in addition to one 100mg capsule (to equal 150mg/dose) twice a day.  What changed:   how much to take  how to take this  when to take this           * This list has 2 medication(s) that are the same as other medications prescribed for you. Read the directions carefully, and ask your doctor or other care provider to review them with you.                CONTINUE taking these medications      acyclovir 200 MG capsule  Commonly known as: ZOVIRAX  Take 4 capsules twice a day for prophylaxis     cholecalciferol (vitamin D3) 10 mcg (400 unit) Chew  Take 1,000 Int'l Units by mouth once daily.     CRESEMBA 186 mg Cap  Generic drug: isavuconazonium sulfate  Take 2 capsules by mouth three times a day for the first 2 days, then 2 capsules once a day thereafter     estrogens(esterified)-methyltestosterone 1.25-2.5mg per tablet  Commonly known as: EEMT  Take 1 tablet by mouth once daily.     gabapentin 300 MG capsule  Commonly known as: NEURONTIN  Take 1 capsule by mouth three times a day     magnesium oxide 400 mg (241.3 mg magnesium) tablet  Commonly known as: MAG-OX  Take 400 mg by mouth 2 (two) times daily.     oxyCODONE 10 mg Tab immediate release tablet  Commonly known as: ROXICODONE  1 tablet by mouth three times a day as needed for pain     pantoprazole 40 MG tablet  Commonly known as: PROTONIX  Take 40 mg by mouth every morning.     PREVYMIS 240 mg Tab  Generic drug: letermovir  Take 1 tablet (240 mg) by mouth once daily.     sulfamethoxazole-trimethoprim 800-160mg 800-160 mg Tab  Commonly known as: BACTRIM DS  Take 1 tablet twice a day for  2 days a week for pneumocyctis jiroveci pneumonia prevention     tacrolimus 0.1 % ointment  Commonly known as: PROTOPIC  Apply topically 2 times daily for Atopic Dermatitis, can apply to lips or inside mouth if needed.     triamcinolone  acetonide 0.1% 0.1 % cream  Commonly known as: KENALOG  Apply topically 2 (two) times daily. To areas of rash/dry skin.     XOSPATA 40 mg Tab  Generic drug: gilteritinib  Take 2 tablets (80 mg) by mouth Daily.            ASK your doctor about these medications      dexAMETHasone 0.5 mg/5 mL Soln solution  Take 10 mLs (1 mg total) by mouth every 12 (twelve) hours. Swish and spit. Do not swallow.     DUKE'S SOLUTION (BENADRYL 30 ML, MYLANTA 30 ML, LIDOCAINE 30 ML, NYSTATIN 30 ML)  Take 10 mLs by mouth 4 (four) times daily.              Indwelling Lines/Drains at time of discharge:   Lines/Drains/Airways       None                   Time spent on the discharge of patient: >30 minutes         Nicole Kaur MD  Department of Hospital Medicine  [facility]

## 2024-05-18 NOTE — PROGRESS NOTES
INPATIENT NEPHROLOGY [place]   [facility]    Patient Name: Thai Villalba  Date: 05/18/2024    Reason for consultation: Hyponatremia    Chief Complaint:   Chief Complaint   Patient presents with    Abnormal Lab     Low sodium, advised to come to ED       History of Present Illness:  48 year old pt getting admitted with possible sepsis/hyponatremia/GVHD Concern  Pt suffered from AML and s/p stem cell transplant last year. She was contacted by her Hematologist to come to hospital because recent lab work showed hyponatremia. Pt has been suffering from low grade fever/lethargy/fatigue for pasts several days. Symptoms went worse yesterday and today and she was instructed to come to ER and got admitted. We are consulted for hyponatremia.    Interval History:  5/16- ate first meal after several days of minimal intake- nausea improved  5/17- serum Na improving- no complaints  5/18  First couple of pressures low this am, no UOP recorded.  K+ 5.2--pt on bactrim, ordered low dose lokelma.  Renal function stable.  No complaints.    Plan of Care:    Hyponatremia- looks like poor oral intake initially, now more SIADH  CKD III  Hyperkalemia  Acidosis  HypoMg    - serum Na improving- continue adequate nutrition and 1.5L fluid restriction- daily labs  - prone to hyperK/MAURA with bactrim- daily labs and may need a low K diet- will see- small bump in SCr is noted but not outside her range- dose meds for CrCl 30-60  - trend CO2 for now with above management- improving    - continue standing Mg supplement    Thank you for allowing us to participate in this patient's care. We will continue to follow.    Vital Signs:  Temp Readings from Last 3 Encounters:   05/18/24 98 °F (36.7 °C) (Oral)   04/24/24 98.7 °F (37.1 °C) (Oral)   03/27/24 97.7 °F (36.5 °C)       Pulse Readings from Last 3 Encounters:   05/18/24 60   04/24/24 94   03/27/24 61       BP Readings from Last 3 Encounters:   05/18/24 (!) 102/59   04/24/24 108/68    03/27/24 (!) 91/54       Weight:  Wt Readings from Last 3 Encounters:   05/16/24 73 kg (160 lb 15 oz)   04/24/24 72.3 kg (159 lb 4.5 oz)   03/27/24 65.8 kg (145 lb)       INS/OUTS:  I/O last 3 completed shifts:  In: 1140.1 [P.O.:540; IV Piggyback:600.1]  Out: 350 [Urine:350]  No intake/output data recorded.      Medications:  Scheduled Meds:   acyclovir  800 mg Oral BID    ceFEPime IV (PEDS and ADULTS)  2 g Intravenous Q12H    cycloSPORINE modified (NEORAL)  100 mg Oral BID    cycloSPORINE modified (NEORAL)  50 mg Oral BID    gabapentin  300 mg Oral TID    gilteritinib  80 mg Oral Daily    isavuconazonium sulfate  372 mg Oral Daily    letermovir  240 mg Oral QHS    magnesium oxide  400 mg Oral BID    pantoprazole  40 mg Oral BID    sulfamethoxazole-trimethoprim 800-160mg  1 tablet Oral 2 times per day on Monday Thursday    vancomycin (VANCOCIN) IV (PEDS and ADULTS)  1,250 mg Intravenous Q18H     Continuous Infusions:  PRN Meds:.  Current Facility-Administered Medications:     aluminum-magnesium hydroxide-simethicone, 30 mL, Oral, QID PRN    magnesium oxide, 800 mg, Oral, PRN    magnesium oxide, 800 mg, Oral, PRN    ondansetron, 4 mg, Intravenous, Q6H PRN    oxyCODONE, 10 mg, Oral, Q4H PRN    potassium bicarbonate, 35 mEq, Oral, PRN    potassium bicarbonate, 50 mEq, Oral, PRN    potassium bicarbonate, 60 mEq, Oral, PRN    potassium, sodium phosphates, 2 packet, Oral, PRN    potassium, sodium phosphates, 2 packet, Oral, PRN    potassium, sodium phosphates, 2 packet, Oral, PRN    traZODone, 50 mg, Oral, Nightly PRN    Pharmacy to dose Vancomycin consult, , , Once **AND** vancomycin - pharmacy to dose, , Intravenous, pharmacy to manage frequency  No current facility-administered medications on file prior to encounter.     Current Outpatient Medications on File Prior to Encounter   Medication Sig Dispense Refill    acyclovir (ZOVIRAX) 200 MG capsule Take 4 capsules twice a day for prophylaxis 240 capsule 9     cholecalciferol, vitamin D3, 10 mcg (400 unit) Chew Take 1,000 Int'l Units by mouth once daily.      cycloSPORINE modified, NEORAL, (NEORAL) 100 MG capsule Take 1 capsule (100mg) in addition to two 50mg capsules (to equal 150mg/dose) twice a day. (Patient taking differently: Take 100 mg by mouth 2 (two) times daily. Take 1 capsule (100mg) in addition to two 50mg capsules (to equal 150mg/dose) twice a day.) 60 capsule 11    cycloSPORINE modified, NEORAL, (NEORAL) 25 MG capsule Take 2 capsules (50mg) in addition to one 100mg capsule (to equal 150mg/dose) twice a day. (Patient taking differently: Take 50 mg by mouth 2 (two) times daily. Take 2 capsules (50mg) in addition to one 100mg capsule (to equal 150mg/dose) twice a day.) 240 capsule 10    estrogens,esterified,-methyltestosterone 1.25-2.5mg (EEMT) per tablet Take 1 tablet by mouth once daily. 90 tablet 1    gabapentin (NEURONTIN) 300 MG capsule Take 1 capsule by mouth three times a day 90 capsule 3    gilteritinib 40 mg Tab Take 2 tablets (80 mg) by mouth Daily. 60 tablet 11    isavuconazonium sulfate (CRESEMBA) 186 mg Cap Take 2 capsules by mouth three times a day for the first 2 days, then 2 capsules once a day thereafter 70 capsule 5    letermovir (PREVYMIS) 240 mg Tab Take 1 tablet (240 mg) by mouth once daily. 28 tablet 2    magnesium oxide (MAG-OX) 400 mg (241.3 mg magnesium) tablet Take 400 mg by mouth 2 (two) times daily.      oxyCODONE (ROXICODONE) 10 mg Tab immediate release tablet 1 tablet by mouth three times a day as needed for pain (Patient taking differently: Take 10 mg by mouth 3 (three) times daily as needed.) 90 tablet 0    pantoprazole (PROTONIX) 40 MG tablet Take 40 mg by mouth every morning.      sulfamethoxazole-trimethoprim 800-160mg (BACTRIM DS) 800-160 mg Tab Take 1 tablet twice a day for  2 days a week for pneumocyctis jiroveci pneumonia prevention 12 tablet 15    tacrolimus (PROTOPIC) 0.1 % ointment Apply topically 2 times daily for  "Atopic Dermatitis, can apply to lips or inside mouth if needed. (Patient taking differently: Apply 1 application  topically 2 (two) times daily.) 100 g 1    triamcinolone acetonide 0.1% (KENALOG) 0.1 % cream Apply topically 2 (two) times daily. To areas of rash/dry skin. (Patient taking differently: Apply 1 g topically 2 (two) times daily. To areas of rash/dry skin.) 80 g 2    dexAMETHasone 0.5 mg/5 mL Soln solution Take 10 mLs (1 mg total) by mouth every 12 (twelve) hours. Swish and spit. Do not swallow. (Patient not taking: Reported on 5/15/2024) 500 mL 1    duke's soln (benadryl 30 mL, mylanta 30 mL, LIDOcaine 30 mL, nystatin 30 mL) 120mL Take 10 mLs by mouth 4 (four) times daily. (Patient not taking: Reported on 5/15/2024) 120 mL 0     Review of Systems:  Neg    Physical Exam:  BP (!) 102/59   Pulse 60   Temp 98 °F (36.7 °C) (Oral)   Resp 16   Ht 5' 4" (1.626 m)   Wt 73 kg (160 lb 15 oz)   LMP  (LMP Unknown)   SpO2 96%   Breastfeeding No   BMI 27.62 kg/m²     General Appearance:    NAD, AAO x 3, cooperative, appears stated age   Head:    Normocephalic, atraumatic   Eyes:    PER, EOMI, and conjunctiva/sclera clear bilaterally        Mouth:   Moist mucus membranes, no thrush or oral lesions, normal      dentition   Back:     No CVA tenderness   Lungs:     Clear to auscultation bilaterally, no wheeze, crackles, rales      or rhonchi, symmetric air movement, respirations unlabored   Heart:    Regular rate and rhythm, S1 and S2 normal, no murmur, rub   or gallop   Abdomen:     Soft, non-tender, non-distended, bowel sounds active all four   quadrants, no RT or guarding, no masses, no organomegaly   Extremities:   Warm and well perfused, distal pulses intact, no cyanosis or    peripheral edema   MSK:   No joint or muscle swelling, tenderness or deformity   Skin:   Skin color, texture, turgor normal, no rashes or lesions   Neurologic/Psychiatric:   CNII-XII intact, normal strength and sensation       " throughout, no asterixis; normal affect, memory, judgement     and insight     Results:  Lab Results   Component Value Date     (L) 05/18/2024    K 5.2 (H) 05/18/2024     05/18/2024    CO2 23 05/18/2024    BUN 15 05/18/2024    CREATININE 1.3 05/18/2024    CALCIUM 7.9 (L) 05/18/2024    ANIONGAP 4 (L) 05/18/2024    ESTGFRAFRICA >60 12/18/2020    EGFRNONAA >60 12/18/2020       Lab Results   Component Value Date    CALCIUM 7.9 (L) 05/18/2024    PHOS 2.3 (L) 05/15/2024       Recent Labs   Lab 05/18/24  0526   WBC 5.72   RBC 3.69*   HGB 12.3   HCT 35.9*      MCV 97   MCH 33.3*   MCHC 34.3       I have personally reviewed pertinent radiological imaging and reports.    I have spent > 35 minutes providing care for this patient for the above diagnoses. These services have included chart/data/imaging review, evaluation, exam, formulation of plan, , note preparation, and discussions with staff involved in this patient's care.    Skylar Brambila MD MPH  [facility]  [address] [place] [id number]  [phone] (p)  [phone] (f)

## 2024-05-19 LAB
BACTERIA UR CULT: NO GROWTH
CYCLOSPORINE BLD LC/MS/MS-MCNC: 265 NG/ML (ref 100–400)

## 2024-05-20 LAB
BACTERIA BLD CULT: NORMAL
BACTERIA BLD CULT: NORMAL

## 2024-05-21 ENCOUNTER — PATIENT MESSAGE (OUTPATIENT)
Dept: HEMATOLOGY/ONCOLOGY | Facility: CLINIC | Age: 49
End: 2024-05-21
Payer: COMMERCIAL

## 2024-05-21 ENCOUNTER — LAB VISIT (OUTPATIENT)
Dept: LAB | Facility: HOSPITAL | Age: 49
End: 2024-05-21
Attending: INTERNAL MEDICINE
Payer: COMMERCIAL

## 2024-05-21 DIAGNOSIS — Z94.84 HISTORY OF ALLOGENEIC STEM CELL TRANSPLANT: ICD-10-CM

## 2024-05-21 LAB
ALBUMIN SERPL BCP-MCNC: 3.7 G/DL (ref 3.5–5.2)
ALP SERPL-CCNC: 221 U/L (ref 55–135)
ALT SERPL W/O P-5'-P-CCNC: 220 U/L (ref 10–44)
ANION GAP SERPL CALC-SCNC: 10 MMOL/L (ref 8–16)
AST SERPL-CCNC: 134 U/L (ref 10–40)
BASOPHILS # BLD AUTO: 0.01 K/UL (ref 0–0.2)
BASOPHILS NFR BLD: 0.2 % (ref 0–1.9)
BILIRUB SERPL-MCNC: 0.8 MG/DL (ref 0.1–1)
BUN SERPL-MCNC: 25 MG/DL (ref 6–20)
CALCIUM SERPL-MCNC: 9.1 MG/DL (ref 8.7–10.5)
CHLORIDE SERPL-SCNC: 108 MMOL/L (ref 95–110)
CO2 SERPL-SCNC: 22 MMOL/L (ref 23–29)
CREAT SERPL-MCNC: 1.6 MG/DL (ref 0.5–1.4)
DIFFERENTIAL METHOD BLD: ABNORMAL
EOSINOPHIL # BLD AUTO: 0.3 K/UL (ref 0–0.5)
EOSINOPHIL NFR BLD: 6.8 % (ref 0–8)
ERYTHROCYTE [DISTWIDTH] IN BLOOD BY AUTOMATED COUNT: 14.4 % (ref 11.5–14.5)
EST. GFR  (NO RACE VARIABLE): 39.3 ML/MIN/1.73 M^2
GLUCOSE SERPL-MCNC: 93 MG/DL (ref 70–110)
HCT VFR BLD AUTO: 38.4 % (ref 37–48.5)
HGB BLD-MCNC: 13.4 G/DL (ref 12–16)
IMM GRANULOCYTES # BLD AUTO: 0.02 K/UL (ref 0–0.04)
IMM GRANULOCYTES NFR BLD AUTO: 0.4 % (ref 0–0.5)
LYMPHOCYTES # BLD AUTO: 1.7 K/UL (ref 1–4.8)
LYMPHOCYTES NFR BLD: 35.9 % (ref 18–48)
MAGNESIUM SERPL-MCNC: 1.6 MG/DL (ref 1.6–2.6)
MCH RBC QN AUTO: 33.3 PG (ref 27–31)
MCHC RBC AUTO-ENTMCNC: 34.9 G/DL (ref 32–36)
MCV RBC AUTO: 95 FL (ref 82–98)
MONOCYTES # BLD AUTO: 0.6 K/UL (ref 0.3–1)
MONOCYTES NFR BLD: 13.7 % (ref 4–15)
NEUTROPHILS # BLD AUTO: 2 K/UL (ref 1.8–7.7)
NEUTROPHILS NFR BLD: 43 % (ref 38–73)
NRBC BLD-RTO: 0 /100 WBC
PHOSPHATE SERPL-MCNC: 3.6 MG/DL (ref 2.7–4.5)
PLATELET # BLD AUTO: 226 K/UL (ref 150–450)
PMV BLD AUTO: 9.7 FL (ref 9.2–12.9)
POTASSIUM SERPL-SCNC: 5.4 MMOL/L (ref 3.5–5.1)
PROT SERPL-MCNC: 6.5 G/DL (ref 6–8.4)
RBC # BLD AUTO: 4.03 M/UL (ref 4–5.4)
SODIUM SERPL-SCNC: 140 MMOL/L (ref 136–145)
WBC # BLD AUTO: 4.59 K/UL (ref 3.9–12.7)

## 2024-05-21 PROCEDURE — 80053 COMPREHEN METABOLIC PANEL: CPT | Mod: PN | Performed by: INTERNAL MEDICINE

## 2024-05-21 PROCEDURE — 85025 COMPLETE CBC W/AUTO DIFF WBC: CPT | Mod: PN | Performed by: INTERNAL MEDICINE

## 2024-05-21 PROCEDURE — 80158 DRUG ASSAY CYCLOSPORINE: CPT | Performed by: INTERNAL MEDICINE

## 2024-05-21 PROCEDURE — 84100 ASSAY OF PHOSPHORUS: CPT | Mod: PN | Performed by: INTERNAL MEDICINE

## 2024-05-21 PROCEDURE — 87799 DETECT AGENT NOS DNA QUANT: CPT | Performed by: INTERNAL MEDICINE

## 2024-05-21 PROCEDURE — 83735 ASSAY OF MAGNESIUM: CPT | Mod: PN | Performed by: INTERNAL MEDICINE

## 2024-05-22 LAB
CMV DNA SPEC QL NAA+PROBE: NORMAL
CYCLOSPORINE BLD LC/MS/MS-MCNC: 228 NG/ML (ref 100–400)
CYTOMEGALOVIRUS PCR, QUANT: NOT DETECTED IU/ML
EPSTEIN-BARR VIRUS DNA: NORMAL
EPSTEIN-BARR VIRUS PCR, QUANT: NOT DETECTED IU/ML

## 2024-05-23 ENCOUNTER — PATIENT MESSAGE (OUTPATIENT)
Dept: HEMATOLOGY/ONCOLOGY | Facility: CLINIC | Age: 49
End: 2024-05-23
Payer: COMMERCIAL

## 2024-05-23 ENCOUNTER — PATIENT OUTREACH (OUTPATIENT)
Dept: ADMINISTRATIVE | Facility: CLINIC | Age: 49
End: 2024-05-23
Payer: COMMERCIAL

## 2024-05-23 NOTE — PROGRESS NOTES
BMT Pharmacist Immunosuppression Note:    Reviewed patient's cyclosporine level with Dr. Franco and it is within goal range. Instructed patient to continue your current regimen of 150mg (one 100mg capsule and two 25mg capsules) in the morning and 150mg (one 100mg capsules and two 25mg capsules) in the evening.       Nel Luo, Pharm.D., BCOP  Clinical Pharmacy Specialist, Bone Marrow Transplant/Cellular Therapy  [facility]  SpectraLink: [phone]

## 2024-05-23 NOTE — PROGRESS NOTES
C3 nurse attempted to contact Thai Villalba  for a TCC post hospital discharge follow up call. No answer. The patient does not have a scheduled HOSFU appointment, and the pt does not have an [facility] PCP.

## 2024-05-24 ENCOUNTER — OFFICE VISIT (OUTPATIENT)
Dept: HEMATOLOGY/ONCOLOGY | Facility: CLINIC | Age: 49
End: 2024-05-24
Payer: COMMERCIAL

## 2024-05-24 VITALS
RESPIRATION RATE: 18 BRPM | SYSTOLIC BLOOD PRESSURE: 138 MMHG | HEART RATE: 66 BPM | OXYGEN SATURATION: 99 % | WEIGHT: 159.38 LBS | BODY MASS INDEX: 27.21 KG/M2 | HEIGHT: 64 IN | DIASTOLIC BLOOD PRESSURE: 87 MMHG | TEMPERATURE: 98 F

## 2024-05-24 DIAGNOSIS — Z94.84 HISTORY OF ALLOGENEIC STEM CELL TRANSPLANT: Primary | ICD-10-CM

## 2024-05-24 DIAGNOSIS — C92.01 ACUTE MYELOID LEUKEMIA IN REMISSION: ICD-10-CM

## 2024-05-24 DIAGNOSIS — R63.5 WEIGHT GAIN: ICD-10-CM

## 2024-05-24 PROCEDURE — 1159F MED LIST DOCD IN RCRD: CPT | Mod: CPTII,S$GLB,, | Performed by: INTERNAL MEDICINE

## 2024-05-24 PROCEDURE — 3075F SYST BP GE 130 - 139MM HG: CPT | Mod: CPTII,S$GLB,, | Performed by: INTERNAL MEDICINE

## 2024-05-24 PROCEDURE — 1111F DSCHRG MED/CURRENT MED MERGE: CPT | Mod: CPTII,S$GLB,, | Performed by: INTERNAL MEDICINE

## 2024-05-24 PROCEDURE — 99999 PR PBB SHADOW E&M-EST. PATIENT-LVL V: CPT | Mod: PBBFAC,,, | Performed by: INTERNAL MEDICINE

## 2024-05-24 PROCEDURE — 3079F DIAST BP 80-89 MM HG: CPT | Mod: CPTII,S$GLB,, | Performed by: INTERNAL MEDICINE

## 2024-05-24 PROCEDURE — 3008F BODY MASS INDEX DOCD: CPT | Mod: CPTII,S$GLB,, | Performed by: INTERNAL MEDICINE

## 2024-05-24 PROCEDURE — 1160F RVW MEDS BY RX/DR IN RCRD: CPT | Mod: CPTII,S$GLB,, | Performed by: INTERNAL MEDICINE

## 2024-05-24 PROCEDURE — 99215 OFFICE O/P EST HI 40 MIN: CPT | Mod: S$GLB,,, | Performed by: INTERNAL MEDICINE

## 2024-05-24 NOTE — PROGRESS NOTES
Route Chart for Scheduling    BMT Chart Routing      Follow up with physician 1 month. Patient prefers in person   Follow up with KENDALL    Provider visit type Malignant hem   Infusion scheduling note    Injection scheduling note    Labs    Imaging    Pharmacy appointment    Other referrals

## 2024-05-28 ENCOUNTER — PATIENT MESSAGE (OUTPATIENT)
Dept: HEMATOLOGY/ONCOLOGY | Facility: CLINIC | Age: 49
End: 2024-05-28
Payer: COMMERCIAL

## 2024-05-28 ENCOUNTER — LAB VISIT (OUTPATIENT)
Dept: LAB | Facility: HOSPITAL | Age: 49
End: 2024-05-28
Attending: INTERNAL MEDICINE
Payer: COMMERCIAL

## 2024-05-28 ENCOUNTER — TELEPHONE (OUTPATIENT)
Dept: HEMATOLOGY/ONCOLOGY | Facility: CLINIC | Age: 49
End: 2024-05-28
Payer: COMMERCIAL

## 2024-05-28 ENCOUNTER — HOSPITAL ENCOUNTER (EMERGENCY)
Facility: HOSPITAL | Age: 49
Discharge: HOME | End: 2024-05-28
Attending: EMERGENCY MEDICINE
Payer: COMMERCIAL

## 2024-05-28 VITALS
DIASTOLIC BLOOD PRESSURE: 98 MMHG | RESPIRATION RATE: 17 BRPM | SYSTOLIC BLOOD PRESSURE: 156 MMHG | BODY MASS INDEX: 27.31 KG/M2 | WEIGHT: 160 LBS | TEMPERATURE: 97 F | HEART RATE: 68 BPM | OXYGEN SATURATION: 99 % | HEIGHT: 64 IN

## 2024-05-28 DIAGNOSIS — Z94.84 HISTORY OF ALLOGENEIC STEM CELL TRANSPLANT: ICD-10-CM

## 2024-05-28 DIAGNOSIS — Z86.39 H/O HYPERKALEMIA: Primary | ICD-10-CM

## 2024-05-28 LAB
ALBUMIN SERPL BCP-MCNC: 3.7 G/DL (ref 3.5–5.2)
ALBUMIN SERPL BCP-MCNC: 4.1 G/DL (ref 3.5–5.2)
ALP SERPL-CCNC: 188 U/L (ref 55–135)
ALP SERPL-CCNC: 192 U/L (ref 55–135)
ALT SERPL W/O P-5'-P-CCNC: 140 U/L (ref 10–44)
ALT SERPL W/O P-5'-P-CCNC: 169 U/L (ref 10–44)
ANION GAP SERPL CALC-SCNC: 5 MMOL/L (ref 8–16)
ANION GAP SERPL CALC-SCNC: 8 MMOL/L (ref 8–16)
AST SERPL-CCNC: 78 U/L (ref 10–40)
AST SERPL-CCNC: 96 U/L (ref 10–40)
BASOPHILS # BLD AUTO: 0.01 K/UL (ref 0–0.2)
BASOPHILS # BLD AUTO: 0.01 K/UL (ref 0–0.2)
BASOPHILS NFR BLD: 0.2 % (ref 0–1.9)
BASOPHILS NFR BLD: 0.2 % (ref 0–1.9)
BILIRUB SERPL-MCNC: 0.7 MG/DL (ref 0.1–1)
BILIRUB SERPL-MCNC: 0.8 MG/DL (ref 0.1–1)
BILIRUB UR QL STRIP: NEGATIVE
BUN SERPL-MCNC: 23 MG/DL (ref 6–20)
BUN SERPL-MCNC: 26 MG/DL (ref 6–20)
CALCIUM SERPL-MCNC: 8.5 MG/DL (ref 8.7–10.5)
CALCIUM SERPL-MCNC: 9.2 MG/DL (ref 8.7–10.5)
CHLORIDE SERPL-SCNC: 101 MMOL/L (ref 95–110)
CHLORIDE SERPL-SCNC: 102 MMOL/L (ref 95–110)
CLARITY UR: CLEAR
CO2 SERPL-SCNC: 23 MMOL/L (ref 23–29)
CO2 SERPL-SCNC: 26 MMOL/L (ref 23–29)
COLOR UR: YELLOW
CREAT SERPL-MCNC: 1.5 MG/DL (ref 0.5–1.4)
CREAT SERPL-MCNC: 1.7 MG/DL (ref 0.5–1.4)
DIFFERENTIAL METHOD BLD: ABNORMAL
DIFFERENTIAL METHOD BLD: ABNORMAL
EOSINOPHIL # BLD AUTO: 0.2 K/UL (ref 0–0.5)
EOSINOPHIL # BLD AUTO: 0.3 K/UL (ref 0–0.5)
EOSINOPHIL NFR BLD: 3.9 % (ref 0–8)
EOSINOPHIL NFR BLD: 4.1 % (ref 0–8)
ERYTHROCYTE [DISTWIDTH] IN BLOOD BY AUTOMATED COUNT: 14.3 % (ref 11.5–14.5)
ERYTHROCYTE [DISTWIDTH] IN BLOOD BY AUTOMATED COUNT: 14.5 % (ref 11.5–14.5)
EST. GFR  (NO RACE VARIABLE): 36.5 ML/MIN/1.73 M^2
EST. GFR  (NO RACE VARIABLE): 42.5 ML/MIN/1.73 M^2
GLUCOSE SERPL-MCNC: 78 MG/DL (ref 70–110)
GLUCOSE SERPL-MCNC: 95 MG/DL (ref 70–110)
GLUCOSE SERPL-MCNC: 96 MG/DL (ref 70–110)
GLUCOSE UR QL STRIP: NEGATIVE
HCT VFR BLD AUTO: 36.8 % (ref 37–48.5)
HCT VFR BLD AUTO: 37.1 % (ref 37–48.5)
HGB BLD-MCNC: 12.7 G/DL (ref 12–16)
HGB BLD-MCNC: 13.2 G/DL (ref 12–16)
HGB UR QL STRIP: NEGATIVE
IMM GRANULOCYTES # BLD AUTO: 0.02 K/UL (ref 0–0.04)
IMM GRANULOCYTES # BLD AUTO: 0.03 K/UL (ref 0–0.04)
IMM GRANULOCYTES NFR BLD AUTO: 0.4 % (ref 0–0.5)
IMM GRANULOCYTES NFR BLD AUTO: 0.5 % (ref 0–0.5)
KETONES UR QL STRIP: NEGATIVE
LEUKOCYTE ESTERASE UR QL STRIP: NEGATIVE
LYMPHOCYTES # BLD AUTO: 1.1 K/UL (ref 1–4.8)
LYMPHOCYTES # BLD AUTO: 1.8 K/UL (ref 1–4.8)
LYMPHOCYTES NFR BLD: 18.7 % (ref 18–48)
LYMPHOCYTES NFR BLD: 26.7 % (ref 18–48)
MAGNESIUM SERPL-MCNC: 2 MG/DL (ref 1.6–2.6)
MAGNESIUM SERPL-MCNC: 2.1 MG/DL (ref 1.6–2.6)
MCH RBC QN AUTO: 34 PG (ref 27–31)
MCH RBC QN AUTO: 34.1 PG (ref 27–31)
MCHC RBC AUTO-ENTMCNC: 34.5 G/DL (ref 32–36)
MCHC RBC AUTO-ENTMCNC: 35.6 G/DL (ref 32–36)
MCV RBC AUTO: 96 FL (ref 82–98)
MCV RBC AUTO: 99 FL (ref 82–98)
MONOCYTES # BLD AUTO: 0.7 K/UL (ref 0.3–1)
MONOCYTES # BLD AUTO: 1 K/UL (ref 0.3–1)
MONOCYTES NFR BLD: 13 % (ref 4–15)
MONOCYTES NFR BLD: 14.9 % (ref 4–15)
NEUTROPHILS # BLD AUTO: 3.5 K/UL (ref 1.8–7.7)
NEUTROPHILS # BLD AUTO: 3.6 K/UL (ref 1.8–7.7)
NEUTROPHILS NFR BLD: 53.6 % (ref 38–73)
NEUTROPHILS NFR BLD: 63.8 % (ref 38–73)
NITRITE UR QL STRIP: NEGATIVE
NRBC BLD-RTO: 0 /100 WBC
NRBC BLD-RTO: 0 /100 WBC
PH UR STRIP: 7 [PH] (ref 5–8)
PHOSPHATE SERPL-MCNC: 2.6 MG/DL (ref 2.7–4.5)
PHOSPHATE SERPL-MCNC: 3.3 MG/DL (ref 2.7–4.5)
PLATELET # BLD AUTO: 193 K/UL (ref 150–450)
PLATELET # BLD AUTO: 204 K/UL (ref 150–450)
PMV BLD AUTO: 10.6 FL (ref 9.2–12.9)
PMV BLD AUTO: 10.6 FL (ref 9.2–12.9)
POTASSIUM SERPL-SCNC: 5 MMOL/L (ref 3.5–5.1)
POTASSIUM SERPL-SCNC: 6.1 MMOL/L (ref 3.5–5.1)
PROT SERPL-MCNC: 6.4 G/DL (ref 6–8.4)
PROT SERPL-MCNC: 6.9 G/DL (ref 6–8.4)
PROT UR QL STRIP: NEGATIVE
RBC # BLD AUTO: 3.73 M/UL (ref 4–5.4)
RBC # BLD AUTO: 3.87 M/UL (ref 4–5.4)
SODIUM SERPL-SCNC: 132 MMOL/L (ref 136–145)
SODIUM SERPL-SCNC: 133 MMOL/L (ref 136–145)
SP GR UR STRIP: 1.01 (ref 1–1.03)
URN SPEC COLLECT METH UR: NORMAL
UROBILINOGEN UR STRIP-ACNC: NEGATIVE EU/DL
WBC # BLD AUTO: 5.68 K/UL (ref 3.9–12.7)
WBC # BLD AUTO: 6.56 K/UL (ref 3.9–12.7)

## 2024-05-28 PROCEDURE — 83735 ASSAY OF MAGNESIUM: CPT | Mod: PN | Performed by: INTERNAL MEDICINE

## 2024-05-28 PROCEDURE — 80053 COMPREHEN METABOLIC PANEL: CPT | Mod: PN | Performed by: INTERNAL MEDICINE

## 2024-05-28 PROCEDURE — 80053 COMPREHEN METABOLIC PANEL: CPT | Mod: 91 | Performed by: EMERGENCY MEDICINE

## 2024-05-28 PROCEDURE — 81003 URINALYSIS AUTO W/O SCOPE: CPT | Performed by: NURSE PRACTITIONER

## 2024-05-28 PROCEDURE — 36415 COLL VENOUS BLD VENIPUNCTURE: CPT | Mod: PN | Performed by: INTERNAL MEDICINE

## 2024-05-28 PROCEDURE — 87799 DETECT AGENT NOS DNA QUANT: CPT | Performed by: INTERNAL MEDICINE

## 2024-05-28 PROCEDURE — 84100 ASSAY OF PHOSPHORUS: CPT | Mod: 91 | Performed by: EMERGENCY MEDICINE

## 2024-05-28 PROCEDURE — 84100 ASSAY OF PHOSPHORUS: CPT | Mod: PN | Performed by: INTERNAL MEDICINE

## 2024-05-28 PROCEDURE — 82962 GLUCOSE BLOOD TEST: CPT

## 2024-05-28 PROCEDURE — 85025 COMPLETE CBC W/AUTO DIFF WBC: CPT | Mod: PN | Performed by: INTERNAL MEDICINE

## 2024-05-28 PROCEDURE — 83735 ASSAY OF MAGNESIUM: CPT | Mod: 91 | Performed by: EMERGENCY MEDICINE

## 2024-05-28 PROCEDURE — 99284 EMERGENCY DEPT VISIT MOD MDM: CPT

## 2024-05-28 PROCEDURE — 80158 DRUG ASSAY CYCLOSPORINE: CPT | Performed by: INTERNAL MEDICINE

## 2024-05-28 PROCEDURE — 85025 COMPLETE CBC W/AUTO DIFF WBC: CPT | Mod: 91 | Performed by: NURSE PRACTITIONER

## 2024-05-28 NOTE — TELEPHONE ENCOUNTER
Advised pt to be evaluated by ED for potassium level of 6.1 per Dr. Franco. Pt verbalized understanding and will update clinic.

## 2024-05-28 NOTE — PROGRESS NOTES
HEMATOLOGIC MALIGNANCIES PROGRESS NOTE    IDENTIFYING STATEMENT   Thai Villalba (Thai) is a 48 y.o. female with a  of 1975 from [place], [place] with the diagnosis of acute myeloie leukemia.      ONCOLOGY HISTORY:    1. 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 2023: Presented to hospital in [place] with 2 week h/o fevers, night sweats, weight loss, lymphadenopathy, myalgias, stomatitis, bruising, sore throat and found to have WBC 333K with 80% blasts c/f AML vs CML in acute blast crisis               B. 2023: Bone marrow biopsy - AML, FLT3 positive.               C. 2023: Began 7+3+midostaurin induction. Subsequent D14 and D28 BMBxs both showed no evidence of persistent leukemia, in CR1               D. 2023: BMBx showed recurrent AML with flow showing 23% myeloblasts.               E. 2023: Began azacitidine-venetoclax-gilteritinib (off protocol)              F. 2023: Bone marrow biopsy showed CR2              G. 2023: Dual cord blood allogeneic stem cell transplant with Flu/Cy/TT/TBI (4 Gy) conditioning. GVHD ppx with cyclosporine/MMF.               H. 10/19/2023: Bone marrow biopsy on day +28 - IRENE by path and hematologics flow.               I. 2023: Day +75 bone marrow biopsy - MRD-neg by flow, NPM1 ddPCR, FLT3 pcr. Full donor chimerisms   J. 3/27/2024: Day +180 bone marrow biopsy - no definitive morphologic or immunophenotypic evidence of residual AML. No pathogenic mutations detected. VUS MPL      2. Attention deficit disorder  3. Uterine leiomyoma    INTERVAL HISTORY:      Ms. Vlilalba is seen in follow-up of AML with history of dual cord blood allogeneic stem cell transplant. Today is day +246 after alloSCT. She is feeling generally well today. No mouth sores. No rash at this time. No diarrhea.     She had been hospitalized after last visit and was found to have hyponatremia with findings concerning for  SIADH. She was treated with fluid restriction and improved. More recently, her creatinine worsened, so we have liberalized her fluid restriction. She still feels well.     Past Medical History, Past Social History and Past Family History have been reviewed and are unchanged except as noted in the interval history.    MEDICATIONS:     Current Outpatient Medications on File Prior to Visit   Medication Sig Dispense Refill    acyclovir (ZOVIRAX) 200 MG capsule Take 4 capsules twice a day for prophylaxis 240 capsule 9    cholecalciferol, vitamin D3, 10 mcg (400 unit) Chew Take 1,000 Int'l Units by mouth once daily.      cycloSPORINE modified, NEORAL, (NEORAL) 100 MG capsule Take 1 capsule (100mg) in addition to two 50mg capsules (to equal 150mg/dose) twice a day. (Patient taking differently: Take 100 mg by mouth 2 (two) times daily. Take 1 capsule (100mg) in addition to two 50mg capsules (to equal 150mg/dose) twice a day.) 60 capsule 11    cycloSPORINE modified, NEORAL, (NEORAL) 25 MG capsule Take 2 capsules (50mg) in addition to one 100mg capsule (to equal 150mg/dose) twice a day. (Patient taking differently: Take 50 mg by mouth 2 (two) times daily. Take 2 capsules (50mg) in addition to one 100mg capsule (to equal 150mg/dose) twice a day.) 240 capsule 10    estrogens,esterified,-methyltestosterone 1.25-2.5mg (EEMT) per tablet Take 1 tablet by mouth once daily. 90 tablet 1    gabapentin (NEURONTIN) 300 MG capsule Take 1 capsule by mouth three times a day 90 capsule 3    gabapentin (NEURONTIN) 300 MG capsule Take 1 capsule by mouth three times a day 90 capsule 5    gilteritinib 40 mg Tab Take 2 tablets (80 mg) by mouth Daily. 60 tablet 11    isavuconazonium sulfate (CRESEMBA) 186 mg Cap Take 2 capsules by mouth three times a day for the first 2 days, then 2 capsules once a day thereafter 70 capsule 5    magnesium oxide (MAG-OX) 400 mg (241.3 mg magnesium) tablet Take 400 mg by mouth 2 (two) times daily.      [START ON  6/19/2024] oxyCODONE (ROXICODONE) 10 mg Tab immediate release tablet 1 tablet by mouth three times a day as needed for pain 90 tablet 0    oxyCODONE (ROXICODONE) 10 mg Tab immediate release tablet 1 tablet by mouth three times a day as needed for pain 90 tablet 0    pantoprazole (PROTONIX) 40 MG tablet Take 40 mg by mouth every morning.      sodium zirconium cyclosilicate (LOKELMA) 5 gram packet Take 1 packet (5 g total) by mouth once daily. Mix entire contents of packet(s) into drinking glass containing 3 tablespoons of water; stir well and drink immediately. Add water and repeat until no powder remains to receive entire dose. 30 packet 1    sulfamethoxazole-trimethoprim 800-160mg (BACTRIM DS) 800-160 mg Tab Take 1 tablet twice a day for  2 days a week for pneumocyctis jiroveci pneumonia prevention 12 tablet 15    tacrolimus (PROTOPIC) 0.1 % ointment Apply topically 2 times daily for Atopic Dermatitis, can apply to lips or inside mouth if needed. (Patient taking differently: Apply 1 application  topically 2 (two) times daily.) 100 g 1    triamcinolone acetonide 0.1% (KENALOG) 0.1 % cream Apply topically 2 (two) times daily. To areas of rash/dry skin. (Patient taking differently: Apply 1 g topically 2 (two) times daily. To areas of rash/dry skin.) 80 g 2    dexAMETHasone 0.5 mg/5 mL Soln solution Take 10 mLs (1 mg total) by mouth every 12 (twelve) hours. Swish and spit. Do not swallow. (Patient not taking: Reported on 5/15/2024) 500 mL 1    duke's soln (benadryl 30 mL, mylanta 30 mL, LIDOcaine 30 mL, nystatin 30 mL) 120mL Take 10 mLs by mouth 4 (four) times daily. (Patient not taking: Reported on 5/15/2024) 120 mL 0     No current facility-administered medications on file prior to visit.       ALLERGIES:   Review of patient's allergies indicates:   Allergen Reactions    Promethazine Nausea And Vomiting     Other Reaction(s): VOMITING    Penicillin Hives    Penicillins      Other reaction(s): Unknown    Melatonin  "Anxiety     Other Reaction(s): FLUSHING        ROS:       Review of Systems   Constitutional:  Negative for appetite change, diaphoresis, fatigue, fever and unexpected weight change.   HENT:   Negative for lump/mass and sore throat.    Eyes:  Negative for icterus.   Respiratory:  Negative for cough and shortness of breath.    Cardiovascular:  Negative for chest pain and palpitations.   Gastrointestinal:  Negative for abdominal distention, constipation, diarrhea, nausea and vomiting.   Genitourinary:  Negative for dysuria and frequency.    Musculoskeletal:  Negative for arthralgias, gait problem and myalgias.   Skin:  Negative for rash.   Neurological:  Negative for dizziness, gait problem and headaches.   Hematological:  Negative for adenopathy. Does not bruise/bleed easily.   Psychiatric/Behavioral:  The patient is not nervous/anxious.        PHYSICAL EXAM:  Vitals:    05/24/24 0904   BP: 138/87   Pulse: 66   Resp: 18   Temp: 98 °F (36.7 °C)   TempSrc: Oral   SpO2: 99%   Weight: 72.3 kg (159 lb 6.3 oz)   Height: 5' 4" (1.626 m)   PainSc:   4   PainLoc: Back       Physical Exam  Constitutional:       General: She is not in acute distress.     Appearance: She is well-developed.   HENT:      Head: Normocephalic and atraumatic.      Mouth/Throat:      Mouth: No oral lesions.   Eyes:      Conjunctiva/sclera: Conjunctivae normal.   Neck:      Thyroid: No thyromegaly.   Cardiovascular:      Rate and Rhythm: Normal rate and regular rhythm.      Heart sounds: Normal heart sounds. No murmur heard.  Pulmonary:      Breath sounds: Normal breath sounds. No wheezing or rales.   Abdominal:      General: There is no distension.      Palpations: Abdomen is soft. There is no hepatomegaly, splenomegaly or mass.      Tenderness: There is no abdominal tenderness.   Lymphadenopathy:      Cervical: No cervical adenopathy.      Right cervical: No deep cervical adenopathy.     Left cervical: No deep cervical adenopathy.   Skin:     " Findings: No rash.   Neurological:      Mental Status: She is alert and oriented to person, place, and time.      Cranial Nerves: No cranial nerve deficit.      Coordination: Coordination normal.      Deep Tendon Reflexes: Reflexes are normal and symmetric.         LAB:   Results for orders placed or performed in visit on 05/21/24   CBC Auto Differential   Result Value Ref Range    WBC 4.59 3.90 - 12.70 K/uL    RBC 4.03 4.00 - 5.40 M/uL    Hemoglobin 13.4 12.0 - 16.0 g/dL    Hematocrit 38.4 37.0 - 48.5 %    MCV 95 82 - 98 fL    MCH 33.3 (H) 27.0 - 31.0 pg    MCHC 34.9 32.0 - 36.0 g/dL    RDW 14.4 11.5 - 14.5 %    Platelets 226 150 - 450 K/uL    MPV 9.7 9.2 - 12.9 fL    Immature Granulocytes 0.4 0.0 - 0.5 %    Gran # (ANC) 2.0 1.8 - 7.7 K/uL    Immature Grans (Abs) 0.02 0.00 - 0.04 K/uL    Lymph # 1.7 1.0 - 4.8 K/uL    Mono # 0.6 0.3 - 1.0 K/uL    Eos # 0.3 0.0 - 0.5 K/uL    Baso # 0.01 0.00 - 0.20 K/uL    nRBC 0 0 /100 WBC    Gran % 43.0 38.0 - 73.0 %    Lymph % 35.9 18.0 - 48.0 %    Mono % 13.7 4.0 - 15.0 %    Eosinophil % 6.8 0.0 - 8.0 %    Basophil % 0.2 0.0 - 1.9 %    Differential Method Automated    Comprehensive Metabolic Panel   Result Value Ref Range    Sodium 140 136 - 145 mmol/L    Potassium 5.4 (H) 3.5 - 5.1 mmol/L    Chloride 108 95 - 110 mmol/L    CO2 22 (L) 23 - 29 mmol/L    Glucose 93 70 - 110 mg/dL    BUN 25 (H) 6 - 20 mg/dL    Creatinine 1.6 (H) 0.5 - 1.4 mg/dL    Calcium 9.1 8.7 - 10.5 mg/dL    Total Protein 6.5 6.0 - 8.4 g/dL    Albumin 3.7 3.5 - 5.2 g/dL    Total Bilirubin 0.8 0.1 - 1.0 mg/dL    Alkaline Phosphatase 221 (H) 55 - 135 U/L     (H) 10 - 40 U/L     (H) 10 - 44 U/L    eGFR 39.3 (A) >60 mL/min/1.73 m^2    Anion Gap 10 8 - 16 mmol/L   Magnesium   Result Value Ref Range    Magnesium 1.6 1.6 - 2.6 mg/dL   PHOSPHORUS   Result Value Ref Range    Phosphorus 3.6 2.7 - 4.5 mg/dL   CMV DNA, QUANTITATIVE, PCR   Result Value Ref Range    Cytomegalovirus PCR, Quant Not Detected <50  IU/mL    Cytomegalovirus DNA CMV DNA not detected Not Detected   EMERITA-BALDWIN VIRUS DNA, QUANTITATIVE (PCR)   Result Value Ref Range    Emerita-Barr Virus PCR, Quant Not Detected <12 IU/mL    Emerita-Barr Virus DNA EBV DNA not detected Not Detected   CYCLOSPORINE LEVEL   Result Value Ref Range    Cyclosporine, LC/ 100 - 400 ng/mL       PROBLEMS ASSESSED THIS VISIT:    1. History of allogeneic stem cell transplant    2. Weight gain    3. Acute myeloid leukemia in remission        PLAN:       Acute myeloid leukemia  Diagnosed with FLT3 mutated AML. She achieved CR1 with 7+3+midostaurin induction but relapsed prior to allogeneic stem cell transplant. She achieved CR2 with aza/ameya/gilteritinib. Ms. Villalba received dual cord blood allogeneic stem cell transplant and has maintained CR on follow-up bone marrow biopsy.   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   - we discussed consideration of antifungal ppx as per our institutional standard - continue isavuconazonium sulfate  - Prior/resolved infections:              - 9/29/2023: C. Diff - treated with fidaxomicin              - 10/7/2023: Suspected fungal infection based on abnormal CT, treated with isavuconazonium sulfate              - 11/6/2023: UTI - treated with levofloxacin              - 11/8/2023: BK virus              - 11/13/2023: Pneumonia diagnosed on CXR - treated with cefpodoxime     Cytopenias  - none current  - Continue to monitor with routine labs monitoring     Chronic pain  - Neuropathic pain secondary to lumbar radiculopathy               - gabapentin 300 mg PO TID              - Oxycodone 10-15 mg q4prn    Weight gain  - reports she is gaining weight - referred to dietician     Hormone replacement therapy  - on oral estrogen     Follow-up  Weekly labs monitoring  12 month bone marrow to be done at [facility]  Follow up in 1 month         Shemar Franco MD  Hematology and Stem Cell Transplant

## 2024-05-28 NOTE — FIRST PROVIDER EVALUATION
Emergency Department TeleTriage Encounter Note      CHIEF COMPLAINT    Chief Complaint   Patient presents with    abnormal labs       VITAL SIGNS   Initial Vitals [05/28/24 1644]   BP Pulse Resp Temp SpO2   (!) 156/98 68 17 97.4 °F (36.3 °C) 99 %      MAP       --            ALLERGIES    Review of patient's allergies indicates:   Allergen Reactions    Promethazine Nausea And Vomiting     Other Reaction(s): VOMITING    Penicillin Hives    Penicillins      Other reaction(s): Unknown    Melatonin Anxiety     Other Reaction(s): FLUSHING       PROVIDER TRIAGE NOTE  Verbal consent for the teletriage evaluation was given by the patient at the start of the evaluation.  All efforts will be made to maintain patient's privacy during the evaluation.      This is a teletriage evaluation of a 48 y.o. female presenting to the ED with c/o elevated potassium with routine labs. Limited physical exam via telehealth: The patient is awake, alert, answering questions appropriately and is not in respiratory distress.  As the Teletriage provider, I performed an initial assessment and ordered appropriate labs and imaging studies, if any, to facilitate the patient's care once placed in the ED. Once a room is available, care and a full evaluation will be completed by an alternate ED provider.  Any additional orders and the final disposition will be determined by that provider.  All imaging and labs will not be followed-up by the Teletriage Team, including myself.        ORDERS  Labs Reviewed - No data to display    ED Orders (720h ago, onward)      Start Ordered     Status Ordering Provider    05/28/24 1654 05/28/24 1653  CBC auto differential  STAT         Ordered RADHA ARENAS    05/28/24 1654 05/28/24 1653  Comprehensive metabolic panel  STAT         Ordered RADHA ARENAS    05/28/24 1654 05/28/24 1653  POCT glucose  Once         Ordered RADHA ARENAS    05/28/24 1654 05/28/24 1653  Urinalysis, Reflex to Urine Culture Urine, Clean Catch   STAT         Ordered RADHA ARENAS    05/28/24 1653 05/28/24 1653  Pulse Oximetry Continuous  Continuous         Ordered RADHA ARENAS    05/28/24 1653 05/28/24 1653  Cardiac Monitoring - Adult  Continuous        Comments: Notify Physician If:    Ordered RADHA ARENAS    05/28/24 1653 05/28/24 1653  EKG 12-lead  Once         Ordered RADHA ARENAS            Virtual Visit Note: The provider triage portion of this emergency department evaluation and documentation was performed via [employer], a HIPAA-compliant telemedicine application, in concert with a tele-presenter in the room. A face to face patient evaluation with one of my colleagues will occur once the patient is placed in an emergency department room.      DISCLAIMER: This note was prepared with [employer] voice recognition transcription software. Garbled syntax, mangled pronouns, and other bizarre constructions may be attributed to that software system.

## 2024-05-29 LAB
CMV DNA SPEC QL NAA+PROBE: NORMAL
CYCLOSPORINE BLD LC/MS/MS-MCNC: 196 NG/ML (ref 100–400)
CYTOMEGALOVIRUS PCR, QUANT: NOT DETECTED IU/ML
EPSTEIN-BARR VIRUS DNA: NORMAL
EPSTEIN-BARR VIRUS PCR, QUANT: NOT DETECTED IU/ML

## 2024-05-29 NOTE — ED PROVIDER NOTES
Encounter Date: 5/28/2024       History     Chief Complaint   Patient presents with    abnormal labs     48-year-old female with history of leukemia status post bone marrow transplant in September 2023 presents to the ER with reported having abnormal labs.  Reportedly patient's potassium was elevated at 6.1.  She was contacted by her provider was instructed to come to the ER for further evaluation.  The patient states she otherwise feels well.  She has no abdominal pain.  She has no vomiting.  She has been urinating well and has been staying well hydrated.  She has no other new complaints.    The history is provided by the patient. No  was used.     Review of patient's allergies indicates:   Allergen Reactions    Promethazine Nausea And Vomiting     Other Reaction(s): VOMITING    Penicillin Hives    Penicillins      Other reaction(s): Unknown    Melatonin Anxiety     Other Reaction(s): FLUSHING     Past Medical History:   Diagnosis Date    ADHD (attention deficit hyperactivity disorder)     Anxiety     General anesthetics causing adverse effect in therapeutic use     REFLUX WITH SURGERY AND ASPIRATED, ZOFRAN GIVEN IV     Past Surgical History:   Procedure Laterality Date    BONE MARROW BIOPSY Left 3/27/2024    Procedure: Biopsy-bone marrow;  Surgeon: Shemar Franco MD;  Location: [address]);  Service: Oncology;  Laterality: Left;  3/21-LVM for precall-KPvt  3/25-precall complete-Kpvt    BREAST RECONSTRUCTION Bilateral 2007    BREAST SURGERY      CHOLECYSTECTOMY      DIAGNOSTIC LAPAROSCOPY N/A 12/23/2020    Procedure: LAPAROSCOPY, DIAGNOSTIC;  Surgeon: Ed Troy MD;  Location: [facility];  Service: OB/GYN;  Laterality: N/A;    DILATION AND CURETTAGE OF UTERUS      EVACUATION OF HEMATOMA Left 12/23/2020    Procedure: EVACUATION, HEMATOMA;  Surgeon: Ed Troy MD;  Location: [facility] OR;  Service: OB/GYN;  Laterality: Left;    HYSTERECTOMY  12/2020    partial     LAPAROSCOPY-ASSISTED VAGINAL HYSTERECTOMY N/A 2020    Procedure: HYSTERECTOMY, VAGINAL, LAPAROSCOPY-ASSISTED;  Surgeon: Ed Troy MD;  Location: [facility];  Service: OB/GYN;  Laterality: N/A;    LEFT KNEE      LEFT MENISCUS REPAIR    TONSILLECTOMY       Family History   Problem Relation Name Age of Onset    Cancer Mother          Breast    Breast cancer Mother          age unknown    Cancer Maternal Grandmother          Breast    Breast cancer Maternal Grandmother          70s and 80s    COPD Maternal Grandfather      Heart disease Paternal Grandfather       Social History     Tobacco Use    Smoking status: Former     Current packs/day: 0.00     Types: Cigarettes     Quit date: 2011     Years since quittin.9    Smokeless tobacco: Never   Substance Use Topics    Alcohol use: Yes     Alcohol/week: 0.0 - 0.8 standard drinks of alcohol     Comment: socially    Drug use: No     Review of Systems   Constitutional:  Negative for fatigue and fever.   HENT:  Negative for facial swelling.    Eyes:  Negative for photophobia and visual disturbance.   Respiratory:  Negative for cough, shortness of breath and wheezing.    Cardiovascular:  Negative for chest pain and palpitations.   Gastrointestinal:  Negative for abdominal pain, blood in stool and vomiting.   Genitourinary:  Negative for difficulty urinating and hematuria.   Musculoskeletal:  Negative for neck pain.   Neurological:  Negative for dizziness, seizures, weakness and headaches.   Psychiatric/Behavioral:  Negative for confusion and hallucinations.    All other systems reviewed and are negative.      Physical Exam     Initial Vitals [24 1644]   BP Pulse Resp Temp SpO2   (!) 156/98 68 17 97.4 °F (36.3 °C) 99 %      MAP       --         Physical Exam    Constitutional: She appears well-developed and well-nourished.   HENT:   Head: Normocephalic and atraumatic.   Eyes: Conjunctivae and EOM are normal. Pupils are equal, round, and reactive to  light.   Neck: Neck supple.   Normal range of motion.  Cardiovascular:  Normal rate, regular rhythm, normal heart sounds and intact distal pulses.           Pulmonary/Chest: Breath sounds normal.   Abdominal: Abdomen is soft. Bowel sounds are normal. She exhibits no distension. There is no abdominal tenderness. There is no rebound and no guarding.   Musculoskeletal:         General: Normal range of motion.      Cervical back: Normal range of motion and neck supple.     Neurological: She is alert and oriented to person, place, and time.   Skin: Skin is warm and dry.   Psychiatric: She has a normal mood and affect.         ED Course   Procedures  Labs Reviewed   CBC W/ AUTO DIFFERENTIAL - Abnormal; Notable for the following components:       Result Value    RBC 3.73 (*)     Hematocrit 36.8 (*)     MCV 99 (*)     MCH 34.0 (*)     All other components within normal limits   PHOSPHORUS - Abnormal; Notable for the following components:    Phosphorus 2.6 (*)     All other components within normal limits   COMPREHENSIVE METABOLIC PANEL - Abnormal; Notable for the following components:    Sodium 132 (*)     BUN 23 (*)     Creatinine 1.5 (*)     Calcium 8.5 (*)     Alkaline Phosphatase 188 (*)     AST 78 (*)      (*)     eGFR 42.5 (*)     Anion Gap 5 (*)     All other components within normal limits   URINALYSIS, REFLEX TO URINE CULTURE    Narrative:     Specimen Source->Urine   MAGNESIUM   POCT GLUCOSE   POCT GLUCOSE MONITORING CONTINUOUS          Imaging Results    None          Medications - No data to display  Medical Decision Making  Amount and/or Complexity of Data Reviewed  Labs: ordered.     Details: CBC has normal white count.  CMP is creatinine is 1.6 which is at baseline.  Sodium is 132.  Her potassium is 5.0.  There is mild elevation them Lasix which she is consistent with previous labs.    Differential diagnosis includes laboratory, renal failure, other electrolyte disturbances, dehydration,              Patient is feeling well.  She has no new complaints.  She is in the ER for elevated potassium.  On repeat of her blood chemistry her potassium is 5.0.  Remainder of baseline is very near baseline.  She has close follow up with her transplant provider.  She was encouraged to stay well hydrated.  She is otherwise encouraged to return turned to the ER for any concerns                   Clinical Impression:  Concern for hyperkalemia              Demetrius Zayas MD  05/28/24 1945

## 2024-05-31 ENCOUNTER — TELEPHONE (OUTPATIENT)
Dept: HEPATOLOGY | Facility: CLINIC | Age: 49
End: 2024-05-31
Payer: COMMERCIAL

## 2024-05-31 NOTE — TELEPHONE ENCOUNTER
"----- Message from Fabricio Wilde sent at 5/31/2024 11:08 AM CDT -----  Consult/Advisory    Name Of Caller: Self    Contact Preference?: [phone]    What is the nature of the call?: Returning call to Pia    Additional Notes:  "Thank you for all that you do for our patients"  "

## 2024-05-31 NOTE — TELEPHONE ENCOUNTER
Returned call to the patient.  Reschedule appt to 6/3 with Dr. Freire.  Patient  confirmed and agreed with the appt.  Reminder letter mailed.

## 2024-05-31 NOTE — TELEPHONE ENCOUNTER
Called the patient to reschedule appt to sooner.  No answer, left message with call back # [phone].

## 2024-05-31 NOTE — TELEPHONE ENCOUNTER
----- Message from Keri Pizano RN sent at 5/31/2024 10:34 AM CDT -----  Regarding: FW: Needs sooner appointment with a Hepatologist  Can you please schedule patient with Dr. Freire Virtual Monday 6/3 or Dr. Almeida, I can flip [place] slots.    Thanks  ----- Message -----  From: Irene Reeves NP  Sent: 5/24/2024  10:16 AM CDT  To: Keri Pizano RN; Shemar Franco MD  Subject: Needs sooner appointment with a Hepatologist     Good Morning Dr. Franco - Thanks for reaching out. It sounds as if she would be better served by seeing a Hepatologist.     Good Morning Cookie - Do you mind rescheduling the patient with a Hepatologist? Thanks!  ----- Message -----  From: Shemar Franco MD  Sent: 5/24/2024   9:42 AM CDT  To: NAHID Dos Santos,    This is a patient whom I follow for history of allogeneic stem cell tranpslant (had dual cord blood transplant at [facility]). She has persistently elevated transaminases, and I would like hepatology eval to help me determine if this is GVHD or an alternative etiology.     She is scheduled to see you on 8/5. Is there any way she can be seen sooner? I would hate to be undertreating GVHD.     Thanks,    Shemar

## 2024-06-03 ENCOUNTER — PATIENT MESSAGE (OUTPATIENT)
Dept: HEMATOLOGY/ONCOLOGY | Facility: CLINIC | Age: 49
End: 2024-06-03
Payer: COMMERCIAL

## 2024-06-03 ENCOUNTER — TELEPHONE (OUTPATIENT)
Dept: HEMATOLOGY/ONCOLOGY | Facility: CLINIC | Age: 49
End: 2024-06-03
Payer: COMMERCIAL

## 2024-06-03 ENCOUNTER — OFFICE VISIT (OUTPATIENT)
Dept: HEPATOLOGY | Facility: CLINIC | Age: 49
End: 2024-06-03
Payer: COMMERCIAL

## 2024-06-03 DIAGNOSIS — R17 JAUNDICE: ICD-10-CM

## 2024-06-03 DIAGNOSIS — R79.89 ELEVATED LFTS: Primary | ICD-10-CM

## 2024-06-03 PROCEDURE — 1160F RVW MEDS BY RX/DR IN RCRD: CPT | Mod: CPTII,95,, | Performed by: STUDENT IN AN ORGANIZED HEALTH CARE EDUCATION/TRAINING PROGRAM

## 2024-06-03 PROCEDURE — 99204 OFFICE O/P NEW MOD 45 MIN: CPT | Mod: 95,,, | Performed by: STUDENT IN AN ORGANIZED HEALTH CARE EDUCATION/TRAINING PROGRAM

## 2024-06-03 PROCEDURE — 1111F DSCHRG MED/CURRENT MED MERGE: CPT | Mod: CPTII,95,, | Performed by: STUDENT IN AN ORGANIZED HEALTH CARE EDUCATION/TRAINING PROGRAM

## 2024-06-03 PROCEDURE — 1159F MED LIST DOCD IN RCRD: CPT | Mod: CPTII,95,, | Performed by: STUDENT IN AN ORGANIZED HEALTH CARE EDUCATION/TRAINING PROGRAM

## 2024-06-03 NOTE — PROGRESS NOTES
Hepatology Consult Note    The patient location is: [place]  The chief complaint leading to consultation is: elevated LFTs    Visit type: audiovisual    Face to Face time with patient: 20  45 minutes of total time spent on the encounter, which includes face to face time and non-face to face time preparing to see the patient (eg, review of tests), Obtaining and/or reviewing separately obtained history, Documenting clinical information in the electronic or other health record, Independently interpreting results (not separately reported) and communicating results to the patient/family/caregiver, or Care coordination (not separately reported).     Each patient to whom he or she provides medical services by telemedicine is:  (1) informed of the relationship between the physician and patient and the respective role of any other health care provider with respect to management of the patient; and (2) notified that he or she may decline to receive medical services by telemedicine and may withdraw from such care at any time.    Referring provider: Dr. Shemar Franco  PCP: No, Primary Doctor    Chief complaint:  Elevated LFTs    HPI:  Thai Villalba is a 48 y.o. female who was referred to Hepatology Clinic for elevated LFTs.    She has history of acute myeloid leukemia treated with chemotherapy starting in May 2023.  She was found to have recurrent AML in August 2023 treated with chemotherapy and allogeneic stem cell transplant.  In January 2024 she was noted to have elevated transaminases.  In February 2024, her transaminases abruptly increased further, and her alkaline phosphatase also became elevated.  She reports being told that her LFTs were elevated prior to stem cell transplant which was attributed to Cresemba.  LFTs reportedly normalized following discontinuation.  She was restarted on Cresemba in January 2024.  She has otherwise not started any new medications in the last several months. Of note, she has been  treated for GVHD.    She does not drink alcohol and has never been a heavy drinker.  No known family history of liver disease.  No known family history of liver disease. She denies signs of decompensated liver disease including no recent abdominal distension, encephalopathy, jaundice, GI bleeding.    Past Medical History:   Diagnosis Date    ADHD (attention deficit hyperactivity disorder)     Anxiety     General anesthetics causing adverse effect in therapeutic use     REFLUX WITH SURGERY AND ASPIRATED, ZOFRAN GIVEN IV       Past Surgical History:   Procedure Laterality Date    BONE MARROW BIOPSY Left 3/27/2024    Procedure: Biopsy-bone marrow;  Surgeon: Shemar Franco MD;  Location: [facility] ([address] [place] [address]);  Service: Oncology;  Laterality: Left;  3/21-LVM for precall-KPvt  3/25-precall complete-Kpvt    BREAST RECONSTRUCTION Bilateral 2007    BREAST SURGERY      CHOLECYSTECTOMY      DIAGNOSTIC LAPAROSCOPY N/A 12/23/2020    Procedure: LAPAROSCOPY, DIAGNOSTIC;  Surgeon: Ed Troy MD;  Location: [facility];  Service: OB/GYN;  Laterality: N/A;    DILATION AND CURETTAGE OF UTERUS      EVACUATION OF HEMATOMA Left 12/23/2020    Procedure: EVACUATION, HEMATOMA;  Surgeon: Ed Troy MD;  Location: [facility] OR;  Service: OB/GYN;  Laterality: Left;    HYSTERECTOMY  12/2020    partial    LAPAROSCOPY-ASSISTED VAGINAL HYSTERECTOMY N/A 12/22/2020    Procedure: HYSTERECTOMY, VAGINAL, LAPAROSCOPY-ASSISTED;  Surgeon: Ed Troy MD;  Location: [facility];  Service: OB/GYN;  Laterality: N/A;    LEFT KNEE      LEFT MENISCUS REPAIR    TONSILLECTOMY         Family History   Problem Relation Name Age of Onset    Cancer Mother          Breast    Breast cancer Mother          age unknown    Cancer Maternal Grandmother          Breast    Breast cancer Maternal Grandmother          70s and 80s    COPD Maternal Grandfather      Heart disease Paternal Grandfather         Social History     Tobacco Use    Smoking status: Former      Current packs/day: 0.00     Types: Cigarettes     Quit date: 2011     Years since quittin.9    Smokeless tobacco: Never   Substance Use Topics    Alcohol use: Yes     Alcohol/week: 0.0 - 0.8 standard drinks of alcohol     Comment: socially    Drug use: No       Current Outpatient Medications   Medication Sig Dispense Refill    acyclovir (ZOVIRAX) 200 MG capsule Take 4 capsules twice a day for prophylaxis 240 capsule 9    cholecalciferol, vitamin D3, 10 mcg (400 unit) Chew Take 1,000 Int'l Units by mouth once daily.      cycloSPORINE modified, NEORAL, (NEORAL) 100 MG capsule Take 1 capsule (100mg) in addition to two 50mg capsules (to equal 150mg/dose) twice a day. (Patient taking differently: Take 100 mg by mouth 2 (two) times daily. Take 1 capsule (100mg) in addition to two 50mg capsules (to equal 150mg/dose) twice a day.) 60 capsule 11    cycloSPORINE modified, NEORAL, (NEORAL) 25 MG capsule Take 2 capsules (50mg) in addition to one 100mg capsule (to equal 150mg/dose) twice a day. (Patient taking differently: Take 50 mg by mouth 2 (two) times daily. Take 2 capsules (50mg) in addition to one 100mg capsule (to equal 150mg/dose) twice a day.) 240 capsule 10    dexAMETHasone 0.5 mg/5 mL Soln solution Take 10 mLs (1 mg total) by mouth every 12 (twelve) hours. Swish and spit. Do not swallow. (Patient not taking: Reported on 5/15/2024) 500 mL 1    duke's soln (benadryl 30 mL, mylanta 30 mL, LIDOcaine 30 mL, nystatin 30 mL) 120mL Take 10 mLs by mouth 4 (four) times daily. (Patient not taking: Reported on 5/15/2024) 120 mL 0    estrogens,esterified,-methyltestosterone 1.25-2.5mg (EEMT) per tablet Take 1 tablet by mouth once daily. 90 tablet 1    gabapentin (NEURONTIN) 300 MG capsule Take 1 capsule by mouth three times a day 90 capsule 3    gabapentin (NEURONTIN) 300 MG capsule Take 1 capsule by mouth three times a day 90 capsule 5    gilteritinib 40 mg Tab Take 2 tablets (80 mg) by mouth Daily. 60 tablet 11     isavuconazonium sulfate (CRESEMBA) 186 mg Cap Take 2 capsules by mouth three times a day for the first 2 days, then 2 capsules once a day thereafter 70 capsule 5    magnesium oxide (MAG-OX) 400 mg (241.3 mg magnesium) tablet Take 400 mg by mouth 2 (two) times daily.      [START ON 6/19/2024] oxyCODONE (ROXICODONE) 10 mg Tab immediate release tablet 1 tablet by mouth three times a day as needed for pain 90 tablet 0    oxyCODONE (ROXICODONE) 10 mg Tab immediate release tablet 1 tablet by mouth three times a day as needed for pain 90 tablet 0    pantoprazole (PROTONIX) 40 MG tablet Take 40 mg by mouth every morning.      sodium zirconium cyclosilicate (LOKELMA) 5 gram packet Take 1 packet (5 g total) by mouth once daily. Mix entire contents of packet(s) into drinking glass containing 3 tablespoons of water; stir well and drink immediately. Add water and repeat until no powder remains to receive entire dose. 30 packet 1    sulfamethoxazole-trimethoprim 800-160mg (BACTRIM DS) 800-160 mg Tab Take 1 tablet twice a day for  2 days a week for pneumocyctis jiroveci pneumonia prevention 12 tablet 15    tacrolimus (PROTOPIC) 0.1 % ointment Apply topically 2 times daily for Atopic Dermatitis, can apply to lips or inside mouth if needed. (Patient taking differently: Apply 1 application  topically 2 (two) times daily.) 100 g 1    triamcinolone acetonide 0.1% (KENALOG) 0.1 % cream Apply topically 2 (two) times daily. To areas of rash/dry skin. (Patient taking differently: Apply 1 g topically 2 (two) times daily. To areas of rash/dry skin.) 80 g 2     No current facility-administered medications for this visit.       Review of patient's allergies indicates:   Allergen Reactions    Promethazine Nausea And Vomiting     Other Reaction(s): VOMITING    Penicillin Hives    Penicillins      Other reaction(s): Unknown    Melatonin Anxiety     Other Reaction(s): FLUSHING       Review of Systems   Constitutional:  Negative for fever and  "weight loss.   Cardiovascular:  Negative for leg swelling.   Gastrointestinal:  Negative for abdominal pain, blood in stool, constipation, diarrhea, heartburn, melena, nausea and vomiting.       There were no vitals filed for this visit.    Physical Exam  Constitutional:       General: She is not in acute distress.     Appearance: She is not ill-appearing.   HENT:      Head: Normocephalic and atraumatic.   Eyes:      General: No scleral icterus.     Extraocular Movements: Extraocular movements intact.   Pulmonary:      Effort: No respiratory distress.   Skin:     Coloration: Skin is not jaundiced.   Neurological:      Mental Status: She is alert.         LABS: I personally reviewed pertinent laboratory findings.    Lab Results   Component Value Date    WBC 6.56 05/28/2024    HGB 12.7 05/28/2024    HCT 36.8 (L) 05/28/2024    MCV 99 (H) 05/28/2024     05/28/2024       Lab Results   Component Value Date     (L) 05/28/2024    K 5.0 05/28/2024     05/28/2024    CO2 26 05/28/2024    BUN 23 (H) 05/28/2024    CREATININE 1.5 (H) 05/28/2024    CALCIUM 8.5 (L) 05/28/2024    ANIONGAP 5 (L) 05/28/2024    ESTGFRAFRICA >60 12/18/2020    EGFRNONAA >60 12/18/2020       Lab Results   Component Value Date     (H) 05/28/2024    AST 78 (H) 05/28/2024    ALKPHOS 188 (H) 05/28/2024    BILITOT 0.8 05/28/2024       No results found for: "HAV", "HEPAIGM", "HEPBIGM", "HEPBCAB", "HBEAG", "HEPCAB"    No results found for: "JO ANN", "MITOAB", "SMOOTHMUSCAB", "IGG", "CERULOPLSM"     Computed MELD 3.0 unavailable. One or more values for this score either were not found within the given timeframe or did not fit some other criterion.  Computed MELD-Na unavailable. One or more values for this score either were not found within the given timeframe or did not fit some other criterion.       IMAGING: I personally reviewed imaging studies.      Assessment:  48 y.o. female who was referred to Hepatology Clinic for elevated " "LFTs.    She has history of acute myeloid leukemia treated with chemotherapy starting in May 2023.  She was found to have recurrent AML in August 2023 treated with chemotherapy and allogeneic stem cell transplant.  In January 2024 she was noted to have elevated transaminases.  In February 2024 her transaminases abruptly increased further, and her alkaline phosphatase also became elevated.  She was started on Cresemba in January 2024. Reports prior history of elevated LFTs attributed to Cresemba. She has otherwise not started any new medications in the last several months. Of note, she has been treated for GVHD.    1. Elevated LFTs      Recommendations:  - Would limit potentially hepatotoxic medications as able. Per [employer]: "Transient elevations in serum aminotransferase levels occur in 1% to 5% of patients on isavuconazonium. These elevations are usually asymptomatic and self-limited, but occasional patients require discontinuation of isavuconazonium because of ALT elevations."  - Serologic evaluation to help determine etiology of elevated LFTs  - MRI with/without contrast + MRCP to evaluate liver, hepatic vasculature, and biliary tract  - If above unrevealing and LFTs remain elevated, will obtain liver biopsy especially given history of AML in GVHD    LFTs every 2 weeks for now. Return to clinic in 3 months or sooner if needed.    I have sent communication to the referring physician and/or primary care provider.    This note includes dictation done using [employer]ference[employer] speech recognition program. Word recognition mistakes are occasionally missed on review.    Armando Freire MD  Staff Physician  Hepatology and Liver Transplant  [facility]  [facility]    "

## 2024-06-04 ENCOUNTER — LAB VISIT (OUTPATIENT)
Dept: LAB | Facility: HOSPITAL | Age: 49
End: 2024-06-04
Attending: INTERNAL MEDICINE
Payer: COMMERCIAL

## 2024-06-04 ENCOUNTER — PATIENT MESSAGE (OUTPATIENT)
Dept: HEMATOLOGY/ONCOLOGY | Facility: CLINIC | Age: 49
End: 2024-06-04
Payer: COMMERCIAL

## 2024-06-04 DIAGNOSIS — R79.89 ELEVATED LFTS: ICD-10-CM

## 2024-06-04 DIAGNOSIS — Z94.84 HISTORY OF ALLOGENEIC STEM CELL TRANSPLANT: ICD-10-CM

## 2024-06-04 LAB
A1AT SERPL-MCNC: 167 MG/DL (ref 100–190)
ALBUMIN SERPL BCP-MCNC: 3.4 G/DL (ref 3.5–5.2)
ALBUMIN SERPL BCP-MCNC: 3.5 G/DL (ref 3.5–5.2)
ALP SERPL-CCNC: 198 U/L (ref 55–135)
ALP SERPL-CCNC: 198 U/L (ref 55–135)
ALT SERPL W/O P-5'-P-CCNC: 212 U/L (ref 10–44)
ALT SERPL W/O P-5'-P-CCNC: 212 U/L (ref 10–44)
ANION GAP SERPL CALC-SCNC: 12 MMOL/L (ref 8–16)
ANION GAP SERPL CALC-SCNC: 13 MMOL/L (ref 8–16)
AST SERPL-CCNC: 84 U/L (ref 10–40)
AST SERPL-CCNC: 86 U/L (ref 10–40)
BASOPHILS # BLD AUTO: 0.01 K/UL (ref 0–0.2)
BASOPHILS # BLD AUTO: 0.01 K/UL (ref 0–0.2)
BASOPHILS NFR BLD: 0.2 % (ref 0–1.9)
BASOPHILS NFR BLD: 0.2 % (ref 0–1.9)
BILIRUB SERPL-MCNC: 0.7 MG/DL (ref 0.1–1)
BILIRUB SERPL-MCNC: 0.7 MG/DL (ref 0.1–1)
BUN SERPL-MCNC: 21 MG/DL (ref 6–20)
BUN SERPL-MCNC: 22 MG/DL (ref 6–20)
CALCIUM SERPL-MCNC: 9.2 MG/DL (ref 8.7–10.5)
CALCIUM SERPL-MCNC: 9.2 MG/DL (ref 8.7–10.5)
CERULOPLASMIN SERPL-MCNC: 27 MG/DL (ref 15–45)
CHLORIDE SERPL-SCNC: 102 MMOL/L (ref 95–110)
CHLORIDE SERPL-SCNC: 102 MMOL/L (ref 95–110)
CO2 SERPL-SCNC: 18 MMOL/L (ref 23–29)
CO2 SERPL-SCNC: 18 MMOL/L (ref 23–29)
CREAT SERPL-MCNC: 1.7 MG/DL (ref 0.5–1.4)
CREAT SERPL-MCNC: 1.7 MG/DL (ref 0.5–1.4)
DIFFERENTIAL METHOD BLD: ABNORMAL
DIFFERENTIAL METHOD BLD: ABNORMAL
EOSINOPHIL # BLD AUTO: 0.2 K/UL (ref 0–0.5)
EOSINOPHIL # BLD AUTO: 0.2 K/UL (ref 0–0.5)
EOSINOPHIL NFR BLD: 3.8 % (ref 0–8)
EOSINOPHIL NFR BLD: 3.8 % (ref 0–8)
ERYTHROCYTE [DISTWIDTH] IN BLOOD BY AUTOMATED COUNT: 14.2 % (ref 11.5–14.5)
ERYTHROCYTE [DISTWIDTH] IN BLOOD BY AUTOMATED COUNT: 14.2 % (ref 11.5–14.5)
EST. GFR  (NO RACE VARIABLE): 36.5 ML/MIN/1.73 M^2
EST. GFR  (NO RACE VARIABLE): 36.5 ML/MIN/1.73 M^2
GLUCOSE SERPL-MCNC: 97 MG/DL (ref 70–110)
GLUCOSE SERPL-MCNC: 97 MG/DL (ref 70–110)
HAV IGG SER QL IA: NORMAL
HAV IGM SERPL QL IA: NORMAL
HBV CORE AB SERPL QL IA: NORMAL
HBV CORE IGM SERPL QL IA: NORMAL
HBV SURFACE AG SERPL QL IA: NORMAL
HCT VFR BLD AUTO: 35.4 % (ref 37–48.5)
HCT VFR BLD AUTO: 35.4 % (ref 37–48.5)
HCV AB SERPL QL IA: NORMAL
HGB BLD-MCNC: 13 G/DL (ref 12–16)
HGB BLD-MCNC: 13 G/DL (ref 12–16)
IGG SERPL-MCNC: 524 MG/DL (ref 650–1600)
IMM GRANULOCYTES # BLD AUTO: 0.06 K/UL (ref 0–0.04)
IMM GRANULOCYTES # BLD AUTO: 0.06 K/UL (ref 0–0.04)
IMM GRANULOCYTES NFR BLD AUTO: 1.2 % (ref 0–0.5)
IMM GRANULOCYTES NFR BLD AUTO: 1.2 % (ref 0–0.5)
INR PPP: 0.9 (ref 0.8–1.2)
LYMPHOCYTES # BLD AUTO: 1.5 K/UL (ref 1–4.8)
LYMPHOCYTES # BLD AUTO: 1.5 K/UL (ref 1–4.8)
LYMPHOCYTES NFR BLD: 30.6 % (ref 18–48)
LYMPHOCYTES NFR BLD: 30.6 % (ref 18–48)
MAGNESIUM SERPL-MCNC: 1.6 MG/DL (ref 1.6–2.6)
MCH RBC QN AUTO: 34.1 PG (ref 27–31)
MCH RBC QN AUTO: 34.1 PG (ref 27–31)
MCHC RBC AUTO-ENTMCNC: 36.7 G/DL (ref 32–36)
MCHC RBC AUTO-ENTMCNC: 36.7 G/DL (ref 32–36)
MCV RBC AUTO: 93 FL (ref 82–98)
MCV RBC AUTO: 93 FL (ref 82–98)
MONOCYTES # BLD AUTO: 0.8 K/UL (ref 0.3–1)
MONOCYTES # BLD AUTO: 0.8 K/UL (ref 0.3–1)
MONOCYTES NFR BLD: 15.7 % (ref 4–15)
MONOCYTES NFR BLD: 15.7 % (ref 4–15)
NEUTROPHILS # BLD AUTO: 2.4 K/UL (ref 1.8–7.7)
NEUTROPHILS # BLD AUTO: 2.4 K/UL (ref 1.8–7.7)
NEUTROPHILS NFR BLD: 48.5 % (ref 38–73)
NEUTROPHILS NFR BLD: 48.5 % (ref 38–73)
NRBC BLD-RTO: 0 /100 WBC
NRBC BLD-RTO: 0 /100 WBC
PHOSPHATE SERPL-MCNC: 3.2 MG/DL (ref 2.7–4.5)
PLATELET # BLD AUTO: 234 K/UL (ref 150–450)
PLATELET # BLD AUTO: 234 K/UL (ref 150–450)
PMV BLD AUTO: 10.3 FL (ref 9.2–12.9)
PMV BLD AUTO: 10.3 FL (ref 9.2–12.9)
POTASSIUM SERPL-SCNC: 4.4 MMOL/L (ref 3.5–5.1)
POTASSIUM SERPL-SCNC: 4.5 MMOL/L (ref 3.5–5.1)
PROT SERPL-MCNC: 6.5 G/DL (ref 6–8.4)
PROT SERPL-MCNC: 6.5 G/DL (ref 6–8.4)
PROTHROMBIN TIME: 9.9 SEC (ref 9–12.5)
RBC # BLD AUTO: 3.81 M/UL (ref 4–5.4)
RBC # BLD AUTO: 3.81 M/UL (ref 4–5.4)
SODIUM SERPL-SCNC: 132 MMOL/L (ref 136–145)
SODIUM SERPL-SCNC: 133 MMOL/L (ref 136–145)
WBC # BLD AUTO: 4.96 K/UL (ref 3.9–12.7)
WBC # BLD AUTO: 4.96 K/UL (ref 3.9–12.7)

## 2024-06-04 PROCEDURE — 86258 DGP ANTIBODY EACH IG CLASS: CPT | Mod: 59 | Performed by: STUDENT IN AN ORGANIZED HEALTH CARE EDUCATION/TRAINING PROGRAM

## 2024-06-04 PROCEDURE — 82103 ALPHA-1-ANTITRYPSIN TOTAL: CPT | Performed by: STUDENT IN AN ORGANIZED HEALTH CARE EDUCATION/TRAINING PROGRAM

## 2024-06-04 PROCEDURE — 80321 ALCOHOLS BIOMARKERS 1OR 2: CPT | Performed by: STUDENT IN AN ORGANIZED HEALTH CARE EDUCATION/TRAINING PROGRAM

## 2024-06-04 PROCEDURE — 82390 ASSAY OF CERULOPLASMIN: CPT | Performed by: STUDENT IN AN ORGANIZED HEALTH CARE EDUCATION/TRAINING PROGRAM

## 2024-06-04 PROCEDURE — 85610 PROTHROMBIN TIME: CPT | Performed by: STUDENT IN AN ORGANIZED HEALTH CARE EDUCATION/TRAINING PROGRAM

## 2024-06-04 PROCEDURE — 83735 ASSAY OF MAGNESIUM: CPT | Mod: PN | Performed by: INTERNAL MEDICINE

## 2024-06-04 PROCEDURE — 80074 ACUTE HEPATITIS PANEL: CPT | Performed by: STUDENT IN AN ORGANIZED HEALTH CARE EDUCATION/TRAINING PROGRAM

## 2024-06-04 PROCEDURE — 85025 COMPLETE CBC W/AUTO DIFF WBC: CPT | Mod: PN | Performed by: INTERNAL MEDICINE

## 2024-06-04 PROCEDURE — 80053 COMPREHEN METABOLIC PANEL: CPT | Mod: PN | Performed by: INTERNAL MEDICINE

## 2024-06-04 PROCEDURE — 86704 HEP B CORE ANTIBODY TOTAL: CPT | Performed by: STUDENT IN AN ORGANIZED HEALTH CARE EDUCATION/TRAINING PROGRAM

## 2024-06-04 PROCEDURE — 80158 DRUG ASSAY CYCLOSPORINE: CPT | Performed by: INTERNAL MEDICINE

## 2024-06-04 PROCEDURE — 82784 ASSAY IGA/IGD/IGG/IGM EACH: CPT | Performed by: STUDENT IN AN ORGANIZED HEALTH CARE EDUCATION/TRAINING PROGRAM

## 2024-06-04 PROCEDURE — 86706 HEP B SURFACE ANTIBODY: CPT | Performed by: STUDENT IN AN ORGANIZED HEALTH CARE EDUCATION/TRAINING PROGRAM

## 2024-06-04 PROCEDURE — 87799 DETECT AGENT NOS DNA QUANT: CPT | Mod: 59 | Performed by: INTERNAL MEDICINE

## 2024-06-04 PROCEDURE — 86015 ACTIN ANTIBODY EACH: CPT | Performed by: STUDENT IN AN ORGANIZED HEALTH CARE EDUCATION/TRAINING PROGRAM

## 2024-06-04 PROCEDURE — 86038 ANTINUCLEAR ANTIBODIES: CPT | Performed by: STUDENT IN AN ORGANIZED HEALTH CARE EDUCATION/TRAINING PROGRAM

## 2024-06-04 PROCEDURE — 80053 COMPREHEN METABOLIC PANEL: CPT | Mod: 91,PN | Performed by: STUDENT IN AN ORGANIZED HEALTH CARE EDUCATION/TRAINING PROGRAM

## 2024-06-04 PROCEDURE — 82784 ASSAY IGA/IGD/IGG/IGM EACH: CPT | Mod: 59 | Performed by: STUDENT IN AN ORGANIZED HEALTH CARE EDUCATION/TRAINING PROGRAM

## 2024-06-04 PROCEDURE — 81256 HFE GENE: CPT | Performed by: STUDENT IN AN ORGANIZED HEALTH CARE EDUCATION/TRAINING PROGRAM

## 2024-06-04 PROCEDURE — 84100 ASSAY OF PHOSPHORUS: CPT | Mod: PN | Performed by: INTERNAL MEDICINE

## 2024-06-04 PROCEDURE — 86790 VIRUS ANTIBODY NOS: CPT | Performed by: STUDENT IN AN ORGANIZED HEALTH CARE EDUCATION/TRAINING PROGRAM

## 2024-06-04 PROCEDURE — 86381 MITOCHONDRIAL ANTIBODY EACH: CPT | Performed by: STUDENT IN AN ORGANIZED HEALTH CARE EDUCATION/TRAINING PROGRAM

## 2024-06-05 LAB
ANA SER QL IF: NORMAL
CMV DNA SPEC QL NAA+PROBE: NORMAL
CYCLOSPORINE BLD LC/MS/MS-MCNC: 275 NG/ML (ref 100–400)
CYTOMEGALOVIRUS PCR, QUANT: NOT DETECTED IU/ML
EPSTEIN-BARR VIRUS DNA: NORMAL
EPSTEIN-BARR VIRUS PCR, QUANT: NOT DETECTED IU/ML

## 2024-06-06 LAB
MITOCHONDRIA AB TITR SER IF: NORMAL {TITER}
SMOOTH MUSCLE AB TITR SER IF: NORMAL {TITER}

## 2024-06-07 LAB
CLINICAL BIOCHEMIST REVIEW: NORMAL
GENETICIST REVIEW: NORMAL
GLIADIN PEPTIDE IGA SER-ACNC: 0.4 U/ML
GLIADIN PEPTIDE IGG SER-ACNC: <0.6 U/ML
HBV SURFACE AB SER QL IA: NEGATIVE
HBV SURFACE AB SERPL IA-ACNC: 7 MIU/ML
HFE GENE MUT ANL BLD/T: NORMAL
HFE RELEASED BY: NORMAL
HFE RESULT SUMMARY: NORMAL
IGA SERPL-MCNC: 69 MG/DL (ref 70–400)
PLPETH BLD-MCNC: <10 NG/ML
POPETH BLD-MCNC: <10 NG/ML
REF LAB TEST METHOD: NORMAL
SPECIMEN SOURCE: NORMAL
SPECIMEN,  HEMOCHROMATOSIS: NORMAL
TTG IGA SER-ACNC: <0.2 U/ML
TTG IGG SER-ACNC: <0.6 U/ML

## 2024-06-08 LAB
OHS QRS DURATION: 70 MS
OHS QTC CALCULATION: 407 MS

## 2024-06-10 ENCOUNTER — TELEPHONE (OUTPATIENT)
Dept: HEMATOLOGY/ONCOLOGY | Facility: CLINIC | Age: 49
End: 2024-06-10
Payer: COMMERCIAL

## 2024-06-10 DIAGNOSIS — Z94.84 HISTORY OF ALLOGENEIC STEM CELL TRANSPLANT: Primary | ICD-10-CM

## 2024-06-10 NOTE — TELEPHONE ENCOUNTER
"----- Message from Lulu Mcguire sent at 6/10/2024  2:26 PM CDT -----  Regarding: Consult/Advisory          Name Of Caller: Self     Contact Preference?:  [phone]      Provider Name:   Salvador     Does patient feel the need to be seen today? No     What is the nature of the call?:   Pt was hospitalized three weeks ago for a low sodium level and a blown vein occurred. She states now the same vein is changing color and is very painful. Pt states the pain was at a 7/8 out of 10 last night. Please call back to assist.         Additional Notes:  "Thank you for all that you do for our patients  "

## 2024-06-10 NOTE — TELEPHONE ENCOUNTER
Advised pt receive u/s due to reported symptoms per DON Ibanez. Informed pt appointment was scheduled for 6/11 at 11:00 AM. Pt verbalized understanding and agreeable to appointment.

## 2024-06-11 ENCOUNTER — TELEPHONE (OUTPATIENT)
Dept: HEPATOLOGY | Facility: CLINIC | Age: 49
End: 2024-06-11
Payer: COMMERCIAL

## 2024-06-11 ENCOUNTER — HOSPITAL ENCOUNTER (OUTPATIENT)
Dept: RADIOLOGY | Facility: HOSPITAL | Age: 49
Discharge: HOME | End: 2024-06-11
Attending: INTERNAL MEDICINE
Payer: COMMERCIAL

## 2024-06-11 ENCOUNTER — LAB VISIT (OUTPATIENT)
Dept: LAB | Facility: HOSPITAL | Age: 49
End: 2024-06-11
Attending: INTERNAL MEDICINE
Payer: COMMERCIAL

## 2024-06-11 ENCOUNTER — TELEPHONE (OUTPATIENT)
Dept: HEMATOLOGY/ONCOLOGY | Facility: CLINIC | Age: 49
End: 2024-06-11
Payer: COMMERCIAL

## 2024-06-11 DIAGNOSIS — Z94.84 HISTORY OF ALLOGENEIC STEM CELL TRANSPLANT: ICD-10-CM

## 2024-06-11 LAB
ALBUMIN SERPL BCP-MCNC: 3.5 G/DL (ref 3.5–5.2)
ALP SERPL-CCNC: 181 U/L (ref 55–135)
ALT SERPL W/O P-5'-P-CCNC: 138 U/L (ref 10–44)
ANION GAP SERPL CALC-SCNC: 11 MMOL/L (ref 8–16)
AST SERPL-CCNC: 59 U/L (ref 10–40)
BASOPHILS # BLD AUTO: 0.01 K/UL (ref 0–0.2)
BASOPHILS NFR BLD: 0.3 % (ref 0–1.9)
BILIRUB SERPL-MCNC: 0.6 MG/DL (ref 0.1–1)
BUN SERPL-MCNC: 20 MG/DL (ref 6–20)
CALCIUM SERPL-MCNC: 9.3 MG/DL (ref 8.7–10.5)
CHLORIDE SERPL-SCNC: 104 MMOL/L (ref 95–110)
CO2 SERPL-SCNC: 19 MMOL/L (ref 23–29)
CREAT SERPL-MCNC: 1.8 MG/DL (ref 0.5–1.4)
DIFFERENTIAL METHOD BLD: ABNORMAL
EOSINOPHIL # BLD AUTO: 0.2 K/UL (ref 0–0.5)
EOSINOPHIL NFR BLD: 4.3 % (ref 0–8)
ERYTHROCYTE [DISTWIDTH] IN BLOOD BY AUTOMATED COUNT: 14.3 % (ref 11.5–14.5)
EST. GFR  (NO RACE VARIABLE): 33.9 ML/MIN/1.73 M^2
GLUCOSE SERPL-MCNC: 104 MG/DL (ref 70–110)
HCT VFR BLD AUTO: 35.8 % (ref 37–48.5)
HGB BLD-MCNC: 12.9 G/DL (ref 12–16)
IMM GRANULOCYTES # BLD AUTO: 0.02 K/UL (ref 0–0.04)
IMM GRANULOCYTES NFR BLD AUTO: 0.5 % (ref 0–0.5)
LYMPHOCYTES # BLD AUTO: 1.1 K/UL (ref 1–4.8)
LYMPHOCYTES NFR BLD: 28 % (ref 18–48)
MAGNESIUM SERPL-MCNC: 1.8 MG/DL (ref 1.6–2.6)
MCH RBC QN AUTO: 34 PG (ref 27–31)
MCHC RBC AUTO-ENTMCNC: 36 G/DL (ref 32–36)
MCV RBC AUTO: 95 FL (ref 82–98)
MONOCYTES # BLD AUTO: 0.7 K/UL (ref 0.3–1)
MONOCYTES NFR BLD: 16.4 % (ref 4–15)
NEUTROPHILS # BLD AUTO: 2 K/UL (ref 1.8–7.7)
NEUTROPHILS NFR BLD: 50.5 % (ref 38–73)
NRBC BLD-RTO: 0 /100 WBC
PHOSPHATE SERPL-MCNC: 2.7 MG/DL (ref 2.7–4.5)
PLATELET # BLD AUTO: 238 K/UL (ref 150–450)
PMV BLD AUTO: 10.6 FL (ref 9.2–12.9)
POTASSIUM SERPL-SCNC: 5 MMOL/L (ref 3.5–5.1)
PROT SERPL-MCNC: 6.3 G/DL (ref 6–8.4)
RBC # BLD AUTO: 3.79 M/UL (ref 4–5.4)
SODIUM SERPL-SCNC: 134 MMOL/L (ref 136–145)
WBC # BLD AUTO: 3.96 K/UL (ref 3.9–12.7)

## 2024-06-11 PROCEDURE — 87799 DETECT AGENT NOS DNA QUANT: CPT | Performed by: INTERNAL MEDICINE

## 2024-06-11 PROCEDURE — 80158 DRUG ASSAY CYCLOSPORINE: CPT | Performed by: INTERNAL MEDICINE

## 2024-06-11 PROCEDURE — 80053 COMPREHEN METABOLIC PANEL: CPT | Mod: PN | Performed by: INTERNAL MEDICINE

## 2024-06-11 PROCEDURE — 93971 EXTREMITY STUDY: CPT | Mod: TC,PO,RT

## 2024-06-11 PROCEDURE — 83735 ASSAY OF MAGNESIUM: CPT | Mod: PN | Performed by: INTERNAL MEDICINE

## 2024-06-11 PROCEDURE — 84100 ASSAY OF PHOSPHORUS: CPT | Mod: PN | Performed by: INTERNAL MEDICINE

## 2024-06-11 PROCEDURE — 36415 COLL VENOUS BLD VENIPUNCTURE: CPT | Mod: PN | Performed by: INTERNAL MEDICINE

## 2024-06-11 PROCEDURE — 85025 COMPLETE CBC W/AUTO DIFF WBC: CPT | Mod: PN | Performed by: INTERNAL MEDICINE

## 2024-06-11 PROCEDURE — 93971 EXTREMITY STUDY: CPT | Mod: 26,RT,, | Performed by: RADIOLOGY

## 2024-06-11 NOTE — TELEPHONE ENCOUNTER
Reached out to pt in regards to scheduling. Pt did not answer, left vm for pt to give the office a call back.  Scheduled appts and sending pt portal message     ----- Message from Armando Freire MD sent at 6/9/2024  4:37 PM CDT -----  MRI 1-2 weeks. Labs every 2 weeks. Return 3 months.

## 2024-06-12 LAB
CMV DNA SPEC QL NAA+PROBE: NORMAL
CYCLOSPORINE BLD LC/MS/MS-MCNC: 216 NG/ML (ref 100–400)
CYTOMEGALOVIRUS PCR, QUANT: NOT DETECTED IU/ML
EPSTEIN-BARR VIRUS DNA: NORMAL
EPSTEIN-BARR VIRUS PCR, QUANT: NOT DETECTED IU/ML

## 2024-06-18 ENCOUNTER — LAB VISIT (OUTPATIENT)
Dept: LAB | Facility: HOSPITAL | Age: 49
End: 2024-06-18
Attending: INTERNAL MEDICINE
Payer: COMMERCIAL

## 2024-06-18 DIAGNOSIS — Z94.84 HISTORY OF ALLOGENEIC STEM CELL TRANSPLANT: ICD-10-CM

## 2024-06-18 DIAGNOSIS — R79.89 ELEVATED LFTS: ICD-10-CM

## 2024-06-18 LAB
ALBUMIN SERPL BCP-MCNC: 3.4 G/DL (ref 3.5–5.2)
ALBUMIN SERPL BCP-MCNC: 3.4 G/DL (ref 3.5–5.2)
ALP SERPL-CCNC: 184 U/L (ref 55–135)
ALP SERPL-CCNC: 184 U/L (ref 55–135)
ALT SERPL W/O P-5'-P-CCNC: 165 U/L (ref 10–44)
ALT SERPL W/O P-5'-P-CCNC: 165 U/L (ref 10–44)
ANION GAP SERPL CALC-SCNC: 10 MMOL/L (ref 8–16)
ANION GAP SERPL CALC-SCNC: 10 MMOL/L (ref 8–16)
AST SERPL-CCNC: 105 U/L (ref 10–40)
AST SERPL-CCNC: 105 U/L (ref 10–40)
BASOPHILS # BLD AUTO: 0.01 K/UL (ref 0–0.2)
BASOPHILS NFR BLD: 0.3 % (ref 0–1.9)
BILIRUB SERPL-MCNC: 0.7 MG/DL (ref 0.1–1)
BILIRUB SERPL-MCNC: 0.7 MG/DL (ref 0.1–1)
BUN SERPL-MCNC: 25 MG/DL (ref 6–20)
BUN SERPL-MCNC: 25 MG/DL (ref 6–20)
CALCIUM SERPL-MCNC: 8.8 MG/DL (ref 8.7–10.5)
CALCIUM SERPL-MCNC: 8.8 MG/DL (ref 8.7–10.5)
CHLORIDE SERPL-SCNC: 103 MMOL/L (ref 95–110)
CHLORIDE SERPL-SCNC: 103 MMOL/L (ref 95–110)
CO2 SERPL-SCNC: 19 MMOL/L (ref 23–29)
CO2 SERPL-SCNC: 19 MMOL/L (ref 23–29)
CREAT SERPL-MCNC: 1.8 MG/DL (ref 0.5–1.4)
CREAT SERPL-MCNC: 1.8 MG/DL (ref 0.5–1.4)
DIFFERENTIAL METHOD BLD: ABNORMAL
EOSINOPHIL # BLD AUTO: 0.2 K/UL (ref 0–0.5)
EOSINOPHIL NFR BLD: 4.9 % (ref 0–8)
ERYTHROCYTE [DISTWIDTH] IN BLOOD BY AUTOMATED COUNT: 14.4 % (ref 11.5–14.5)
EST. GFR  (NO RACE VARIABLE): 33.9 ML/MIN/1.73 M^2
EST. GFR  (NO RACE VARIABLE): 33.9 ML/MIN/1.73 M^2
GLUCOSE SERPL-MCNC: 94 MG/DL (ref 70–110)
GLUCOSE SERPL-MCNC: 94 MG/DL (ref 70–110)
HCT VFR BLD AUTO: 33.6 % (ref 37–48.5)
HGB BLD-MCNC: 12.3 G/DL (ref 12–16)
IMM GRANULOCYTES # BLD AUTO: 0.02 K/UL (ref 0–0.04)
IMM GRANULOCYTES NFR BLD AUTO: 0.5 % (ref 0–0.5)
INR PPP: 0.9 (ref 0.8–1.2)
LYMPHOCYTES # BLD AUTO: 1 K/UL (ref 1–4.8)
LYMPHOCYTES NFR BLD: 26.7 % (ref 18–48)
MAGNESIUM SERPL-MCNC: 1.9 MG/DL (ref 1.6–2.6)
MCH RBC QN AUTO: 34.4 PG (ref 27–31)
MCHC RBC AUTO-ENTMCNC: 36.6 G/DL (ref 32–36)
MCV RBC AUTO: 94 FL (ref 82–98)
MONOCYTES # BLD AUTO: 0.8 K/UL (ref 0.3–1)
MONOCYTES NFR BLD: 21 % (ref 4–15)
NEUTROPHILS # BLD AUTO: 1.8 K/UL (ref 1.8–7.7)
NEUTROPHILS NFR BLD: 46.6 % (ref 38–73)
NRBC BLD-RTO: 0 /100 WBC
PHOSPHATE SERPL-MCNC: 3.7 MG/DL (ref 2.7–4.5)
PLATELET # BLD AUTO: 209 K/UL (ref 150–450)
PMV BLD AUTO: 10.5 FL (ref 9.2–12.9)
POTASSIUM SERPL-SCNC: 5.1 MMOL/L (ref 3.5–5.1)
POTASSIUM SERPL-SCNC: 5.1 MMOL/L (ref 3.5–5.1)
PROT SERPL-MCNC: 6.1 G/DL (ref 6–8.4)
PROT SERPL-MCNC: 6.1 G/DL (ref 6–8.4)
PROTHROMBIN TIME: 9.8 SEC (ref 9–12.5)
RBC # BLD AUTO: 3.58 M/UL (ref 4–5.4)
SODIUM SERPL-SCNC: 132 MMOL/L (ref 136–145)
SODIUM SERPL-SCNC: 132 MMOL/L (ref 136–145)
WBC # BLD AUTO: 3.86 K/UL (ref 3.9–12.7)

## 2024-06-18 PROCEDURE — 80158 DRUG ASSAY CYCLOSPORINE: CPT | Performed by: INTERNAL MEDICINE

## 2024-06-18 PROCEDURE — 84100 ASSAY OF PHOSPHORUS: CPT | Mod: PN | Performed by: INTERNAL MEDICINE

## 2024-06-18 PROCEDURE — 85610 PROTHROMBIN TIME: CPT | Performed by: STUDENT IN AN ORGANIZED HEALTH CARE EDUCATION/TRAINING PROGRAM

## 2024-06-18 PROCEDURE — 80053 COMPREHEN METABOLIC PANEL: CPT | Mod: PN | Performed by: INTERNAL MEDICINE

## 2024-06-18 PROCEDURE — 83735 ASSAY OF MAGNESIUM: CPT | Mod: PN | Performed by: INTERNAL MEDICINE

## 2024-06-18 PROCEDURE — 85025 COMPLETE CBC W/AUTO DIFF WBC: CPT | Mod: PN | Performed by: INTERNAL MEDICINE

## 2024-06-18 PROCEDURE — 36415 COLL VENOUS BLD VENIPUNCTURE: CPT | Mod: PN | Performed by: INTERNAL MEDICINE

## 2024-06-18 PROCEDURE — 87799 DETECT AGENT NOS DNA QUANT: CPT | Performed by: INTERNAL MEDICINE

## 2024-06-18 NOTE — PROGRESS NOTES
"The patient location is: [place]  The chief complaint leading to consultation is: unintended weight gain     Visit type: audiovisual    Face to Face time with patient: 44 minutes   60 minutes of total time spent on the encounter, which includes face to face time and non-face to face time preparing to see the patient (eg, review of tests), Obtaining and/or reviewing separately obtained history, Documenting clinical information in the electronic or other health record, Independently interpreting results (not separately reported) and communicating results to the patient/family/caregiver, or Care coordination (not separately reported).     Each patient to whom he or she provides medical services by telemedicine is:  (1) informed of the relationship between the physician and patient and the respective role of any other health care provider with respect to management of the patient; and (2) notified that he or she may decline to receive medical services by telemedicine and may withdraw from such care at any time.    Oncology Nutrition Assessment for Medical Nutrition Therapy  Initial Visit    Thai Villalba   1975    Referring Provider:  Shemar Franco MD      Reason for Visit: Nutrition counseling and education     PMHx:   Past Medical History:   Diagnosis Date    ADHD (attention deficit hyperactivity disorder)     Anxiety     General anesthetics causing adverse effect in therapeutic use     REFLUX WITH SURGERY AND ASPIRATED, ZOFRAN GIVEN IV       Nutrition Assessment    Patient is a 49 y.o.female with Acute myeloid leukemia s/p allogeneic stem cell transplant (dual cord blood transplant) at OSH in [place]. Referred to nutrition due to reports of unintended weight gain.   She reports she has always had the "palate of a toddler." Also, since her transplant she has had a strong gag reflux which makes eating a lot of fruits and vegetables difficult. GVHD (mouth, skin). Previously on a lot of oral steroids due to " "severity of GVHD. At this time she wasn't eating and weight was down to 125lb. Much better controlled. Currently no oral lesions.   Her  has celiac disease so they do not have anything with gluten. They avoid most processed foods and red meat.   She does a lot of walking at home, chasing after 2 year old grandchild. Reports being a fainting risk so she limits for vigorous activity.   She has been on hormone replacement, and she gained weight when it was changed to estrogen only. She is back on testosterone and estrogen combination. Weight gain seems to have leveled off.   Possible SIADH, fluid restriction relaxed at last visit due to elevated creatinine. She typically drinks 8-10 glasses of water per day, though occasionally only 6-8.   Breakfast (somewhat forced)- small bowl of raisin bran and banana, coffee with cream and splenda   Lunch- grilled turkey, chicken salad, or leftovers   Dinner- chicken or fish and vegetables (tolerates asparagus, green beans, corn, lettuce), sweet potato, regular potato or rice   Snack- fruit   Drinks water, protein shakes (if not eating enough), sometimes tea or juice  Does not drink alcohol     Weight:72.6 kg (160 lb)  Height:5' 4" (1.626 m)  BMI:Body mass index is 27.46 kg/m².   IBW: Ideal body weight: 54.7 kg (120 lb 9.5 oz)  Adjusted ideal body weight: 61.9 kg (136 lb 5.7 oz)    Usual BW: 140lb  Weight Change: up about 20lb from usual    Allergies: Promethazine, Penicillin, Penicillins, and Melatonin    Current Medications:    Current Outpatient Medications:     acyclovir (ZOVIRAX) 200 MG capsule, Take 4 capsules twice a day for prophylaxis, Disp: 240 capsule, Rfl: 9    cholecalciferol, vitamin D3, 10 mcg (400 unit) Chew, Take 1,000 Int'l Units by mouth once daily., Disp: , Rfl:     cycloSPORINE modified, NEORAL, (NEORAL) 100 MG capsule, Take 1 capsule (100mg) in addition to two 50mg capsules (to equal 150mg/dose) twice a day. (Patient taking differently: Take 100 mg by " mouth 2 (two) times daily. Take 1 capsule (100mg) in addition to two 50mg capsules (to equal 150mg/dose) twice a day.), Disp: 60 capsule, Rfl: 11    cycloSPORINE modified, NEORAL, (NEORAL) 25 MG capsule, Take 2 capsules (50mg) in addition to one 100mg capsule (to equal 150mg/dose) twice a day. (Patient taking differently: Take 50 mg by mouth 2 (two) times daily. Take 2 capsules (50mg) in addition to one 100mg capsule (to equal 150mg/dose) twice a day.), Disp: 240 capsule, Rfl: 10    dexAMETHasone 0.5 mg/5 mL Soln solution, Take 10 mLs (1 mg total) by mouth every 12 (twelve) hours. Swish and spit. Do not swallow. (Patient not taking: Reported on 5/15/2024), Disp: 500 mL, Rfl: 1    duke's soln (benadryl 30 mL, mylanta 30 mL, LIDOcaine 30 mL, nystatin 30 mL) 120mL, Take 10 mLs by mouth 4 (four) times daily. (Patient not taking: Reported on 5/15/2024), Disp: 120 mL, Rfl: 0    estrogens,esterified,-methyltestosterone 1.25-2.5mg (EEMT) per tablet, Take 1 tablet by mouth once daily., Disp: 90 tablet, Rfl: 1    gabapentin (NEURONTIN) 300 MG capsule, Take 1 capsule by mouth three times a day, Disp: 90 capsule, Rfl: 3    gabapentin (NEURONTIN) 300 MG capsule, Take 1 capsule by mouth three times a day, Disp: 90 capsule, Rfl: 5    gilteritinib 40 mg Tab, Take 2 tablets (80 mg) by mouth Daily., Disp: 60 tablet, Rfl: 11    isavuconazonium sulfate (CRESEMBA) 186 mg Cap, Take 2 capsules by mouth three times a day for the first 2 days, then 2 capsules once a day thereafter, Disp: 70 capsule, Rfl: 5    magnesium oxide (MAG-OX) 400 mg (241.3 mg magnesium) tablet, Take 400 mg by mouth 2 (two) times daily., Disp: , Rfl:     oxyCODONE (ROXICODONE) 10 mg Tab immediate release tablet, 1 tablet by mouth three times a day as needed for pain, Disp: 90 tablet, Rfl: 0    oxyCODONE (ROXICODONE) 10 mg Tab immediate release tablet, 1 tablet by mouth three times a day as needed for pain, Disp: 90 tablet, Rfl: 0    pantoprazole (PROTONIX) 40 MG  tablet, Take 40 mg by mouth every morning., Disp: , Rfl:     sodium zirconium cyclosilicate (LOKELMA) 5 gram packet, Take 1 packet (5 g total) by mouth once daily. Mix entire contents of packet(s) into drinking glass containing 3 tablespoons of water; stir well and drink immediately. Add water and repeat until no powder remains to receive entire dose., Disp: 30 packet, Rfl: 1    sulfamethoxazole-trimethoprim 800-160mg (BACTRIM DS) 800-160 mg Tab, Take 1 tablet twice a day for  2 days a week for pneumocyctis jiroveci pneumonia prevention, Disp: 12 tablet, Rfl: 15    tacrolimus (PROTOPIC) 0.1 % ointment, Apply topically 2 times daily for Atopic Dermatitis, can apply to lips or inside mouth if needed. (Patient taking differently: Apply 1 application  topically 2 (two) times daily.), Disp: 100 g, Rfl: 1    triamcinolone acetonide 0.1% (KENALOG) 0.1 % cream, Apply topically 2 (two) times daily. To areas of rash/dry skin. (Patient taking differently: Apply 1 g topically 2 (two) times daily. To areas of rash/dry skin.), Disp: 80 g, Rfl: 2    Vitamins/Supplements: Vitamin D    Labs: Reviewed     Glucose   Date Value Ref Range Status   06/18/2024 94 70 - 110 mg/dL Final   06/18/2024 94 70 - 110 mg/dL Final     BUN   Date Value Ref Range Status   06/18/2024 25 (H) 6 - 20 mg/dL Final   06/18/2024 25 (H) 6 - 20 mg/dL Final     Creatinine   Date Value Ref Range Status   06/18/2024 1.8 (H) 0.5 - 1.4 mg/dL Final   06/18/2024 1.8 (H) 0.5 - 1.4 mg/dL Final     Sodium   Date Value Ref Range Status   06/18/2024 132 (L) 136 - 145 mmol/L Final   06/18/2024 132 (L) 136 - 145 mmol/L Final     Cholesterol   Date Value Ref Range Status   03/31/2011 209 (H) 120 - 199 mg/dL Final     Comment:     The National Cholesterol Education Program (NCEP) has set the  following guidelines (reference ranges) for Cholesterol:  Optimal.....................<200 mg/dL  Borderline High.............200-239 mg/dL  High........................> or = 240 mg/dL  "    No results found for: "HGBA1C"  Hemoglobin   Date Value Ref Range Status   06/18/2024 12.3 12.0 - 16.0 g/dL Final   06/11/2024 12.9 12.0 - 16.0 g/dL Final     Hematocrit   Date Value Ref Range Status   06/18/2024 33.6 (L) 37.0 - 48.5 % Final   06/11/2024 35.8 (L) 37.0 - 48.5 % Final     Iron   Date Value Ref Range Status   02/02/2024 85 30 - 160 ug/dL Final       Nutrition Diagnosis    Problem: Unintended weight gain  Etiology (related to):  medications   Signs/Symptoms (as evidenced by):  reported weight gain of about 20lb from UBW    Nutrition Intervention    Nutrition Prescription   1450 Kcals (20kcal/kg)  73-88 g protein (1-1.2g/kg)   7081-9011 mL fluid (25-30mL/kg)    Recommendations:  Resistance training to rebuild muscle and improve metabolism  -yoga (seated) and/or resistance bands 2-3 times per week  -yoga with Onelia videos   Continue walking for exercise as well     Anti-inflammatory plant based diet   -help with insulin resistance and weight loss    Adequate protein (about 70-80g per day)  -fish, chicken, eggs, nuts, seeds, legumes, whole soy foods (edamame, soy milk)    New foods to try:   Soy milk (unsweetened), Egg Life wraps, Nature's path organic cereal      Nutrition Intervention General/healthful diet and Protein-modified diet   Goals/Expected Outcomes Extra protein to help with muscle building    Progress Initial     Materials Provided/Reviewed Dr. Weil anti-inflammatory diet     Nutrition Monitoring and Evaluation    Monitor: energy intake, weight, and diet education needs       Follow up In 8 weeks     Communication to referring provider/care team: note available in chart     Counseling time: 45 Minutes    Lorin Davis, MPH, RD, , LDN, FAND  Board Certified Specialist in Oncology Nutrition   [phone]                                                                                                                                                                                           "

## 2024-06-19 LAB
CMV DNA SPEC QL NAA+PROBE: NORMAL
CYCLOSPORINE BLD LC/MS/MS-MCNC: 249 NG/ML (ref 100–400)
CYTOMEGALOVIRUS PCR, QUANT: NOT DETECTED IU/ML
EPSTEIN-BARR VIRUS DNA: NORMAL
EPSTEIN-BARR VIRUS PCR, QUANT: NOT DETECTED IU/ML

## 2024-06-20 ENCOUNTER — PATIENT MESSAGE (OUTPATIENT)
Dept: HEMATOLOGY/ONCOLOGY | Facility: CLINIC | Age: 49
End: 2024-06-20
Payer: COMMERCIAL

## 2024-06-20 ENCOUNTER — CLINICAL SUPPORT (OUTPATIENT)
Dept: HEMATOLOGY/ONCOLOGY | Facility: CLINIC | Age: 49
End: 2024-06-20
Payer: COMMERCIAL

## 2024-06-20 VITALS — BODY MASS INDEX: 27.31 KG/M2 | WEIGHT: 160 LBS | HEIGHT: 64 IN

## 2024-06-20 DIAGNOSIS — R63.5 WEIGHT GAIN: ICD-10-CM

## 2024-06-20 DIAGNOSIS — C92.01 ACUTE MYELOID LEUKEMIA IN REMISSION: ICD-10-CM

## 2024-06-20 DIAGNOSIS — Z94.84 HISTORY OF ALLOGENEIC STEM CELL TRANSPLANT: ICD-10-CM

## 2024-06-20 DIAGNOSIS — Z71.3 NUTRITIONAL COUNSELING: Primary | ICD-10-CM

## 2024-06-20 PROCEDURE — 97802 MEDICAL NUTRITION INDIV IN: CPT | Mod: 95,,, | Performed by: DIETITIAN, REGISTERED

## 2024-06-20 NOTE — PROGRESS NOTES
BMT Pharmacist Immunosuppression Note:    Reviewed patient's cyclosporine level with Dr. Franco and it is within goal range. Instructed patient to continue your current regimen of 150 mg twice daily (one 100 mg capsule and two 25 mg capsules)      Anjelica Sullivan, PharmD  PGY-2 Oncology Pharmacy Resident  Spectra link: [id number]

## 2024-06-20 NOTE — PATIENT INSTRUCTIONS
Katie from the infusion center called stating that they have the patient on [facility] until 2/26/20. They are needing to know if dr. Pardeep Fabian is wanting lab work if so they need an order put in. They also need to know if patient is finished as of the 26th can picc line be discontinued will also need order for this as well. Please notify the infusion center as what next step will be. Resistance training to rebuild muscle and improve metabolism  -yoga (seated) and/or resistance bands 2-3 times per week  -yoga with Onelia videos   Continue walking for exercise as well     Anti-inflammatory plant based diet   -Dr. Weil   [email or web address]-[email or web address]-[email or web address]  -help with insulin resistance and weight loss    Adequate protein (about 70-80g per day)  -fish, chicken, eggs, nuts, seeds, legumes, whole soy foods (edamame, soy milk)    New foods to try:   Soy milk (unsweetened), Egg Life wraps, Nature's Path Organic Pumpkin Seed and Flax cereal

## 2024-06-25 ENCOUNTER — LAB VISIT (OUTPATIENT)
Dept: LAB | Facility: HOSPITAL | Age: 49
End: 2024-06-25
Attending: INTERNAL MEDICINE
Payer: COMMERCIAL

## 2024-06-25 DIAGNOSIS — R79.89 ELEVATED LFTS: ICD-10-CM

## 2024-06-25 DIAGNOSIS — Z94.84 HISTORY OF ALLOGENEIC STEM CELL TRANSPLANT: ICD-10-CM

## 2024-06-25 LAB
ALBUMIN SERPL BCP-MCNC: 3.2 G/DL (ref 3.5–5.2)
ALBUMIN SERPL BCP-MCNC: 3.2 G/DL (ref 3.5–5.2)
ALP SERPL-CCNC: 172 U/L (ref 55–135)
ALP SERPL-CCNC: 172 U/L (ref 55–135)
ALT SERPL W/O P-5'-P-CCNC: 128 U/L (ref 10–44)
ALT SERPL W/O P-5'-P-CCNC: 128 U/L (ref 10–44)
ANION GAP SERPL CALC-SCNC: 8 MMOL/L (ref 8–16)
ANION GAP SERPL CALC-SCNC: 8 MMOL/L (ref 8–16)
AST SERPL-CCNC: 52 U/L (ref 10–40)
AST SERPL-CCNC: 52 U/L (ref 10–40)
BASOPHILS # BLD AUTO: 0.01 K/UL (ref 0–0.2)
BASOPHILS # BLD AUTO: 0.01 K/UL (ref 0–0.2)
BASOPHILS NFR BLD: 0.2 % (ref 0–1.9)
BASOPHILS NFR BLD: 0.2 % (ref 0–1.9)
BILIRUB SERPL-MCNC: 0.6 MG/DL (ref 0.1–1)
BILIRUB SERPL-MCNC: 0.6 MG/DL (ref 0.1–1)
BUN SERPL-MCNC: 19 MG/DL (ref 6–20)
BUN SERPL-MCNC: 19 MG/DL (ref 6–20)
CALCIUM SERPL-MCNC: 8.8 MG/DL (ref 8.7–10.5)
CALCIUM SERPL-MCNC: 8.8 MG/DL (ref 8.7–10.5)
CHLORIDE SERPL-SCNC: 101 MMOL/L (ref 95–110)
CHLORIDE SERPL-SCNC: 101 MMOL/L (ref 95–110)
CO2 SERPL-SCNC: 22 MMOL/L (ref 23–29)
CO2 SERPL-SCNC: 22 MMOL/L (ref 23–29)
CREAT SERPL-MCNC: 1.6 MG/DL (ref 0.5–1.4)
CREAT SERPL-MCNC: 1.6 MG/DL (ref 0.5–1.4)
DIFFERENTIAL METHOD BLD: ABNORMAL
DIFFERENTIAL METHOD BLD: ABNORMAL
EOSINOPHIL # BLD AUTO: 0.2 K/UL (ref 0–0.5)
EOSINOPHIL # BLD AUTO: 0.3 K/UL (ref 0–0.5)
EOSINOPHIL NFR BLD: 5.9 % (ref 0–8)
EOSINOPHIL NFR BLD: 6 % (ref 0–8)
ERYTHROCYTE [DISTWIDTH] IN BLOOD BY AUTOMATED COUNT: 14.5 % (ref 11.5–14.5)
ERYTHROCYTE [DISTWIDTH] IN BLOOD BY AUTOMATED COUNT: 14.6 % (ref 11.5–14.5)
EST. GFR  (NO RACE VARIABLE): 39 ML/MIN/1.73 M^2
EST. GFR  (NO RACE VARIABLE): 39 ML/MIN/1.73 M^2
GLUCOSE SERPL-MCNC: 87 MG/DL (ref 70–110)
GLUCOSE SERPL-MCNC: 87 MG/DL (ref 70–110)
HCT VFR BLD AUTO: 33.7 % (ref 37–48.5)
HCT VFR BLD AUTO: 33.9 % (ref 37–48.5)
HGB BLD-MCNC: 12.3 G/DL (ref 12–16)
HGB BLD-MCNC: 12.3 G/DL (ref 12–16)
IMM GRANULOCYTES # BLD AUTO: 0.03 K/UL (ref 0–0.04)
IMM GRANULOCYTES # BLD AUTO: 0.03 K/UL (ref 0–0.04)
IMM GRANULOCYTES NFR BLD AUTO: 0.7 % (ref 0–0.5)
IMM GRANULOCYTES NFR BLD AUTO: 0.7 % (ref 0–0.5)
INR PPP: 0.9 (ref 0.8–1.2)
LYMPHOCYTES # BLD AUTO: 0.8 K/UL (ref 1–4.8)
LYMPHOCYTES # BLD AUTO: 0.8 K/UL (ref 1–4.8)
LYMPHOCYTES NFR BLD: 18.1 % (ref 18–48)
LYMPHOCYTES NFR BLD: 18.9 % (ref 18–48)
MAGNESIUM SERPL-MCNC: 1.8 MG/DL (ref 1.6–2.6)
MCH RBC QN AUTO: 34.6 PG (ref 27–31)
MCH RBC QN AUTO: 34.9 PG (ref 27–31)
MCHC RBC AUTO-ENTMCNC: 36.3 G/DL (ref 32–36)
MCHC RBC AUTO-ENTMCNC: 36.5 G/DL (ref 32–36)
MCV RBC AUTO: 96 FL (ref 82–98)
MCV RBC AUTO: 96 FL (ref 82–98)
MONOCYTES # BLD AUTO: 0.8 K/UL (ref 0.3–1)
MONOCYTES # BLD AUTO: 0.9 K/UL (ref 0.3–1)
MONOCYTES NFR BLD: 18.6 % (ref 4–15)
MONOCYTES NFR BLD: 20.5 % (ref 4–15)
NEUTROPHILS # BLD AUTO: 2.3 K/UL (ref 1.8–7.7)
NEUTROPHILS # BLD AUTO: 2.3 K/UL (ref 1.8–7.7)
NEUTROPHILS NFR BLD: 54.5 % (ref 38–73)
NEUTROPHILS NFR BLD: 55.7 % (ref 38–73)
NRBC BLD-RTO: 0 /100 WBC
NRBC BLD-RTO: 0 /100 WBC
PHOSPHATE SERPL-MCNC: 3.4 MG/DL (ref 2.7–4.5)
PLATELET # BLD AUTO: 201 K/UL (ref 150–450)
PLATELET # BLD AUTO: 211 K/UL (ref 150–450)
PMV BLD AUTO: 10.1 FL (ref 9.2–12.9)
PMV BLD AUTO: 10.3 FL (ref 9.2–12.9)
POTASSIUM SERPL-SCNC: 5.2 MMOL/L (ref 3.5–5.1)
POTASSIUM SERPL-SCNC: 5.2 MMOL/L (ref 3.5–5.1)
PROT SERPL-MCNC: 5.9 G/DL (ref 6–8.4)
PROT SERPL-MCNC: 5.9 G/DL (ref 6–8.4)
PROTHROMBIN TIME: 10.2 SEC (ref 9–12.5)
RBC # BLD AUTO: 3.52 M/UL (ref 4–5.4)
RBC # BLD AUTO: 3.55 M/UL (ref 4–5.4)
SODIUM SERPL-SCNC: 131 MMOL/L (ref 136–145)
SODIUM SERPL-SCNC: 131 MMOL/L (ref 136–145)
WBC # BLD AUTO: 4.08 K/UL (ref 3.9–12.7)
WBC # BLD AUTO: 4.14 K/UL (ref 3.9–12.7)

## 2024-06-25 PROCEDURE — 83735 ASSAY OF MAGNESIUM: CPT | Mod: PN | Performed by: INTERNAL MEDICINE

## 2024-06-25 PROCEDURE — 80158 DRUG ASSAY CYCLOSPORINE: CPT | Performed by: INTERNAL MEDICINE

## 2024-06-25 PROCEDURE — 85025 COMPLETE CBC W/AUTO DIFF WBC: CPT | Mod: PN | Performed by: STUDENT IN AN ORGANIZED HEALTH CARE EDUCATION/TRAINING PROGRAM

## 2024-06-25 PROCEDURE — 36415 COLL VENOUS BLD VENIPUNCTURE: CPT | Mod: PN | Performed by: INTERNAL MEDICINE

## 2024-06-25 PROCEDURE — 85025 COMPLETE CBC W/AUTO DIFF WBC: CPT | Mod: 91,PN | Performed by: INTERNAL MEDICINE

## 2024-06-25 PROCEDURE — 87799 DETECT AGENT NOS DNA QUANT: CPT | Performed by: INTERNAL MEDICINE

## 2024-06-25 PROCEDURE — 84100 ASSAY OF PHOSPHORUS: CPT | Mod: PN | Performed by: INTERNAL MEDICINE

## 2024-06-25 PROCEDURE — 85610 PROTHROMBIN TIME: CPT | Performed by: STUDENT IN AN ORGANIZED HEALTH CARE EDUCATION/TRAINING PROGRAM

## 2024-06-25 PROCEDURE — 80053 COMPREHEN METABOLIC PANEL: CPT | Mod: PN | Performed by: INTERNAL MEDICINE

## 2024-06-26 LAB
CMV DNA SPEC QL NAA+PROBE: NORMAL
CYCLOSPORINE BLD LC/MS/MS-MCNC: 194 NG/ML (ref 100–400)
CYTOMEGALOVIRUS PCR, QUANT: NOT DETECTED IU/ML
EPSTEIN-BARR VIRUS DNA: NORMAL
EPSTEIN-BARR VIRUS PCR, QUANT: NOT DETECTED IU/ML

## 2024-07-02 ENCOUNTER — LAB VISIT (OUTPATIENT)
Dept: LAB | Facility: HOSPITAL | Age: 49
End: 2024-07-02
Attending: INTERNAL MEDICINE
Payer: COMMERCIAL

## 2024-07-02 DIAGNOSIS — Z94.84 HISTORY OF ALLOGENEIC STEM CELL TRANSPLANT: ICD-10-CM

## 2024-07-02 LAB
ALBUMIN SERPL BCP-MCNC: 3.3 G/DL (ref 3.5–5.2)
ALP SERPL-CCNC: 199 U/L (ref 55–135)
ALT SERPL W/O P-5'-P-CCNC: 202 U/L (ref 10–44)
ANION GAP SERPL CALC-SCNC: 9 MMOL/L (ref 8–16)
AST SERPL-CCNC: 124 U/L (ref 10–40)
BASOPHILS # BLD AUTO: 0.01 K/UL (ref 0–0.2)
BASOPHILS NFR BLD: 0.3 % (ref 0–1.9)
BILIRUB SERPL-MCNC: 0.8 MG/DL (ref 0.1–1)
BUN SERPL-MCNC: 22 MG/DL (ref 6–20)
CALCIUM SERPL-MCNC: 8.6 MG/DL (ref 8.7–10.5)
CHLORIDE SERPL-SCNC: 101 MMOL/L (ref 95–110)
CO2 SERPL-SCNC: 17 MMOL/L (ref 23–29)
CREAT SERPL-MCNC: 1.4 MG/DL (ref 0.5–1.4)
DIFFERENTIAL METHOD BLD: ABNORMAL
EOSINOPHIL # BLD AUTO: 0.2 K/UL (ref 0–0.5)
EOSINOPHIL NFR BLD: 6.7 % (ref 0–8)
ERYTHROCYTE [DISTWIDTH] IN BLOOD BY AUTOMATED COUNT: 14.6 % (ref 11.5–14.5)
EST. GFR  (NO RACE VARIABLE): 45.8 ML/MIN/1.73 M^2
GLUCOSE SERPL-MCNC: 95 MG/DL (ref 70–110)
HCT VFR BLD AUTO: 34.5 % (ref 37–48.5)
HGB BLD-MCNC: 12.7 G/DL (ref 12–16)
IMM GRANULOCYTES # BLD AUTO: 0.02 K/UL (ref 0–0.04)
IMM GRANULOCYTES NFR BLD AUTO: 0.6 % (ref 0–0.5)
LYMPHOCYTES # BLD AUTO: 1 K/UL (ref 1–4.8)
LYMPHOCYTES NFR BLD: 28.1 % (ref 18–48)
MAGNESIUM SERPL-MCNC: 1.6 MG/DL (ref 1.6–2.6)
MCH RBC QN AUTO: 35.3 PG (ref 27–31)
MCHC RBC AUTO-ENTMCNC: 36.8 G/DL (ref 32–36)
MCV RBC AUTO: 96 FL (ref 82–98)
MONOCYTES # BLD AUTO: 0.8 K/UL (ref 0.3–1)
MONOCYTES NFR BLD: 22.2 % (ref 4–15)
NEUTROPHILS # BLD AUTO: 1.5 K/UL (ref 1.8–7.7)
NEUTROPHILS NFR BLD: 42.1 % (ref 38–73)
NRBC BLD-RTO: 0 /100 WBC
PHOSPHATE SERPL-MCNC: 3.3 MG/DL (ref 2.7–4.5)
PLATELET # BLD AUTO: 187 K/UL (ref 150–450)
PMV BLD AUTO: 10.1 FL (ref 9.2–12.9)
POTASSIUM SERPL-SCNC: 6 MMOL/L (ref 3.5–5.1)
PROT SERPL-MCNC: 6.1 G/DL (ref 6–8.4)
RBC # BLD AUTO: 3.6 M/UL (ref 4–5.4)
SODIUM SERPL-SCNC: 127 MMOL/L (ref 136–145)
WBC # BLD AUTO: 3.6 K/UL (ref 3.9–12.7)

## 2024-07-02 PROCEDURE — 36415 COLL VENOUS BLD VENIPUNCTURE: CPT | Mod: PN | Performed by: INTERNAL MEDICINE

## 2024-07-02 PROCEDURE — 84100 ASSAY OF PHOSPHORUS: CPT | Mod: PN | Performed by: INTERNAL MEDICINE

## 2024-07-02 PROCEDURE — 83735 ASSAY OF MAGNESIUM: CPT | Mod: PN | Performed by: INTERNAL MEDICINE

## 2024-07-02 PROCEDURE — 87799 DETECT AGENT NOS DNA QUANT: CPT | Performed by: INTERNAL MEDICINE

## 2024-07-02 PROCEDURE — 80053 COMPREHEN METABOLIC PANEL: CPT | Mod: PN | Performed by: INTERNAL MEDICINE

## 2024-07-02 PROCEDURE — 85025 COMPLETE CBC W/AUTO DIFF WBC: CPT | Mod: PN | Performed by: INTERNAL MEDICINE

## 2024-07-02 PROCEDURE — 80158 DRUG ASSAY CYCLOSPORINE: CPT | Performed by: INTERNAL MEDICINE

## 2024-07-03 ENCOUNTER — HOSPITAL ENCOUNTER (INPATIENT)
Facility: HOSPITAL | Age: 49
LOS: 1 days | Discharge: HOME | DRG: 641 | End: 2024-07-04
Attending: EMERGENCY MEDICINE | Admitting: INTERNAL MEDICINE
Payer: COMMERCIAL

## 2024-07-03 ENCOUNTER — OFFICE VISIT (OUTPATIENT)
Dept: HEMATOLOGY/ONCOLOGY | Facility: CLINIC | Age: 49
End: 2024-07-03
Payer: COMMERCIAL

## 2024-07-03 ENCOUNTER — TELEPHONE (OUTPATIENT)
Dept: HEMATOLOGY/ONCOLOGY | Facility: CLINIC | Age: 49
End: 2024-07-03
Payer: COMMERCIAL

## 2024-07-03 VITALS
HEART RATE: 61 BPM | OXYGEN SATURATION: 98 % | WEIGHT: 159.31 LBS | SYSTOLIC BLOOD PRESSURE: 142 MMHG | RESPIRATION RATE: 18 BRPM | TEMPERATURE: 98 F | HEIGHT: 64 IN | BODY MASS INDEX: 27.2 KG/M2 | DIASTOLIC BLOOD PRESSURE: 89 MMHG

## 2024-07-03 DIAGNOSIS — C92.01 ACUTE MYELOID LEUKEMIA IN REMISSION: ICD-10-CM

## 2024-07-03 DIAGNOSIS — E27.40 ADRENAL INSUFFICIENCY: ICD-10-CM

## 2024-07-03 DIAGNOSIS — D84.821 IMMUNODEFICIENCY DUE TO DRUG THERAPY: ICD-10-CM

## 2024-07-03 DIAGNOSIS — E83.42 HYPOMAGNESEMIA: ICD-10-CM

## 2024-07-03 DIAGNOSIS — E87.5 HYPERKALEMIA: ICD-10-CM

## 2024-07-03 DIAGNOSIS — E87.5 HYPERKALEMIA: Primary | ICD-10-CM

## 2024-07-03 DIAGNOSIS — C92.01 ACUTE MYELOID LEUKEMIA IN REMISSION: Primary | ICD-10-CM

## 2024-07-03 DIAGNOSIS — R07.9 CHEST PAIN: ICD-10-CM

## 2024-07-03 DIAGNOSIS — Z94.84 HISTORY OF ALLOGENEIC STEM CELL TRANSPLANT: ICD-10-CM

## 2024-07-03 DIAGNOSIS — E87.1 HYPONATREMIA: ICD-10-CM

## 2024-07-03 DIAGNOSIS — Z94.81 HISTORY OF BONE MARROW TRANSPLANT: ICD-10-CM

## 2024-07-03 DIAGNOSIS — Z79.899 IMMUNODEFICIENCY DUE TO DRUG THERAPY: ICD-10-CM

## 2024-07-03 DIAGNOSIS — D84.9 IMMUNOSUPPRESSED STATUS: ICD-10-CM

## 2024-07-03 DIAGNOSIS — D84.822 IMMUNODEFICIENCY DUE TO EXTERNAL CAUSE: ICD-10-CM

## 2024-07-03 PROBLEM — G89.29 CHRONIC PAIN: Status: ACTIVE | Noted: 2024-07-03

## 2024-07-03 LAB
ALBUMIN SERPL BCP-MCNC: 3.6 G/DL (ref 3.5–5.2)
ALP SERPL-CCNC: 226 U/L (ref 55–135)
ALT SERPL W/O P-5'-P-CCNC: 214 U/L (ref 10–44)
ANION GAP SERPL CALC-SCNC: 11 MMOL/L (ref 8–16)
ANION GAP SERPL CALC-SCNC: 7 MMOL/L (ref 8–16)
ANION GAP SERPL CALC-SCNC: 8 MMOL/L (ref 8–16)
AST SERPL-CCNC: 117 U/L (ref 10–40)
BASOPHILS # BLD AUTO: 0.01 K/UL (ref 0–0.2)
BASOPHILS NFR BLD: 0.3 % (ref 0–1.9)
BILIRUB SERPL-MCNC: 1 MG/DL (ref 0.1–1)
BUN SERPL-MCNC: 19 MG/DL (ref 6–20)
BUN SERPL-MCNC: 20 MG/DL (ref 6–20)
BUN SERPL-MCNC: 24 MG/DL (ref 6–20)
BUN SERPL-MCNC: 24 MG/DL (ref 6–30)
CALCIUM SERPL-MCNC: 8.6 MG/DL (ref 8.7–10.5)
CALCIUM SERPL-MCNC: 9.1 MG/DL (ref 8.7–10.5)
CALCIUM SERPL-MCNC: 9.5 MG/DL (ref 8.7–10.5)
CHLORIDE SERPL-SCNC: 101 MMOL/L (ref 95–110)
CHLORIDE SERPL-SCNC: 103 MMOL/L (ref 95–110)
CMV DNA SPEC QL NAA+PROBE: ABNORMAL
CO2 SERPL-SCNC: 18 MMOL/L (ref 23–29)
CO2 SERPL-SCNC: 18 MMOL/L (ref 23–29)
CO2 SERPL-SCNC: 21 MMOL/L (ref 23–29)
CREAT SERPL-MCNC: 1.3 MG/DL (ref 0.5–1.4)
CREAT SERPL-MCNC: 1.5 MG/DL (ref 0.5–1.4)
CREAT SERPL-MCNC: 1.6 MG/DL (ref 0.5–1.4)
CREAT SERPL-MCNC: 1.7 MG/DL (ref 0.5–1.4)
CYCLOSPORINE BLD LC/MS/MS-MCNC: 326 NG/ML (ref 100–400)
CYTOMEGALOVIRUS LOG (IU/ML): <1.48 LOGIU/ML
CYTOMEGALOVIRUS PCR, QUANT: <30 IU/ML
DIFFERENTIAL METHOD BLD: ABNORMAL
EOSINOPHIL # BLD AUTO: 0.2 K/UL (ref 0–0.5)
EOSINOPHIL NFR BLD: 4.4 % (ref 0–8)
EPSTEIN-BARR VIRUS DNA: NORMAL
EPSTEIN-BARR VIRUS PCR, QUANT: NOT DETECTED IU/ML
ERYTHROCYTE [DISTWIDTH] IN BLOOD BY AUTOMATED COUNT: 15.4 % (ref 11.5–14.5)
EST. GFR  (NO RACE VARIABLE): 39 ML/MIN/1.73 M^2
EST. GFR  (NO RACE VARIABLE): 42.2 ML/MIN/1.73 M^2
EST. GFR  (NO RACE VARIABLE): 50.1 ML/MIN/1.73 M^2
GLUCOSE SERPL-MCNC: 229 MG/DL (ref 70–110)
GLUCOSE SERPL-MCNC: 77 MG/DL (ref 70–110)
GLUCOSE SERPL-MCNC: 90 MG/DL (ref 70–110)
GLUCOSE SERPL-MCNC: 90 MG/DL (ref 70–110)
HCT VFR BLD AUTO: 36.8 % (ref 37–48.5)
HCT VFR BLD CALC: 39 %PCV (ref 36–54)
HCV AB SERPL QL IA: NORMAL
HGB BLD-MCNC: 12.9 G/DL (ref 12–16)
HIV 1+2 AB+HIV1 P24 AG SERPL QL IA: NORMAL
IMM GRANULOCYTES # BLD AUTO: 0.02 K/UL (ref 0–0.04)
IMM GRANULOCYTES NFR BLD AUTO: 0.5 % (ref 0–0.5)
LYMPHOCYTES # BLD AUTO: 0.7 K/UL (ref 1–4.8)
LYMPHOCYTES NFR BLD: 16.8 % (ref 18–48)
MAGNESIUM SERPL-MCNC: 1.9 MG/DL (ref 1.6–2.6)
MCH RBC QN AUTO: 34.8 PG (ref 27–31)
MCHC RBC AUTO-ENTMCNC: 35.1 G/DL (ref 32–36)
MCV RBC AUTO: 99 FL (ref 82–98)
MONOCYTES # BLD AUTO: 0.8 K/UL (ref 0.3–1)
MONOCYTES NFR BLD: 20.2 % (ref 4–15)
NEUTROPHILS # BLD AUTO: 2.2 K/UL (ref 1.8–7.7)
NEUTROPHILS NFR BLD: 57.8 % (ref 38–73)
NRBC BLD-RTO: 0 /100 WBC
OHS QRS DURATION: 72 MS
OHS QTC CALCULATION: 413 MS
PLATELET # BLD AUTO: 193 K/UL (ref 150–450)
PMV BLD AUTO: 10.8 FL (ref 9.2–12.9)
POC IONIZED CALCIUM: 1.17 MMOL/L (ref 1.06–1.42)
POC TCO2 (MEASURED): 21 MMOL/L (ref 23–29)
POCT GLUCOSE: 106 MG/DL (ref 70–110)
POCT GLUCOSE: 166 MG/DL (ref 70–110)
POCT GLUCOSE: 245 MG/DL (ref 70–110)
POTASSIUM BLD-SCNC: 5.4 MMOL/L (ref 3.5–5.1)
POTASSIUM SERPL-SCNC: 4.4 MMOL/L (ref 3.5–5.1)
POTASSIUM SERPL-SCNC: 4.9 MMOL/L (ref 3.5–5.1)
POTASSIUM SERPL-SCNC: 5.7 MMOL/L (ref 3.5–5.1)
PROT SERPL-MCNC: 6.7 G/DL (ref 6–8.4)
RBC # BLD AUTO: 3.71 M/UL (ref 4–5.4)
SAMPLE: ABNORMAL
SARS-COV-2 RDRP RESP QL NAA+PROBE: NEGATIVE
SODIUM BLD-SCNC: 131 MMOL/L (ref 136–145)
SODIUM SERPL-SCNC: 129 MMOL/L (ref 136–145)
SODIUM SERPL-SCNC: 131 MMOL/L (ref 136–145)
SODIUM SERPL-SCNC: 132 MMOL/L (ref 136–145)
WBC # BLD AUTO: 3.87 K/UL (ref 3.9–12.7)

## 2024-07-03 PROCEDURE — 94640 AIRWAY INHALATION TREATMENT: CPT

## 2024-07-03 PROCEDURE — 93005 ELECTROCARDIOGRAM TRACING: CPT

## 2024-07-03 PROCEDURE — 25000242 PHARM REV CODE 250 ALT 637 W/ HCPCS: Performed by: EMERGENCY MEDICINE

## 2024-07-03 PROCEDURE — 96365 THER/PROPH/DIAG IV INF INIT: CPT

## 2024-07-03 PROCEDURE — 80048 BASIC METABOLIC PNL TOTAL CA: CPT | Mod: 91,XB | Performed by: STUDENT IN AN ORGANIZED HEALTH CARE EDUCATION/TRAINING PROGRAM

## 2024-07-03 PROCEDURE — 87389 HIV-1 AG W/HIV-1&-2 AB AG IA: CPT | Performed by: PHYSICIAN ASSISTANT

## 2024-07-03 PROCEDURE — 63600175 PHARM REV CODE 636 W HCPCS: Performed by: STUDENT IN AN ORGANIZED HEALTH CARE EDUCATION/TRAINING PROGRAM

## 2024-07-03 PROCEDURE — 94761 N-INVAS EAR/PLS OXIMETRY MLT: CPT

## 2024-07-03 PROCEDURE — 80053 COMPREHEN METABOLIC PANEL: CPT | Performed by: EMERGENCY MEDICINE

## 2024-07-03 PROCEDURE — 36415 COLL VENOUS BLD VENIPUNCTURE: CPT | Performed by: STUDENT IN AN ORGANIZED HEALTH CARE EDUCATION/TRAINING PROGRAM

## 2024-07-03 PROCEDURE — U0002 COVID-19 LAB TEST NON-CDC: HCPCS | Performed by: STUDENT IN AN ORGANIZED HEALTH CARE EDUCATION/TRAINING PROGRAM

## 2024-07-03 PROCEDURE — 99223 1ST HOSP IP/OBS HIGH 75: CPT | Mod: ,,, | Performed by: INTERNAL MEDICINE

## 2024-07-03 PROCEDURE — 86803 HEPATITIS C AB TEST: CPT | Performed by: PHYSICIAN ASSISTANT

## 2024-07-03 PROCEDURE — 83735 ASSAY OF MAGNESIUM: CPT | Performed by: EMERGENCY MEDICINE

## 2024-07-03 PROCEDURE — 63600175 PHARM REV CODE 636 W HCPCS: Performed by: EMERGENCY MEDICINE

## 2024-07-03 PROCEDURE — 99999 PR PBB SHADOW E&M-EST. PATIENT-LVL IV: CPT | Mod: PBBFAC,,, | Performed by: INTERNAL MEDICINE

## 2024-07-03 PROCEDURE — 82962 GLUCOSE BLOOD TEST: CPT

## 2024-07-03 PROCEDURE — 25000003 PHARM REV CODE 250: Performed by: EMERGENCY MEDICINE

## 2024-07-03 PROCEDURE — 21400001 HC TELEMETRY ROOM

## 2024-07-03 PROCEDURE — 25000003 PHARM REV CODE 250: Performed by: STUDENT IN AN ORGANIZED HEALTH CARE EDUCATION/TRAINING PROGRAM

## 2024-07-03 PROCEDURE — 93010 ELECTROCARDIOGRAM REPORT: CPT | Mod: ,,, | Performed by: INTERNAL MEDICINE

## 2024-07-03 PROCEDURE — 99291 CRITICAL CARE FIRST HOUR: CPT

## 2024-07-03 PROCEDURE — 85025 COMPLETE CBC W/AUTO DIFF WBC: CPT | Performed by: EMERGENCY MEDICINE

## 2024-07-03 PROCEDURE — 99900035 HC TECH TIME PER 15 MIN (STAT)

## 2024-07-03 RX ORDER — CALCIUM GLUCONATE 20 MG/ML
1 INJECTION, SOLUTION INTRAVENOUS
Status: COMPLETED | OUTPATIENT
Start: 2024-07-03 | End: 2024-07-03

## 2024-07-03 RX ORDER — SODIUM CHLORIDE 0.9 % (FLUSH) 0.9 %
10 SYRINGE (ML) INJECTION EVERY 12 HOURS PRN
Status: DISCONTINUED | OUTPATIENT
Start: 2024-07-03 | End: 2024-07-04 | Stop reason: HOSPADM

## 2024-07-03 RX ORDER — ESTERIFIED ESTROGEN AND METHYLTESTOSTERONE 1.25; 2.5 MG/1; MG/1
1 TABLET ORAL DAILY
Status: DISCONTINUED | OUTPATIENT
Start: 2024-07-03 | End: 2024-07-04

## 2024-07-03 RX ORDER — GLUCAGON 1 MG
1 KIT INJECTION
Status: DISCONTINUED | OUTPATIENT
Start: 2024-07-03 | End: 2024-07-04 | Stop reason: HOSPADM

## 2024-07-03 RX ORDER — ALBUTEROL SULFATE 2.5 MG/.5ML
5 SOLUTION RESPIRATORY (INHALATION)
Status: COMPLETED | OUTPATIENT
Start: 2024-07-03 | End: 2024-07-03

## 2024-07-03 RX ORDER — ACYCLOVIR 200 MG/1
800 CAPSULE ORAL 2 TIMES DAILY
Status: DISCONTINUED | OUTPATIENT
Start: 2024-07-03 | End: 2024-07-04 | Stop reason: HOSPADM

## 2024-07-03 RX ORDER — IBUPROFEN 200 MG
24 TABLET ORAL
Status: DISCONTINUED | OUTPATIENT
Start: 2024-07-03 | End: 2024-07-04 | Stop reason: HOSPADM

## 2024-07-03 RX ORDER — IBUPROFEN 200 MG
16 TABLET ORAL
Status: DISCONTINUED | OUTPATIENT
Start: 2024-07-03 | End: 2024-07-04 | Stop reason: HOSPADM

## 2024-07-03 RX ORDER — ATOVAQUONE 750 MG/5ML
1500 SUSPENSION ORAL 2 TIMES DAILY
Qty: 210 ML | Refills: 2 | Status: SHIPPED | OUTPATIENT
Start: 2024-07-03

## 2024-07-03 RX ORDER — NALOXONE HCL 0.4 MG/ML
0.02 VIAL (ML) INJECTION
Status: DISCONTINUED | OUTPATIENT
Start: 2024-07-03 | End: 2024-07-04 | Stop reason: HOSPADM

## 2024-07-03 RX ORDER — HEPARIN SODIUM 5000 [USP'U]/ML
5000 INJECTION, SOLUTION INTRAVENOUS; SUBCUTANEOUS EVERY 8 HOURS
Status: DISCONTINUED | OUTPATIENT
Start: 2024-07-03 | End: 2024-07-04 | Stop reason: HOSPADM

## 2024-07-03 RX ORDER — ATOVAQUONE 750 MG/5ML
1500 SUSPENSION ORAL 2 TIMES DAILY
Status: DISCONTINUED | OUTPATIENT
Start: 2024-07-03 | End: 2024-07-04 | Stop reason: HOSPADM

## 2024-07-03 RX ORDER — GABAPENTIN 300 MG/1
300 CAPSULE ORAL 3 TIMES DAILY
Status: DISCONTINUED | OUTPATIENT
Start: 2024-07-03 | End: 2024-07-04 | Stop reason: HOSPADM

## 2024-07-03 RX ORDER — LANOLIN ALCOHOL/MO/W.PET/CERES
400 CREAM (GRAM) TOPICAL 2 TIMES DAILY
Qty: 60 TABLET | Refills: 11 | Status: SHIPPED | OUTPATIENT
Start: 2024-07-03

## 2024-07-03 RX ORDER — OXYCODONE HYDROCHLORIDE 10 MG/1
10 TABLET ORAL EVERY 6 HOURS PRN
Status: DISCONTINUED | OUTPATIENT
Start: 2024-07-03 | End: 2024-07-04 | Stop reason: HOSPADM

## 2024-07-03 RX ORDER — PANTOPRAZOLE SODIUM 40 MG/1
40 TABLET, DELAYED RELEASE ORAL EVERY MORNING
Status: DISCONTINUED | OUTPATIENT
Start: 2024-07-03 | End: 2024-07-04 | Stop reason: HOSPADM

## 2024-07-03 RX ORDER — ONDANSETRON 8 MG/1
8 TABLET, ORALLY DISINTEGRATING ORAL EVERY 8 HOURS PRN
Status: DISCONTINUED | OUTPATIENT
Start: 2024-07-03 | End: 2024-07-04 | Stop reason: HOSPADM

## 2024-07-03 RX ADMIN — OXYCODONE HYDROCHLORIDE 10 MG: 10 TABLET ORAL at 05:07

## 2024-07-03 RX ADMIN — ISAVUCONAZONIUM SULFATE 372 MG: 186 CAPSULE ORAL at 05:07

## 2024-07-03 RX ADMIN — GABAPENTIN 300 MG: 300 CAPSULE ORAL at 08:07

## 2024-07-03 RX ADMIN — DEXTROSE MONOHYDRATE 500 ML: 100 INJECTION, SOLUTION INTRAVENOUS at 01:07

## 2024-07-03 RX ADMIN — ACYCLOVIR 800 MG: 200 CAPSULE ORAL at 08:07

## 2024-07-03 RX ADMIN — SODIUM ZIRCONIUM CYCLOSILICATE 5 G: 5 POWDER, FOR SUSPENSION ORAL at 08:07

## 2024-07-03 RX ADMIN — SODIUM CHLORIDE 500 ML: 9 INJECTION, SOLUTION INTRAVENOUS at 12:07

## 2024-07-03 RX ADMIN — HEPARIN SODIUM 5000 UNITS: 5000 INJECTION INTRAVENOUS; SUBCUTANEOUS at 10:07

## 2024-07-03 RX ADMIN — ALBUTEROL SULFATE 5 MG: 2.5 SOLUTION RESPIRATORY (INHALATION) at 12:07

## 2024-07-03 RX ADMIN — PANTOPRAZOLE SODIUM 40 MG: 40 TABLET, DELAYED RELEASE ORAL at 05:07

## 2024-07-03 RX ADMIN — INSULIN HUMAN 7 UNITS: 100 INJECTION, SOLUTION PARENTERAL at 02:07

## 2024-07-03 RX ADMIN — CALCIUM GLUCONATE 1 G: 20 INJECTION, SOLUTION INTRAVENOUS at 12:07

## 2024-07-03 RX ADMIN — SODIUM ZIRCONIUM CYCLOSILICATE 5 G: 5 POWDER, FOR SUSPENSION ORAL at 05:07

## 2024-07-03 RX ADMIN — CYCLOSPORINE 150 MG: 100 CAPSULE, LIQUID FILLED ORAL at 06:07

## 2024-07-03 RX ADMIN — ATOVAQUONE 1500 MG: 750 SUSPENSION ORAL at 08:07

## 2024-07-03 NOTE — SUBJECTIVE & OBJECTIVE
Patient information was obtained from patient, past medical records, and ER records.     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 5/26/2023: Presented to hospital in [place] with 2 week h/o fevers, night sweats, weight loss, lymphadenopathy, myalgias, stomatitis, bruising, sore throat and found to have WBC 333K with 80% blasts c/f AML vs CML in acute blast crisis               B. 5/28/2023: Bone marrow biopsy - AML, FLT3 positive.               C. 5/30/2023: Began 7+3+midostaurin induction. Subsequent D14 and D28 BMBxs both showed no evidence of persistent leukemia, in CR1               D. 8/4/2023: BMBx showed recurrent AML with flow showing 23% myeloblasts.               E. 8/5/2023: Began azacitidine-venetoclax-gilteritinib (off protocol)              F. 8/31/2023: Bone marrow biopsy showed CR2              G. 9/21/2023: Dual cord blood allogeneic stem cell transplant with Flu/Cy/TT/TBI (4 Gy) conditioning. GVHD ppx with cyclosporine/MMF.               H. 10/19/2023: Bone marrow biopsy on day +28 - IRENE by path and hematologics flow.               I. 12/5/2023: Day +75 bone marrow biopsy - MRD-neg by flow, NPM1 ddPCR, FLT3 pcr. Full donor chimerisms              J. 3/27/2024: Day +180 bone marrow biopsy - no definitive morphologic or immunophenotypic evidence of residual AML. No pathogenic mutations detected. VUS MPL      (Not in a hospital admission)      Promethazine, Penicillin, Penicillins, and Melatonin     Past Medical History:   Diagnosis Date    ADHD (attention deficit hyperactivity disorder)     Anxiety     General anesthetics causing adverse effect in therapeutic use     REFLUX WITH SURGERY AND ASPIRATED, ZOFRAN GIVEN IV     Past Surgical History:   Procedure Laterality Date    BONE MARROW BIOPSY Left 3/27/2024    Procedure: Biopsy-bone marrow;  Surgeon: Shemar Franco MD;  Location: [address]);  Service:  Oncology;  Laterality: Left;  3/21-LVM for precall-KPvt  3/25-precall complete-Kpvt    BREAST RECONSTRUCTION Bilateral     BREAST SURGERY      CHOLECYSTECTOMY      DIAGNOSTIC LAPAROSCOPY N/A 2020    Procedure: LAPAROSCOPY, DIAGNOSTIC;  Surgeon: Ed Troy MD;  Location: [facility] OR;  Service: OB/GYN;  Laterality: N/A;    DILATION AND CURETTAGE OF UTERUS      EVACUATION OF HEMATOMA Left 2020    Procedure: EVACUATION, HEMATOMA;  Surgeon: Ed Troy MD;  Location: [facility] OR;  Service: OB/GYN;  Laterality: Left;    HYSTERECTOMY  2020    partial    LAPAROSCOPY-ASSISTED VAGINAL HYSTERECTOMY N/A 2020    Procedure: HYSTERECTOMY, VAGINAL, LAPAROSCOPY-ASSISTED;  Surgeon: Ed Troy MD;  Location: [facility] OR;  Service: OB/GYN;  Laterality: N/A;    LEFT KNEE      LEFT MENISCUS REPAIR    TONSILLECTOMY       Family History       Problem Relation (Age of Onset)    Breast cancer Mother, Maternal Grandmother    COPD Maternal Grandfather    Cancer Mother, Maternal Grandmother    Heart disease Paternal Grandfather          Tobacco Use    Smoking status: Former     Current packs/day: 0.00     Types: Cigarettes     Quit date: 2011     Years since quittin.0    Smokeless tobacco: Never   Substance and Sexual Activity    Alcohol use: Yes     Alcohol/week: 0.0 - 0.8 standard drinks of alcohol     Comment: socially    Drug use: No    Sexual activity: Not on file       Review of Systems   Constitutional:  Negative for chills and fever.   HENT:  Negative for congestion and sore throat.    Eyes:  Negative for pain.   Respiratory:  Negative for cough and shortness of breath.    Cardiovascular:  Negative for chest pain, palpitations and leg swelling.   Gastrointestinal:  Negative for abdominal pain, constipation, diarrhea, nausea and vomiting.   Genitourinary:  Negative for difficulty urinating, dysuria and hematuria.   Musculoskeletal:  Negative for back pain.   Skin:  Negative for rash.    Neurological:  Negative for light-headedness and headaches.   Hematological:  Does not bruise/bleed easily.   Psychiatric/Behavioral:  Negative for agitation.      Objective:     Vital Signs (Most Recent):  Temp: 98.1 °F (36.7 °C) (07/03/24 1140)  Pulse: 79 (07/03/24 1330)  Resp: 12 (07/03/24 1330)  BP: (!) 154/80 (07/03/24 1330)  SpO2: 99 % (07/03/24 1330) Vital Signs (24h Range):  Temp:  [97.8 °F (36.6 °C)-98.1 °F (36.7 °C)] 98.1 °F (36.7 °C)  Pulse:  [57-79] 79  Resp:  [12-18] 12  SpO2:  [98 %-100 %] 99 %  BP: (142-154)/(65-89) 154/80     Weight: 72.1 kg (159 lb)  Body mass index is 27.29 kg/m².  Body surface area is 1.8 meters squared.    ECOG SCORE           [unfilled]    Lines/Drains/Airways       Peripheral Intravenous Line  Duration                  Peripheral IV - Single Lumen 07/03/24 1218 20 G Right Antecubital <1 day                     Physical Exam  Constitutional:       Appearance: Normal appearance.   HENT:      Head: Normocephalic and atraumatic.      Mouth/Throat:      Mouth: Mucous membranes are moist.   Eyes:      Extraocular Movements: Extraocular movements intact.      Pupils: Pupils are equal, round, and reactive to light.   Cardiovascular:      Rate and Rhythm: Normal rate and regular rhythm.      Pulses: Normal pulses.      Heart sounds: Normal heart sounds.   Pulmonary:      Effort: Pulmonary effort is normal. No respiratory distress.      Breath sounds: Normal breath sounds.   Abdominal:      General: Abdomen is flat. There is no distension.      Palpations: Abdomen is soft.      Tenderness: There is no abdominal tenderness.   Musculoskeletal:         General: Normal range of motion.      Cervical back: Normal range of motion and neck supple.      Right lower leg: No edema.      Left lower leg: No edema.   Skin:     General: Skin is warm.   Neurological:      General: No focal deficit present.      Mental Status: She is alert and oriented to person, place, and time.   Psychiatric:          Mood and Affect: Mood normal.         Behavior: Behavior normal.            Significant Labs:   All pertinent labs from the last 24 hours have been reviewed.    Diagnostic Results:  I have reviewed all pertinent imaging results/findings within the past 24 hours.

## 2024-07-03 NOTE — PROGRESS NOTES
HEMATOLOGIC MALIGNANCIES PROGRESS NOTE    IDENTIFYING STATEMENT   Thai Villalba (Thai) is a 49 y.o. female with a  of 1975 from [place], [place] with the diagnosis of acute myeloie leukemia.      ONCOLOGY HISTORY:    1. 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 2023: Presented to hospital in [place] with 2 week h/o fevers, night sweats, weight loss, lymphadenopathy, myalgias, stomatitis, bruising, sore throat and found to have WBC 333K with 80% blasts c/f AML vs CML in acute blast crisis               B. 2023: Bone marrow biopsy - AML, FLT3 positive.               C. 2023: Began 7+3+midostaurin induction. Subsequent D14 and D28 BMBxs both showed no evidence of persistent leukemia, in CR1               D. 2023: BMBx showed recurrent AML with flow showing 23% myeloblasts.               E. 2023: Began azacitidine-venetoclax-gilteritinib (off protocol)              F. 2023: Bone marrow biopsy showed CR2              G. 2023: Dual cord blood allogeneic stem cell transplant with Flu/Cy/TT/TBI (4 Gy) conditioning. GVHD ppx with cyclosporine/MMF.               H. 10/19/2023: Bone marrow biopsy on day +28 - IRENE by path and hematologics flow.               I. 2023: Day +75 bone marrow biopsy - MRD-neg by flow, NPM1 ddPCR, FLT3 pcr. Full donor chimerisms   J. 3/27/2024: Day +180 bone marrow biopsy - no definitive morphologic or immunophenotypic evidence of residual AML. No pathogenic mutations detected. VUS MPL      2. Attention deficit disorder  3. Uterine leiomyoma    INTERVAL HISTORY:      Ms. Villalba is seen in follow-up of AML with history of dual cord blood allogeneic stem cell transplant. Today is day +286 after alloSCT. She is overall feeling generally well with the exception that since about 2 weeks ago, she started having constant bone and nerve pain. She feels the bone pain is the same as when she previously had  radiation. She has tried different shoes but symptoms, which has not helped. She has to have shoes on both feet to walk. The pain goes as far as to the ankles occasionally. She is taking oxycodone with some relief. No mouth sores. No rash at this time. No diarrhea.     Regarding her SIADH, her fluid intake was liberalized last visit. She is drinking 8-10 glasses of water per day. She is eating well.     Past Medical History, Past Social History and Past Family History have been reviewed and are unchanged except as noted in the interval history.    MEDICATIONS:     Current Outpatient Medications on File Prior to Visit   Medication Sig Dispense Refill    acyclovir (ZOVIRAX) 200 MG capsule Take 4 capsules twice a day for prophylaxis 240 capsule 9    cholecalciferol, vitamin D3, 10 mcg (400 unit) Chew Take 1,000 Int'l Units by mouth once daily.      cycloSPORINE modified, NEORAL, (NEORAL) 100 MG capsule Take 1 capsule (100mg) in addition to two 50mg capsules (to equal 150mg/dose) twice a day. (Patient taking differently: Take 100 mg by mouth 2 (two) times daily. Take 1 capsule (100mg) in addition to two 50mg capsules (to equal 150mg/dose) twice a day.) 60 capsule 11    cycloSPORINE modified, NEORAL, (NEORAL) 25 MG capsule Take 2 capsules (50mg) in addition to one 100mg capsule (to equal 150mg/dose) twice a day. (Patient taking differently: Take 50 mg by mouth 2 (two) times daily. Take 2 capsules (50mg) in addition to one 100mg capsule (to equal 150mg/dose) twice a day.) 240 capsule 10    estrogens,esterified,-methyltestosterone 1.25-2.5mg (EEMT) per tablet Take 1 tablet by mouth once daily. 90 tablet 1    gabapentin (NEURONTIN) 300 MG capsule Take 1 capsule by mouth three times a day 90 capsule 3    gilteritinib 40 mg Tab Take 2 tablets (80 mg) by mouth Daily. 60 tablet 11    isavuconazonium sulfate (CRESEMBA) 186 mg Cap Take 2 capsules by mouth three times a day for the first 2 days, then 2 capsules once a day  thereafter 70 capsule 5    oxyCODONE (ROXICODONE) 10 mg Tab immediate release tablet 1 tablet by mouth three times a day as needed for pain 90 tablet 0    pantoprazole (PROTONIX) 40 MG tablet Take 40 mg by mouth every morning.      tacrolimus (PROTOPIC) 0.1 % ointment Apply topically 2 times daily for Atopic Dermatitis, can apply to lips or inside mouth if needed. (Patient taking differently: Apply 1 application  topically 2 (two) times daily.) 100 g 1    triamcinolone acetonide 0.1% (KENALOG) 0.1 % cream Apply topically 2 (two) times daily. To areas of rash/dry skin. (Patient taking differently: Apply 1 g topically 2 (two) times daily. To areas of rash/dry skin.) 80 g 2    [DISCONTINUED] magnesium oxide (MAG-OX) 400 mg (241.3 mg magnesium) tablet Take 400 mg by mouth 2 (two) times daily.      [DISCONTINUED] sodium zirconium cyclosilicate (LOKELMA) 5 gram packet Take 1 packet (5 g total) by mouth once daily. Mix entire contents of packet(s) into drinking glass containing 3 tablespoons of water; stir well and drink immediately. Add water and repeat until no powder remains to receive entire dose. 30 packet 1    [DISCONTINUED] sulfamethoxazole-trimethoprim 800-160mg (BACTRIM DS) 800-160 mg Tab Take 1 tablet twice a day for  2 days a week for pneumocyctis jiroveci pneumonia prevention 12 tablet 15    dexAMETHasone 0.5 mg/5 mL Soln solution Take 10 mLs (1 mg total) by mouth every 12 (twelve) hours. Swish and spit. Do not swallow. (Patient not taking: Reported on 5/15/2024) 500 mL 1    duke's soln (benadryl 30 mL, mylanta 30 mL, LIDOcaine 30 mL, nystatin 30 mL) 120mL Take 10 mLs by mouth 4 (four) times daily. (Patient not taking: Reported on 5/15/2024) 120 mL 0    [DISCONTINUED] gabapentin (NEURONTIN) 300 MG capsule Take 1 capsule by mouth three times a day 90 capsule 5    [DISCONTINUED] oxyCODONE (ROXICODONE) 10 mg Tab immediate release tablet 1 tablet by mouth three times a day as needed for pain 90 tablet 0     No  "current facility-administered medications on file prior to visit.       ALLERGIES:   Review of patient's allergies indicates:   Allergen Reactions    Promethazine Nausea And Vomiting     Other Reaction(s): VOMITING    Penicillin Hives    Penicillins      Other reaction(s): Unknown    Melatonin Anxiety     Other Reaction(s): FLUSHING        ROS:       Review of Systems   Constitutional:  Negative for appetite change, diaphoresis, fatigue, fever and unexpected weight change.   HENT:   Negative for lump/mass and sore throat.    Eyes:  Negative for icterus.   Respiratory:  Negative for cough and shortness of breath.    Cardiovascular:  Negative for chest pain and palpitations.   Gastrointestinal:  Negative for abdominal distention, constipation, diarrhea, nausea and vomiting.   Genitourinary:  Negative for dysuria and frequency.    Musculoskeletal:  Negative for arthralgias, gait problem and myalgias.        +bone pain   Skin:  Negative for rash.   Neurological:  Negative for dizziness, gait problem and headaches.   Hematological:  Negative for adenopathy. Does not bruise/bleed easily.   Psychiatric/Behavioral:  Negative for confusion. The patient is not nervous/anxious.        PHYSICAL EXAM:  Vitals:    07/03/24 0842   BP: (!) 142/89   Pulse: 61   Resp: 18   Temp: 97.8 °F (36.6 °C)   TempSrc: Oral   SpO2: 98%   Weight: 72.3 kg (159 lb 4.5 oz)   Height: 5' 4" (1.626 m)   PainSc:   3   PainLoc: Back         Physical Exam  Constitutional:       General: She is not in acute distress.     Appearance: She is well-developed.   HENT:      Head: Normocephalic and atraumatic.      Mouth/Throat:      Mouth: No oral lesions.   Eyes:      Conjunctiva/sclera: Conjunctivae normal.   Neck:      Thyroid: No thyromegaly.   Cardiovascular:      Rate and Rhythm: Normal rate and regular rhythm.      Heart sounds: Normal heart sounds. No murmur heard.  Pulmonary:      Breath sounds: Normal breath sounds. No wheezing or rales.   Abdominal: "      General: There is no distension.      Palpations: Abdomen is soft. There is no hepatomegaly, splenomegaly or mass.      Tenderness: There is no abdominal tenderness.   Lymphadenopathy:      Cervical: No cervical adenopathy.      Right cervical: No deep cervical adenopathy.     Left cervical: No deep cervical adenopathy.   Skin:     Findings: No rash.   Neurological:      Mental Status: She is alert and oriented to person, place, and time.      Cranial Nerves: No cranial nerve deficit.      Coordination: Coordination normal.      Deep Tendon Reflexes: Reflexes are normal and symmetric.         LAB:   Results for orders placed or performed in visit on 07/02/24   CBC Auto Differential   Result Value Ref Range    WBC 3.60 (L) 3.90 - 12.70 K/uL    RBC 3.60 (L) 4.00 - 5.40 M/uL    Hemoglobin 12.7 12.0 - 16.0 g/dL    Hematocrit 34.5 (L) 37.0 - 48.5 %    MCV 96 82 - 98 fL    MCH 35.3 (H) 27.0 - 31.0 pg    MCHC 36.8 (H) 32.0 - 36.0 g/dL    RDW 14.6 (H) 11.5 - 14.5 %    Platelets 187 150 - 450 K/uL    MPV 10.1 9.2 - 12.9 fL    Immature Granulocytes 0.6 (H) 0.0 - 0.5 %    Gran # (ANC) 1.5 (L) 1.8 - 7.7 K/uL    Immature Grans (Abs) 0.02 0.00 - 0.04 K/uL    Lymph # 1.0 1.0 - 4.8 K/uL    Mono # 0.8 0.3 - 1.0 K/uL    Eos # 0.2 0.0 - 0.5 K/uL    Baso # 0.01 0.00 - 0.20 K/uL    nRBC 0 0 /100 WBC    Gran % 42.1 38.0 - 73.0 %    Lymph % 28.1 18.0 - 48.0 %    Mono % 22.2 (H) 4.0 - 15.0 %    Eosinophil % 6.7 0.0 - 8.0 %    Basophil % 0.3 0.0 - 1.9 %    Differential Method Automated    Comprehensive Metabolic Panel   Result Value Ref Range    Sodium 127 (L) 136 - 145 mmol/L    Potassium 6.0 (H) 3.5 - 5.1 mmol/L    Chloride 101 95 - 110 mmol/L    CO2 17 (L) 23 - 29 mmol/L    Glucose 95 70 - 110 mg/dL    BUN 22 (H) 6 - 20 mg/dL    Creatinine 1.4 0.5 - 1.4 mg/dL    Calcium 8.6 (L) 8.7 - 10.5 mg/dL    Total Protein 6.1 6.0 - 8.4 g/dL    Albumin 3.3 (L) 3.5 - 5.2 g/dL    Total Bilirubin 0.8 0.1 - 1.0 mg/dL    Alkaline Phosphatase 199 (H)  55 - 135 U/L     (H) 10 - 40 U/L     (H) 10 - 44 U/L    eGFR 45.8 (A) >60 mL/min/1.73 m^2    Anion Gap 9 8 - 16 mmol/L   Magnesium   Result Value Ref Range    Magnesium 1.6 1.6 - 2.6 mg/dL   PHOSPHORUS   Result Value Ref Range    Phosphorus 3.3 2.7 - 4.5 mg/dL   CYCLOSPORINE LEVEL   Result Value Ref Range    Cyclosporine, LC/ 100 - 400 ng/mL       PROBLEMS ASSESSED THIS VISIT:    1. Acute myeloid leukemia in remission    2. History of allogeneic stem cell transplant    3. Hyperkalemia    4. Hyponatremia    5. Immunodeficiency due to drug therapy    6. Immunodeficiency due to external cause    7. Hypomagnesemia          PLAN:       Acute myeloid leukemia  Diagnosed with FLT3 mutated AML. She achieved CR1 with 7+3+midostaurin induction but relapsed prior to allogeneic stem cell transplant. She achieved CR2 with aza/ameya/gilteritinib. Ms. Villalba received dual cord blood allogeneic stem cell transplant and has maintained CR on follow-up bone marrow biopsy.   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 Per Hepatology, plan for MRI abdomen and consider liver biopsy pending labs and imaging.  - Currently on cyclosporine to 150 mg PO BID with therapeutic levels - will continue     Infectious Disease  - see GVHD  above for current immunosuppressive regimen  - antimicrobial ppx:   - Acyclovir 800 mg BID until 12 months post tx, then decrease to 400 mg BID until 5 year post-transplant jose  - Adjusted Bactrim DS to atovaquone due to hyperkalemia - continue until 12 months post tx and off IST  - Letermovir ppx until 6 months post tx.   - Previously discussed consideration of antifungal ppx as per our institutional standard - continue isavuconazonium sulfate  - Prior/resolved infections:              - 9/29/2023: C. Diff - treated with fidaxomicin              - 10/7/2023: Suspected fungal infection based on abnormal CT, treated with isavuconazonium sulfate              - 11/6/2023: UTI - treated with levofloxacin              - 11/8/2023: BK virus              - 11/13/2023: Pneumonia diagnosed on CXR - treated with cefpodoxime     Cytopenias  - None clinically significant  - Continue to monitor with routine labs monitoring     Hyponatremia/Hyperkalemia  - Hospitalized in May with hyponatremia and diagnosed with SIADH. Hyponatremia improved with fluid restriction, however, she then developed MAURA so fluids were liberalized.  - Hyperkalemia possibly medication related. Adjust bactrim to atovaquone ppx. Continue lokelma daily.   - Labs notable for worsening hyponatremia, hyperkalemia, non-anion gap metabolic acidosis, concerning for adrenal insufficiency. Obtain 8AM cortisol and ACTH.  - EKG ordered due to worsening hyperkalemia, however, referred to ER today 7/3/24 for above.     Chronic pain  - Neuropathic pain secondary to lumbar radiculopathy               - Gabapentin 300 mg PO TID              - Oxycodone 10-15 mg q4prn    Weight gain  - reports she is gaining weight - following with dietician     Hormone replacement therapy  - on oral estrogen     Follow-up  Weekly labs monitoring  12 month bone marrow to be done at [facility]  Follow up in 1 month         Discussed with Dr. Franco.    Jolene Wood MD   Hematology  and Oncology Fellow, PGY VI  Hematology and Stem Cell Transplant

## 2024-07-03 NOTE — PLAN OF CARE
Patient received from ER at 5;30 pm.Vitals sign monitored.SpO2 maintained in room air.Patient is alert and oriented.Walks independently.Complained of pain gave PRN oxycodone.Bed in low and locked position.call light and personal items within a reach.

## 2024-07-03 NOTE — ED NOTES
I-STAT Chem-8+ Results:   Value Reference Range   Sodium 131 136-145 mmol/L   Potassium  5.4 3.5-5.1 mmol/L   Chloride 101  mmol/L   Ionized Calcium 1.17 1.06-1.42 mmol/L   CO2 (measured) 21 23-29 mmol/L   Glucose 90  mg/dL   BUN 24 6-30 mg/dL   Creatinine 1.7 0.5-1.4 mg/dL   Hematocrit 39 36-54%

## 2024-07-03 NOTE — ASSESSMENT & PLAN NOTE
- Possibly medication induced, follows with hepatology   - Outpatient MRCP scheduled for 7/5, may need biopsy depending on imaging results   - Patient states this happened before with cresemba and her liver labs improved when it was discontinued, may need to consider switching antifungal prophylaxis if workup remains negative   - Check liver labs daily

## 2024-07-03 NOTE — ASSESSMENT & PLAN NOTE
Patient of Dr. Franco with FLT3 AML who is 286 days post Flu/Cy/TT/TBI dual cord blood allogeneic stem cell transplant. She had her transplant at [facility] on 9/21/23. She is currently admitted with hyperkalemia.     - Bactrim switched to atovaquone due to hyperkalemia   - Continue maintenance gilteritinib 80mg daily; plan for 2 years total of maintenance  - Continue Acyclovir 800mg BID, Cyclosporine 150mg BID, and Isovuconazonium sulfate   - Day +180 bone marrow biopsy with no evidence of residual AML  - 12 month bone marrow will be done at [facility]  - Had GVHD of of her skin on her face/neck and that improved with topical tacro/steroids, states she hasn't need to use the topical treatments for weeks

## 2024-07-03 NOTE — H&P
[facility] - Emergency Dept  Hematology  Bone Marrow Transplant  H&P    Subjective:     Principal Problem: Hyperkalemia    HPI: Ms. Thai Villalba is a 49 y.o. female  is a 49 y.o. 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 She had an appointment with her oncologist Dr. Franco and labs revealed a K of 6.4 and Cr of 1.7 (baseline around 1.4). She also had a 8am cortisol level which was 13.00 but was drawn at 10am. She was admitted in May 2024 with hyponatremia and was diagnosed with SIADH. She denies any symptoms at this time. Her potassium has been slightly elevates since May 2024 and she was started on daily lokelma and in the last couple days the potassium has been gradually increasing.     In the ED patient was afebrile and vitals were stable. Labs revealed a K of 5.7. She was shifted with insulin/albuterol, was given IVF, and calcium gluconate.     Patient information was obtained from patient, past medical records, and ER records.     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 5/26/2023: Presented to hospital in [place] with 2 week h/o fevers, night sweats, weight loss, lymphadenopathy, myalgias, stomatitis, bruising, sore throat and found to have WBC 333K with 80% blasts c/f AML vs CML in acute blast crisis               B. 5/28/2023: Bone marrow biopsy - AML, FLT3 positive.               C. 5/30/2023: Began 7+3+midostaurin induction. Subsequent D14 and D28 BMBxs both showed no evidence of persistent leukemia, in CR1               D. 8/4/2023: BMBx showed recurrent AML with flow showing 23% myeloblasts.               E. 8/5/2023: Began azacitidine-venetoclax-gilteritinib (off protocol)              F. 8/31/2023: Bone marrow biopsy showed CR2              G. 9/21/2023: Dual cord blood allogeneic stem cell transplant with Flu/Cy/TT/TBI (4 Gy) conditioning. GVHD ppx with cyclosporine/MMF.                H. 10/19/2023: Bone marrow biopsy on day +28 - IRENE by path and hematologics flow.               I. 12/5/2023: Day +75 bone marrow biopsy - MRD-neg by flow, NPM1 ddPCR, FLT3 pcr. Full donor chimerisms              J. 3/27/2024: Day +180 bone marrow biopsy - no definitive morphologic or immunophenotypic evidence of residual AML. No pathogenic mutations detected. VUS MPL      (Not in a hospital admission)      Promethazine, Penicillin, Penicillins, and Melatonin     Past Medical History:   Diagnosis Date    ADHD (attention deficit hyperactivity disorder)     Anxiety     General anesthetics causing adverse effect in therapeutic use     REFLUX WITH SURGERY AND ASPIRATED, ZOFRAN GIVEN IV     Past Surgical History:   Procedure Laterality Date    BONE MARROW BIOPSY Left 3/27/2024    Procedure: Biopsy-bone marrow;  Surgeon: Shemar Franco MD;  Location: [address];  Service: Oncology;  Laterality: Left;  3/21-LVM for precall-KPvt  3/25-precall complete-Kpvt    BREAST RECONSTRUCTION Bilateral 2007    BREAST SURGERY      CHOLECYSTECTOMY      DIAGNOSTIC LAPAROSCOPY N/A 12/23/2020    Procedure: LAPAROSCOPY, DIAGNOSTIC;  Surgeon: Ed Troy MD;  Location: [facility];  Service: OB/GYN;  Laterality: N/A;    DILATION AND CURETTAGE OF UTERUS      EVACUATION OF HEMATOMA Left 12/23/2020    Procedure: EVACUATION, HEMATOMA;  Surgeon: Ed Troy MD;  Location: [facility] OR;  Service: OB/GYN;  Laterality: Left;    HYSTERECTOMY  12/2020    partial    LAPAROSCOPY-ASSISTED VAGINAL HYSTERECTOMY N/A 12/22/2020    Procedure: HYSTERECTOMY, VAGINAL, LAPAROSCOPY-ASSISTED;  Surgeon: Ed Troy MD;  Location: [facility];  Service: OB/GYN;  Laterality: N/A;    LEFT KNEE      LEFT MENISCUS REPAIR    TONSILLECTOMY       Family History       Problem Relation (Age of Onset)    Breast cancer Mother, Maternal Grandmother    COPD Maternal Grandfather    Cancer Mother, Maternal Grandmother    Heart disease Paternal  Grandfather          Tobacco Use    Smoking status: Former     Current packs/day: 0.00     Types: Cigarettes     Quit date: 2011     Years since quittin.0    Smokeless tobacco: Never   Substance and Sexual Activity    Alcohol use: Yes     Alcohol/week: 0.0 - 0.8 standard drinks of alcohol     Comment: socially    Drug use: No    Sexual activity: Not on file       Review of Systems   Constitutional:  Negative for chills and fever.   HENT:  Negative for congestion and sore throat.    Eyes:  Negative for pain.   Respiratory:  Negative for cough and shortness of breath.    Cardiovascular:  Negative for chest pain, palpitations and leg swelling.   Gastrointestinal:  Negative for abdominal pain, constipation, diarrhea, nausea and vomiting.   Genitourinary:  Negative for difficulty urinating, dysuria and hematuria.   Musculoskeletal:  Negative for back pain.   Skin:  Negative for rash.   Neurological:  Negative for light-headedness and headaches.   Hematological:  Does not bruise/bleed easily.   Psychiatric/Behavioral:  Negative for agitation.      Objective:     Vital Signs (Most Recent):  Temp: 98.1 °F (36.7 °C) (24 1140)  Pulse: 79 (24 1330)  Resp: 12 (24 1330)  BP: (!) 154/80 (24 1330)  SpO2: 99 % (24 1330) Vital Signs (24h Range):  Temp:  [97.8 °F (36.6 °C)-98.1 °F (36.7 °C)] 98.1 °F (36.7 °C)  Pulse:  [57-79] 79  Resp:  [12-18] 12  SpO2:  [98 %-100 %] 99 %  BP: (142-154)/(65-89) 154/80     Weight: 72.1 kg (159 lb)  Body mass index is 27.29 kg/m².  Body surface area is 1.8 meters squared.    ECOG SCORE           [unfilled]    Lines/Drains/Airways       Peripheral Intravenous Line  Duration                  Peripheral IV - Single Lumen 24 1218 20 G Right Antecubital <1 day                     Physical Exam  Constitutional:       Appearance: Normal appearance.   HENT:      Head: Normocephalic and atraumatic.      Mouth/Throat:      Mouth: Mucous membranes are moist.   Eyes:       Extraocular Movements: Extraocular movements intact.      Pupils: Pupils are equal, round, and reactive to light.   Cardiovascular:      Rate and Rhythm: Normal rate and regular rhythm.      Pulses: Normal pulses.      Heart sounds: Normal heart sounds.   Pulmonary:      Effort: Pulmonary effort is normal. No respiratory distress.      Breath sounds: Normal breath sounds.   Abdominal:      General: Abdomen is flat. There is no distension.      Palpations: Abdomen is soft.      Tenderness: There is no abdominal tenderness.   Musculoskeletal:         General: Normal range of motion.      Cervical back: Normal range of motion and neck supple.      Right lower leg: No edema.      Left lower leg: No edema.   Skin:     General: Skin is warm.   Neurological:      General: No focal deficit present.      Mental Status: She is alert and oriented to person, place, and time.   Psychiatric:         Mood and Affect: Mood normal.         Behavior: Behavior normal.            Significant Labs:   All pertinent labs from the last 24 hours have been reviewed.    Diagnostic Results:  I have reviewed all pertinent imaging results/findings within the past 24 hours.  Assessment/Plan:     Neuro  Chronic pain  - Neuropathic pain secondary to lumbar radiculopathy   - Gabapentin 300 mg PO TID  - Oxycodone 10 mg q4prn    Renal/  * Hyperkalemia  Patient with AML s/p allo transplant was sent to the ED for hyperkalemia that was found on outpatient labs. K of 6.4 on outpatient labs and was 5.7 in the ED. Hyperkalemia is likely secondary to MAURA, possible adrenal insufficiency, and/or bactrim/cyclosporine use. In the ED patient was given calcium gluconate, a NS bolus, and was shifted with insulin/albuterol.     - Patient shifted in the ED, will trend potassium every 4 hours   - Lokelma TID (only takes it once daily at home)   - If needed can give lasix but there is concern for pre-renal MAURA   - Bactrim switched to atovaquone   - Will repeat 8am  "cortisol tomorrow as lab today was drawn at 10am   - May need cosyntropin test depending on cortisol level   - If no improvement in hyperkalemia and testing is negative for adrenal insufficiency will need to consider cyclosporine as the etiology     Oncology  History of allogeneic stem cell transplant  See "AML"    AML (acute myeloblastic leukemia)  Patient of Dr. Franco with FLT3 AML who is 286 days post Flu/Cy/TT/TBI dual cord blood allogeneic stem cell transplant. She had her transplant at [facility] on 9/21/23. She is currently admitted with hyperkalemia.     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 and that improved with topical tacro/steroids, states she hasn't need to use the topical treatments for weeks       Endocrine  Hyponatremia  - Diagnosed with SIADH in May 2024   - Na 131 on admission   - Was being fluid restricted but had an increase in Cr so restriction was decreased   - Giving IVF for hyperkalemia, if Na worsens will need to start fluid restriction again       GI  Transaminitis  - Possibly medication induced, follows with hepatology   - Outpatient MRCP scheduled for 7/5, may need biopsy depending on imaging results   - Patient states this happened before with cresemba and her liver labs improved when it was discontinued, may need to consider switching antifungal prophylaxis if workup remains negative   - Check liver labs daily         VTE Risk Mitigation (From admission, onward)           Ordered     heparin (porcine) injection 5,000 Units  Every 8 hours         07/03/24 1407     IP VTE HIGH RISK PATIENT  Once         07/03/24 1407 "     Place sequential compression device  Until discontinued         07/03/24 9896                    Shabbir Jones MD  Bone Marrow Transplant  Hematology  [facility] - Emergency Dept

## 2024-07-03 NOTE — ASSESSMENT & PLAN NOTE
Patient with AML s/p allo transplant was sent to the ED for hyperkalemia that was found on outpatient labs. K of 6.4 on outpatient labs and was 5.7 in the ED. Hyperkalemia is likely secondary to MAURA, possible adrenal insufficiency, and/or bactrim/cyclosporine use. In the ED patient was given calcium gluconate, a NS bolus, and was shifted with insulin/albuterol.     - Patient shifted in the ED, will trend potassium every 4 hours   - Lokelma TID (only takes it once daily at home)   - If needed can give lasix but there is concern for pre-renal MAURA   - Bactrim switched to atovaquone   - Will repeat 8am cortisol tomorrow as lab today was drawn at 10am   - May need cosyntropin test depending on cortisol level   - If no improvement in hyperkalemia and testing is negative for adrenal insufficiency will need to consider cyclosporine as the etiology

## 2024-07-03 NOTE — ED PROVIDER NOTES
Encounter Date: 7/3/2024       History     Chief Complaint   Patient presents with    abnormal labs     Pt was seen at oncology clinic this am and was sent to ED for elevated potassium. Pt had bone marrow transplant in Sept.      49-year-old female with a history of AML status post bone marrow transplant on multiple rejection medications presents with elevated potassium.  Sent in by Oncology.  She has had this before and was admitted in [place].  The etiology of the hyperkalemia was never established.  She feels fine and has no acute complaint.  Patient denies nausea, vomiting, diarrhea, fever, cough, shortness of breath, chest pain, abdominal pain, or dysuria.  The patients available PMH, PSH, Social History, medications, allergies, and triage vital signs were reviewed just prior to their medical evaluation.         Review of patient's allergies indicates:   Allergen Reactions    Promethazine Nausea And Vomiting     Other Reaction(s): VOMITING    Penicillin Hives    Penicillins      Other reaction(s): Unknown    Melatonin Anxiety     Other Reaction(s): FLUSHING     Past Medical History:   Diagnosis Date    ADHD (attention deficit hyperactivity disorder)     Anxiety     General anesthetics causing adverse effect in therapeutic use     REFLUX WITH SURGERY AND ASPIRATED, ZOFRAN GIVEN IV     Past Surgical History:   Procedure Laterality Date    BONE MARROW BIOPSY Left 3/27/2024    Procedure: Biopsy-bone marrow;  Surgeon: Shemar Franco MD;  Location: [facility] ([address] [place] [address]);  Service: Oncology;  Laterality: Left;  3/21-LVM for precall-KPvt  3/25-precall complete-Kpvt    BREAST RECONSTRUCTION Bilateral 2007    BREAST SURGERY      CHOLECYSTECTOMY      DIAGNOSTIC LAPAROSCOPY N/A 12/23/2020    Procedure: LAPAROSCOPY, DIAGNOSTIC;  Surgeon: Ed Troy MD;  Location: [facility];  Service: OB/GYN;  Laterality: N/A;    DILATION AND CURETTAGE OF UTERUS      EVACUATION OF HEMATOMA Left 12/23/2020    Procedure: EVACUATION,  HEMATOMA;  Surgeon: Ed Troy MD;  Location: [facility] OR;  Service: OB/GYN;  Laterality: Left;    HYSTERECTOMY  2020    partial    LAPAROSCOPY-ASSISTED VAGINAL HYSTERECTOMY N/A 2020    Procedure: HYSTERECTOMY, VAGINAL, LAPAROSCOPY-ASSISTED;  Surgeon: Ed Troy MD;  Location: [facility] OR;  Service: OB/GYN;  Laterality: N/A;    LEFT KNEE      LEFT MENISCUS REPAIR    TONSILLECTOMY       Family History   Problem Relation Name Age of Onset    Cancer Mother          Breast    Breast cancer Mother          age unknown    Cancer Maternal Grandmother          Breast    Breast cancer Maternal Grandmother          70s and 80s    COPD Maternal Grandfather      Heart disease Paternal Grandfather       Social History     Tobacco Use    Smoking status: Former     Current packs/day: 0.00     Types: Cigarettes     Quit date: 2011     Years since quittin.0    Smokeless tobacco: Never   Substance Use Topics    Alcohol use: Yes     Alcohol/week: 0.0 - 0.8 standard drinks of alcohol     Comment: socially    Drug use: No     Review of Systems   Constitutional:  Negative for fever.   Respiratory:  Negative for cough and shortness of breath.    Cardiovascular:  Negative for chest pain.   Gastrointestinal:  Negative for abdominal pain, diarrhea, nausea and vomiting.   Genitourinary:  Negative for dysuria.       Physical Exam     Initial Vitals [24 1140]   BP Pulse Resp Temp SpO2   (!) 146/65 63 17 98.1 °F (36.7 °C) 99 %      MAP       --         Physical Exam    Nursing note and vitals reviewed.  Constitutional: She appears well-developed and well-nourished. She is not diaphoretic. No distress.   HENT:   Head: Normocephalic and atraumatic.   Nose: Nose normal.   Eyes: Conjunctivae are normal. Right eye exhibits no discharge. Left eye exhibits no discharge.   Neck: Neck supple.   Normal range of motion.  Cardiovascular:  Normal rate, regular rhythm and normal heart sounds.     Exam reveals no gallop and  no friction rub.       No murmur heard.  Pulmonary/Chest: Breath sounds normal. No respiratory distress. She has no wheezes. She has no rhonchi. She has no rales.   Abdominal: Abdomen is soft. She exhibits no distension. There is no abdominal tenderness. There is no rebound and no guarding.   Musculoskeletal:         General: No tenderness or edema. Normal range of motion.      Cervical back: Normal range of motion and neck supple.     Neurological: She is alert and oriented to person, place, and time. She has normal strength. GCS score is 15. GCS eye subscore is 4. GCS verbal subscore is 5. GCS motor subscore is 6.   Skin: Skin is warm and dry. No rash noted. No erythema.   Psychiatric: She has a normal mood and affect. Her behavior is normal. Judgment and thought content normal.         ED Course   Procedures  Labs Reviewed   CBC W/ AUTO DIFFERENTIAL - Abnormal; Notable for the following components:       Result Value    WBC 3.87 (*)     RBC 3.71 (*)     Hematocrit 36.8 (*)     MCV 99 (*)     MCH 34.8 (*)     RDW 15.4 (*)     Lymph # 0.7 (*)     Lymph % 16.8 (*)     Mono % 20.2 (*)     All other components within normal limits    Narrative:     Release to patient->Immediate   COMPREHENSIVE METABOLIC PANEL - Abnormal; Notable for the following components:    Sodium 131 (*)     Potassium 5.7 (*)     CO2 21 (*)     BUN 24 (*)     Creatinine 1.6 (*)     Alkaline Phosphatase 226 (*)      (*)      (*)     eGFR 39.0 (*)     Anion Gap 7 (*)     All other components within normal limits    Narrative:     Release to patient->Immediate   ISTAT PROCEDURE - Abnormal; Notable for the following components:    POC Creatinine 1.7 (*)     POC Sodium 131 (*)     POC Potassium 5.4 (*)     POC TCO2 (MEASURED) 21 (*)     All other components within normal limits   POCT GLUCOSE - Abnormal; Notable for the following components:    POCT Glucose 166 (*)     All other components within normal limits   HIV 1 / 2 ANTIBODY     Narrative:     Release to patient->Immediate   HEPATITIS C ANTIBODY    Narrative:     Release to patient->Immediate   MAGNESIUM    Narrative:     Release to patient->Immediate   SARS-COV-2 RNA AMPLIFICATION, QUAL   BASIC METABOLIC PANEL   POCT GLUCOSE   ISTAT CHEM8   POCT GLUCOSE MONITORING CONTINUOUS     EKG Readings: (Independently Interpreted)   Initial Reading: No STEMI. Rhythm: Normal Sinus Rhythm. Heart Rate: 63. Ectopy: PACs. Conduction: Normal.   poor qrs progression in V3-4, peaked T waves     ECG Results              EKG 12-lead (Final result)        Collection Time Result Time QRS Duration OHS QTC Calculation    07/03/24 12:02:24 07/03/24 13:47:00 72 413                     Final result by Interface, Lab In [facility] (07/03/24 13:47:09)                   Narrative:    Test Reason : E87.5,    Vent. Rate : 063 BPM     Atrial Rate : 063 BPM     P-R Int : 140 ms          QRS Dur : 072 ms      QT Int : 404 ms       P-R-T Axes : 049 032 048 degrees     QTc Int : 413 ms    Sinus rhythm with Premature atrial complexes  Abnormal R wave progression in the precordial leads - Cannot rule out   Anterior infarct ,age undetermined but doubt possibly lead placement  Abnormal ECG  When compared with ECG of 15-MAY-2024 18:23,  Premature atrial complexes are now Present  Confirmed by Prieto SANTANA MD ([id number]) on 7/3/2024 1:46:53 PM    Referred By: [email or web address]   SELF           Confirmed By:Prieto SANTANA MD                                  Imaging Results    None          Medications   acyclovir capsule 800 mg (has no administration in time range)   atovaquone 750 mg/5 mL oral liquid 1,500 mg (has no administration in time range)   cycloSPORINE modified capsule 150 mg (has no administration in time range)   estrogens(esterified)-methyltestosterone 1.25-2.5mg per tablet 1 tablet (has no administration in time range)   gabapentin capsule 300 mg (has no administration in time range)   gilteritinib Tab 80 mg (has no administration in time  range)   oxyCODONE immediate release tablet 10 mg (has no administration in time range)   pantoprazole EC tablet 40 mg (has no administration in time range)   sodium zirconium cyclosilicate packet 5 g (has no administration in time range)   isavuconazonium sulfate Cap 372 mg (has no administration in time range)   sodium chloride 0.9% flush 10 mL (has no administration in time range)   naloxone 0.4 mg/mL injection 0.02 mg (has no administration in time range)   glucose chewable tablet 16 g (has no administration in time range)   glucose chewable tablet 24 g (has no administration in time range)   glucagon (human recombinant) injection 1 mg (has no administration in time range)   heparin (porcine) injection 5,000 Units (has no administration in time range)   ondansetron disintegrating tablet 8 mg (has no administration in time range)   dextrose 10% bolus 125 mL 125 mL (has no administration in time range)   dextrose 10% bolus 250 mL 250 mL (has no administration in time range)   sodium chloride 0.9% bolus 500 mL 500 mL (0 mLs Intravenous Stopped 7/3/24 1343)   calcium gluconate 1 g in NS IVPB (premixed) (0 g Intravenous Stopped 7/3/24 1342)   albuterol sulfate nebulizer solution 5 mg (5 mg Nebulization Given 7/3/24 1220)   insulin regular injection 7 Units 0.07 mL (7 Units Intravenous Given 7/3/24 1412)   dextrose 10% bolus 500 mL 500 mL (500 mLs Intravenous New Bag 7/3/24 1340)     Medical Decision Making  49-year-old female with a history of bone marrow transplant on multiple medications for AML presents with hyperkalemia.  Vitals with hypertension.  Physical exam benign.  EKG with peak T-waves.  Labs confirm hyperkalemia.  Shifted in the ED.  Admitted to BMT to work out her medical management.  Did bedside teaching.  All questions answered.  Patient acknowledges understanding.     Amount and/or Complexity of Data Reviewed  Independent Historian: spouse  External Data Reviewed: labs and notes.  Labs: ordered.  Decision-making details documented in ED Course.  ECG/medicine tests: ordered and independent interpretation performed. Decision-making details documented in ED Course.    Risk  OTC drugs.  Prescription drug management.  Decision regarding hospitalization.    Critical Care  Total time providing critical care: 35 minutes                     Critical Care:  Date: 07/03/2024  Performed by: Dr. Luis Christine   Authorized by: Dr. Luis Christine  Total critical care time (exclusive of procedural time) : 35 minutes  Critical care was necessary to treat or prevent imminent or life-threatening deterioration of the following conditions:  hyperkalemia needing shift                  Clinical Impression:  Final diagnoses:  [E87.5] Hyperkalemia (Primary)  [Z94.81] History of bone marrow transplant  [D84.9] Immunosuppressed status          ED Disposition Condition    Admit                 Luis Christine MD  07/03/24 7529

## 2024-07-03 NOTE — ASSESSMENT & PLAN NOTE
- Diagnosed with SIADH in May 2024   - Na 131 on admission   - Was being fluid restricted but had an increase in Cr so restriction was decreased   - Giving IVF for hyperkalemia, if Na worsens will need to start fluid restriction again

## 2024-07-03 NOTE — HPI
Ms. Thai Villalba is a 49 y.o. female  is a 49 y.o. 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 She had an appointment with her oncologist Dr. Franco and labs revealed a K of 6.4 and Cr of 1.7 (baseline around 1.4). She also had a 8am cortisol level which was 13.00 but was drawn at 10am. She was admitted in May 2024 with hyponatremia and was diagnosed with SIADH. She denies any symptoms at this time. Her potassium has been slightly elevates since May 2024 and she was started on daily lokelma and in the last couple days the potassium has been gradually increasing.     In the ED patient was afebrile and vitals were stable. Labs revealed a K of 5.7. She was shifted with insulin/albuterol, was given IVF, and calcium gluconate.

## 2024-07-03 NOTE — NURSING
Nurses Note -- 4 Eyes      7/3/2024   5:58 PM      Skin assessed during: Admit      [] No Altered Skin Integrity Present    []Prevention Measures Documented      [x] Yes- Altered Skin Integrity Present or Discovered   [] LDA Added if Not in Epic (Describe Wound)   [] New Altered Skin Integrity was Present on Admit and Documented in LDA   [] Wound Image Taken    Small circular redness on the leg and arm from horase flies bite .    Wound Care Consulted? No    Attending Nurse:  Dara Anderson RN/Staff Member:   Yojana GALLEGOS

## 2024-07-03 NOTE — ASSESSMENT & PLAN NOTE
- Neuropathic pain secondary to lumbar radiculopathy   - Gabapentin 300 mg PO TID  - Oxycodone 10 mg q4prn

## 2024-07-03 NOTE — TELEPHONE ENCOUNTER
Reached pt through pt spouse's number. Advised pt to go to ER due to potassium of 6.4 per Dr. Franco. Pt verbalized understanding and agreeable to go to [facility] ER.

## 2024-07-04 VITALS
WEIGHT: 160.38 LBS | HEART RATE: 71 BPM | OXYGEN SATURATION: 97 % | SYSTOLIC BLOOD PRESSURE: 129 MMHG | HEIGHT: 64 IN | TEMPERATURE: 98 F | DIASTOLIC BLOOD PRESSURE: 83 MMHG | BODY MASS INDEX: 27.38 KG/M2 | RESPIRATION RATE: 20 BRPM

## 2024-07-04 LAB
ALBUMIN SERPL BCP-MCNC: 3 G/DL (ref 3.5–5.2)
ALP SERPL-CCNC: 223 U/L (ref 55–135)
ALT SERPL W/O P-5'-P-CCNC: 203 U/L (ref 10–44)
ANION GAP SERPL CALC-SCNC: 7 MMOL/L (ref 8–16)
ANION GAP SERPL CALC-SCNC: 7 MMOL/L (ref 8–16)
ANION GAP SERPL CALC-SCNC: 8 MMOL/L (ref 8–16)
AST SERPL-CCNC: 121 U/L (ref 10–40)
BASOPHILS # BLD AUTO: 0.01 K/UL (ref 0–0.2)
BASOPHILS NFR BLD: 0.3 % (ref 0–1.9)
BILIRUB DIRECT SERPL-MCNC: 0.6 MG/DL (ref 0.1–0.3)
BILIRUB SERPL-MCNC: 0.9 MG/DL (ref 0.1–1)
BUN SERPL-MCNC: 19 MG/DL (ref 6–20)
BUN SERPL-MCNC: 20 MG/DL (ref 6–20)
BUN SERPL-MCNC: 21 MG/DL (ref 6–20)
CALCIUM SERPL-MCNC: 8.5 MG/DL (ref 8.7–10.5)
CALCIUM SERPL-MCNC: 8.6 MG/DL (ref 8.7–10.5)
CALCIUM SERPL-MCNC: 8.7 MG/DL (ref 8.7–10.5)
CHLORIDE SERPL-SCNC: 105 MMOL/L (ref 95–110)
CHLORIDE SERPL-SCNC: 107 MMOL/L (ref 95–110)
CHLORIDE SERPL-SCNC: 108 MMOL/L (ref 95–110)
CO2 SERPL-SCNC: 17 MMOL/L (ref 23–29)
CO2 SERPL-SCNC: 18 MMOL/L (ref 23–29)
CO2 SERPL-SCNC: 19 MMOL/L (ref 23–29)
CORTIS SERPL-MCNC: 17.5 UG/DL (ref 4.3–22.4)
CREAT SERPL-MCNC: 1.3 MG/DL (ref 0.5–1.4)
CREAT SERPL-MCNC: 1.4 MG/DL (ref 0.5–1.4)
CREAT SERPL-MCNC: 1.5 MG/DL (ref 0.5–1.4)
DIFFERENTIAL METHOD BLD: ABNORMAL
EOSINOPHIL # BLD AUTO: 0.2 K/UL (ref 0–0.5)
EOSINOPHIL NFR BLD: 4.3 % (ref 0–8)
ERYTHROCYTE [DISTWIDTH] IN BLOOD BY AUTOMATED COUNT: 15.2 % (ref 11.5–14.5)
EST. GFR  (NO RACE VARIABLE): 42.2 ML/MIN/1.73 M^2
EST. GFR  (NO RACE VARIABLE): 45.8 ML/MIN/1.73 M^2
EST. GFR  (NO RACE VARIABLE): 50.1 ML/MIN/1.73 M^2
GLUCOSE SERPL-MCNC: 108 MG/DL (ref 70–110)
GLUCOSE SERPL-MCNC: 131 MG/DL (ref 70–110)
GLUCOSE SERPL-MCNC: 94 MG/DL (ref 70–110)
HCT VFR BLD AUTO: 35.1 % (ref 37–48.5)
HGB BLD-MCNC: 12.3 G/DL (ref 12–16)
IMM GRANULOCYTES # BLD AUTO: 0.02 K/UL (ref 0–0.04)
IMM GRANULOCYTES NFR BLD AUTO: 0.5 % (ref 0–0.5)
LYMPHOCYTES # BLD AUTO: 1 K/UL (ref 1–4.8)
LYMPHOCYTES NFR BLD: 26.1 % (ref 18–48)
MAGNESIUM SERPL-MCNC: 1.9 MG/DL (ref 1.6–2.6)
MCH RBC QN AUTO: 34.7 PG (ref 27–31)
MCHC RBC AUTO-ENTMCNC: 35 G/DL (ref 32–36)
MCV RBC AUTO: 99 FL (ref 82–98)
MONOCYTES # BLD AUTO: 1 K/UL (ref 0.3–1)
MONOCYTES NFR BLD: 24.6 % (ref 4–15)
NEUTROPHILS # BLD AUTO: 1.8 K/UL (ref 1.8–7.7)
NEUTROPHILS NFR BLD: 44.2 % (ref 38–73)
NRBC BLD-RTO: 0 /100 WBC
PHOSPHATE SERPL-MCNC: 3.2 MG/DL (ref 2.7–4.5)
PLATELET # BLD AUTO: 196 K/UL (ref 150–450)
PMV BLD AUTO: 10.6 FL (ref 9.2–12.9)
POTASSIUM SERPL-SCNC: 4.2 MMOL/L (ref 3.5–5.1)
POTASSIUM SERPL-SCNC: 4.7 MMOL/L (ref 3.5–5.1)
POTASSIUM SERPL-SCNC: 5 MMOL/L (ref 3.5–5.1)
PROT SERPL-MCNC: 5.8 G/DL (ref 6–8.4)
RBC # BLD AUTO: 3.54 M/UL (ref 4–5.4)
SODIUM SERPL-SCNC: 131 MMOL/L (ref 136–145)
SODIUM SERPL-SCNC: 132 MMOL/L (ref 136–145)
SODIUM SERPL-SCNC: 133 MMOL/L (ref 136–145)
WBC # BLD AUTO: 3.95 K/UL (ref 3.9–12.7)

## 2024-07-04 PROCEDURE — 80048 BASIC METABOLIC PNL TOTAL CA: CPT | Mod: 91 | Performed by: STUDENT IN AN ORGANIZED HEALTH CARE EDUCATION/TRAINING PROGRAM

## 2024-07-04 PROCEDURE — 25000003 PHARM REV CODE 250: Performed by: STUDENT IN AN ORGANIZED HEALTH CARE EDUCATION/TRAINING PROGRAM

## 2024-07-04 PROCEDURE — 84100 ASSAY OF PHOSPHORUS: CPT | Performed by: STUDENT IN AN ORGANIZED HEALTH CARE EDUCATION/TRAINING PROGRAM

## 2024-07-04 PROCEDURE — 85025 COMPLETE CBC W/AUTO DIFF WBC: CPT | Performed by: STUDENT IN AN ORGANIZED HEALTH CARE EDUCATION/TRAINING PROGRAM

## 2024-07-04 PROCEDURE — 83735 ASSAY OF MAGNESIUM: CPT | Performed by: STUDENT IN AN ORGANIZED HEALTH CARE EDUCATION/TRAINING PROGRAM

## 2024-07-04 PROCEDURE — 63600175 PHARM REV CODE 636 W HCPCS: Performed by: STUDENT IN AN ORGANIZED HEALTH CARE EDUCATION/TRAINING PROGRAM

## 2024-07-04 PROCEDURE — 82533 TOTAL CORTISOL: CPT | Performed by: STUDENT IN AN ORGANIZED HEALTH CARE EDUCATION/TRAINING PROGRAM

## 2024-07-04 PROCEDURE — 36415 COLL VENOUS BLD VENIPUNCTURE: CPT | Performed by: STUDENT IN AN ORGANIZED HEALTH CARE EDUCATION/TRAINING PROGRAM

## 2024-07-04 PROCEDURE — 99239 HOSP IP/OBS DSCHRG MGMT >30: CPT | Mod: ,,, | Performed by: INTERNAL MEDICINE

## 2024-07-04 PROCEDURE — 80076 HEPATIC FUNCTION PANEL: CPT | Performed by: STUDENT IN AN ORGANIZED HEALTH CARE EDUCATION/TRAINING PROGRAM

## 2024-07-04 RX ORDER — ATOVAQUONE 750 MG/5ML
1500 SUSPENSION ORAL 2 TIMES DAILY
Qty: 750 ML | Refills: 1 | Status: SHIPPED | OUTPATIENT
Start: 2024-07-04

## 2024-07-04 RX ADMIN — PANTOPRAZOLE SODIUM 40 MG: 40 TABLET, DELAYED RELEASE ORAL at 06:07

## 2024-07-04 RX ADMIN — ACYCLOVIR 800 MG: 200 CAPSULE ORAL at 09:07

## 2024-07-04 RX ADMIN — GABAPENTIN 300 MG: 300 CAPSULE ORAL at 09:07

## 2024-07-04 RX ADMIN — SODIUM ZIRCONIUM CYCLOSILICATE 5 G: 5 POWDER, FOR SUSPENSION ORAL at 09:07

## 2024-07-04 RX ADMIN — HEPARIN SODIUM 5000 UNITS: 5000 INJECTION INTRAVENOUS; SUBCUTANEOUS at 05:07

## 2024-07-04 RX ADMIN — ATOVAQUONE 1500 MG: 750 SUSPENSION ORAL at 09:07

## 2024-07-04 RX ADMIN — OXYCODONE HYDROCHLORIDE 10 MG: 10 TABLET ORAL at 09:07

## 2024-07-04 RX ADMIN — CYCLOSPORINE 150 MG: 100 CAPSULE, LIQUID FILLED ORAL at 09:07

## 2024-07-04 RX ADMIN — ISAVUCONAZONIUM SULFATE 372 MG: 186 CAPSULE ORAL at 09:07

## 2024-07-04 NOTE — DISCHARGE SUMMARY
[facility] - Oncology ([facility])  Hematology  Bone Marrow Transplant  Discharge Summary      Patient Name: Thai Villalba  MRN: [id number]  Admission Date: 7/3/2024  Hospital Length of Stay: 1 days  Discharge Date and Time:  07/04/2024 3:06 PM  Attending Physician: Fanta Argueta MD   Discharging Provider: Kingsley Serna MD  Primary Care Provider: Evangelina, Primary Doctor    HPI: Ms. Thai Villalba is a 49 y.o. female  is a 49 y.o. 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 She had an appointment with her oncologist Dr. Franco and labs revealed a K of 6.4 and Cr of 1.7 (baseline around 1.4). She also had a 8am cortisol level which was 13.00 but was drawn at 10am. She was admitted in May 2024 with hyponatremia and was diagnosed with SIADH. She denies any symptoms at this time. Her potassium has been slightly elevates since May 2024 and she was started on daily lokelma and in the last couple days the potassium has been gradually increasing.     In the ED patient was afebrile and vitals were stable. Labs revealed a K of 5.7. She was shifted with insulin/albuterol, was given IVF, and calcium gluconate.     * No surgery found *     Hospital Course: Admitted to BMT for hyperkalemia and hyponatremia which improved with shifting and fluids. Her morning cortisol level (though drawn at 1030am) was 17 essential ruling out AI. She was discharged with a new prescription for atovaquone in lieu of bactrim. Plan discussed with patient and/or family. Patient and/or family are in agreement with plan, verbalized understanding, and all questions were answered.       Vitals:    07/04/24 0812 07/04/24 0912 07/04/24 1109 07/04/24 1131   BP: 123/79   129/83   BP Location:       Patient Position:    Lying   Pulse: (!) 57  66 66   Resp:  18  20   Temp: 98.1 °F (36.7 °C)   97.8 °F (36.6 °C)   TempSrc: Oral   Oral   SpO2: 96%   97%   Weight:       Height:            Goals of Care Treatment  Preferences:  Code Status: Full Code          What is most important right now is to focus on curative/life-prolongation (regardless of treatment burdens).  Accordingly, we have decided that the best plan to meet the patient's goals includes continuing with treatment.      Consults (From admission, onward)          Status Ordering Provider     Inpatient consult to Oncology  Once        Provider:  (Not yet assigned)    Completed TSERING CORNEJO Diagnostic Studies: Labs: CMP   Recent Labs   Lab 07/03/24  1231 07/03/24  1623 07/04/24  0050 07/04/24  0449 07/04/24  1053   *   < > 131* 133* 132*   K 5.7*   < > 4.2 4.7 5.0      < > 105 108 107   CO2 21*   < > 19* 17* 18*   GLU 90   < > 108 94 131*   BUN 24*   < > 19 21* 20   CREATININE 1.6*   < > 1.4 1.5* 1.3   CALCIUM 9.5   < > 8.5* 8.7 8.6*   PROT 6.7  --   --  5.8*  --    ALBUMIN 3.6  --   --  3.0*  --    BILITOT 1.0  --   --  0.9  --    ALKPHOS 226*  --   --  223*  --    *  --   --  121*  --    *  --   --  203*  --    ANIONGAP 7*   < > 7* 8 7*    < > = values in this interval not displayed.    and CBC   Recent Labs   Lab 07/03/24  1231 07/03/24  1232 07/04/24  0449   WBC 3.87*  --  3.95   HGB 12.9  --  12.3   HCT 36.8*   < > 35.1*     --  196    < > = values in this interval not displayed.       Pending Diagnostic Studies:       Procedure Component Value Units Date/Time    Basic metabolic panel [[id number]]     Order Status: Sent Lab Status: No result     Specimen: Blood     Basic metabolic panel [[id number]]     Order Status: Sent Lab Status: No result     Specimen: Blood           Final Active Diagnoses:    Diagnosis Date Noted POA    PRINCIPAL PROBLEM:  Hyperkalemia [E87.5] 05/15/2024 Yes    Chronic pain [G89.29] 07/03/2024 Unknown    Hyponatremia [E87.1] 05/15/2024 Yes    Transaminitis [R74.01] 05/15/2024 Yes    History of allogeneic stem cell transplant [Z94.84] 01/25/2024 Not Applicable    AML (acute  myeloblastic leukemia) [C92.00] 05/30/2023 Yes      Problems Resolved During this Admission:      Discharged Condition: good    Disposition:     Follow Up:    Patient Instructions:      Ambulatory referral/consult to Endocrinology   Standing Status: Future   Referral Priority: Routine Referral Type: Consultation   Requested Specialty: Endocrinology   Number of Visits Requested: 1     Medications:  Reconciled Home Medications:      Medication List        CHANGE how you take these medications      * atovaquone 750 mg/5 mL Susp oral liquid  Commonly known as: MEPRON  Take 10 mLs (1,500 mg total) by mouth 2 (two) times daily.  What changed: Another medication with the same name was added. Make sure you understand how and when to take each.     * atovaquone 750 mg/5 mL Susp oral liquid  Commonly known as: MEPRON  Take 10 mLs (1,500 mg total) by mouth 2 (two) times daily.  What changed: You were already taking a medication with the same name, and this prescription was added. Make sure you understand how and when to take each.     * cycloSPORINE modified (NEORAL) 100 MG capsule  Commonly known as: NEORAL  Take 1 capsule (100mg) in addition to two 50mg capsules (to equal 150mg/dose) twice a day.  What changed:   how much to take  how to take this  when to take this     * cycloSPORINE modified (NEORAL) 25 MG capsule  Commonly known as: NEORAL  Take 2 capsules (50mg) in addition to one 100mg capsule (to equal 150mg/dose) twice a day.  What changed:   how much to take  how to take this  when to take this           * This list has 4 medication(s) that are the same as other medications prescribed for you. Read the directions carefully, and ask your doctor or other care provider to review them with you.                CONTINUE taking these medications      acyclovir 200 MG capsule  Commonly known as: ZOVIRAX  Take 4 capsules twice a day for prophylaxis     cholecalciferol (vitamin D3) 10 mcg (400 unit) Chew  Take 1,000 Int'l  Units by mouth once daily.     CRESEMBA 186 mg Cap  Generic drug: isavuconazonium sulfate  Take 2 capsules by mouth three times a day for the first 2 days, then 2 capsules once a day thereafter     estrogens(esterified)-methyltestosterone 1.25-2.5mg per tablet  Commonly known as: EEMT  Take 1 tablet by mouth once daily.     gabapentin 300 MG capsule  Commonly known as: NEURONTIN  Take 1 capsule by mouth three times a day     magnesium oxide 400 mg (241.3 mg magnesium) tablet  Commonly known as: MAG-OX  Take 1 tablet (400 mg total) by mouth 2 (two) times daily.     oxyCODONE 10 mg Tab immediate release tablet  Commonly known as: ROXICODONE  1 tablet by mouth three times a day as needed for pain     pantoprazole 40 MG tablet  Commonly known as: PROTONIX  Take 40 mg by mouth every morning.     sodium zirconium cyclosilicate 5 gram packet  Commonly known as: LOKELMA  Take 1 packet (5 g total) by mouth once daily. Mix entire contents of packet(s) into drinking glass containing 3 tablespoons of water; stir well and drink immediately. Add water and repeat until no powder remains to receive entire dose.     tacrolimus 0.1 % ointment  Commonly known as: PROTOPIC  Apply topically 2 times daily for Atopic Dermatitis, can apply to lips or inside mouth if needed.     triamcinolone acetonide 0.1% 0.1 % cream  Commonly known as: KENALOG  Apply topically 2 (two) times daily. To areas of rash/dry skin.     XOSPATA 40 mg Tab  Generic drug: gilteritinib  Take 2 tablets (80 mg) by mouth Daily.            ASK your doctor about these medications      dexAMETHasone 0.5 mg/5 mL Soln solution  Take 10 mLs (1 mg total) by mouth every 12 (twelve) hours. Swish and spit. Do not swallow.     DUKE'S SOLUTION (BENADRYL 30 ML, MYLANTA 30 ML, LIDOCAINE 30 ML, NYSTATIN 30 ML)  Take 10 mLs by mouth 4 (four) times daily.              Kingsley Serna MD  Bone Marrow Transplant  [facility] - Oncology ([facility])

## 2024-07-04 NOTE — PLAN OF CARE
No acute events this shift, VSS, afebril, pt aaox4, bed alarm refused, medications tolerated, pt ambulates to bathroom independently, CMP drawn, frequent rounds performed, I/O recorded, labs drawn, pt in bed resting, call light in reach, pt instructed to call for assistance, NAD, safety maintained.

## 2024-07-04 NOTE — HOSPITAL COURSE
Admitted to BMT for hyperkalemia and hyponatremia which improved with shifting and fluids. Her morning cortisol level (though drawn at 1030am) was 17 essential ruling out AI. She was discharged with a new prescription for atovaquone in lieu of bactrim and a referral to endocrinology was placed for follow up. Plan discussed with patient and/or family. Patient and/or family are in agreement with plan, verbalized understanding, and all questions were answered.       Vitals:    07/04/24 0812 07/04/24 0912 07/04/24 1109 07/04/24 1131   BP: 123/79   129/83   BP Location:       Patient Position:    Lying   Pulse: (!) 57  66 66   Resp:  18  20   Temp: 98.1 °F (36.7 °C)   97.8 °F (36.6 °C)   TempSrc: Oral   Oral   SpO2: 96%   97%   Weight:       Height:

## 2024-07-04 NOTE — PLAN OF CARE
1530 - IV and tele removed. Discharge paperwork reviewed with and given to pt and pt's spouse; both verbalized understanding. Questions answered.   1545 - Discharge med delivered via bedside pharmacy. Pt walked off unit with spouse. No difficulty noted.    Problem: Adult Inpatient Plan of Care  Goal: Plan of Care Review  Outcome: Met  Goal: Patient-Specific Goal (Individualized)  Outcome: Met  Goal: Absence of Hospital-Acquired Illness or Injury  Outcome: Met  Goal: Optimal Comfort and Wellbeing  Outcome: Met  Goal: Readiness for Transition of Care  Outcome: Met     Problem: Sepsis/Septic Shock  Goal: Optimal Coping  Outcome: Met  Goal: Absence of Bleeding  Outcome: Met  Goal: Blood Glucose Level Within Targeted Range  Outcome: Met  Goal: Absence of Infection Signs and Symptoms  Outcome: Met  Goal: Optimal Nutrition Intake  Outcome: Met

## 2024-07-08 ENCOUNTER — PATIENT MESSAGE (OUTPATIENT)
Dept: HEMATOLOGY/ONCOLOGY | Facility: CLINIC | Age: 49
End: 2024-07-08
Payer: COMMERCIAL

## 2024-07-09 ENCOUNTER — LAB VISIT (OUTPATIENT)
Dept: LAB | Facility: HOSPITAL | Age: 49
End: 2024-07-09
Attending: INTERNAL MEDICINE
Payer: COMMERCIAL

## 2024-07-09 DIAGNOSIS — R79.89 ELEVATED LFTS: ICD-10-CM

## 2024-07-09 DIAGNOSIS — Z94.84 HISTORY OF ALLOGENEIC STEM CELL TRANSPLANT: ICD-10-CM

## 2024-07-09 LAB
ALBUMIN SERPL BCP-MCNC: 3.5 G/DL (ref 3.5–5.2)
ALBUMIN SERPL BCP-MCNC: 3.5 G/DL (ref 3.5–5.2)
ALP SERPL-CCNC: 228 U/L (ref 55–135)
ALP SERPL-CCNC: 228 U/L (ref 55–135)
ALT SERPL W/O P-5'-P-CCNC: 157 U/L (ref 10–44)
ALT SERPL W/O P-5'-P-CCNC: 157 U/L (ref 10–44)
ANION GAP SERPL CALC-SCNC: 12 MMOL/L (ref 8–16)
ANION GAP SERPL CALC-SCNC: 12 MMOL/L (ref 8–16)
AST SERPL-CCNC: 76 U/L (ref 10–40)
AST SERPL-CCNC: 76 U/L (ref 10–40)
BASOPHILS # BLD AUTO: 0.01 K/UL (ref 0–0.2)
BASOPHILS # BLD AUTO: 0.01 K/UL (ref 0–0.2)
BASOPHILS NFR BLD: 0.3 % (ref 0–1.9)
BASOPHILS NFR BLD: 0.3 % (ref 0–1.9)
BILIRUB SERPL-MCNC: 1.2 MG/DL (ref 0.1–1)
BILIRUB SERPL-MCNC: 1.2 MG/DL (ref 0.1–1)
BUN SERPL-MCNC: 27 MG/DL (ref 6–20)
BUN SERPL-MCNC: 27 MG/DL (ref 6–20)
CALCIUM SERPL-MCNC: 8.9 MG/DL (ref 8.7–10.5)
CALCIUM SERPL-MCNC: 8.9 MG/DL (ref 8.7–10.5)
CHLORIDE SERPL-SCNC: 100 MMOL/L (ref 95–110)
CHLORIDE SERPL-SCNC: 100 MMOL/L (ref 95–110)
CO2 SERPL-SCNC: 21 MMOL/L (ref 23–29)
CO2 SERPL-SCNC: 21 MMOL/L (ref 23–29)
CREAT SERPL-MCNC: 1.9 MG/DL (ref 0.5–1.4)
CREAT SERPL-MCNC: 1.9 MG/DL (ref 0.5–1.4)
DIFFERENTIAL METHOD BLD: ABNORMAL
DIFFERENTIAL METHOD BLD: ABNORMAL
EOSINOPHIL # BLD AUTO: 0.2 K/UL (ref 0–0.5)
EOSINOPHIL # BLD AUTO: 0.3 K/UL (ref 0–0.5)
EOSINOPHIL NFR BLD: 6.1 % (ref 0–8)
EOSINOPHIL NFR BLD: 6.6 % (ref 0–8)
ERYTHROCYTE [DISTWIDTH] IN BLOOD BY AUTOMATED COUNT: 14.8 % (ref 11.5–14.5)
ERYTHROCYTE [DISTWIDTH] IN BLOOD BY AUTOMATED COUNT: 14.8 % (ref 11.5–14.5)
EST. GFR  (NO RACE VARIABLE): 31.8 ML/MIN/1.73 M^2
EST. GFR  (NO RACE VARIABLE): 31.8 ML/MIN/1.73 M^2
GLUCOSE SERPL-MCNC: 96 MG/DL (ref 70–110)
GLUCOSE SERPL-MCNC: 96 MG/DL (ref 70–110)
HCT VFR BLD AUTO: 35.9 % (ref 37–48.5)
HCT VFR BLD AUTO: 36.6 % (ref 37–48.5)
HGB BLD-MCNC: 13.1 G/DL (ref 12–16)
HGB BLD-MCNC: 13.1 G/DL (ref 12–16)
IMM GRANULOCYTES # BLD AUTO: 0.03 K/UL (ref 0–0.04)
IMM GRANULOCYTES # BLD AUTO: 0.04 K/UL (ref 0–0.04)
IMM GRANULOCYTES NFR BLD AUTO: 0.8 % (ref 0–0.5)
IMM GRANULOCYTES NFR BLD AUTO: 1 % (ref 0–0.5)
INR PPP: 0.9 (ref 0.8–1.2)
LYMPHOCYTES # BLD AUTO: 0.8 K/UL (ref 1–4.8)
LYMPHOCYTES # BLD AUTO: 1 K/UL (ref 1–4.8)
LYMPHOCYTES NFR BLD: 21.6 % (ref 18–48)
LYMPHOCYTES NFR BLD: 24.4 % (ref 18–48)
MAGNESIUM SERPL-MCNC: 1.9 MG/DL (ref 1.6–2.6)
MCH RBC QN AUTO: 34.3 PG (ref 27–31)
MCH RBC QN AUTO: 34.8 PG (ref 27–31)
MCHC RBC AUTO-ENTMCNC: 35.8 G/DL (ref 32–36)
MCHC RBC AUTO-ENTMCNC: 36.5 G/DL (ref 32–36)
MCV RBC AUTO: 96 FL (ref 82–98)
MCV RBC AUTO: 96 FL (ref 82–98)
MONOCYTES # BLD AUTO: 0.8 K/UL (ref 0.3–1)
MONOCYTES # BLD AUTO: 0.9 K/UL (ref 0.3–1)
MONOCYTES NFR BLD: 20.6 % (ref 4–15)
MONOCYTES NFR BLD: 22.4 % (ref 4–15)
NEUTROPHILS # BLD AUTO: 1.9 K/UL (ref 1.8–7.7)
NEUTROPHILS # BLD AUTO: 1.9 K/UL (ref 1.8–7.7)
NEUTROPHILS NFR BLD: 47.1 % (ref 38–73)
NEUTROPHILS NFR BLD: 48.8 % (ref 38–73)
NRBC BLD-RTO: 0 /100 WBC
NRBC BLD-RTO: 0 /100 WBC
PHOSPHATE SERPL-MCNC: 3.4 MG/DL (ref 2.7–4.5)
PLATELET # BLD AUTO: 209 K/UL (ref 150–450)
PLATELET # BLD AUTO: 212 K/UL (ref 150–450)
PMV BLD AUTO: 10.4 FL (ref 9.2–12.9)
PMV BLD AUTO: 10.5 FL (ref 9.2–12.9)
POTASSIUM SERPL-SCNC: 4.5 MMOL/L (ref 3.5–5.1)
POTASSIUM SERPL-SCNC: 4.5 MMOL/L (ref 3.5–5.1)
PROT SERPL-MCNC: 6.5 G/DL (ref 6–8.4)
PROT SERPL-MCNC: 6.5 G/DL (ref 6–8.4)
PROTHROMBIN TIME: 10.1 SEC (ref 9–12.5)
RBC # BLD AUTO: 3.76 M/UL (ref 4–5.4)
RBC # BLD AUTO: 3.82 M/UL (ref 4–5.4)
SODIUM SERPL-SCNC: 133 MMOL/L (ref 136–145)
SODIUM SERPL-SCNC: 133 MMOL/L (ref 136–145)
WBC # BLD AUTO: 3.79 K/UL (ref 3.9–12.7)
WBC # BLD AUTO: 3.94 K/UL (ref 3.9–12.7)

## 2024-07-09 PROCEDURE — 84100 ASSAY OF PHOSPHORUS: CPT | Mod: PN | Performed by: INTERNAL MEDICINE

## 2024-07-09 PROCEDURE — 85025 COMPLETE CBC W/AUTO DIFF WBC: CPT | Mod: 91,PN | Performed by: STUDENT IN AN ORGANIZED HEALTH CARE EDUCATION/TRAINING PROGRAM

## 2024-07-09 PROCEDURE — 83735 ASSAY OF MAGNESIUM: CPT | Mod: PN | Performed by: INTERNAL MEDICINE

## 2024-07-09 PROCEDURE — 80053 COMPREHEN METABOLIC PANEL: CPT | Mod: PN | Performed by: INTERNAL MEDICINE

## 2024-07-09 PROCEDURE — 80158 DRUG ASSAY CYCLOSPORINE: CPT | Performed by: INTERNAL MEDICINE

## 2024-07-09 PROCEDURE — 85025 COMPLETE CBC W/AUTO DIFF WBC: CPT | Mod: PN | Performed by: INTERNAL MEDICINE

## 2024-07-09 PROCEDURE — 87799 DETECT AGENT NOS DNA QUANT: CPT | Performed by: INTERNAL MEDICINE

## 2024-07-09 PROCEDURE — 36415 COLL VENOUS BLD VENIPUNCTURE: CPT | Mod: PN | Performed by: INTERNAL MEDICINE

## 2024-07-09 PROCEDURE — 85610 PROTHROMBIN TIME: CPT | Performed by: STUDENT IN AN ORGANIZED HEALTH CARE EDUCATION/TRAINING PROGRAM

## 2024-07-10 DIAGNOSIS — Z94.84 HISTORY OF ALLOGENEIC STEM CELL TRANSPLANT: ICD-10-CM

## 2024-07-10 LAB
CMV DNA SPEC QL NAA+PROBE: ABNORMAL
CYCLOSPORINE BLD LC/MS/MS-MCNC: 846 NG/ML (ref 100–400)
CYTOMEGALOVIRUS LOG (IU/ML): <1.48 LOGIU/ML
CYTOMEGALOVIRUS PCR, QUANT: <30 IU/ML
EPSTEIN-BARR VIRUS DNA: NORMAL
EPSTEIN-BARR VIRUS PCR, QUANT: NOT DETECTED IU/ML

## 2024-07-10 RX ORDER — CYCLOSPORINE 100 MG/1
CAPSULE, LIQUID FILLED ORAL
Qty: 60 CAPSULE | Refills: 11 | Status: SHIPPED | OUTPATIENT
Start: 2024-07-10

## 2024-07-10 RX ORDER — CYCLOSPORINE 25 MG/1
75 CAPSULE, LIQUID FILLED ORAL 2 TIMES DAILY
Qty: 240 CAPSULE | Refills: 10 | Status: SHIPPED | OUTPATIENT
Start: 2024-07-10

## 2024-07-10 NOTE — PROGRESS NOTES
BMT Pharmacist Immunosuppression Note:    Reviewed patient's cyclosporine level with Dr. Franco and it is above goal range. She has 100mg and 25mg capsules at home to make her current regimen of 150mg PO BID. Advised patient hold this evening's dose along with tomorrow's doses and restart Friday with a new regimen of only using the 25mg capsules to make: 75mg PO BID. Also advised patient drink fluids to help with renal function.     Will follow up with next's week level.       Nel Luo, Pharm.D., BCOP  Clinical Pharmacy Specialist, Bone Marrow Transplant/Cellular Therapy  [facility]  SpectraLink: [phone]

## 2024-07-16 ENCOUNTER — LAB VISIT (OUTPATIENT)
Dept: LAB | Facility: HOSPITAL | Age: 49
End: 2024-07-16
Attending: INTERNAL MEDICINE
Payer: COMMERCIAL

## 2024-07-16 DIAGNOSIS — Z94.84 HISTORY OF ALLOGENEIC STEM CELL TRANSPLANT: ICD-10-CM

## 2024-07-16 LAB
ALBUMIN SERPL BCP-MCNC: 3.5 G/DL (ref 3.5–5.2)
ALP SERPL-CCNC: 231 U/L (ref 55–135)
ALT SERPL W/O P-5'-P-CCNC: 103 U/L (ref 10–44)
ANION GAP SERPL CALC-SCNC: 10 MMOL/L (ref 8–16)
AST SERPL-CCNC: 51 U/L (ref 10–40)
BASOPHILS # BLD AUTO: 0.01 K/UL (ref 0–0.2)
BASOPHILS NFR BLD: 0.3 % (ref 0–1.9)
BILIRUB SERPL-MCNC: 1 MG/DL (ref 0.1–1)
BUN SERPL-MCNC: 17 MG/DL (ref 6–20)
CALCIUM SERPL-MCNC: 9 MG/DL (ref 8.7–10.5)
CHLORIDE SERPL-SCNC: 98 MMOL/L (ref 95–110)
CO2 SERPL-SCNC: 21 MMOL/L (ref 23–29)
CREAT SERPL-MCNC: 1.5 MG/DL (ref 0.5–1.4)
DIFFERENTIAL METHOD BLD: ABNORMAL
EOSINOPHIL # BLD AUTO: 0.2 K/UL (ref 0–0.5)
EOSINOPHIL NFR BLD: 5.2 % (ref 0–8)
ERYTHROCYTE [DISTWIDTH] IN BLOOD BY AUTOMATED COUNT: 14.7 % (ref 11.5–14.5)
EST. GFR  (NO RACE VARIABLE): 42.2 ML/MIN/1.73 M^2
GLUCOSE SERPL-MCNC: 85 MG/DL (ref 70–110)
HCT VFR BLD AUTO: 33.9 % (ref 37–48.5)
HGB BLD-MCNC: 12.3 G/DL (ref 12–16)
IMM GRANULOCYTES # BLD AUTO: 0.01 K/UL (ref 0–0.04)
IMM GRANULOCYTES NFR BLD AUTO: 0.3 % (ref 0–0.5)
LYMPHOCYTES # BLD AUTO: 0.7 K/UL (ref 1–4.8)
LYMPHOCYTES NFR BLD: 19.1 % (ref 18–48)
MAGNESIUM SERPL-MCNC: 1.8 MG/DL (ref 1.6–2.6)
MCH RBC QN AUTO: 34.6 PG (ref 27–31)
MCHC RBC AUTO-ENTMCNC: 36.3 G/DL (ref 32–36)
MCV RBC AUTO: 95 FL (ref 82–98)
MONOCYTES # BLD AUTO: 0.8 K/UL (ref 0.3–1)
MONOCYTES NFR BLD: 22.7 % (ref 4–15)
NEUTROPHILS # BLD AUTO: 1.9 K/UL (ref 1.8–7.7)
NEUTROPHILS NFR BLD: 52.4 % (ref 38–73)
NRBC BLD-RTO: 0 /100 WBC
PHOSPHATE SERPL-MCNC: 2.8 MG/DL (ref 2.7–4.5)
PLATELET # BLD AUTO: 229 K/UL (ref 150–450)
PMV BLD AUTO: 10.2 FL (ref 9.2–12.9)
POTASSIUM SERPL-SCNC: 4.9 MMOL/L (ref 3.5–5.1)
PROT SERPL-MCNC: 6.5 G/DL (ref 6–8.4)
RBC # BLD AUTO: 3.56 M/UL (ref 4–5.4)
SODIUM SERPL-SCNC: 129 MMOL/L (ref 136–145)
WBC # BLD AUTO: 3.66 K/UL (ref 3.9–12.7)

## 2024-07-16 PROCEDURE — 36415 COLL VENOUS BLD VENIPUNCTURE: CPT | Mod: PN | Performed by: INTERNAL MEDICINE

## 2024-07-16 PROCEDURE — 87799 DETECT AGENT NOS DNA QUANT: CPT | Performed by: INTERNAL MEDICINE

## 2024-07-16 PROCEDURE — 83735 ASSAY OF MAGNESIUM: CPT | Mod: PN | Performed by: INTERNAL MEDICINE

## 2024-07-16 PROCEDURE — 80053 COMPREHEN METABOLIC PANEL: CPT | Mod: PN | Performed by: INTERNAL MEDICINE

## 2024-07-16 PROCEDURE — 80158 DRUG ASSAY CYCLOSPORINE: CPT | Performed by: INTERNAL MEDICINE

## 2024-07-16 PROCEDURE — 84100 ASSAY OF PHOSPHORUS: CPT | Mod: PN | Performed by: INTERNAL MEDICINE

## 2024-07-16 PROCEDURE — 85025 COMPLETE CBC W/AUTO DIFF WBC: CPT | Mod: PN | Performed by: INTERNAL MEDICINE

## 2024-07-17 LAB
CMV DNA SPEC QL NAA+PROBE: ABNORMAL
CYCLOSPORINE BLD LC/MS/MS-MCNC: 126 NG/ML (ref 100–400)
CYTOMEGALOVIRUS LOG (IU/ML): <1.48 LOGIU/ML
CYTOMEGALOVIRUS PCR, QUANT: <30 IU/ML
EPSTEIN-BARR VIRUS DNA: NORMAL
EPSTEIN-BARR VIRUS PCR, QUANT: NOT DETECTED IU/ML

## 2024-07-18 DIAGNOSIS — Z94.84 HISTORY OF ALLOGENEIC STEM CELL TRANSPLANT: ICD-10-CM

## 2024-07-18 RX ORDER — CYCLOSPORINE 25 MG/1
CAPSULE, LIQUID FILLED ORAL
Start: 2024-07-18

## 2024-07-18 RX ORDER — CYCLOSPORINE 100 MG/1
100 CAPSULE, LIQUID FILLED ORAL 2 TIMES DAILY
Start: 2024-07-18

## 2024-07-18 NOTE — PROGRESS NOTES
BMT Pharmacist Immunosuppression Note:    Reviewed patient's cyclosporine level with Dr. Franco and it is below goal range. Instructed patient to increase your current regimen of three 25mg capsules (75mg) in the morning and three 25mg capsules (75mg) in the evening to one 100mg capsule (100 mg) in the morning and one 100mg capsule (100 mg) in the evening.       Nel Luo Pharm.D., BCOP  Clinical Pharmacy Specialist, Bone Marrow Transplant/Cellular Therapy  [facility]  SpectraLink: [phone]

## 2024-07-23 ENCOUNTER — LAB VISIT (OUTPATIENT)
Dept: LAB | Facility: HOSPITAL | Age: 49
End: 2024-07-23
Attending: INTERNAL MEDICINE
Payer: COMMERCIAL

## 2024-07-23 DIAGNOSIS — R79.89 ELEVATED LFTS: ICD-10-CM

## 2024-07-23 DIAGNOSIS — Z94.84 HISTORY OF ALLOGENEIC STEM CELL TRANSPLANT: ICD-10-CM

## 2024-07-23 LAB
ALBUMIN SERPL BCP-MCNC: 3.2 G/DL (ref 3.5–5.2)
ALBUMIN SERPL BCP-MCNC: 3.2 G/DL (ref 3.5–5.2)
ALP SERPL-CCNC: 232 U/L (ref 55–135)
ALP SERPL-CCNC: 232 U/L (ref 55–135)
ALT SERPL W/O P-5'-P-CCNC: 143 U/L (ref 10–44)
ALT SERPL W/O P-5'-P-CCNC: 143 U/L (ref 10–44)
ANION GAP SERPL CALC-SCNC: 8 MMOL/L (ref 8–16)
ANION GAP SERPL CALC-SCNC: 8 MMOL/L (ref 8–16)
AST SERPL-CCNC: 84 U/L (ref 10–40)
AST SERPL-CCNC: 84 U/L (ref 10–40)
BASOPHILS # BLD AUTO: 0.01 K/UL (ref 0–0.2)
BASOPHILS # BLD AUTO: 0.01 K/UL (ref 0–0.2)
BASOPHILS NFR BLD: 0.3 % (ref 0–1.9)
BASOPHILS NFR BLD: 0.3 % (ref 0–1.9)
BILIRUB SERPL-MCNC: 1 MG/DL (ref 0.1–1)
BILIRUB SERPL-MCNC: 1 MG/DL (ref 0.1–1)
BUN SERPL-MCNC: 19 MG/DL (ref 6–20)
BUN SERPL-MCNC: 19 MG/DL (ref 6–20)
CALCIUM SERPL-MCNC: 8.8 MG/DL (ref 8.7–10.5)
CALCIUM SERPL-MCNC: 8.8 MG/DL (ref 8.7–10.5)
CHLORIDE SERPL-SCNC: 102 MMOL/L (ref 95–110)
CHLORIDE SERPL-SCNC: 102 MMOL/L (ref 95–110)
CO2 SERPL-SCNC: 22 MMOL/L (ref 23–29)
CO2 SERPL-SCNC: 22 MMOL/L (ref 23–29)
CREAT SERPL-MCNC: 1.3 MG/DL (ref 0.5–1.4)
CREAT SERPL-MCNC: 1.3 MG/DL (ref 0.5–1.4)
DIFFERENTIAL METHOD BLD: ABNORMAL
DIFFERENTIAL METHOD BLD: ABNORMAL
EOSINOPHIL # BLD AUTO: 0.2 K/UL (ref 0–0.5)
EOSINOPHIL # BLD AUTO: 0.2 K/UL (ref 0–0.5)
EOSINOPHIL NFR BLD: 6 % (ref 0–8)
EOSINOPHIL NFR BLD: 6.1 % (ref 0–8)
ERYTHROCYTE [DISTWIDTH] IN BLOOD BY AUTOMATED COUNT: 14.6 % (ref 11.5–14.5)
ERYTHROCYTE [DISTWIDTH] IN BLOOD BY AUTOMATED COUNT: 15 % (ref 11.5–14.5)
EST. GFR  (NO RACE VARIABLE): 50.1 ML/MIN/1.73 M^2
EST. GFR  (NO RACE VARIABLE): 50.1 ML/MIN/1.73 M^2
GLUCOSE SERPL-MCNC: 89 MG/DL (ref 70–110)
GLUCOSE SERPL-MCNC: 89 MG/DL (ref 70–110)
HCT VFR BLD AUTO: 34.3 % (ref 37–48.5)
HCT VFR BLD AUTO: 34.4 % (ref 37–48.5)
HGB BLD-MCNC: 12.3 G/DL (ref 12–16)
HGB BLD-MCNC: 12.3 G/DL (ref 12–16)
IMM GRANULOCYTES # BLD AUTO: 0.01 K/UL (ref 0–0.04)
IMM GRANULOCYTES # BLD AUTO: 0.01 K/UL (ref 0–0.04)
IMM GRANULOCYTES NFR BLD AUTO: 0.3 % (ref 0–0.5)
IMM GRANULOCYTES NFR BLD AUTO: 0.3 % (ref 0–0.5)
INR PPP: 0.9 (ref 0.8–1.2)
LYMPHOCYTES # BLD AUTO: 0.8 K/UL (ref 1–4.8)
LYMPHOCYTES # BLD AUTO: 0.8 K/UL (ref 1–4.8)
LYMPHOCYTES NFR BLD: 20 % (ref 18–48)
LYMPHOCYTES NFR BLD: 20.1 % (ref 18–48)
MAGNESIUM SERPL-MCNC: 1.7 MG/DL (ref 1.6–2.6)
MCH RBC QN AUTO: 34.7 PG (ref 27–31)
MCH RBC QN AUTO: 34.9 PG (ref 27–31)
MCHC RBC AUTO-ENTMCNC: 35.8 G/DL (ref 32–36)
MCHC RBC AUTO-ENTMCNC: 35.9 G/DL (ref 32–36)
MCV RBC AUTO: 97 FL (ref 82–98)
MCV RBC AUTO: 97 FL (ref 82–98)
MONOCYTES # BLD AUTO: 0.6 K/UL (ref 0.3–1)
MONOCYTES # BLD AUTO: 0.7 K/UL (ref 0.3–1)
MONOCYTES NFR BLD: 16.2 % (ref 4–15)
MONOCYTES NFR BLD: 16.5 % (ref 4–15)
NEUTROPHILS # BLD AUTO: 2.3 K/UL (ref 1.8–7.7)
NEUTROPHILS # BLD AUTO: 2.3 K/UL (ref 1.8–7.7)
NEUTROPHILS NFR BLD: 56.8 % (ref 38–73)
NEUTROPHILS NFR BLD: 57.1 % (ref 38–73)
NRBC BLD-RTO: 0 /100 WBC
NRBC BLD-RTO: 0 /100 WBC
PHOSPHATE SERPL-MCNC: 3 MG/DL (ref 2.7–4.5)
PLATELET # BLD AUTO: 221 K/UL (ref 150–450)
PLATELET # BLD AUTO: 230 K/UL (ref 150–450)
PMV BLD AUTO: 10.1 FL (ref 9.2–12.9)
PMV BLD AUTO: 10.3 FL (ref 9.2–12.9)
POTASSIUM SERPL-SCNC: 5.4 MMOL/L (ref 3.5–5.1)
POTASSIUM SERPL-SCNC: 5.4 MMOL/L (ref 3.5–5.1)
PROT SERPL-MCNC: 6.2 G/DL (ref 6–8.4)
PROT SERPL-MCNC: 6.2 G/DL (ref 6–8.4)
PROTHROMBIN TIME: 10 SEC (ref 9–12.5)
RBC # BLD AUTO: 3.52 M/UL (ref 4–5.4)
RBC # BLD AUTO: 3.54 M/UL (ref 4–5.4)
SODIUM SERPL-SCNC: 132 MMOL/L (ref 136–145)
SODIUM SERPL-SCNC: 132 MMOL/L (ref 136–145)
WBC # BLD AUTO: 3.95 K/UL (ref 3.9–12.7)
WBC # BLD AUTO: 3.99 K/UL (ref 3.9–12.7)

## 2024-07-23 PROCEDURE — 87799 DETECT AGENT NOS DNA QUANT: CPT | Performed by: INTERNAL MEDICINE

## 2024-07-23 PROCEDURE — 83735 ASSAY OF MAGNESIUM: CPT | Mod: PN | Performed by: INTERNAL MEDICINE

## 2024-07-23 PROCEDURE — 85025 COMPLETE CBC W/AUTO DIFF WBC: CPT | Mod: 91,PN | Performed by: STUDENT IN AN ORGANIZED HEALTH CARE EDUCATION/TRAINING PROGRAM

## 2024-07-23 PROCEDURE — 85610 PROTHROMBIN TIME: CPT | Performed by: STUDENT IN AN ORGANIZED HEALTH CARE EDUCATION/TRAINING PROGRAM

## 2024-07-23 PROCEDURE — 80053 COMPREHEN METABOLIC PANEL: CPT | Mod: PN | Performed by: INTERNAL MEDICINE

## 2024-07-23 PROCEDURE — 80158 DRUG ASSAY CYCLOSPORINE: CPT | Performed by: INTERNAL MEDICINE

## 2024-07-23 PROCEDURE — 36415 COLL VENOUS BLD VENIPUNCTURE: CPT | Mod: PN | Performed by: INTERNAL MEDICINE

## 2024-07-23 PROCEDURE — 84100 ASSAY OF PHOSPHORUS: CPT | Mod: PN | Performed by: INTERNAL MEDICINE

## 2024-07-23 PROCEDURE — 85025 COMPLETE CBC W/AUTO DIFF WBC: CPT | Mod: PN | Performed by: INTERNAL MEDICINE

## 2024-07-24 LAB
CMV DNA SPEC QL NAA+PROBE: ABNORMAL
CYCLOSPORINE BLD LC/MS/MS-MCNC: 124 NG/ML (ref 100–400)
CYTOMEGALOVIRUS LOG (IU/ML): 1.95 LOGIU/ML
CYTOMEGALOVIRUS PCR, QUANT: 90 IU/ML
EPSTEIN-BARR VIRUS DNA: NORMAL
EPSTEIN-BARR VIRUS PCR, QUANT: NOT DETECTED IU/ML

## 2024-07-25 ENCOUNTER — PATIENT MESSAGE (OUTPATIENT)
Dept: HEMATOLOGY/ONCOLOGY | Facility: CLINIC | Age: 49
End: 2024-07-25
Payer: COMMERCIAL

## 2024-07-25 DIAGNOSIS — Z94.84 HISTORY OF ALLOGENEIC STEM CELL TRANSPLANT: ICD-10-CM

## 2024-07-25 DIAGNOSIS — E87.5 HYPERKALEMIA: ICD-10-CM

## 2024-07-25 RX ORDER — CYCLOSPORINE 25 MG/1
25 CAPSULE, LIQUID FILLED ORAL 2 TIMES DAILY
Start: 2024-07-25

## 2024-07-25 RX ORDER — CYCLOSPORINE 100 MG/1
100 CAPSULE, LIQUID FILLED ORAL 2 TIMES DAILY
Start: 2024-07-25

## 2024-07-25 NOTE — PROGRESS NOTES
BMT Pharmacist Immunosuppression Note:    Unable to contact patient through phone. The following patient message has been sent through [employer].       Hi Ms. Villalba -     I've tried calling a couple of times to discuss your latest cyclosporine results. Your level is still low, however it's most likely because your kidney function continues to improve. I have discussed with Dr. Franco and we would like you to increase your current regimen of one 100mg capsule in the morning and one 100mg capsule in the evening to one 100mg and one 25mg capsule (125mg) in the morning and one 100mg and one 25mg capsule (125mg) in the evening.     You mentioned last week that with the lower cyclosporine level you could feel your GVHD flaring up. Are you still having this issue?    Also, we noticed your potassium level is on the high side and I was wanting to confirm if you are still taking the Lokelma?      Patient responded to message. She will increase her cyclosporine to 125mg BID. Dr. Franco would also like her to increase her Lokelma packets to 2 packets for the next 7 days to help decrease potassium levels.           Nel Luo, Pharm.D., BCOP  Clinical Pharmacy Specialist, Bone Marrow Transplant/Cellular Therapy  [facility]  SpectraLink: [phone]

## 2024-07-26 ENCOUNTER — TELEPHONE (OUTPATIENT)
Dept: HEPATOLOGY | Facility: CLINIC | Age: 49
End: 2024-07-26
Payer: COMMERCIAL

## 2024-07-26 NOTE — TELEPHONE ENCOUNTER
Dr. Freire pt. Reached out to pt in regards to discuss appt changes for appt on 9/12. Pt did not answer, left vm for pt to give the office a call back

## 2024-07-26 NOTE — TELEPHONE ENCOUNTER
Returned pts call. Discussed appt changes w/ pt. Pt vu and no further questions   ----- Message from Usha Perkins sent at 7/26/2024  3:58 PM CDT -----  Regarding: returning call to Patti  Contact: PT  [phone]  The patient called returning call from Patti. Please reach back at your convenience   No further information provided    Patient can be contacted @# [phone]

## 2024-07-30 ENCOUNTER — LAB VISIT (OUTPATIENT)
Dept: LAB | Facility: HOSPITAL | Age: 49
End: 2024-07-30
Attending: INTERNAL MEDICINE
Payer: COMMERCIAL

## 2024-07-30 ENCOUNTER — PATIENT MESSAGE (OUTPATIENT)
Dept: HEMATOLOGY/ONCOLOGY | Facility: CLINIC | Age: 49
End: 2024-07-30
Payer: COMMERCIAL

## 2024-07-30 DIAGNOSIS — Z94.84 HISTORY OF ALLOGENEIC STEM CELL TRANSPLANT: ICD-10-CM

## 2024-07-30 LAB
ALBUMIN SERPL BCP-MCNC: 3.3 G/DL (ref 3.5–5.2)
ALP SERPL-CCNC: 227 U/L (ref 55–135)
ALT SERPL W/O P-5'-P-CCNC: 113 U/L (ref 10–44)
ANION GAP SERPL CALC-SCNC: 9 MMOL/L (ref 8–16)
AST SERPL-CCNC: 48 U/L (ref 10–40)
BASOPHILS # BLD AUTO: 0.02 K/UL (ref 0–0.2)
BASOPHILS NFR BLD: 0.5 % (ref 0–1.9)
BILIRUB SERPL-MCNC: 1.1 MG/DL (ref 0.1–1)
BUN SERPL-MCNC: 11 MG/DL (ref 6–20)
CALCIUM SERPL-MCNC: 8.4 MG/DL (ref 8.7–10.5)
CHLORIDE SERPL-SCNC: 100 MMOL/L (ref 95–110)
CO2 SERPL-SCNC: 23 MMOL/L (ref 23–29)
CREAT SERPL-MCNC: 1.4 MG/DL (ref 0.5–1.4)
DIFFERENTIAL METHOD BLD: ABNORMAL
EOSINOPHIL # BLD AUTO: 0.2 K/UL (ref 0–0.5)
EOSINOPHIL NFR BLD: 4.7 % (ref 0–8)
ERYTHROCYTE [DISTWIDTH] IN BLOOD BY AUTOMATED COUNT: 14.7 % (ref 11.5–14.5)
EST. GFR  (NO RACE VARIABLE): 45.8 ML/MIN/1.73 M^2
GLUCOSE SERPL-MCNC: 80 MG/DL (ref 70–110)
HCT VFR BLD AUTO: 33.2 % (ref 37–48.5)
HGB BLD-MCNC: 11.9 G/DL (ref 12–16)
IMM GRANULOCYTES # BLD AUTO: 0.01 K/UL (ref 0–0.04)
IMM GRANULOCYTES NFR BLD AUTO: 0.2 % (ref 0–0.5)
LYMPHOCYTES # BLD AUTO: 1.1 K/UL (ref 1–4.8)
LYMPHOCYTES NFR BLD: 25.5 % (ref 18–48)
MAGNESIUM SERPL-MCNC: 1.9 MG/DL (ref 1.6–2.6)
MCH RBC QN AUTO: 34.5 PG (ref 27–31)
MCHC RBC AUTO-ENTMCNC: 35.8 G/DL (ref 32–36)
MCV RBC AUTO: 96 FL (ref 82–98)
MONOCYTES # BLD AUTO: 0.6 K/UL (ref 0.3–1)
MONOCYTES NFR BLD: 14.3 % (ref 4–15)
NEUTROPHILS # BLD AUTO: 2.3 K/UL (ref 1.8–7.7)
NEUTROPHILS NFR BLD: 54.8 % (ref 38–73)
NRBC BLD-RTO: 0 /100 WBC
PHOSPHATE SERPL-MCNC: 2.5 MG/DL (ref 2.7–4.5)
PLATELET # BLD AUTO: 224 K/UL (ref 150–450)
PMV BLD AUTO: 10 FL (ref 9.2–12.9)
POTASSIUM SERPL-SCNC: 4.1 MMOL/L (ref 3.5–5.1)
PROT SERPL-MCNC: 6.1 G/DL (ref 6–8.4)
RBC # BLD AUTO: 3.45 M/UL (ref 4–5.4)
SODIUM SERPL-SCNC: 132 MMOL/L (ref 136–145)
WBC # BLD AUTO: 4.27 K/UL (ref 3.9–12.7)

## 2024-07-30 PROCEDURE — 83735 ASSAY OF MAGNESIUM: CPT | Mod: PN | Performed by: INTERNAL MEDICINE

## 2024-07-30 PROCEDURE — 80053 COMPREHEN METABOLIC PANEL: CPT | Mod: PN | Performed by: INTERNAL MEDICINE

## 2024-07-30 PROCEDURE — 87799 DETECT AGENT NOS DNA QUANT: CPT | Performed by: INTERNAL MEDICINE

## 2024-07-30 PROCEDURE — 84100 ASSAY OF PHOSPHORUS: CPT | Mod: PN | Performed by: INTERNAL MEDICINE

## 2024-07-30 PROCEDURE — 85025 COMPLETE CBC W/AUTO DIFF WBC: CPT | Mod: PN | Performed by: INTERNAL MEDICINE

## 2024-07-30 PROCEDURE — 80158 DRUG ASSAY CYCLOSPORINE: CPT | Performed by: INTERNAL MEDICINE

## 2024-07-31 LAB
CMV DNA SPEC QL NAA+PROBE: ABNORMAL
CYCLOSPORINE BLD LC/MS/MS-MCNC: 145 NG/ML (ref 100–400)
CYTOMEGALOVIRUS LOG (IU/ML): 1.65 LOGIU/ML
CYTOMEGALOVIRUS PCR, QUANT: 45 IU/ML
EPSTEIN-BARR VIRUS DNA: NORMAL
EPSTEIN-BARR VIRUS PCR, QUANT: NOT DETECTED IU/ML

## 2024-08-01 ENCOUNTER — PATIENT MESSAGE (OUTPATIENT)
Dept: HEMATOLOGY/ONCOLOGY | Facility: CLINIC | Age: 49
End: 2024-08-01
Payer: COMMERCIAL

## 2024-08-05 ENCOUNTER — TELEPHONE (OUTPATIENT)
Dept: AUDIOLOGY | Facility: CLINIC | Age: 49
End: 2024-08-05
Payer: COMMERCIAL

## 2024-08-05 ENCOUNTER — OFFICE VISIT (OUTPATIENT)
Dept: HEMATOLOGY/ONCOLOGY | Facility: CLINIC | Age: 49
End: 2024-08-05
Payer: COMMERCIAL

## 2024-08-05 VITALS
HEIGHT: 65 IN | OXYGEN SATURATION: 99 % | HEART RATE: 59 BPM | SYSTOLIC BLOOD PRESSURE: 174 MMHG | WEIGHT: 161.19 LBS | RESPIRATION RATE: 20 BRPM | TEMPERATURE: 98 F | BODY MASS INDEX: 26.85 KG/M2 | DIASTOLIC BLOOD PRESSURE: 85 MMHG

## 2024-08-05 DIAGNOSIS — M79.89 LEG SWELLING: ICD-10-CM

## 2024-08-05 DIAGNOSIS — H93.13 TINNITUS OF BOTH EARS: ICD-10-CM

## 2024-08-05 DIAGNOSIS — E87.5 HYPERKALEMIA: ICD-10-CM

## 2024-08-05 DIAGNOSIS — C92.01 ACUTE MYELOID LEUKEMIA IN REMISSION: ICD-10-CM

## 2024-08-05 DIAGNOSIS — Z94.84 HISTORY OF ALLOGENEIC STEM CELL TRANSPLANT: Primary | ICD-10-CM

## 2024-08-05 DIAGNOSIS — D64.9 NORMOCYTIC ANEMIA: ICD-10-CM

## 2024-08-05 PROCEDURE — 3008F BODY MASS INDEX DOCD: CPT | Mod: CPTII,S$GLB,, | Performed by: INTERNAL MEDICINE

## 2024-08-05 PROCEDURE — 1159F MED LIST DOCD IN RCRD: CPT | Mod: CPTII,S$GLB,, | Performed by: INTERNAL MEDICINE

## 2024-08-05 PROCEDURE — 3079F DIAST BP 80-89 MM HG: CPT | Mod: CPTII,S$GLB,, | Performed by: INTERNAL MEDICINE

## 2024-08-05 PROCEDURE — 99215 OFFICE O/P EST HI 40 MIN: CPT | Mod: S$GLB,,, | Performed by: INTERNAL MEDICINE

## 2024-08-05 PROCEDURE — G2211 COMPLEX E/M VISIT ADD ON: HCPCS | Mod: S$GLB,,, | Performed by: INTERNAL MEDICINE

## 2024-08-05 PROCEDURE — 3077F SYST BP >= 140 MM HG: CPT | Mod: CPTII,S$GLB,, | Performed by: INTERNAL MEDICINE

## 2024-08-05 PROCEDURE — 1160F RVW MEDS BY RX/DR IN RCRD: CPT | Mod: CPTII,S$GLB,, | Performed by: INTERNAL MEDICINE

## 2024-08-05 PROCEDURE — 99999 PR PBB SHADOW E&M-EST. PATIENT-LVL V: CPT | Mod: PBBFAC,,, | Performed by: INTERNAL MEDICINE

## 2024-08-05 RX ORDER — FUROSEMIDE 40 MG/1
40 TABLET ORAL DAILY PRN
Qty: 30 TABLET | Refills: 2 | Status: SHIPPED | OUTPATIENT
Start: 2024-08-05

## 2024-08-06 ENCOUNTER — PATIENT MESSAGE (OUTPATIENT)
Dept: HEMATOLOGY/ONCOLOGY | Facility: CLINIC | Age: 49
End: 2024-08-06
Payer: COMMERCIAL

## 2024-08-06 ENCOUNTER — TELEPHONE (OUTPATIENT)
Dept: HEMATOLOGY/ONCOLOGY | Facility: CLINIC | Age: 49
End: 2024-08-06
Payer: COMMERCIAL

## 2024-08-07 ENCOUNTER — TELEPHONE (OUTPATIENT)
Dept: AUDIOLOGY | Facility: CLINIC | Age: 49
End: 2024-08-07
Payer: COMMERCIAL

## 2024-08-08 ENCOUNTER — LAB VISIT (OUTPATIENT)
Dept: LAB | Facility: HOSPITAL | Age: 49
End: 2024-08-08
Attending: INTERNAL MEDICINE
Payer: COMMERCIAL

## 2024-08-08 DIAGNOSIS — Z94.84 HISTORY OF ALLOGENEIC STEM CELL TRANSPLANT: ICD-10-CM

## 2024-08-08 LAB
ALBUMIN SERPL BCP-MCNC: 3.2 G/DL (ref 3.5–5.2)
ALP SERPL-CCNC: 249 U/L (ref 55–135)
ALT SERPL W/O P-5'-P-CCNC: 184 U/L (ref 10–44)
ANION GAP SERPL CALC-SCNC: 10 MMOL/L (ref 8–16)
AST SERPL-CCNC: 125 U/L (ref 10–40)
BASOPHILS # BLD AUTO: 0.01 K/UL (ref 0–0.2)
BASOPHILS NFR BLD: 0.2 % (ref 0–1.9)
BILIRUB SERPL-MCNC: 2.2 MG/DL (ref 0.1–1)
BUN SERPL-MCNC: 12 MG/DL (ref 6–20)
CALCIUM SERPL-MCNC: 8.9 MG/DL (ref 8.7–10.5)
CHLORIDE SERPL-SCNC: 103 MMOL/L (ref 95–110)
CO2 SERPL-SCNC: 21 MMOL/L (ref 23–29)
CREAT SERPL-MCNC: 1.5 MG/DL (ref 0.5–1.4)
DIFFERENTIAL METHOD BLD: ABNORMAL
EOSINOPHIL # BLD AUTO: 0.2 K/UL (ref 0–0.5)
EOSINOPHIL NFR BLD: 3.6 % (ref 0–8)
ERYTHROCYTE [DISTWIDTH] IN BLOOD BY AUTOMATED COUNT: 14.8 % (ref 11.5–14.5)
EST. GFR  (NO RACE VARIABLE): 42 ML/MIN/1.73 M^2
GLUCOSE SERPL-MCNC: 79 MG/DL (ref 70–110)
HCT VFR BLD AUTO: 34.2 % (ref 37–48.5)
HGB BLD-MCNC: 12.3 G/DL (ref 12–16)
IMM GRANULOCYTES # BLD AUTO: 0.01 K/UL (ref 0–0.04)
IMM GRANULOCYTES NFR BLD AUTO: 0.2 % (ref 0–0.5)
LYMPHOCYTES # BLD AUTO: 1.1 K/UL (ref 1–4.8)
LYMPHOCYTES NFR BLD: 26.8 % (ref 18–48)
MAGNESIUM SERPL-MCNC: 1.5 MG/DL (ref 1.6–2.6)
MCH RBC QN AUTO: 34.8 PG (ref 27–31)
MCHC RBC AUTO-ENTMCNC: 36 G/DL (ref 32–36)
MCV RBC AUTO: 97 FL (ref 82–98)
MONOCYTES # BLD AUTO: 0.6 K/UL (ref 0.3–1)
MONOCYTES NFR BLD: 14.3 % (ref 4–15)
NEUTROPHILS # BLD AUTO: 2.3 K/UL (ref 1.8–7.7)
NEUTROPHILS NFR BLD: 54.9 % (ref 38–73)
NRBC BLD-RTO: 0 /100 WBC
PHOSPHATE SERPL-MCNC: 2.5 MG/DL (ref 2.7–4.5)
PLATELET # BLD AUTO: 235 K/UL (ref 150–450)
PMV BLD AUTO: 10.2 FL (ref 9.2–12.9)
POTASSIUM SERPL-SCNC: 4.7 MMOL/L (ref 3.5–5.1)
PROT SERPL-MCNC: 5.9 G/DL (ref 6–8.4)
RBC # BLD AUTO: 3.53 M/UL (ref 4–5.4)
SODIUM SERPL-SCNC: 134 MMOL/L (ref 136–145)
WBC # BLD AUTO: 4.21 K/UL (ref 3.9–12.7)

## 2024-08-08 PROCEDURE — 80158 DRUG ASSAY CYCLOSPORINE: CPT | Performed by: INTERNAL MEDICINE

## 2024-08-08 PROCEDURE — 84100 ASSAY OF PHOSPHORUS: CPT | Mod: PN | Performed by: INTERNAL MEDICINE

## 2024-08-08 PROCEDURE — 83735 ASSAY OF MAGNESIUM: CPT | Mod: PN | Performed by: INTERNAL MEDICINE

## 2024-08-08 PROCEDURE — 80053 COMPREHEN METABOLIC PANEL: CPT | Mod: PO | Performed by: INTERNAL MEDICINE

## 2024-08-08 PROCEDURE — 36415 COLL VENOUS BLD VENIPUNCTURE: CPT | Mod: PN | Performed by: INTERNAL MEDICINE

## 2024-08-08 PROCEDURE — 85025 COMPLETE CBC W/AUTO DIFF WBC: CPT | Mod: PN | Performed by: INTERNAL MEDICINE

## 2024-08-08 PROCEDURE — 87799 DETECT AGENT NOS DNA QUANT: CPT | Performed by: INTERNAL MEDICINE

## 2024-08-09 ENCOUNTER — PATIENT MESSAGE (OUTPATIENT)
Dept: HEMATOLOGY/ONCOLOGY | Facility: CLINIC | Age: 49
End: 2024-08-09
Payer: COMMERCIAL

## 2024-08-09 DIAGNOSIS — Z94.84 HISTORY OF ALLOGENEIC STEM CELL TRANSPLANT: ICD-10-CM

## 2024-08-09 DIAGNOSIS — C92.01 ACUTE MYELOID LEUKEMIA IN REMISSION: Primary | ICD-10-CM

## 2024-08-09 DIAGNOSIS — E87.5 HYPERKALEMIA: ICD-10-CM

## 2024-08-09 LAB
CMV DNA SPEC QL NAA+PROBE: ABNORMAL
CYCLOSPORINE BLD LC/MS/MS-MCNC: 151 NG/ML (ref 100–400)
CYTOMEGALOVIRUS LOG (IU/ML): 1.53 LOGIU/ML
CYTOMEGALOVIRUS PCR, QUANT: 34 IU/ML
EPSTEIN-BARR VIRUS DNA: NORMAL
EPSTEIN-BARR VIRUS PCR, QUANT: NOT DETECTED IU/ML

## 2024-08-13 ENCOUNTER — LAB VISIT (OUTPATIENT)
Dept: LAB | Facility: HOSPITAL | Age: 49
End: 2024-08-13
Attending: INTERNAL MEDICINE
Payer: COMMERCIAL

## 2024-08-13 DIAGNOSIS — Z94.84 HISTORY OF ALLOGENEIC STEM CELL TRANSPLANT: ICD-10-CM

## 2024-08-13 LAB
ALBUMIN SERPL BCP-MCNC: 3.3 G/DL (ref 3.5–5.2)
ALP SERPL-CCNC: 250 U/L (ref 55–135)
ALT SERPL W/O P-5'-P-CCNC: 132 U/L (ref 10–44)
ANION GAP SERPL CALC-SCNC: 12 MMOL/L (ref 8–16)
AST SERPL-CCNC: 84 U/L (ref 10–40)
BASOPHILS # BLD AUTO: 0.01 K/UL (ref 0–0.2)
BASOPHILS NFR BLD: 0.2 % (ref 0–1.9)
BILIRUB SERPL-MCNC: 1.7 MG/DL (ref 0.1–1)
BUN SERPL-MCNC: 16 MG/DL (ref 6–20)
CALCIUM SERPL-MCNC: 8.8 MG/DL (ref 8.7–10.5)
CHLORIDE SERPL-SCNC: 97 MMOL/L (ref 95–110)
CO2 SERPL-SCNC: 20 MMOL/L (ref 23–29)
CREAT SERPL-MCNC: 1.6 MG/DL (ref 0.5–1.4)
DIFFERENTIAL METHOD BLD: ABNORMAL
EOSINOPHIL # BLD AUTO: 0.3 K/UL (ref 0–0.5)
EOSINOPHIL NFR BLD: 5.6 % (ref 0–8)
ERYTHROCYTE [DISTWIDTH] IN BLOOD BY AUTOMATED COUNT: 14.6 % (ref 11.5–14.5)
EST. GFR  (NO RACE VARIABLE): 39 ML/MIN/1.73 M^2
GLUCOSE SERPL-MCNC: 82 MG/DL (ref 70–110)
HCT VFR BLD AUTO: 34.8 % (ref 37–48.5)
HGB BLD-MCNC: 12.7 G/DL (ref 12–16)
IMM GRANULOCYTES # BLD AUTO: 0.02 K/UL (ref 0–0.04)
IMM GRANULOCYTES NFR BLD AUTO: 0.4 % (ref 0–0.5)
LYMPHOCYTES # BLD AUTO: 1.3 K/UL (ref 1–4.8)
LYMPHOCYTES NFR BLD: 24.3 % (ref 18–48)
MAGNESIUM SERPL-MCNC: 1.9 MG/DL (ref 1.6–2.6)
MCH RBC QN AUTO: 34.9 PG (ref 27–31)
MCHC RBC AUTO-ENTMCNC: 36.5 G/DL (ref 32–36)
MCV RBC AUTO: 96 FL (ref 82–98)
MONOCYTES # BLD AUTO: 0.8 K/UL (ref 0.3–1)
MONOCYTES NFR BLD: 15 % (ref 4–15)
NEUTROPHILS # BLD AUTO: 2.9 K/UL (ref 1.8–7.7)
NEUTROPHILS NFR BLD: 54.5 % (ref 38–73)
NRBC BLD-RTO: 0 /100 WBC
PHOSPHATE SERPL-MCNC: 2.5 MG/DL (ref 2.7–4.5)
PLATELET # BLD AUTO: 253 K/UL (ref 150–450)
PMV BLD AUTO: 10.5 FL (ref 9.2–12.9)
POTASSIUM SERPL-SCNC: 4.3 MMOL/L (ref 3.5–5.1)
PROT SERPL-MCNC: 6.4 G/DL (ref 6–8.4)
RBC # BLD AUTO: 3.64 M/UL (ref 4–5.4)
SODIUM SERPL-SCNC: 129 MMOL/L (ref 136–145)
WBC # BLD AUTO: 5.39 K/UL (ref 3.9–12.7)

## 2024-08-13 PROCEDURE — 85025 COMPLETE CBC W/AUTO DIFF WBC: CPT | Mod: PN | Performed by: INTERNAL MEDICINE

## 2024-08-13 PROCEDURE — 36415 COLL VENOUS BLD VENIPUNCTURE: CPT | Mod: PN | Performed by: INTERNAL MEDICINE

## 2024-08-13 PROCEDURE — 83735 ASSAY OF MAGNESIUM: CPT | Mod: PN | Performed by: INTERNAL MEDICINE

## 2024-08-13 PROCEDURE — 84100 ASSAY OF PHOSPHORUS: CPT | Mod: PN | Performed by: INTERNAL MEDICINE

## 2024-08-13 PROCEDURE — 80053 COMPREHEN METABOLIC PANEL: CPT | Mod: PN | Performed by: INTERNAL MEDICINE

## 2024-08-13 PROCEDURE — 80158 DRUG ASSAY CYCLOSPORINE: CPT | Performed by: INTERNAL MEDICINE

## 2024-08-13 PROCEDURE — 87799 DETECT AGENT NOS DNA QUANT: CPT | Performed by: INTERNAL MEDICINE

## 2024-08-14 LAB
CMV DNA SPEC QL NAA+PROBE: ABNORMAL
CYCLOSPORINE BLD LC/MS/MS-MCNC: 182 NG/ML (ref 100–400)
CYTOMEGALOVIRUS LOG (IU/ML): 1.71 LOGIU/ML
CYTOMEGALOVIRUS PCR, QUANT: 52 IU/ML
EPSTEIN-BARR VIRUS DNA: NORMAL
EPSTEIN-BARR VIRUS PCR, QUANT: NOT DETECTED IU/ML

## 2024-08-15 ENCOUNTER — TELEPHONE (OUTPATIENT)
Dept: HEMATOLOGY/ONCOLOGY | Facility: CLINIC | Age: 49
End: 2024-08-15
Payer: COMMERCIAL

## 2024-08-15 DIAGNOSIS — N30.01 ACUTE CYSTITIS WITH HEMATURIA: Primary | ICD-10-CM

## 2024-08-15 RX ORDER — NITROFURANTOIN 25; 75 MG/1; MG/1
100 CAPSULE ORAL 2 TIMES DAILY
Qty: 10 CAPSULE | Refills: 0 | Status: SHIPPED | OUTPATIENT
Start: 2024-08-15 | End: 2024-08-21

## 2024-08-15 NOTE — TELEPHONE ENCOUNTER
"Per Dr. Franco "It looks like she has a UTI based on her urine test. I sent MacroBID to the [facility]. Can we please ask her to pick it up and take it for the full 5 day course prescribed?" Called and left a voicemail for pt to inform her of the given information and gave call back number to reach out to if she has any further questions or concerns.   "

## 2024-08-19 DIAGNOSIS — Z94.84 HISTORY OF ALLOGENEIC STEM CELL TRANSPLANT: Primary | ICD-10-CM

## 2024-08-19 PROBLEM — A41.9 SEPSIS: Status: RESOLVED | Noted: 2024-05-15 | Resolved: 2024-08-19

## 2024-08-20 ENCOUNTER — LAB VISIT (OUTPATIENT)
Dept: LAB | Facility: HOSPITAL | Age: 49
End: 2024-08-20
Attending: STUDENT IN AN ORGANIZED HEALTH CARE EDUCATION/TRAINING PROGRAM
Payer: COMMERCIAL

## 2024-08-20 DIAGNOSIS — Z94.84 HISTORY OF ALLOGENEIC STEM CELL TRANSPLANT: ICD-10-CM

## 2024-08-20 LAB
ALBUMIN SERPL BCP-MCNC: 3.1 G/DL (ref 3.5–5.2)
ALP SERPL-CCNC: 229 U/L (ref 55–135)
ALT SERPL W/O P-5'-P-CCNC: 115 U/L (ref 10–44)
ANION GAP SERPL CALC-SCNC: 11 MMOL/L (ref 8–16)
AST SERPL-CCNC: 86 U/L (ref 10–40)
BASOPHILS # BLD AUTO: 0.01 K/UL (ref 0–0.2)
BASOPHILS NFR BLD: 0.3 % (ref 0–1.9)
BILIRUB SERPL-MCNC: 1.6 MG/DL (ref 0.1–1)
BUN SERPL-MCNC: 12 MG/DL (ref 6–20)
CALCIUM SERPL-MCNC: 9.1 MG/DL (ref 8.7–10.5)
CHLORIDE SERPL-SCNC: 96 MMOL/L (ref 95–110)
CO2 SERPL-SCNC: 23 MMOL/L (ref 23–29)
CREAT SERPL-MCNC: 1.6 MG/DL (ref 0.5–1.4)
DIFFERENTIAL METHOD BLD: ABNORMAL
EOSINOPHIL # BLD AUTO: 0.2 K/UL (ref 0–0.5)
EOSINOPHIL NFR BLD: 6.3 % (ref 0–8)
ERYTHROCYTE [DISTWIDTH] IN BLOOD BY AUTOMATED COUNT: 13.8 % (ref 11.5–14.5)
EST. GFR  (NO RACE VARIABLE): 39 ML/MIN/1.73 M^2
GLUCOSE SERPL-MCNC: 88 MG/DL (ref 70–110)
HCT VFR BLD AUTO: 34.9 % (ref 37–48.5)
HGB BLD-MCNC: 12.8 G/DL (ref 12–16)
IMM GRANULOCYTES # BLD AUTO: 0.04 K/UL (ref 0–0.04)
IMM GRANULOCYTES NFR BLD AUTO: 1 % (ref 0–0.5)
LYMPHOCYTES # BLD AUTO: 1.1 K/UL (ref 1–4.8)
LYMPHOCYTES NFR BLD: 29.1 % (ref 18–48)
MAGNESIUM SERPL-MCNC: 1.6 MG/DL (ref 1.6–2.6)
MCH RBC QN AUTO: 35.2 PG (ref 27–31)
MCHC RBC AUTO-ENTMCNC: 36.7 G/DL (ref 32–36)
MCV RBC AUTO: 96 FL (ref 82–98)
MONOCYTES # BLD AUTO: 0.7 K/UL (ref 0.3–1)
MONOCYTES NFR BLD: 17 % (ref 4–15)
NEUTROPHILS # BLD AUTO: 1.8 K/UL (ref 1.8–7.7)
NEUTROPHILS NFR BLD: 46.3 % (ref 38–73)
NRBC BLD-RTO: 0 /100 WBC
PHOSPHATE SERPL-MCNC: 3 MG/DL (ref 2.7–4.5)
PLATELET # BLD AUTO: 275 K/UL (ref 150–450)
PMV BLD AUTO: 10 FL (ref 9.2–12.9)
POTASSIUM SERPL-SCNC: 4.3 MMOL/L (ref 3.5–5.1)
PROT SERPL-MCNC: 6.3 G/DL (ref 6–8.4)
RBC # BLD AUTO: 3.64 M/UL (ref 4–5.4)
SODIUM SERPL-SCNC: 130 MMOL/L (ref 136–145)
WBC # BLD AUTO: 3.82 K/UL (ref 3.9–12.7)

## 2024-08-20 PROCEDURE — 80053 COMPREHEN METABOLIC PANEL: CPT | Mod: PN | Performed by: INTERNAL MEDICINE

## 2024-08-20 PROCEDURE — 36415 COLL VENOUS BLD VENIPUNCTURE: CPT | Mod: PN | Performed by: INTERNAL MEDICINE

## 2024-08-20 PROCEDURE — 83735 ASSAY OF MAGNESIUM: CPT | Mod: PN | Performed by: INTERNAL MEDICINE

## 2024-08-20 PROCEDURE — 80158 DRUG ASSAY CYCLOSPORINE: CPT | Performed by: INTERNAL MEDICINE

## 2024-08-20 PROCEDURE — 87799 DETECT AGENT NOS DNA QUANT: CPT | Performed by: INTERNAL MEDICINE

## 2024-08-20 PROCEDURE — 85025 COMPLETE CBC W/AUTO DIFF WBC: CPT | Mod: PN | Performed by: INTERNAL MEDICINE

## 2024-08-20 PROCEDURE — 84100 ASSAY OF PHOSPHORUS: CPT | Mod: PN | Performed by: INTERNAL MEDICINE

## 2024-08-21 LAB
CMV DNA SPEC QL NAA+PROBE: ABNORMAL
CYCLOSPORINE BLD LC/MS/MS-MCNC: 205 NG/ML (ref 100–400)
CYTOMEGALOVIRUS LOG (IU/ML): <1.48 LOGIU/ML
CYTOMEGALOVIRUS PCR, QUANT: <30 IU/ML
EPSTEIN-BARR VIRUS DNA: NORMAL
EPSTEIN-BARR VIRUS PCR, QUANT: NOT DETECTED IU/ML

## 2024-08-22 NOTE — PROGRESS NOTES
BMT Pharmacist Immunosuppression Note:    Reviewed patient's cyclosporine level with Dr. Franco and it is within goal range. Instructed patient to continue your current regimen of 125 mg (1 capsule of 100 mg PLUS 1 capsule of 25 mg) twice a day      Anjelica Sullivan, PharmD  Clinical Pharmacy Specialist, Bone Marrow Transplant/Hematology  Spectra link: [phone]

## 2024-08-27 ENCOUNTER — LAB VISIT (OUTPATIENT)
Dept: LAB | Facility: HOSPITAL | Age: 49
End: 2024-08-27
Attending: INTERNAL MEDICINE
Payer: COMMERCIAL

## 2024-08-27 DIAGNOSIS — Z94.84 HISTORY OF ALLOGENEIC STEM CELL TRANSPLANT: ICD-10-CM

## 2024-08-27 LAB
ALBUMIN SERPL BCP-MCNC: 3 G/DL (ref 3.5–5.2)
ALP SERPL-CCNC: 242 U/L (ref 55–135)
ALT SERPL W/O P-5'-P-CCNC: 141 U/L (ref 10–44)
ANION GAP SERPL CALC-SCNC: 11 MMOL/L (ref 8–16)
AST SERPL-CCNC: 119 U/L (ref 10–40)
BASOPHILS # BLD AUTO: 0.02 K/UL (ref 0–0.2)
BASOPHILS NFR BLD: 0.3 % (ref 0–1.9)
BILIRUB SERPL-MCNC: 1.9 MG/DL (ref 0.1–1)
BUN SERPL-MCNC: 14 MG/DL (ref 6–20)
CALCIUM SERPL-MCNC: 9.2 MG/DL (ref 8.7–10.5)
CHLORIDE SERPL-SCNC: 102 MMOL/L (ref 95–110)
CO2 SERPL-SCNC: 21 MMOL/L (ref 23–29)
CREAT SERPL-MCNC: 1.6 MG/DL (ref 0.5–1.4)
DIFFERENTIAL METHOD BLD: ABNORMAL
EOSINOPHIL # BLD AUTO: 0.2 K/UL (ref 0–0.5)
EOSINOPHIL NFR BLD: 2.7 % (ref 0–8)
ERYTHROCYTE [DISTWIDTH] IN BLOOD BY AUTOMATED COUNT: 14.2 % (ref 11.5–14.5)
EST. GFR  (NO RACE VARIABLE): 39 ML/MIN/1.73 M^2
GLUCOSE SERPL-MCNC: 91 MG/DL (ref 70–110)
HCT VFR BLD AUTO: 35.7 % (ref 37–48.5)
HGB BLD-MCNC: 12.7 G/DL (ref 12–16)
IMM GRANULOCYTES # BLD AUTO: 0.04 K/UL (ref 0–0.04)
IMM GRANULOCYTES NFR BLD AUTO: 0.5 % (ref 0–0.5)
LYMPHOCYTES # BLD AUTO: 1.4 K/UL (ref 1–4.8)
LYMPHOCYTES NFR BLD: 19.2 % (ref 18–48)
MAGNESIUM SERPL-MCNC: 1.5 MG/DL (ref 1.6–2.6)
MCH RBC QN AUTO: 34.5 PG (ref 27–31)
MCHC RBC AUTO-ENTMCNC: 35.6 G/DL (ref 32–36)
MCV RBC AUTO: 97 FL (ref 82–98)
MONOCYTES # BLD AUTO: 0.9 K/UL (ref 0.3–1)
MONOCYTES NFR BLD: 12.5 % (ref 4–15)
NEUTROPHILS # BLD AUTO: 4.7 K/UL (ref 1.8–7.7)
NEUTROPHILS NFR BLD: 64.8 % (ref 38–73)
NRBC BLD-RTO: 0 /100 WBC
PHOSPHATE SERPL-MCNC: 2.8 MG/DL (ref 2.7–4.5)
PLATELET # BLD AUTO: 265 K/UL (ref 150–450)
PMV BLD AUTO: 10.5 FL (ref 9.2–12.9)
POTASSIUM SERPL-SCNC: 4.9 MMOL/L (ref 3.5–5.1)
PROT SERPL-MCNC: 6.4 G/DL (ref 6–8.4)
RBC # BLD AUTO: 3.68 M/UL (ref 4–5.4)
SODIUM SERPL-SCNC: 134 MMOL/L (ref 136–145)
WBC # BLD AUTO: 7.3 K/UL (ref 3.9–12.7)

## 2024-08-27 PROCEDURE — 80053 COMPREHEN METABOLIC PANEL: CPT | Mod: PN | Performed by: INTERNAL MEDICINE

## 2024-08-27 PROCEDURE — 87799 DETECT AGENT NOS DNA QUANT: CPT | Performed by: INTERNAL MEDICINE

## 2024-08-27 PROCEDURE — 85025 COMPLETE CBC W/AUTO DIFF WBC: CPT | Mod: PN | Performed by: INTERNAL MEDICINE

## 2024-08-27 PROCEDURE — 80158 DRUG ASSAY CYCLOSPORINE: CPT | Performed by: INTERNAL MEDICINE

## 2024-08-27 PROCEDURE — 36415 COLL VENOUS BLD VENIPUNCTURE: CPT | Mod: PN | Performed by: INTERNAL MEDICINE

## 2024-08-27 PROCEDURE — 83735 ASSAY OF MAGNESIUM: CPT | Mod: PN | Performed by: INTERNAL MEDICINE

## 2024-08-27 PROCEDURE — 84100 ASSAY OF PHOSPHORUS: CPT | Mod: PN | Performed by: INTERNAL MEDICINE

## 2024-08-28 ENCOUNTER — DOCUMENTATION ONLY (OUTPATIENT)
Dept: HEMATOLOGY/ONCOLOGY | Facility: CLINIC | Age: 49
End: 2024-08-28
Payer: COMMERCIAL

## 2024-08-28 ENCOUNTER — TELEPHONE (OUTPATIENT)
Dept: HEMATOLOGY/ONCOLOGY | Facility: CLINIC | Age: 49
End: 2024-08-28
Payer: COMMERCIAL

## 2024-08-28 LAB
CMV DNA SPEC QL NAA+PROBE: ABNORMAL
CYCLOSPORINE BLD LC/MS/MS-MCNC: 150 NG/ML (ref 100–400)
CYTOMEGALOVIRUS LOG (IU/ML): 1.95 LOGIU/ML
CYTOMEGALOVIRUS PCR, QUANT: 89 IU/ML
EPSTEIN-BARR VIRUS DNA: NORMAL
EPSTEIN-BARR VIRUS PCR, QUANT: NOT DETECTED IU/ML

## 2024-08-28 NOTE — TELEPHONE ENCOUNTER
LVM in regards to confirming appt with Dr. Dudley for tomorrow 8/29 @ 2:30PM virtual.      [place], [place] ext [phone]

## 2024-08-29 ENCOUNTER — TELEPHONE (OUTPATIENT)
Dept: HEMATOLOGY/ONCOLOGY | Facility: CLINIC | Age: 49
End: 2024-08-29
Payer: COMMERCIAL

## 2024-08-29 RX ORDER — CYCLOSPORINE 100 MG/1
100 CAPSULE, LIQUID FILLED ORAL 2 TIMES DAILY
Qty: 180 CAPSULE | Refills: 3 | Status: SHIPPED | OUTPATIENT
Start: 2024-08-29

## 2024-08-29 RX ORDER — CYCLOSPORINE 25 MG/1
25 CAPSULE, LIQUID FILLED ORAL 2 TIMES DAILY
Qty: 180 CAPSULE | Refills: 3 | Status: SHIPPED | OUTPATIENT
Start: 2024-08-29

## 2024-08-29 NOTE — TELEPHONE ENCOUNTER
MA return phone call from pt. Pt requested to r/s appt with Dr. Dudley today due to family emergency. Pt requested to schedule beginning of October due to being out of town for BMT check up. Pt approved to r/s to 10/1 @ 2:30PM, virtual.       [place], [place] ext [phone]

## 2024-08-30 ENCOUNTER — TELEPHONE (OUTPATIENT)
Dept: HEMATOLOGY/ONCOLOGY | Facility: CLINIC | Age: 49
End: 2024-08-30

## 2024-08-30 ENCOUNTER — OFFICE VISIT (OUTPATIENT)
Dept: HEMATOLOGY/ONCOLOGY | Facility: CLINIC | Age: 49
End: 2024-08-30
Payer: COMMERCIAL

## 2024-08-30 VITALS
BODY MASS INDEX: 26.3 KG/M2 | RESPIRATION RATE: 18 BRPM | SYSTOLIC BLOOD PRESSURE: 158 MMHG | OXYGEN SATURATION: 99 % | DIASTOLIC BLOOD PRESSURE: 93 MMHG | TEMPERATURE: 98 F | WEIGHT: 158.06 LBS | HEART RATE: 65 BPM

## 2024-08-30 DIAGNOSIS — R30.0 DYSURIA: Primary | ICD-10-CM

## 2024-08-30 DIAGNOSIS — R30.0 DYSURIA: ICD-10-CM

## 2024-08-30 DIAGNOSIS — Z94.84 HISTORY OF ALLOGENEIC STEM CELL TRANSPLANT: ICD-10-CM

## 2024-08-30 DIAGNOSIS — N30.90 CYSTITIS: ICD-10-CM

## 2024-08-30 DIAGNOSIS — N30.01 ACUTE CYSTITIS WITH HEMATURIA: ICD-10-CM

## 2024-08-30 DIAGNOSIS — Z94.84 HISTORY OF ALLOGENEIC STEM CELL TRANSPLANT: Primary | ICD-10-CM

## 2024-08-30 PROCEDURE — 3008F BODY MASS INDEX DOCD: CPT | Mod: CPTII,S$GLB,, | Performed by: INTERNAL MEDICINE

## 2024-08-30 PROCEDURE — 3080F DIAST BP >= 90 MM HG: CPT | Mod: CPTII,S$GLB,, | Performed by: INTERNAL MEDICINE

## 2024-08-30 PROCEDURE — 3077F SYST BP >= 140 MM HG: CPT | Mod: CPTII,S$GLB,, | Performed by: INTERNAL MEDICINE

## 2024-08-30 PROCEDURE — 99999 PR PBB SHADOW E&M-EST. PATIENT-LVL IV: CPT | Mod: PBBFAC,,, | Performed by: INTERNAL MEDICINE

## 2024-08-30 PROCEDURE — G2211 COMPLEX E/M VISIT ADD ON: HCPCS | Mod: S$GLB,,, | Performed by: INTERNAL MEDICINE

## 2024-08-30 PROCEDURE — 99215 OFFICE O/P EST HI 40 MIN: CPT | Mod: S$GLB,,, | Performed by: INTERNAL MEDICINE

## 2024-08-30 PROCEDURE — 1160F RVW MEDS BY RX/DR IN RCRD: CPT | Mod: CPTII,S$GLB,, | Performed by: INTERNAL MEDICINE

## 2024-08-30 PROCEDURE — 1159F MED LIST DOCD IN RCRD: CPT | Mod: CPTII,S$GLB,, | Performed by: INTERNAL MEDICINE

## 2024-08-30 NOTE — PROGRESS NOTES
Route Chart for Scheduling    BMT Chart Routing  Urgent    Follow up with physician 1 month.   Follow up with KENDALL    Provider visit type Malignant hem   Infusion scheduling note    Injection scheduling note    Labs CBC, CMP, magnesium, phosphorus, CMV and EBV   Scheduling:  Preferred lab:  Lab interval: once a week  Please also include cyclosporine level. Labs on [facility] weekly in the morning. Also, please schedule urinalysis and urine pro/cr on Tuesday 9/3.   Imaging    Pharmacy appointment    Other referrals

## 2024-09-03 ENCOUNTER — PATIENT MESSAGE (OUTPATIENT)
Dept: HEMATOLOGY/ONCOLOGY | Facility: CLINIC | Age: 49
End: 2024-09-03
Payer: COMMERCIAL

## 2024-09-03 ENCOUNTER — LAB VISIT (OUTPATIENT)
Dept: LAB | Facility: HOSPITAL | Age: 49
End: 2024-09-03
Attending: INTERNAL MEDICINE
Payer: COMMERCIAL

## 2024-09-03 DIAGNOSIS — R30.0 DYSURIA: ICD-10-CM

## 2024-09-03 DIAGNOSIS — Z94.84 HISTORY OF ALLOGENEIC STEM CELL TRANSPLANT: ICD-10-CM

## 2024-09-03 DIAGNOSIS — N30.01 ACUTE CYSTITIS WITH HEMATURIA: ICD-10-CM

## 2024-09-03 LAB
ALBUMIN SERPL BCP-MCNC: 2.9 G/DL (ref 3.5–5.2)
ALP SERPL-CCNC: 256 U/L (ref 55–135)
ALT SERPL W/O P-5'-P-CCNC: 176 U/L (ref 10–44)
ANION GAP SERPL CALC-SCNC: 10 MMOL/L (ref 8–16)
AST SERPL-CCNC: 104 U/L (ref 10–40)
BACTERIA #/AREA URNS HPF: ABNORMAL /HPF
BASOPHILS # BLD AUTO: 0.01 K/UL (ref 0–0.2)
BASOPHILS NFR BLD: 0.2 % (ref 0–1.9)
BILIRUB SERPL-MCNC: 2.5 MG/DL (ref 0.1–1)
BILIRUB UR QL STRIP: ABNORMAL
BUN SERPL-MCNC: 16 MG/DL (ref 6–20)
CALCIUM SERPL-MCNC: 8.9 MG/DL (ref 8.7–10.5)
CHLORIDE SERPL-SCNC: 97 MMOL/L (ref 95–110)
CLARITY UR: ABNORMAL
CO2 SERPL-SCNC: 23 MMOL/L (ref 23–29)
COLOR UR: YELLOW
CREAT SERPL-MCNC: 1.6 MG/DL (ref 0.5–1.4)
CREAT UR-MCNC: 72 MG/DL (ref 15–325)
DIFFERENTIAL METHOD BLD: ABNORMAL
EOSINOPHIL # BLD AUTO: 0.3 K/UL (ref 0–0.5)
EOSINOPHIL NFR BLD: 5.2 % (ref 0–8)
ERYTHROCYTE [DISTWIDTH] IN BLOOD BY AUTOMATED COUNT: 15.4 % (ref 11.5–14.5)
EST. GFR  (NO RACE VARIABLE): 39 ML/MIN/1.73 M^2
GLUCOSE SERPL-MCNC: 88 MG/DL (ref 70–110)
GLUCOSE UR QL STRIP: NEGATIVE
HCT VFR BLD AUTO: 32.9 % (ref 37–48.5)
HGB BLD-MCNC: 12 G/DL (ref 12–16)
HGB UR QL STRIP: ABNORMAL
HYALINE CASTS #/AREA URNS LPF: 4 /LPF
IMM GRANULOCYTES # BLD AUTO: 0.02 K/UL (ref 0–0.04)
IMM GRANULOCYTES NFR BLD AUTO: 0.4 % (ref 0–0.5)
KETONES UR QL STRIP: NEGATIVE
LEUKOCYTE ESTERASE UR QL STRIP: ABNORMAL
LYMPHOCYTES # BLD AUTO: 1.2 K/UL (ref 1–4.8)
LYMPHOCYTES NFR BLD: 23.4 % (ref 18–48)
MAGNESIUM SERPL-MCNC: 1.6 MG/DL (ref 1.6–2.6)
MCH RBC QN AUTO: 35.1 PG (ref 27–31)
MCHC RBC AUTO-ENTMCNC: 36.5 G/DL (ref 32–36)
MCV RBC AUTO: 96 FL (ref 82–98)
MICROSCOPIC COMMENT: ABNORMAL
MONOCYTES # BLD AUTO: 0.7 K/UL (ref 0.3–1)
MONOCYTES NFR BLD: 14.6 % (ref 4–15)
NEUTROPHILS # BLD AUTO: 2.8 K/UL (ref 1.8–7.7)
NEUTROPHILS NFR BLD: 56.2 % (ref 38–73)
NITRITE UR QL STRIP: NEGATIVE
NRBC BLD-RTO: 0 /100 WBC
PH UR STRIP: 7 [PH] (ref 5–8)
PHOSPHATE SERPL-MCNC: 3.2 MG/DL (ref 2.7–4.5)
PLATELET # BLD AUTO: 252 K/UL (ref 150–450)
PMV BLD AUTO: 10.9 FL (ref 9.2–12.9)
POTASSIUM SERPL-SCNC: 4.9 MMOL/L (ref 3.5–5.1)
PROT SERPL-MCNC: 6.3 G/DL (ref 6–8.4)
PROT UR QL STRIP: ABNORMAL
PROT UR-MCNC: 118 MG/DL (ref 0–15)
PROT/CREAT UR: 1.64 MG/G{CREAT} (ref 0–0.2)
RBC # BLD AUTO: 3.42 M/UL (ref 4–5.4)
RBC #/AREA URNS HPF: 3 /HPF (ref 0–4)
SODIUM SERPL-SCNC: 130 MMOL/L (ref 136–145)
SP GR UR STRIP: 1.01 (ref 1–1.03)
SQUAMOUS #/AREA URNS HPF: 6 /HPF
URN SPEC COLLECT METH UR: ABNORMAL
WBC # BLD AUTO: 5 K/UL (ref 3.9–12.7)
WBC #/AREA URNS HPF: 75 /HPF (ref 0–5)
WBC CLUMPS URNS QL MICRO: ABNORMAL

## 2024-09-03 PROCEDURE — 80053 COMPREHEN METABOLIC PANEL: CPT | Mod: PN | Performed by: INTERNAL MEDICINE

## 2024-09-03 PROCEDURE — 83735 ASSAY OF MAGNESIUM: CPT | Mod: PN | Performed by: INTERNAL MEDICINE

## 2024-09-03 PROCEDURE — 84156 ASSAY OF PROTEIN URINE: CPT | Performed by: INTERNAL MEDICINE

## 2024-09-03 PROCEDURE — 80158 DRUG ASSAY CYCLOSPORINE: CPT | Performed by: INTERNAL MEDICINE

## 2024-09-03 PROCEDURE — 84100 ASSAY OF PHOSPHORUS: CPT | Mod: PN | Performed by: INTERNAL MEDICINE

## 2024-09-03 PROCEDURE — 81000 URINALYSIS NONAUTO W/SCOPE: CPT | Mod: PN | Performed by: INTERNAL MEDICINE

## 2024-09-03 PROCEDURE — 85025 COMPLETE CBC W/AUTO DIFF WBC: CPT | Mod: PN | Performed by: INTERNAL MEDICINE

## 2024-09-03 PROCEDURE — 87799 DETECT AGENT NOS DNA QUANT: CPT | Performed by: INTERNAL MEDICINE

## 2024-09-03 RX ORDER — LEVOFLOXACIN 750 MG/1
750 TABLET ORAL DAILY
Qty: 7 TABLET | Refills: 0 | Status: SHIPPED | OUTPATIENT
Start: 2024-09-03 | End: 2024-09-10

## 2024-09-04 ENCOUNTER — PATIENT MESSAGE (OUTPATIENT)
Dept: HEMATOLOGY/ONCOLOGY | Facility: CLINIC | Age: 49
End: 2024-09-04
Payer: COMMERCIAL

## 2024-09-04 DIAGNOSIS — R79.89 ELEVATED LFTS: Primary | ICD-10-CM

## 2024-09-04 LAB
CMV DNA SPEC QL NAA+PROBE: ABNORMAL
CYTOMEGALOVIRUS LOG (IU/ML): 1.88 LOGIU/ML
CYTOMEGALOVIRUS PCR, QUANT: 76 IU/ML
EPSTEIN-BARR VIRUS DNA: NORMAL
EPSTEIN-BARR VIRUS PCR, QUANT: NOT DETECTED IU/ML
MISCELLANEOUS TEST NAME: NORMAL
REFERENCE LAB: NORMAL
SPECIMEN TYPE: NORMAL
TEST RESULT: NORMAL

## 2024-09-05 ENCOUNTER — TELEPHONE (OUTPATIENT)
Dept: INTERVENTIONAL RADIOLOGY/VASCULAR | Facility: HOSPITAL | Age: 49
End: 2024-09-05
Payer: COMMERCIAL

## 2024-09-05 DIAGNOSIS — D89.813 GVHD (GRAFT VERSUS HOST DISEASE): Primary | ICD-10-CM

## 2024-09-05 DIAGNOSIS — D84.822 IMMUNODEFICIENCY DUE TO EXTERNAL CAUSE: ICD-10-CM

## 2024-09-05 DIAGNOSIS — D84.821 IMMUNODEFICIENCY DUE TO DRUG THERAPY: ICD-10-CM

## 2024-09-05 DIAGNOSIS — Z79.899 IMMUNODEFICIENCY DUE TO DRUG THERAPY: ICD-10-CM

## 2024-09-05 DIAGNOSIS — F41.9 ANXIETY: ICD-10-CM

## 2024-09-05 RX ORDER — ATOVAQUONE 750 MG/5ML
1500 SUSPENSION ORAL 2 TIMES DAILY
Qty: 210 ML | Refills: 2 | Status: SHIPPED | OUTPATIENT
Start: 2024-09-05

## 2024-09-05 RX ORDER — PREDNISONE 10 MG/1
TABLET ORAL
Qty: 200 TABLET | Refills: 0 | Status: CANCELLED | OUTPATIENT
Start: 2024-09-05 | End: 2024-10-31

## 2024-09-05 RX ORDER — LORAZEPAM 0.5 MG/1
0.5 TABLET ORAL 2 TIMES DAILY
Qty: 14 TABLET | Refills: 0 | Status: SHIPPED | OUTPATIENT
Start: 2024-09-05 | End: 2024-09-12

## 2024-09-05 RX ORDER — PREDNISONE 10 MG/1
TABLET ORAL
Qty: 200 TABLET | Refills: 0 | Status: SHIPPED | OUTPATIENT
Start: 2024-09-05 | End: 2024-10-31

## 2024-09-05 NOTE — PROGRESS NOTES
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 This is causing her high anxiety and she reports a panic attack yesterday.    - We will repeat labs tomorrow to include CSA level (she will hold before labs)  - She will continue to follow with hepatology. She was evaluated yesterday and they plan for IR liver biopsy.   - Given concerns for recurring GVHD of the skin will plan for 1mg/kg prednisone taper weekly. Prescription sent to pharmacy.   - She has a good support system for her health related anxiety. We will request expedited pysch onc eval. In the interim, I have sent in a one week prescription of Ativan (previously utilized for patient for health related anxiety).     She is going to [place] in 2 weeks for one year post transplant routine evaluation.     We will follow up with further recommendations.     Erin Chan PA-C  Malignant Hematology & Bone Marrow Transplant

## 2024-09-06 ENCOUNTER — LAB VISIT (OUTPATIENT)
Dept: LAB | Facility: HOSPITAL | Age: 49
End: 2024-09-06
Attending: INTERNAL MEDICINE
Payer: COMMERCIAL

## 2024-09-06 DIAGNOSIS — Z94.84 HISTORY OF ALLOGENEIC STEM CELL TRANSPLANT: ICD-10-CM

## 2024-09-06 LAB
ALBUMIN SERPL BCP-MCNC: 3 G/DL (ref 3.5–5.2)
ALP SERPL-CCNC: 298 U/L (ref 55–135)
ALT SERPL W/O P-5'-P-CCNC: 231 U/L (ref 10–44)
ANION GAP SERPL CALC-SCNC: 12 MMOL/L (ref 8–16)
AST SERPL-CCNC: 151 U/L (ref 10–40)
BASOPHILS # BLD AUTO: 0.02 K/UL (ref 0–0.2)
BASOPHILS NFR BLD: 0.3 % (ref 0–1.9)
BILIRUB SERPL-MCNC: 2.5 MG/DL (ref 0.1–1)
BUN SERPL-MCNC: 17 MG/DL (ref 6–20)
CALCIUM SERPL-MCNC: 9.3 MG/DL (ref 8.7–10.5)
CHLORIDE SERPL-SCNC: 104 MMOL/L (ref 95–110)
CO2 SERPL-SCNC: 18 MMOL/L (ref 23–29)
CREAT SERPL-MCNC: 1.6 MG/DL (ref 0.5–1.4)
DIFFERENTIAL METHOD BLD: ABNORMAL
EOSINOPHIL # BLD AUTO: 0.2 K/UL (ref 0–0.5)
EOSINOPHIL NFR BLD: 4 % (ref 0–8)
ERYTHROCYTE [DISTWIDTH] IN BLOOD BY AUTOMATED COUNT: 15.3 % (ref 11.5–14.5)
EST. GFR  (NO RACE VARIABLE): 39 ML/MIN/1.73 M^2
GLUCOSE SERPL-MCNC: 110 MG/DL (ref 70–110)
HCT VFR BLD AUTO: 33.7 % (ref 37–48.5)
HGB BLD-MCNC: 12.1 G/DL (ref 12–16)
IMM GRANULOCYTES # BLD AUTO: 0.04 K/UL (ref 0–0.04)
IMM GRANULOCYTES NFR BLD AUTO: 0.7 % (ref 0–0.5)
LYMPHOCYTES # BLD AUTO: 1.7 K/UL (ref 1–4.8)
LYMPHOCYTES NFR BLD: 27.9 % (ref 18–48)
MCH RBC QN AUTO: 34.1 PG (ref 27–31)
MCHC RBC AUTO-ENTMCNC: 35.9 G/DL (ref 32–36)
MCV RBC AUTO: 95 FL (ref 82–98)
MONOCYTES # BLD AUTO: 0.8 K/UL (ref 0.3–1)
MONOCYTES NFR BLD: 13.3 % (ref 4–15)
NEUTROPHILS # BLD AUTO: 3.2 K/UL (ref 1.8–7.7)
NEUTROPHILS NFR BLD: 53.8 % (ref 38–73)
NRBC BLD-RTO: 0 /100 WBC
PLATELET # BLD AUTO: 269 K/UL (ref 150–450)
PMV BLD AUTO: 10.4 FL (ref 9.2–12.9)
POTASSIUM SERPL-SCNC: 3.8 MMOL/L (ref 3.5–5.1)
PROT SERPL-MCNC: 6.5 G/DL (ref 6–8.4)
RBC # BLD AUTO: 3.55 M/UL (ref 4–5.4)
SODIUM SERPL-SCNC: 134 MMOL/L (ref 136–145)
WBC # BLD AUTO: 5.96 K/UL (ref 3.9–12.7)

## 2024-09-06 PROCEDURE — 80158 DRUG ASSAY CYCLOSPORINE: CPT | Performed by: INTERNAL MEDICINE

## 2024-09-06 PROCEDURE — 80053 COMPREHEN METABOLIC PANEL: CPT | Mod: PN | Performed by: INTERNAL MEDICINE

## 2024-09-06 PROCEDURE — 85025 COMPLETE CBC W/AUTO DIFF WBC: CPT | Mod: PN | Performed by: INTERNAL MEDICINE

## 2024-09-09 ENCOUNTER — TELEPHONE (OUTPATIENT)
Dept: INTERVENTIONAL RADIOLOGY/VASCULAR | Facility: HOSPITAL | Age: 49
End: 2024-09-09
Payer: COMMERCIAL

## 2024-09-09 LAB
MISCELLANEOUS TEST NAME: NORMAL
REFERENCE LAB: NORMAL
SPECIMEN TYPE: NORMAL
TEST RESULT: NORMAL

## 2024-09-09 NOTE — NURSING
Pre-procedure call complete.  2 patient identifier used (name and ).   Arrival time 8am.  Patient instructed not to eat or drink anything after midnight the night before procedure.  Patient aware will need someone to provide transport home and monitor pt 8 hours post procedure.  No driving for at least 24 hours after procedure.   Patient advised to take anti-rejections medications with a sip of water morning of procedure.  Patient verbalized aware of which medications to take.  Do not take sleep medication (including OTC) and anxiety medication the night before procedure.  Arrival time and location given.  Expected length of stay reviewed.  Covid screening completed.  Patient verbalized understanding of all pre-procedure instructions.  Written instructions and directions sent to patient in Mychart/portal.

## 2024-09-10 ENCOUNTER — LAB VISIT (OUTPATIENT)
Dept: LAB | Facility: HOSPITAL | Age: 49
End: 2024-09-10
Attending: INTERNAL MEDICINE
Payer: COMMERCIAL

## 2024-09-10 DIAGNOSIS — Z94.84 HISTORY OF ALLOGENEIC STEM CELL TRANSPLANT: ICD-10-CM

## 2024-09-10 LAB
ALBUMIN SERPL BCP-MCNC: 2.9 G/DL (ref 3.5–5.2)
ALP SERPL-CCNC: 215 U/L (ref 55–135)
ALT SERPL W/O P-5'-P-CCNC: 255 U/L (ref 10–44)
ANION GAP SERPL CALC-SCNC: 11 MMOL/L (ref 8–16)
AST SERPL-CCNC: 111 U/L (ref 10–40)
BASOPHILS # BLD AUTO: 0.01 K/UL (ref 0–0.2)
BASOPHILS NFR BLD: 0.1 % (ref 0–1.9)
BILIRUB SERPL-MCNC: 2.1 MG/DL (ref 0.1–1)
BUN SERPL-MCNC: 23 MG/DL (ref 6–20)
CALCIUM SERPL-MCNC: 8.5 MG/DL (ref 8.7–10.5)
CHLORIDE SERPL-SCNC: 100 MMOL/L (ref 95–110)
CO2 SERPL-SCNC: 22 MMOL/L (ref 23–29)
CREAT SERPL-MCNC: 1.8 MG/DL (ref 0.5–1.4)
DIFFERENTIAL METHOD BLD: ABNORMAL
EOSINOPHIL # BLD AUTO: 0 K/UL (ref 0–0.5)
EOSINOPHIL NFR BLD: 0 % (ref 0–8)
ERYTHROCYTE [DISTWIDTH] IN BLOOD BY AUTOMATED COUNT: 15.9 % (ref 11.5–14.5)
EST. GFR  (NO RACE VARIABLE): 33.9 ML/MIN/1.73 M^2
GLUCOSE SERPL-MCNC: 109 MG/DL (ref 70–110)
HCT VFR BLD AUTO: 32.6 % (ref 37–48.5)
HGB BLD-MCNC: 12 G/DL (ref 12–16)
IMM GRANULOCYTES # BLD AUTO: 0.08 K/UL (ref 0–0.04)
IMM GRANULOCYTES NFR BLD AUTO: 0.9 % (ref 0–0.5)
LYMPHOCYTES # BLD AUTO: 2 K/UL (ref 1–4.8)
LYMPHOCYTES NFR BLD: 21.8 % (ref 18–48)
MAGNESIUM SERPL-MCNC: 2.3 MG/DL (ref 1.6–2.6)
MCH RBC QN AUTO: 34.9 PG (ref 27–31)
MCHC RBC AUTO-ENTMCNC: 36.8 G/DL (ref 32–36)
MCV RBC AUTO: 95 FL (ref 82–98)
MONOCYTES # BLD AUTO: 0.8 K/UL (ref 0.3–1)
MONOCYTES NFR BLD: 8.8 % (ref 4–15)
NEUTROPHILS # BLD AUTO: 6.1 K/UL (ref 1.8–7.7)
NEUTROPHILS NFR BLD: 68.4 % (ref 38–73)
NRBC BLD-RTO: 0 /100 WBC
PHOSPHATE SERPL-MCNC: 2.7 MG/DL (ref 2.7–4.5)
PLATELET # BLD AUTO: 273 K/UL (ref 150–450)
PMV BLD AUTO: 10.4 FL (ref 9.2–12.9)
POTASSIUM SERPL-SCNC: 3.9 MMOL/L (ref 3.5–5.1)
PROT SERPL-MCNC: 6.2 G/DL (ref 6–8.4)
RBC # BLD AUTO: 3.44 M/UL (ref 4–5.4)
SODIUM SERPL-SCNC: 133 MMOL/L (ref 136–145)
WBC # BLD AUTO: 8.95 K/UL (ref 3.9–12.7)

## 2024-09-10 PROCEDURE — 84100 ASSAY OF PHOSPHORUS: CPT | Mod: PN | Performed by: INTERNAL MEDICINE

## 2024-09-10 PROCEDURE — 85025 COMPLETE CBC W/AUTO DIFF WBC: CPT | Mod: PN | Performed by: INTERNAL MEDICINE

## 2024-09-10 PROCEDURE — 80053 COMPREHEN METABOLIC PANEL: CPT | Mod: PN | Performed by: INTERNAL MEDICINE

## 2024-09-10 PROCEDURE — 80158 DRUG ASSAY CYCLOSPORINE: CPT | Performed by: INTERNAL MEDICINE

## 2024-09-10 PROCEDURE — 83735 ASSAY OF MAGNESIUM: CPT | Mod: PN | Performed by: INTERNAL MEDICINE

## 2024-09-10 PROCEDURE — 87799 DETECT AGENT NOS DNA QUANT: CPT | Performed by: INTERNAL MEDICINE

## 2024-09-11 NOTE — PROGRESS NOTES
HEMATOLOGIC MALIGNANCIES PROGRESS NOTE    IDENTIFYING STATEMENT   Thai Villalba (Thai) is a 49 y.o. female with a  of 1975 from [place], [place] with the diagnosis of acute myeloid leukemia.      ONCOLOGY HISTORY:    1. 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 2023: Presented to hospital in [place] with 2 week h/o fevers, night sweats, weight loss, lymphadenopathy, myalgias, stomatitis, bruising, sore throat and found to have WBC 333K with 80% blasts c/f AML vs CML in acute blast crisis               B. 2023: Bone marrow biopsy - AML, FLT3 positive.               C. 2023: Began 7+3+midostaurin induction. Subsequent D14 and D28 BMBxs both showed no evidence of persistent leukemia, in CR1               D. 2023: BMBx showed recurrent AML with flow showing 23% myeloblasts.               E. 2023: Began azacitidine-venetoclax-gilteritinib (off protocol)              F. 2023: Bone marrow biopsy showed CR2              G. 2023: Dual cord blood allogeneic stem cell transplant with Flu/Cy/TT/TBI (4 Gy) conditioning. GVHD ppx with cyclosporine/MMF.               H. 10/19/2023: Bone marrow biopsy on day +28 - IRENE by path and hematologics flow.               I. 2023: Day +75 bone marrow biopsy - MRD-neg by flow, NPM1 ddPCR, FLT3 pcr. Full donor chimerisms   J. 3/27/2024: Day +180 bone marrow biopsy - no definitive morphologic or immunophenotypic evidence of residual AML. No pathogenic mutations detected. VUS MPL      2. Attention deficit disorder  3. Uterine leiomyoma    INTERVAL HISTORY:      Ms. Villalba is seen in follow-up of AML with history of dual cord blood allogeneic stem cell transplant. Today is day +344 after alloSCT.     She reports urinary frequency and dysuria at this visit.     Past Medical History, Past Social History and Past Family History have been reviewed and are unchanged except as noted in the interval  history.    MEDICATIONS:     Current Outpatient Medications on File Prior to Visit   Medication Sig Dispense Refill    acyclovir (ZOVIRAX) 200 MG capsule Take 4 capsules twice a day for prophylaxis 240 capsule 9    cholecalciferol, vitamin D3, 10 mcg (400 unit) Chew Take 1,000 Int'l Units by mouth once daily.      cycloSPORINE modified, NEORAL, (NEORAL) 100 MG capsule Take 1 capsule (100 mg total) by mouth 2 (two) times daily. In addition to one 25mg capsule for a total dose of 125mg twice a day. 180 capsule 3    cycloSPORINE modified, NEORAL, (NEORAL) 25 MG capsule Take 1 capsule (25 mg total) by mouth 2 (two) times daily. In addition to one 100mg capsule for a total dose of 125mg twice a day. 180 capsule 3    estrogens,esterified,-methyltestosterone 1.25-2.5mg (EEMT) per tablet Take 1 tablet by mouth once daily. 90 tablet 1    furosemide (LASIX) 40 MG tablet Take 1 tablet (40 mg total) by mouth daily as needed (swelling in the legs). 30 tablet 2    gabapentin (NEURONTIN) 300 MG capsule Take 1 capsule by mouth three times a day 90 capsule 3    gilteritinib 40 mg Tab Take 2 tablets (80 mg) by mouth Daily. 60 tablet 11    isavuconazonium sulfate (CRESEMBA) 186 mg Cap Take 2 capsules by mouth three times a day for the first 2 days, then 2 capsules once a day thereafter 70 capsule 5    magnesium oxide (MAG-OX) 400 mg (241.3 mg magnesium) tablet Take 1 tablet (400 mg total) by mouth 2 (two) times daily. 60 tablet 11    oxyCODONE (ROXICODONE) 10 mg Tab immediate release tablet 1 tablet by mouth three times a day as needed for pain 90 tablet 0    oxyCODONE (ROXICODONE) 10 mg Tab immediate release tablet 1 tablet by mouth three times a day as needed for pain 90 tablet 0    oxyCODONE (ROXICODONE) 10 mg Tab immediate release tablet 1 tablet by mouth three times a day as needed for pain 90 tablet 0    [START ON 9/26/2024] oxyCODONE (ROXICODONE) 10 mg Tab immediate release tablet 1 tablet by mouth three times a day as needed  for pain 90 tablet 0    pantoprazole (PROTONIX) 40 MG tablet Take 40 mg by mouth every morning.      sodium zirconium cyclosilicate (LOKELMA) 5 gram packet Take 1 packet (5 g total) by mouth 2 (two) times daily. Mix entire contents of packet(s) into drinking glass containing 3 tablespoons of water; stir well and drink immediately. Add water and repeat until no powder remains to receive entire dose. 60 packet 2    tacrolimus (PROTOPIC) 0.1 % ointment Apply topically 2 times daily for Atopic Dermatitis, can apply to lips or inside mouth if needed. (Patient taking differently: Apply 1 application  topically 2 (two) times daily.) 100 g 1    triamcinolone acetonide 0.1% (KENALOG) 0.1 % cream Apply topically 2 (two) times daily. To areas of rash/dry skin. (Patient taking differently: Apply 1 g topically 2 (two) times daily. To areas of rash/dry skin.) 80 g 2    [DISCONTINUED] oxyCODONE (ROXICODONE) 10 mg Tab immediate release tablet 1 tablet by mouth three times a day as needed for pain 90 tablet 0    dexAMETHasone 0.5 mg/5 mL Soln solution Take 10 mLs (1 mg total) by mouth every 12 (twelve) hours. Swish and spit. Do not swallow. (Patient not taking: Reported on 5/15/2024) 500 mL 1    duke's soln (benadryl 30 mL, mylanta 30 mL, LIDOcaine 30 mL, nystatin 30 mL) 120mL Take 10 mLs by mouth 4 (four) times daily. (Patient not taking: Reported on 5/15/2024) 120 mL 0     No current facility-administered medications on file prior to visit.       ALLERGIES:   Review of patient's allergies indicates:   Allergen Reactions    Promethazine Nausea And Vomiting     Other Reaction(s): VOMITING    Penicillin Hives    Penicillins      Other reaction(s): Unknown    Melatonin Anxiety     Other Reaction(s): FLUSHING        ROS:       Review of Systems   Constitutional:  Negative for appetite change, diaphoresis, fatigue, fever and unexpected weight change.   HENT:   Negative for lump/mass and sore throat.    Eyes:  Negative for icterus.    Respiratory:  Negative for cough and shortness of breath.    Cardiovascular:  Positive for leg swelling. Negative for chest pain and palpitations.   Gastrointestinal:  Positive for constipation. Negative for abdominal distention, diarrhea, nausea and vomiting.   Genitourinary:  Positive for difficulty urinating, dysuria and frequency.    Musculoskeletal:  Negative for arthralgias, gait problem and myalgias.        +bone pain   Skin:  Negative for rash.   Neurological:  Negative for dizziness, gait problem and headaches.   Hematological:  Negative for adenopathy. Does not bruise/bleed easily.   Psychiatric/Behavioral:  Negative for confusion. The patient is not nervous/anxious.        PHYSICAL EXAM:  Vitals:    08/30/24 1517   BP: (!) 158/93   Pulse: 65   Resp: 18   Temp: 98.4 °F (36.9 °C)   TempSrc: Oral   SpO2: 99%   Weight: 71.7 kg (158 lb 1.1 oz)   PainSc:   4   PainLoc: Back         Physical Exam  Constitutional:       General: She is not in acute distress.     Appearance: She is well-developed.   HENT:      Head: Normocephalic and atraumatic.      Mouth/Throat:      Mouth: No oral lesions.   Eyes:      Conjunctiva/sclera: Conjunctivae normal.   Neck:      Thyroid: No thyromegaly.   Cardiovascular:      Rate and Rhythm: Normal rate and regular rhythm.      Heart sounds: Normal heart sounds. No murmur heard.  Pulmonary:      Breath sounds: Normal breath sounds. No wheezing or rales.   Abdominal:      General: There is no distension.      Palpations: Abdomen is soft. There is no hepatomegaly, splenomegaly or mass.      Tenderness: There is no abdominal tenderness.   Lymphadenopathy:      Cervical: No cervical adenopathy.      Right cervical: No deep cervical adenopathy.     Left cervical: No deep cervical adenopathy.   Skin:     Findings: No rash.   Neurological:      Mental Status: She is alert and oriented to person, place, and time.      Cranial Nerves: No cranial nerve deficit.      Coordination:  Coordination normal.      Deep Tendon Reflexes: Reflexes are normal and symmetric.         LAB:   Results for orders placed or performed in visit on 08/27/24   CBC Auto Differential   Result Value Ref Range    WBC 7.30 3.90 - 12.70 K/uL    RBC 3.68 (L) 4.00 - 5.40 M/uL    Hemoglobin 12.7 12.0 - 16.0 g/dL    Hematocrit 35.7 (L) 37.0 - 48.5 %    MCV 97 82 - 98 fL    MCH 34.5 (H) 27.0 - 31.0 pg    MCHC 35.6 32.0 - 36.0 g/dL    RDW 14.2 11.5 - 14.5 %    Platelets 265 150 - 450 K/uL    MPV 10.5 9.2 - 12.9 fL    Immature Granulocytes 0.5 0.0 - 0.5 %    Gran # (ANC) 4.7 1.8 - 7.7 K/uL    Immature Grans (Abs) 0.04 0.00 - 0.04 K/uL    Lymph # 1.4 1.0 - 4.8 K/uL    Mono # 0.9 0.3 - 1.0 K/uL    Eos # 0.2 0.0 - 0.5 K/uL    Baso # 0.02 0.00 - 0.20 K/uL    nRBC 0 0 /100 WBC    Gran % 64.8 38.0 - 73.0 %    Lymph % 19.2 18.0 - 48.0 %    Mono % 12.5 4.0 - 15.0 %    Eosinophil % 2.7 0.0 - 8.0 %    Basophil % 0.3 0.0 - 1.9 %    Differential Method Automated    Comprehensive Metabolic Panel   Result Value Ref Range    Sodium 134 (L) 136 - 145 mmol/L    Potassium 4.9 3.5 - 5.1 mmol/L    Chloride 102 95 - 110 mmol/L    CO2 21 (L) 23 - 29 mmol/L    Glucose 91 70 - 110 mg/dL    BUN 14 6 - 20 mg/dL    Creatinine 1.6 (H) 0.5 - 1.4 mg/dL    Calcium 9.2 8.7 - 10.5 mg/dL    Total Protein 6.4 6.0 - 8.4 g/dL    Albumin 3.0 (L) 3.5 - 5.2 g/dL    Total Bilirubin 1.9 (H) 0.1 - 1.0 mg/dL    Alkaline Phosphatase 242 (H) 55 - 135 U/L     (H) 10 - 40 U/L     (H) 10 - 44 U/L    eGFR 39.0 (A) >60 mL/min/1.73 m^2    Anion Gap 11 8 - 16 mmol/L   Magnesium   Result Value Ref Range    Magnesium 1.5 (L) 1.6 - 2.6 mg/dL   PHOSPHORUS   Result Value Ref Range    Phosphorus 2.8 2.7 - 4.5 mg/dL   CMV DNA, QUANTITATIVE, PCR   Result Value Ref Range    Cytomegalovirus PCR, Quant 89 (A) <50 IU/mL    Cytomegalovirus DNA CMV DNA detected and quantified (A) Not Detected    Cytomegalovirus Log (IU/mL) 1.95 (A) <1.70 LogIU/mL   EMERITA-BARR VIRUS DNA,  QUANTITATIVE (PCR)   Result Value Ref Range    [place]-Barr Virus PCR, Quant Not Detected <12 IU/mL    [place]-Barr Virus DNA EBV DNA not detected Not Detected   CYCLOSPORINE LEVEL   Result Value Ref Range    Cyclosporine, LC/ 100 - 400 ng/mL       PROBLEMS ASSESSED THIS VISIT:    1. History of allogeneic stem cell transplant    2. Dysuria    3. Cystitis      PLAN:       Acute myeloid leukemia  Diagnosed with FLT3 mutated AML. She achieved CR1 with 7+3+midostaurin induction but relapsed prior to allogeneic stem cell transplant. She achieved CR2 with aza/ameya/gilteritinib. Ms. Villalba received dual cord blood allogeneic stem cell transplant and has maintained CR on follow-up bone marrow biopsy.   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 Per Hepatology, plan for MRI abdomen and consider liver biopsy pending labs and imaging. I reached out to Dr. Freire, who will plan liver biopsy.   - Continue cyclosporine     Infectious Disease  - see GVHD above for current immunosuppressive regimen  - antimicrobial ppx:   - Acyclovir 800 mg BID until 12 months post tx, then decrease to 400 mg BID until 5 year post-transplant jose  - Adjusted Bactrim DS to atovaquone due to hyperkalemia - continue until 12 months post tx and off IST  -  Letermovir ppx until 6 months post tx - completed  - Previously discussed consideration of antifungal ppx as per our institutional standard - continue isavuconazonium sulfate  - Prior/resolved infections:              - 9/29/2023: C. Diff - treated with fidaxomicin              - 10/7/2023: Suspected fungal infection based on abnormal CT, treated with isavuconazonium sulfate              - 11/6/2023: UTI - treated with levofloxacin              - 11/8/2023: BK virus              - 11/13/2023: Pneumonia diagnosed on CXR - treated with cefpodoxime     Cytopenias  - None clinically significant  - Continue to monitor with routine labs monitoring     Hyponatremia  - Hospitalized in May with hyponatremia and diagnosed with SIADH. Hyponatremia improved with fluid restriction, however, she then developed MAURA so fluids were liberalized.  - Hyperkalemia possibly medication related. Adjust bactrim to atovaquone ppx. Continue lokelma daily.     Chronic pain  - Neuropathic pain secondary to lumbar radiculopathy               - Gabapentin 300 mg PO TID              - Oxycodone 10-15 mg q4prn    Anxiety  Referred to hem/onc psych     Hormone replacement therapy  - on oral estrogen     Follow-up  Weekly labs monitoring  12 month bone marrow to be done at [facility]  Follow up in 1 month         Shemar Franco MD  Hematology and Stem Cell Transplant    Visit today included increased complexity associated with the care of the episodic problem AML and history of allogeneic stem cell transplantation addressed and managing the longitudinal care of the patient due to the serious and/or complex managed problem(s) AML and history of allogeneic stem cell transplantation.

## 2024-09-12 LAB
CMV DNA SPEC QL NAA+PROBE: ABNORMAL
CYTOMEGALOVIRUS LOG (IU/ML): 1.51 LOGIU/ML
CYTOMEGALOVIRUS PCR, QUANT: 32 IU/ML
EPSTEIN-BARR VIRUS DNA: NORMAL
EPSTEIN-BARR VIRUS PCR, QUANT: NOT DETECTED IU/ML

## 2024-09-13 ENCOUNTER — PATIENT MESSAGE (OUTPATIENT)
Dept: HEMATOLOGY/ONCOLOGY | Facility: CLINIC | Age: 49
End: 2024-09-13
Payer: COMMERCIAL

## 2024-09-13 LAB
MISCELLANEOUS TEST NAME: NORMAL
REFERENCE LAB: NORMAL
SPECIMEN TYPE: NORMAL
TEST RESULT: NORMAL

## 2024-09-13 NOTE — PROGRESS NOTES
BMT Pharmacist Immunosuppression Note:    Reviewed patient's cyclosporine level with Dr. Franco and it is within goal range. Instructed patient to continue your current regimen of 125mg in the morning and 125mg in the evening.       Nel Luo PharmCeciliaD., [facility]  Clinical Pharmacy Specialist, Bone Marrow Transplant/Cellular Therapy  [facility]  SpectraLink: [phone]

## 2024-09-16 ENCOUNTER — PATIENT MESSAGE (OUTPATIENT)
Dept: HEMATOLOGY/ONCOLOGY | Facility: CLINIC | Age: 49
End: 2024-09-16
Payer: COMMERCIAL

## 2024-09-17 ENCOUNTER — LAB VISIT (OUTPATIENT)
Dept: LAB | Facility: HOSPITAL | Age: 49
End: 2024-09-17
Attending: INTERNAL MEDICINE
Payer: COMMERCIAL

## 2024-09-17 DIAGNOSIS — Z94.84 HISTORY OF ALLOGENEIC STEM CELL TRANSPLANT: ICD-10-CM

## 2024-09-17 LAB
ALBUMIN SERPL BCP-MCNC: 2.8 G/DL (ref 3.5–5.2)
ALP SERPL-CCNC: 150 U/L (ref 55–135)
ALT SERPL W/O P-5'-P-CCNC: 233 U/L (ref 10–44)
ANION GAP SERPL CALC-SCNC: 12 MMOL/L (ref 8–16)
AST SERPL-CCNC: 83 U/L (ref 10–40)
BASOPHILS # BLD AUTO: 0.01 K/UL (ref 0–0.2)
BASOPHILS NFR BLD: 0.1 % (ref 0–1.9)
BILIRUB SERPL-MCNC: 2 MG/DL (ref 0.1–1)
BUN SERPL-MCNC: 28 MG/DL (ref 6–20)
CALCIUM SERPL-MCNC: 8.5 MG/DL (ref 8.7–10.5)
CHLORIDE SERPL-SCNC: 95 MMOL/L (ref 95–110)
CO2 SERPL-SCNC: 26 MMOL/L (ref 23–29)
CREAT SERPL-MCNC: 1.8 MG/DL (ref 0.5–1.4)
DIFFERENTIAL METHOD BLD: ABNORMAL
EOSINOPHIL # BLD AUTO: 0 K/UL (ref 0–0.5)
EOSINOPHIL NFR BLD: 0.2 % (ref 0–8)
ERYTHROCYTE [DISTWIDTH] IN BLOOD BY AUTOMATED COUNT: 16.5 % (ref 11.5–14.5)
EST. GFR  (NO RACE VARIABLE): 33.9 ML/MIN/1.73 M^2
GLUCOSE SERPL-MCNC: 106 MG/DL (ref 70–110)
HCT VFR BLD AUTO: 33.5 % (ref 37–48.5)
HGB BLD-MCNC: 11.9 G/DL (ref 12–16)
IMM GRANULOCYTES # BLD AUTO: 0.15 K/UL (ref 0–0.04)
IMM GRANULOCYTES NFR BLD AUTO: 1.4 % (ref 0–0.5)
LYMPHOCYTES # BLD AUTO: 2.4 K/UL (ref 1–4.8)
LYMPHOCYTES NFR BLD: 22.1 % (ref 18–48)
MAGNESIUM SERPL-MCNC: 2.2 MG/DL (ref 1.6–2.6)
MCH RBC QN AUTO: 34.7 PG (ref 27–31)
MCHC RBC AUTO-ENTMCNC: 35.5 G/DL (ref 32–36)
MCV RBC AUTO: 98 FL (ref 82–98)
MONOCYTES # BLD AUTO: 1.2 K/UL (ref 0.3–1)
MONOCYTES NFR BLD: 11.3 % (ref 4–15)
NEUTROPHILS # BLD AUTO: 7 K/UL (ref 1.8–7.7)
NEUTROPHILS NFR BLD: 64.9 % (ref 38–73)
NRBC BLD-RTO: 0 /100 WBC
PHOSPHATE SERPL-MCNC: 3.5 MG/DL (ref 2.7–4.5)
PLATELET # BLD AUTO: 270 K/UL (ref 150–450)
PMV BLD AUTO: 10.4 FL (ref 9.2–12.9)
POTASSIUM SERPL-SCNC: 4.6 MMOL/L (ref 3.5–5.1)
PROT SERPL-MCNC: 6 G/DL (ref 6–8.4)
RBC # BLD AUTO: 3.43 M/UL (ref 4–5.4)
SODIUM SERPL-SCNC: 133 MMOL/L (ref 136–145)
WBC # BLD AUTO: 10.75 K/UL (ref 3.9–12.7)

## 2024-09-17 PROCEDURE — 85025 COMPLETE CBC W/AUTO DIFF WBC: CPT | Mod: PN | Performed by: INTERNAL MEDICINE

## 2024-09-17 PROCEDURE — 80053 COMPREHEN METABOLIC PANEL: CPT | Mod: PN | Performed by: INTERNAL MEDICINE

## 2024-09-17 PROCEDURE — 83735 ASSAY OF MAGNESIUM: CPT | Mod: PN | Performed by: INTERNAL MEDICINE

## 2024-09-17 PROCEDURE — 84100 ASSAY OF PHOSPHORUS: CPT | Mod: PN | Performed by: INTERNAL MEDICINE

## 2024-09-17 PROCEDURE — 80158 DRUG ASSAY CYCLOSPORINE: CPT | Performed by: INTERNAL MEDICINE

## 2024-09-17 PROCEDURE — 87799 DETECT AGENT NOS DNA QUANT: CPT | Performed by: INTERNAL MEDICINE

## 2024-09-17 PROCEDURE — 36415 COLL VENOUS BLD VENIPUNCTURE: CPT | Mod: PN | Performed by: INTERNAL MEDICINE

## 2024-09-18 LAB
CMV DNA SPEC QL NAA+PROBE: ABNORMAL
CYTOMEGALOVIRUS LOG (IU/ML): 2.29 LOGIU/ML
CYTOMEGALOVIRUS PCR, QUANT: 197 IU/ML
EPSTEIN-BARR VIRUS DNA: NORMAL
EPSTEIN-BARR VIRUS PCR, QUANT: NOT DETECTED IU/ML
MISCELLANEOUS TEST NAME: NORMAL
REFERENCE LAB: NORMAL
SPECIMEN TYPE: NORMAL
TEST RESULT: NORMAL

## 2024-09-19 ENCOUNTER — PATIENT MESSAGE (OUTPATIENT)
Dept: HEMATOLOGY/ONCOLOGY | Facility: CLINIC | Age: 49
End: 2024-09-19
Payer: COMMERCIAL

## 2024-09-19 DIAGNOSIS — Z94.84 HISTORY OF ALLOGENEIC STEM CELL TRANSPLANT: ICD-10-CM

## 2024-09-19 RX ORDER — CYCLOSPORINE 25 MG/1
100 CAPSULE, LIQUID FILLED ORAL 2 TIMES DAILY
Qty: 180 CAPSULE | Refills: 3 | Status: SHIPPED | OUTPATIENT
Start: 2024-09-19

## 2024-09-19 RX ORDER — CYCLOSPORINE 100 MG/1
100 CAPSULE, LIQUID FILLED ORAL 2 TIMES DAILY
Qty: 180 CAPSULE | Refills: 3 | Status: SHIPPED | OUTPATIENT
Start: 2024-09-19

## 2024-09-19 NOTE — PROGRESS NOTES
BMT Pharmacist Immunosuppression Note:    Reviewed patient's cyclosporine level with Dr. Franco and instructed patient to decrease current regimen of 125mg in the morning and 125mg in the evening to 100mg in the morning and 100mg in the evening.       Les BroD., [facility]  Clinical Pharmacy Specialist, Bone Marrow Transplant/Cellular Therapy  [facility]  SpectraLink: [phone]

## 2024-09-23 ENCOUNTER — TELEPHONE (OUTPATIENT)
Dept: HEMATOLOGY/ONCOLOGY | Facility: CLINIC | Age: 49
End: 2024-09-23
Payer: COMMERCIAL

## 2024-09-26 ENCOUNTER — PATIENT MESSAGE (OUTPATIENT)
Dept: INTERVENTIONAL RADIOLOGY/VASCULAR | Facility: HOSPITAL | Age: 49
End: 2024-09-26
Payer: COMMERCIAL

## 2024-09-30 DIAGNOSIS — Z94.84 HISTORY OF ALLOGENEIC STEM CELL TRANSPLANT: ICD-10-CM

## 2024-10-01 ENCOUNTER — OFFICE VISIT (OUTPATIENT)
Dept: PSYCHIATRY | Facility: CLINIC | Age: 49
End: 2024-10-01
Payer: COMMERCIAL

## 2024-10-01 ENCOUNTER — LAB VISIT (OUTPATIENT)
Dept: LAB | Facility: HOSPITAL | Age: 49
End: 2024-10-01
Attending: INTERNAL MEDICINE
Payer: COMMERCIAL

## 2024-10-01 ENCOUNTER — PATIENT MESSAGE (OUTPATIENT)
Dept: HEMATOLOGY/ONCOLOGY | Facility: CLINIC | Age: 49
End: 2024-10-01
Payer: COMMERCIAL

## 2024-10-01 DIAGNOSIS — Z94.84 HISTORY OF ALLOGENEIC STEM CELL TRANSPLANT: ICD-10-CM

## 2024-10-01 DIAGNOSIS — F43.22 ADJUSTMENT DISORDER WITH ANXIOUS MOOD: Primary | ICD-10-CM

## 2024-10-01 LAB
ALBUMIN SERPL BCP-MCNC: 2.9 G/DL (ref 3.5–5.2)
ALP SERPL-CCNC: 131 U/L (ref 55–135)
ALT SERPL W/O P-5'-P-CCNC: 94 U/L (ref 10–44)
ANION GAP SERPL CALC-SCNC: 11 MMOL/L (ref 8–16)
ANISOCYTOSIS BLD QL SMEAR: SLIGHT
AST SERPL-CCNC: 33 U/L (ref 10–40)
BASOPHILS # BLD AUTO: 0.01 K/UL (ref 0–0.2)
BASOPHILS NFR BLD: 0.1 % (ref 0–1.9)
BILIRUB SERPL-MCNC: 1.5 MG/DL (ref 0.1–1)
BUN SERPL-MCNC: 12 MG/DL (ref 6–20)
CALCIUM SERPL-MCNC: 8.1 MG/DL (ref 8.7–10.5)
CHLORIDE SERPL-SCNC: 112 MMOL/L (ref 95–110)
CO2 SERPL-SCNC: 19 MMOL/L (ref 23–29)
CREAT SERPL-MCNC: 1.2 MG/DL (ref 0.5–1.4)
DIFFERENTIAL METHOD BLD: ABNORMAL
EOSINOPHIL # BLD AUTO: 0.1 K/UL (ref 0–0.5)
EOSINOPHIL NFR BLD: 0.9 % (ref 0–8)
ERYTHROCYTE [DISTWIDTH] IN BLOOD BY AUTOMATED COUNT: 17.8 % (ref 11.5–14.5)
EST. GFR  (NO RACE VARIABLE): 55.1 ML/MIN/1.73 M^2
GLUCOSE SERPL-MCNC: 95 MG/DL (ref 70–110)
HCT VFR BLD AUTO: 35.4 % (ref 37–48.5)
HGB BLD-MCNC: 12.7 G/DL (ref 12–16)
IMM GRANULOCYTES # BLD AUTO: 0.07 K/UL (ref 0–0.04)
IMM GRANULOCYTES NFR BLD AUTO: 0.9 % (ref 0–0.5)
LYMPHOCYTES # BLD AUTO: 2.5 K/UL (ref 1–4.8)
LYMPHOCYTES NFR BLD: 32 % (ref 18–48)
MAGNESIUM SERPL-MCNC: 1.7 MG/DL (ref 1.6–2.6)
MCH RBC QN AUTO: 36.3 PG (ref 27–31)
MCHC RBC AUTO-ENTMCNC: 35.9 G/DL (ref 32–36)
MCV RBC AUTO: 101 FL (ref 82–98)
MONOCYTES # BLD AUTO: 1.1 K/UL (ref 0.3–1)
MONOCYTES NFR BLD: 14.1 % (ref 4–15)
NEUTROPHILS # BLD AUTO: 4 K/UL (ref 1.8–7.7)
NEUTROPHILS NFR BLD: 52 % (ref 38–73)
NRBC BLD-RTO: 0 /100 WBC
PHOSPHATE SERPL-MCNC: 1.6 MG/DL (ref 2.7–4.5)
PLATELET # BLD AUTO: 225 K/UL (ref 150–450)
PLATELET BLD QL SMEAR: ABNORMAL
PMV BLD AUTO: 9.6 FL (ref 9.2–12.9)
POTASSIUM SERPL-SCNC: 4 MMOL/L (ref 3.5–5.1)
PROT SERPL-MCNC: 5.8 G/DL (ref 6–8.4)
RBC # BLD AUTO: 3.5 M/UL (ref 4–5.4)
SODIUM SERPL-SCNC: 142 MMOL/L (ref 136–145)
WBC # BLD AUTO: 7.71 K/UL (ref 3.9–12.7)

## 2024-10-01 PROCEDURE — 83735 ASSAY OF MAGNESIUM: CPT | Mod: PN | Performed by: INTERNAL MEDICINE

## 2024-10-01 PROCEDURE — 84100 ASSAY OF PHOSPHORUS: CPT | Mod: PN | Performed by: INTERNAL MEDICINE

## 2024-10-01 PROCEDURE — 85025 COMPLETE CBC W/AUTO DIFF WBC: CPT | Mod: PN | Performed by: INTERNAL MEDICINE

## 2024-10-01 PROCEDURE — 87799 DETECT AGENT NOS DNA QUANT: CPT | Performed by: INTERNAL MEDICINE

## 2024-10-01 PROCEDURE — 80158 DRUG ASSAY CYCLOSPORINE: CPT | Performed by: INTERNAL MEDICINE

## 2024-10-01 PROCEDURE — 80053 COMPREHEN METABOLIC PANEL: CPT | Mod: PN | Performed by: INTERNAL MEDICINE

## 2024-10-01 PROCEDURE — 90791 PSYCH DIAGNOSTIC EVALUATION: CPT | Mod: 95,,, | Performed by: CASE MANAGER/CARE COORDINATOR

## 2024-10-01 PROCEDURE — 36415 COLL VENOUS BLD VENIPUNCTURE: CPT | Mod: PN | Performed by: INTERNAL MEDICINE

## 2024-10-01 RX ORDER — ISAVUCONAZONIUM SULFATE 186 MG/1
372 CAPSULE ORAL SEE ADMIN INSTRUCTIONS
Qty: 70 CAPSULE | Refills: 5 | Status: SHIPPED | OUTPATIENT
Start: 2024-10-01 | End: 2024-10-03

## 2024-10-01 NOTE — Clinical Note
Please reach out to patient to schedule a follow-up for in 1-2 weeks. I am also putting in an integrative oncology referral. She lives in [place] so may be able to see Keyla in person. 3rd, can you please send her as a message in the portal a copy of DAYO to complete and send back. She will be completing it for a Dr. Zi Gallardo. Thank you

## 2024-10-01 NOTE — PROGRESS NOTES
The patient location is:  at [place]-law's home at home at [address] [place] [address]  (Provider licensed in [place] and [place])  The patient location [place] is: [facility]   The patient phone number is: [phone]   Visit type: Virtual visit with synchronous audio and video  Each patient to whom he or she provides medical services by telemedicine is:  (1) informed of the relationship between the provider and patient and the respective role of any other health care provider with respect to management of the patient; and (2) notified that he or she may decline to receive medical services by telemedicine and may withdraw from such care at any time.    Crisis Disclaimer: Patient was informed that due to the virtual nature of the visit, that if a crisis develops, protocols will be implemented to ensure patient safety, including but not limited to: 1) Initiating a welfare check with local Law Enforcement, 2) Calling 1/National Crisis Hotline, and/or 3) Initiating PEC/CEC procedures.    Time (Face-to-face): 58 minutes    Time (Non-face-to-face): 15 minutes     PSYCHO-ONCOLOGY INTAKE    Diagnostic Interview - CPT 02794    Date: 10/1/2024  Site: [facility] [place]     Name: Thai Villalba     Evaluation Length (direct face-to-face time):   58 minutes     Referral Source: Shemar Franco MD   Oncologist: Shemar Franco MD   PCP: Evangelina, Primary Doctor    Clinical status of patient: Outpatient    Thia Villalba, a 49 y.o. female, seen for initial evaluation visit.  INFORMED CONSENT/ LIMITS of CONFIDENTIALITY: Prior to beginning the interview, the patient's identification was confirmed using two identifiers. Thai Villalba  was informed of the possible risks and benefits of psychological interventions (e.g., counseling, psychotherapy, testing) and provided information regarding the handling of protected health records and the limits of confidentiality, including the importance of reporting any suicidal or homicidal ideation to  ensure safety of all parties. This provider explained the purpose of today's appointment and the patient was provided with time to ask questions regarding this information.  Acceptance and understanding of these conditions was expressed, and Thai Villalba freely consented to this evaluation.     Chief complaint/reason for encounter: adjustment to illness and treatment course, anxiety    Medical/Surgical History:    Patient Active Problem List   Diagnosis    ADD (attention deficit disorder with hyperactivity)    Uterine leiomyoma    AML (acute myeloblastic leukemia)    History of allogeneic stem cell transplant    Hyponatremia    Hyperkalemia    Immunocompromised    Transaminitis    Chronic pain       Health Behaviors:       ETOH Use: No        Tobacco Use: No   Illicit Drug Use:  No     Prescription Misuse:No   Caffeine: 1-2 cups of coffee in AM   Exercise: Patient noted walking.   Firearms:  Yes, patient noted they are stored locked up.    Family History:   Psychiatric illness: Yes, patient noted her mother was never diagnosed but had difficulties with anxiety. She also noted two sisters with developmental delays.     Alcohol/Drug Abuse: Yes, patient shared her maternal grandfather was an alcoholic.     Suicide: No      Past Psychiatric History:   Inpatient treatment: Yes, patient shared she was inpatient as a teenager after running away from home.     Outpatient treatment: Yes, patient noted past therapy for anxiety. She also noted she has previously been diagnosed with ADHD. Patient shared that she saw a psychologist, Dr. Zi Gallardo at [facility] as part of stem cell transplant process. She agreed for an DAYO to be sent to her to complete so I can communicate with Dr. Gallardo regarding treatment.     Prior substance abuse treatment: No     Suicide Attempts: No     Psychotropic Medications:  Current: Patient shared that she was recently prescribed Lexapro but has not started taking it yet. She also noted  Ativan as needed but stated she has not taken it recently.       Past: Buspar and Concerta    Current medications as per below, allergies reviewed in chart.    Current Outpatient Medications   Medication    acyclovir (ZOVIRAX) 200 MG capsule    acyclovir (ZOVIRAX) 200 MG capsule    atovaquone (MEPRON) 750 mg/5 mL Susp oral liquid    cephALEXin (KEFLEX) 500 MG capsule    cholecalciferol, vitamin D3, 10 mcg (400 unit) Chew    cycloSPORINE modified, NEORAL, (NEORAL) 100 MG capsule    dexAMETHasone 0.5 mg/5 mL Soln solution    duke's soln (benadryl 30 mL, mylanta 30 mL, LIDOcaine 30 mL, nystatin 30 mL) 120mL    estrogens,esterified,-methyltestosterone 1.25-2.5mg (EEMT) per tablet    furosemide (LASIX) 40 MG tablet    gabapentin (NEURONTIN) 300 MG capsule    gilteritinib 40 mg Tab    isavuconazonium sulfate (CRESEMBA) 186 mg Cap    LORazepam (ATIVAN) 0.5 MG tablet    magnesium oxide (MAG-OX) 400 mg (241.3 mg magnesium) tablet    magnesium oxide-Mg AA chelate (MG-PLUS-PROTEIN) 133 mg Tab    oxyCODONE (ROXICODONE) 10 mg Tab immediate release tablet    oxyCODONE (ROXICODONE) 10 mg Tab immediate release tablet    oxyCODONE (ROXICODONE) 10 mg Tab immediate release tablet    pantoprazole (PROTONIX) 40 MG tablet    predniSONE (DELTASONE) 10 MG tablet    sodium zirconium cyclosilicate (LOKELMA) 5 gram packet    tacrolimus (PROTOPIC) 0.1 % ointment    triamcinolone acetonide 0.1% (KENALOG) 0.1 % cream    triamcinolone acetonide 0.1% (KENALOG) 0.1 % cream     No current facility-administered medications for this visit.              Social situation/Stressors: Thai Villalba lives with her  and her 's parents in [place], [place].  She is not currently working as she stated she stopped working due to illness.   Thai Villalba has been  for almost 33 years and has two adult children.  Patient noted that she has been having anxiety related to difficulties of treatment course. She is post stem cell transplant. The patient  "reports good social support including from her , friends, and two children.  Thai Villalba shared that she is  spiritual and has a strong relationship with prayer .  Thai Villalba's hobbies include spending time with her grandchildren and . Patient shared that she has had a stomach bug recently since recent trip to [place].   Additional stressors: Patient noted stress regarding daughter's roommate    Strengths:Motivation, readiness for change and Good social supports  Liabilities: Recent financial stress as patient noted her  had lost his job twice in last few years but is reportedly currently working    Current Evaluation:     Mental Status Exam: Thai Villalba arrived promptly for the assessment session.  The patient was fully cooperative throughout the interview and was an adequate historian   Appearance: age appropriate, appropriately  dressed, well groomed  Behavior/Cooperation: friendly and cooperative  Speech: normal in rate, volume, and tone and appropriate quality, quantity and organization of sentences  Mood: anxious  Affect: mood congruent  Thought Process: goal-directed, logical  Thought Content: normal, no suicidality, no homicidality, delusions, or paranoia; did not appear to be responding to internal stimuli during the interview.   Orientation: grossly intact  Memory: No gross deficits appeared to present during session; patient noted she has "occasional bouts of chemo brain" but that it is not significant  Attention Span/Concentration: Attends to interview without distraction  Fund of Knowledge: average  Estimate of Intelligence: average from verbal skills and history  Cognition: grossly intact  Insight: patient has awareness of illness; good insight into own behavior and behavior of others  Judgment: the patient's behavior is adequate to circumstances      History of present illness:    Oncology History    No history exists.         Thai Villalba has adjusted to illness and " treatment course with difficulty primarily through focus on family. She has engaged in appropriate information gathering.  The patient has good family/friend support.  She noted daily anxiety for past month.  Illness-related psychosocial stressors include  patient not currently working .  The patient has a good partnership with her [facility] treatment team. The patient reports the following barriers to cancer care:none.     NCCN Distress thermometer:       9/30/2024     9:33 AM 8/30/2024     3:18 PM 8/30/2024     2:39 PM 8/5/2024     7:15 AM 7/3/2024     8:44 AM 6/28/2024    12:52 PM 5/24/2024     9:04 AM   DISTRESS SCREENING   Distress Score 7  0 - No Distress 5  8  0 - No Distress 5  0 - No Distress   Practical Concerns Finances;Insurance;Treatment decisions   Finances  Housing;Finances;Insurance;Transportation   None of these  None of these   Social Concerns Relationship with family members   None of these  Relationship with family members   None of these  None of these   Emotional Concerns Worry or anxiety   Worry or anxiety  Worry or anxiety;Fear   Worry or anxiety;Changes in appearance  None of these   Spiritual or Advent Concerns None of these   None of these  Death, dying, or afterlife   None of these  None of these   Physical Concerns Pain;Sleep;Fatigue;Changes in eating  Pain Pain;Sleep;Fatigue  Pain;Sleep;Fatigue  Pain Pain;Sleep;Fatigue;Changes in eating  None of these   Other Problems Anxiety   None  Very high anxiety lately. (Also, feelings of ptsd, & flashbacks of cancer and transplant hospitalization)   Weight gain         Patient-reported            10/1/2024     3:02 PM   GAD7   1. Feeling nervous, anxious, or on edge? 3   2. Not being able to stop or control worrying? 2   3. Worrying too much about different things? 2   4. Trouble relaxing? 2   5. Being so restless that it is hard to sit still? 1   6. Becoming easily annoyed or irritable? 1   7. Feeling afraid as if something awful might  "happen? 2   JONATHON-7 Score 13          PHQ ANSWERS    Q1. Little interest or pleasure in doing things: Several days (10/01/24 1503)  Q2. Feeling down, depressed, or hopeless: Not at all (10/01/24 1503)  Q3. Trouble falling or staying asleep, or sleeping too much: More than half the days (10/01/24 1503)  Q4. Feeling tired or having little energy: More than half the days (10/01/24 1503)  Q5. Poor appetite or overeating: More than half the days (10/01/24 1503)  Q6. Feeling bad about yourself - or that you are a failure or have let yourself or your family down: Not at all (10/01/24 1503)  Q7. Trouble concentrating on things, such as reading the newspaper or watching television: Several days (10/01/24 1503)  Q8. Moving or speaking so slowly that other people could have noticed. Or the opposite - being so fidgety or restless that you have been moving around a lot more than usual: Not at all (10/01/24 1503)  Q9. Thoughts that you would be better off dead, or of hurting yourself in some way: Not at all (10/01/24 1503)    PHQ8 Score : 8 (10/01/24 1503)  PHQ-9 Total Score: 8 (10/01/24 1503)     Symptoms:   Mood: tearfulness last couple of days; irritability recently; no SI/HI; NCCN Distress Screener=7; PHQ-9=8  Anxiety: Feeling nervous, anxious, or on edge and panic attacks; worries about her medical difficulties; patient noted anxiety has presented daily for past month; difficulty controlling worry; prior anxiety:at different points in her life ;  JONATHON-7=13  Substance abuse: denied  Cognitive functioning: denied significant difficulties as he noted at times "chemo brain"  Health behaviors:  Stomach bug since recent trip to [place]  Sleep: Interrupted sleep due to anxiety and neuropathy  Pain: Ms. Villalba rated her level of pain today as 4 or 5 out of 10. She noted she has neuropathy pain that presents in legs and lower back. She stated neuropathy leads to difficulties with sleep. She also shared that taking baths and using heat " can help relieve pain.  Pain Catastrophizing Scale: PCS total score (22; 55th%ile), helplessness (10; 60th%ile), magnification (3; 50th%ile), and rumination (9; 58th%ile).      Assessment - Diagnosis - Goals:       ICD-10-CM ICD-9-CM   1. Adjustment disorder with anxious mood  F43.22 309.24   2. History of allogeneic stem cell transplant  Z94.84 V42.82         Plan:individual psychotherapy    Summary and Recommendations  Thai Villalba is a 49 y.o. female referred by Shemar Franco MD for psychological evaluation and treatment.  Ms. Villalba appears to be having difficulty coping with medical difficulties around treatment course. This appears to present as increased anxiety.  She is interested in individual therapy for cancer coping skills and will follow up with me for that purpose. She was encouraged to continue with pleasant events scheduling. Patient also noted pain and neuropathy and agreed for referral to integrative oncology to be placed for this. DAYO form will also be send to patient to communicate with Dr. Gallardo, psychologist, who met with patient in [place] as part of her stem cell transplant process.    Return to clinic:  1 to 2 weeks    GOALS:   Pleasant events scheduling  Increase coping skills        Abner Dudley Psy.D.  Clinical Psychologist  [place] License #[id number]  [place] License #[id number]

## 2024-10-02 ENCOUNTER — TELEPHONE (OUTPATIENT)
Dept: HEMATOLOGY/ONCOLOGY | Facility: CLINIC | Age: 49
End: 2024-10-02
Payer: COMMERCIAL

## 2024-10-02 LAB
CMV DNA SPEC QL NAA+PROBE: ABNORMAL
CYTOMEGALOVIRUS LOG (IU/ML): 2.24 LOGIU/ML
CYTOMEGALOVIRUS PCR, QUANT: 174 IU/ML
EPSTEIN-BARR VIRUS DNA: NORMAL
EPSTEIN-BARR VIRUS PCR, QUANT: NOT DETECTED IU/ML

## 2024-10-02 NOTE — TELEPHONE ENCOUNTER
Spoke w/ pt in regards to scheduling f/u appts per Dr. Dudley. Pt approved to schedule virtual visit for Friday 10/18 @ 3PM. MA informed pt that a DAYO was sent via portal that would need to be filled out and return per Dr. Dudley. Pt acknowledged.    Pt also approved to schedule Integrative Oncology appt for Wednesday 10/09 @ 3pm with Keyla Andrade NP, in person. MA advised pt. that this is an introductory visit whereby the provider will review pt's overall health and discuss the therapies that the dept has to offer. Location of clinic provided to pt.       RADHA LONG ext [phone]

## 2024-10-03 ENCOUNTER — OFFICE VISIT (OUTPATIENT)
Dept: HEMATOLOGY/ONCOLOGY | Facility: CLINIC | Age: 49
End: 2024-10-03
Payer: COMMERCIAL

## 2024-10-03 VITALS
BODY MASS INDEX: 23.93 KG/M2 | OXYGEN SATURATION: 100 % | WEIGHT: 143.63 LBS | HEART RATE: 75 BPM | RESPIRATION RATE: 18 BRPM | DIASTOLIC BLOOD PRESSURE: 86 MMHG | SYSTOLIC BLOOD PRESSURE: 136 MMHG | HEIGHT: 65 IN

## 2024-10-03 DIAGNOSIS — Z94.84 HISTORY OF ALLOGENEIC STEM CELL TRANSPLANT: ICD-10-CM

## 2024-10-03 DIAGNOSIS — D84.821 IMMUNODEFICIENCY DUE TO DRUG THERAPY: ICD-10-CM

## 2024-10-03 DIAGNOSIS — C92.01 ACUTE MYELOID LEUKEMIA IN REMISSION: ICD-10-CM

## 2024-10-03 DIAGNOSIS — Z79.899 IMMUNODEFICIENCY DUE TO DRUG THERAPY: ICD-10-CM

## 2024-10-03 DIAGNOSIS — Z94.84 HISTORY OF ALLOGENEIC STEM CELL TRANSPLANT: Primary | ICD-10-CM

## 2024-10-03 LAB
MISCELLANEOUS TEST NAME: NORMAL
REFERENCE LAB: NORMAL
SPECIMEN TYPE: NORMAL
TEST RESULT: NORMAL

## 2024-10-03 PROCEDURE — 99215 OFFICE O/P EST HI 40 MIN: CPT | Mod: S$GLB,,, | Performed by: INTERNAL MEDICINE

## 2024-10-03 PROCEDURE — G2211 COMPLEX E/M VISIT ADD ON: HCPCS | Mod: S$GLB,,, | Performed by: INTERNAL MEDICINE

## 2024-10-03 PROCEDURE — 1159F MED LIST DOCD IN RCRD: CPT | Mod: CPTII,S$GLB,, | Performed by: INTERNAL MEDICINE

## 2024-10-03 PROCEDURE — 3008F BODY MASS INDEX DOCD: CPT | Mod: CPTII,S$GLB,, | Performed by: INTERNAL MEDICINE

## 2024-10-03 PROCEDURE — 3079F DIAST BP 80-89 MM HG: CPT | Mod: CPTII,S$GLB,, | Performed by: INTERNAL MEDICINE

## 2024-10-03 PROCEDURE — 3075F SYST BP GE 130 - 139MM HG: CPT | Mod: CPTII,S$GLB,, | Performed by: INTERNAL MEDICINE

## 2024-10-03 PROCEDURE — 1160F RVW MEDS BY RX/DR IN RCRD: CPT | Mod: CPTII,S$GLB,, | Performed by: INTERNAL MEDICINE

## 2024-10-03 PROCEDURE — 99999 PR PBB SHADOW E&M-EST. PATIENT-LVL V: CPT | Mod: PBBFAC,,, | Performed by: INTERNAL MEDICINE

## 2024-10-03 RX ORDER — ESCITALOPRAM OXALATE 10 MG/1
10 TABLET ORAL
COMMUNITY
Start: 2024-09-25 | End: 2024-10-11

## 2024-10-03 RX ORDER — CYCLOSPORINE 25 MG/1
CAPSULE, LIQUID FILLED ORAL
Qty: 60 CAPSULE | Refills: 11 | Status: SHIPPED | OUTPATIENT
Start: 2024-10-03

## 2024-10-03 RX ORDER — GRANISETRON HYDROCHLORIDE 1 MG/1
1 TABLET, FILM COATED ORAL EVERY 12 HOURS PRN
COMMUNITY
Start: 2024-09-26

## 2024-10-03 RX ORDER — PROCHLORPERAZINE MALEATE 10 MG
10 TABLET ORAL EVERY 6 HOURS PRN
COMMUNITY
Start: 2024-09-26

## 2024-10-03 NOTE — PROGRESS NOTES
Route Chart for Scheduling    BMT Chart Routing      Follow up with physician 1 month.   Follow up with KENDALL    Provider visit type Malignant hem   Infusion scheduling note    Injection scheduling note    Labs CBC, CMP, magnesium, phosphorus and CMV   Scheduling:  Preferred lab:  Lab interval: once a week  on the [facility]   Imaging    Pharmacy appointment    Other referrals

## 2024-10-03 NOTE — PROGRESS NOTES
HEMATOLOGIC MALIGNANCIES PROGRESS NOTE    IDENTIFYING STATEMENT   Thai Villalba (Thai) is a 49 y.o. female with a  of 1975 from [place], [place] with the diagnosis of acute myeloid leukemia.      ONCOLOGY HISTORY:    1. 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 2023: Presented to hospital in [place] with 2 week h/o fevers, night sweats, weight loss, lymphadenopathy, myalgias, stomatitis, bruising, sore throat and found to have WBC 333K with 80% blasts c/f AML vs CML in acute blast crisis               B. 2023: Bone marrow biopsy - AML, FLT3 positive.               C. 2023: Began 7+3+midostaurin induction. Subsequent D14 and D28 BMBxs both showed no evidence of persistent leukemia, in CR1               D. 2023: BMBx showed recurrent AML with flow showing 23% myeloblasts.               E. 2023: Began azacitidine-venetoclax-gilteritinib (off protocol)              F. 2023: Bone marrow biopsy showed CR2              G. 2023: Dual cord blood allogeneic stem cell transplant with Flu/Cy/TT/TBI (4 Gy) conditioning. GVHD ppx with cyclosporine/MMF.               H. 10/19/2023: Bone marrow biopsy on day +28 - IRENE by path and hematologics flow.               I. 2023: Day +75 bone marrow biopsy - MRD-neg by flow, NPM1 ddPCR, FLT3 pcr. Full donor chimerisms   J. 3/27/2024: Day +180 bone marrow biopsy - no definitive morphologic or immunophenotypic evidence of residual AML. No pathogenic mutations detected. VUS MPL      2. Attention deficit disorder  3. Uterine leiomyoma    INTERVAL HISTORY:      Ms. Villalba is seen in follow-up of AML with history of dual cord blood allogeneic stem cell transplant. Today is day +378  after alloSCT.     She went to [facility] since last visit for 1-year evaluation. She is in MRD-negative complete remission. She is now tapering cyclosporine. Currently at 75/75. Prescribed rux 10 mg PO  BID.   Taper schedule (weekly):  - 75/50  -50/50  - 50/25  - 25/25  - 25 daily  - stop    Stopped isacuvonzaonium sulfate. Liver function has improved.    She had a viral gastroenteritis while travelling, but this has completely resolved.     Past Medical History, Past Social History and Past Family History have been reviewed and are unchanged except as noted in the interval history.    MEDICATIONS:     Current Outpatient Medications on File Prior to Visit   Medication Sig Dispense Refill    acyclovir (ZOVIRAX) 200 MG capsule Take 2 capsules (400 mg total) by mouth 2 (two) times a day for Prophylaxis. 120 capsule 1    atovaquone (MEPRON) 750 mg/5 mL Susp oral liquid Take 10 mLs (1,500 mg total) by mouth 2 (two) times daily. 210 mL 2    cephALEXin (KEFLEX) 500 MG capsule Take 4 capsules by mouth one time for 1 dose for Prophylaxis - 30 min prior to dental cleanings. 4 capsule 1    cholecalciferol, vitamin D3, 10 mcg (400 unit) Chew Take 1,000 Int'l Units by mouth once daily.      EScitalopram oxalate (LEXAPRO) 10 MG tablet Take 10 mg by mouth.      estrogens,esterified,-methyltestosterone 1.25-2.5mg (EEMT) per tablet Take 1 tablet by mouth once daily. 90 tablet 1    furosemide (LASIX) 40 MG tablet Take 1 tablet (40 mg total) by mouth daily as needed (swelling in the legs). 30 tablet 2    gabapentin (NEURONTIN) 300 MG capsule Take 1 capsule by mouth three times a day 90 capsule 3    granisetron HCL (KYTRIL) 1 mg Tab Take 1 mg by mouth every 12 (twelve) hours as needed.      LORazepam (ATIVAN) 0.5 MG tablet Take 1 tablet (0.5 mg total) by mouth 2 (two) times daily. for 7 days 14 tablet 0    magnesium oxide (MAG-OX) 400 mg (241.3 mg magnesium) tablet Take 1 tablet (400 mg total) by mouth 2 (two) times daily. 60 tablet 11    magnesium oxide-Mg AA chelate (MG-PLUS-PROTEIN) 133 mg Tab Take 1 tablet by mouth 2 times daily for hypomagnesemia. 60 tablet 1    oxyCODONE (ROXICODONE) 10 mg Tab immediate release tablet 1 tablet  by mouth three times a day as needed for pain 90 tablet 0    oxyCODONE (ROXICODONE) 10 mg Tab immediate release tablet 1 tablet by mouth three times a day as needed for pain 90 tablet 0    oxyCODONE (ROXICODONE) 10 mg Tab immediate release tablet 1 tablet by mouth three times a day as needed for pain 90 tablet 0    pantoprazole (PROTONIX) 40 MG tablet Take 40 mg by mouth every morning.      prochlorperazine (COMPAZINE) 10 MG tablet Take 10 mg by mouth every 6 (six) hours as needed.      sodium zirconium cyclosilicate (LOKELMA) 5 gram packet Take 1 packet (5 g total) by mouth 2 (two) times daily. Mix entire contents of packet(s) into drinking glass containing 3 tablespoons of water; stir well and drink immediately. Add water and repeat until no powder remains to receive entire dose. 60 packet 2    tacrolimus (PROTOPIC) 0.1 % ointment Apply topically 2 times daily for Atopic Dermatitis, can apply to lips or inside mouth if needed. (Patient taking differently: Apply 1 application  topically 2 (two) times daily.) 100 g 1    triamcinolone acetonide 0.1% (KENALOG) 0.1 % cream Apply topically 2 (two) times daily. To areas of rash/dry skin. (Patient taking differently: Apply 1 g topically 2 (two) times daily. To areas of rash/dry skin.) 80 g 2    triamcinolone acetonide 0.1% (KENALOG) 0.1 % cream Apply topically 2 times daily for GVHD of skin. 454 g 0    dexAMETHasone 0.5 mg/5 mL Soln solution Take 10 mLs (1 mg total) by mouth every 12 (twelve) hours. Swish and spit. Do not swallow. (Patient not taking: Reported on 10/3/2024) 500 mL 1    duke's soln (benadryl 30 mL, mylanta 30 mL, LIDOcaine 30 mL, nystatin 30 mL) 120mL Take 10 mLs by mouth 4 (four) times daily. (Patient not taking: Reported on 10/3/2024) 120 mL 0     No current facility-administered medications on file prior to visit.       ALLERGIES:   Review of patient's allergies indicates:   Allergen Reactions    Promethazine Nausea And Vomiting     Other Reaction(s):  "VOMITING    Penicillin Hives    Penicillins      Other reaction(s): Unknown    Melatonin Anxiety     Other Reaction(s): FLUSHING        ROS:       Review of Systems   Constitutional:  Negative for appetite change, diaphoresis, fatigue, fever and unexpected weight change.   HENT:   Negative for lump/mass and sore throat.    Eyes:  Negative for icterus.   Respiratory:  Negative for cough and shortness of breath.    Cardiovascular:  Negative for chest pain and palpitations.   Gastrointestinal:  Negative for abdominal distention, diarrhea, nausea and vomiting.   Musculoskeletal:  Negative for arthralgias, gait problem and myalgias.   Skin:  Negative for rash.   Neurological:  Negative for dizziness, gait problem and headaches.   Hematological:  Negative for adenopathy. Does not bruise/bleed easily.   Psychiatric/Behavioral:  Negative for confusion. The patient is not nervous/anxious.        PHYSICAL EXAM:  Vitals:    10/03/24 1616   BP: 136/86   Pulse: 75   Resp: 18   SpO2: 100%   Weight: 65.2 kg (143 lb 10.1 oz)   Height: 5' 5" (1.651 m)   PainSc:   4   PainLoc: Back         Physical Exam  Constitutional:       General: She is not in acute distress.     Appearance: She is well-developed.   HENT:      Head: Normocephalic and atraumatic.      Mouth/Throat:      Mouth: No oral lesions.   Eyes:      Conjunctiva/sclera: Conjunctivae normal.   Neck:      Thyroid: No thyromegaly.   Cardiovascular:      Rate and Rhythm: Normal rate and regular rhythm.      Heart sounds: Normal heart sounds. No murmur heard.  Pulmonary:      Breath sounds: Normal breath sounds. No wheezing or rales.   Abdominal:      General: There is no distension.      Palpations: Abdomen is soft. There is no hepatomegaly, splenomegaly or mass.      Tenderness: There is no abdominal tenderness.   Lymphadenopathy:      Cervical: No cervical adenopathy.      Right cervical: No deep cervical adenopathy.     Left cervical: No deep cervical adenopathy.   Skin:     " Findings: No rash.   Neurological:      Mental Status: She is alert and oriented to person, place, and time.      Cranial Nerves: No cranial nerve deficit.      Coordination: Coordination normal.      Deep Tendon Reflexes: Reflexes are normal and symmetric.         LAB:   Results for orders placed or performed in visit on 10/01/24   CBC Auto Differential    Collection Time: 10/01/24  8:46 AM   Result Value Ref Range    WBC 7.71 3.90 - 12.70 K/uL    RBC 3.50 (L) 4.00 - 5.40 M/uL    Hemoglobin 12.7 12.0 - 16.0 g/dL    Hematocrit 35.4 (L) 37.0 - 48.5 %     (H) 82 - 98 fL    MCH 36.3 (H) 27.0 - 31.0 pg    MCHC 35.9 32.0 - 36.0 g/dL    RDW 17.8 (H) 11.5 - 14.5 %    Platelets 225 150 - 450 K/uL    MPV 9.6 9.2 - 12.9 fL    Immature Granulocytes 0.9 (H) 0.0 - 0.5 %    Gran # (ANC) 4.0 1.8 - 7.7 K/uL    Immature Grans (Abs) 0.07 (H) 0.00 - 0.04 K/uL    Lymph # 2.5 1.0 - 4.8 K/uL    Mono # 1.1 (H) 0.3 - 1.0 K/uL    Eos # 0.1 0.0 - 0.5 K/uL    Baso # 0.01 0.00 - 0.20 K/uL    nRBC 0 0 /100 WBC    Gran % 52.0 38.0 - 73.0 %    Lymph % 32.0 18.0 - 48.0 %    Mono % 14.1 4.0 - 15.0 %    Eosinophil % 0.9 0.0 - 8.0 %    Basophil % 0.1 0.0 - 1.9 %    Platelet Estimate Appears normal     Aniso Slight     Differential Method Automated    EMERITA-BALDWIN VIRUS DNA, QUANTITATIVE (PCR)    Collection Time: 10/01/24  8:46 AM   Result Value Ref Range    Emerita-Barr Virus PCR, Quant Not Detected <12 IU/mL    Emerita-Barr Virus DNA EBV DNA not detected Not Detected   Comprehensive Metabolic Panel    Collection Time: 10/01/24  8:47 AM   Result Value Ref Range    Sodium 142 136 - 145 mmol/L    Potassium 4.0 3.5 - 5.1 mmol/L    Chloride 112 (H) 95 - 110 mmol/L    CO2 19 (L) 23 - 29 mmol/L    Glucose 95 70 - 110 mg/dL    BUN 12 6 - 20 mg/dL    Creatinine 1.2 0.5 - 1.4 mg/dL    Calcium 8.1 (L) 8.7 - 10.5 mg/dL    Total Protein 5.8 (L) 6.0 - 8.4 g/dL    Albumin 2.9 (L) 3.5 - 5.2 g/dL    Total Bilirubin 1.5 (H) 0.1 - 1.0 mg/dL    Alkaline  Phosphatase 131 55 - 135 U/L    AST 33 10 - 40 U/L    ALT 94 (H) 10 - 44 U/L    eGFR 55.1 (A) >60 mL/min/1.73 m^2    Anion Gap 11 8 - 16 mmol/L   Magnesium    Collection Time: 10/01/24  8:47 AM   Result Value Ref Range    Magnesium 1.7 1.6 - 2.6 mg/dL   PHOSPHORUS    Collection Time: 10/01/24  8:47 AM   Result Value Ref Range    Phosphorus 1.6 (L) 2.7 - 4.5 mg/dL   CMV DNA, QUANTITATIVE, PCR    Collection Time: 10/01/24  8:47 AM   Result Value Ref Range    Cytomegalovirus PCR, Quant 174 (A) <50 IU/mL    Cytomegalovirus DNA CMV DNA detected and quantified (A) Not Detected    Cytomegalovirus Log (IU/mL) 2.24 (A) <1.70 LogIU/mL   Misc Sendout Test, Blood    Collection Time: 10/01/24  8:47 AM   Result Value Ref Range    Miscellaneous Test Name cyclosporine     Specimen Type EDTA Whole blood     Test Result See image under hyperlink     Reference Lab SEE COMMENT        PROBLEMS ASSESSED THIS VISIT:    1. History of allogeneic stem cell transplant    2. Immunodeficiency due to drug therapy    3. Acute myeloid leukemia in remission        PLAN:       Acute myeloid leukemia  Diagnosed with FLT3 mutated AML. She achieved CR1 with 7+3+midostaurin induction but relapsed prior to allogeneic stem cell transplant. She achieved CR2 with aza/ameya/gilteritinib. Ms. Villalba received dual cord blood allogeneic stem cell transplant and has maintained CR on follow-up bone marrow biopsy.   - Continue maintenance gilteritinib; plan for 2 years total of maintenance  - Day +180 bone marrow biopsy reviewed, no evidence of residual AML  - 12 month bone marrow at [facility] shows no morphologic evidence of AML; MRD-negative by flow; chimerism is 100% from donor unit number 2; cytogenetics 46,XX[20];      Status post allogeneic stem cell transplantation  - Currently day +378 of Flu/Cy/TT/TBI (4Gy) dual cord blood allogeneic stem cell transplant  - Complete remission and full donor chimerism (100% donor 2) assessed on day +75 bone  marrow biopsy  - see AML for 1-year marrow results      Graft versus host disease/Immunosuppresion  - no evidence of aGVHD or cGVHD at this visit  - on cyclosporine taper  - now on ruxolitinib 10 mg PO BID     Infectious Disease   antimicrobial ppx as follows:  - acyclovir 400 mg BID until 5 year post-transplant jose  - Adjusted Bactrim DS to atovaquone due to hyperkalemia - continue until 12 months post tx and off IST  - Letermovir ppx until 6 months post tx - completed  - off isavuconazonium sulfate due to transaminitis  - Prior/resolved infections:              - 9/29/2023: C. Diff - treated with fidaxomicin              - 10/7/2023: Suspected fungal infection based on abnormal CT, treated with isavuconazonium sulfate              - 11/6/2023: UTI - treated with levofloxacin              - 11/8/2023: BK virus              - 11/13/2023: Pneumonia diagnosed on CXR - treated with cefpodoxime     Cytopenias  - None clinically significant  - Continue to monitor with routine labs monitoring     Chronic pain  - Neuropathic pain secondary to lumbar radiculopathy               - Gabapentin 300 mg PO TID              - Oxycodone 10-15 mg q4prn    Anxiety  Referred to hem/onc psych     Hormone replacement therapy  - on oral estrogen     Follow-up  Weekly labs monitoring  Follow up in 1 month         Shemar Franco MD  Hematology and Stem Cell Transplant    Visit today included increased complexity associated with the care of the episodic problem AML and history of allogeneic stem cell transplantation addressed and managing the longitudinal care of the patient due to the serious and/or complex managed problem(s) AML and history of allogeneic stem cell transplantation.

## 2024-10-04 ENCOUNTER — PATIENT MESSAGE (OUTPATIENT)
Dept: HEMATOLOGY/ONCOLOGY | Facility: CLINIC | Age: 49
End: 2024-10-04
Payer: COMMERCIAL

## 2024-10-08 ENCOUNTER — TELEPHONE (OUTPATIENT)
Dept: HEMATOLOGY/ONCOLOGY | Facility: CLINIC | Age: 49
End: 2024-10-08
Payer: COMMERCIAL

## 2024-10-08 ENCOUNTER — LAB VISIT (OUTPATIENT)
Dept: LAB | Facility: HOSPITAL | Age: 49
End: 2024-10-08
Attending: INTERNAL MEDICINE
Payer: COMMERCIAL

## 2024-10-08 DIAGNOSIS — Z79.899 IMMUNODEFICIENCY DUE TO DRUG THERAPY: ICD-10-CM

## 2024-10-08 DIAGNOSIS — Z94.84 HISTORY OF ALLOGENEIC STEM CELL TRANSPLANT: ICD-10-CM

## 2024-10-08 DIAGNOSIS — D84.822 IMMUNODEFICIENCY DUE TO EXTERNAL CAUSE: ICD-10-CM

## 2024-10-08 DIAGNOSIS — D84.821 IMMUNODEFICIENCY DUE TO DRUG THERAPY: ICD-10-CM

## 2024-10-08 LAB
ALBUMIN SERPL BCP-MCNC: 2.9 G/DL (ref 3.5–5.2)
ALP SERPL-CCNC: 172 U/L (ref 55–135)
ALT SERPL W/O P-5'-P-CCNC: 147 U/L (ref 10–44)
ANION GAP SERPL CALC-SCNC: 11 MMOL/L (ref 8–16)
AST SERPL-CCNC: 45 U/L (ref 10–40)
BASOPHILS # BLD AUTO: 0.01 K/UL (ref 0–0.2)
BASOPHILS NFR BLD: 0.1 % (ref 0–1.9)
BILIRUB SERPL-MCNC: 1 MG/DL (ref 0.1–1)
BUN SERPL-MCNC: 16 MG/DL (ref 6–20)
CALCIUM SERPL-MCNC: 8.4 MG/DL (ref 8.7–10.5)
CHLORIDE SERPL-SCNC: 106 MMOL/L (ref 95–110)
CO2 SERPL-SCNC: 23 MMOL/L (ref 23–29)
CREAT SERPL-MCNC: 1.2 MG/DL (ref 0.5–1.4)
DIFFERENTIAL METHOD BLD: ABNORMAL
EOSINOPHIL # BLD AUTO: 0.1 K/UL (ref 0–0.5)
EOSINOPHIL NFR BLD: 1.6 % (ref 0–8)
ERYTHROCYTE [DISTWIDTH] IN BLOOD BY AUTOMATED COUNT: 17.4 % (ref 11.5–14.5)
EST. GFR  (NO RACE VARIABLE): 55.1 ML/MIN/1.73 M^2
GLUCOSE SERPL-MCNC: 85 MG/DL (ref 70–110)
HCT VFR BLD AUTO: 35 % (ref 37–48.5)
HGB BLD-MCNC: 12.2 G/DL (ref 12–16)
IMM GRANULOCYTES # BLD AUTO: 0.09 K/UL (ref 0–0.04)
IMM GRANULOCYTES NFR BLD AUTO: 1.2 % (ref 0–0.5)
LYMPHOCYTES # BLD AUTO: 2.3 K/UL (ref 1–4.8)
LYMPHOCYTES NFR BLD: 30.6 % (ref 18–48)
MAGNESIUM SERPL-MCNC: 1.8 MG/DL (ref 1.6–2.6)
MCH RBC QN AUTO: 36 PG (ref 27–31)
MCHC RBC AUTO-ENTMCNC: 34.9 G/DL (ref 32–36)
MCV RBC AUTO: 103 FL (ref 82–98)
MONOCYTES # BLD AUTO: 1.2 K/UL (ref 0.3–1)
MONOCYTES NFR BLD: 16.1 % (ref 4–15)
NEUTROPHILS # BLD AUTO: 3.8 K/UL (ref 1.8–7.7)
NEUTROPHILS NFR BLD: 50.4 % (ref 38–73)
NRBC BLD-RTO: 0 /100 WBC
PHOSPHATE SERPL-MCNC: 2.4 MG/DL (ref 2.7–4.5)
PLATELET # BLD AUTO: 235 K/UL (ref 150–450)
PMV BLD AUTO: 10.1 FL (ref 9.2–12.9)
POTASSIUM SERPL-SCNC: 3.8 MMOL/L (ref 3.5–5.1)
PROT SERPL-MCNC: 6 G/DL (ref 6–8.4)
RBC # BLD AUTO: 3.39 M/UL (ref 4–5.4)
SODIUM SERPL-SCNC: 140 MMOL/L (ref 136–145)
WBC # BLD AUTO: 7.46 K/UL (ref 3.9–12.7)

## 2024-10-08 PROCEDURE — 36415 COLL VENOUS BLD VENIPUNCTURE: CPT | Mod: PN | Performed by: INTERNAL MEDICINE

## 2024-10-08 PROCEDURE — 80053 COMPREHEN METABOLIC PANEL: CPT | Mod: PN | Performed by: INTERNAL MEDICINE

## 2024-10-08 PROCEDURE — 85025 COMPLETE CBC W/AUTO DIFF WBC: CPT | Mod: PN | Performed by: INTERNAL MEDICINE

## 2024-10-08 PROCEDURE — 84100 ASSAY OF PHOSPHORUS: CPT | Mod: PN | Performed by: INTERNAL MEDICINE

## 2024-10-08 PROCEDURE — 83735 ASSAY OF MAGNESIUM: CPT | Mod: PN | Performed by: INTERNAL MEDICINE

## 2024-10-08 NOTE — TELEPHONE ENCOUNTER
Spoke to pt to remind of appt tomorrow with Keyla. Pt verbalized understanding and confirmed appt Location was discussed.

## 2024-10-09 ENCOUNTER — OFFICE VISIT (OUTPATIENT)
Dept: HEMATOLOGY/ONCOLOGY | Facility: CLINIC | Age: 49
End: 2024-10-09
Payer: COMMERCIAL

## 2024-10-09 VITALS
TEMPERATURE: 97 F | SYSTOLIC BLOOD PRESSURE: 120 MMHG | WEIGHT: 145.31 LBS | HEIGHT: 65 IN | HEART RATE: 92 BPM | OXYGEN SATURATION: 98 % | DIASTOLIC BLOOD PRESSURE: 80 MMHG | BODY MASS INDEX: 24.21 KG/M2

## 2024-10-09 DIAGNOSIS — G89.29 OTHER CHRONIC PAIN: ICD-10-CM

## 2024-10-09 DIAGNOSIS — F43.22 ADJUSTMENT DISORDER WITH ANXIOUS MOOD: Primary | ICD-10-CM

## 2024-10-09 DIAGNOSIS — M54.50 LOW BACK PAIN, UNSPECIFIED BACK PAIN LATERALITY, UNSPECIFIED CHRONICITY, UNSPECIFIED WHETHER SCIATICA PRESENT: ICD-10-CM

## 2024-10-09 DIAGNOSIS — G47.00 INSOMNIA, UNSPECIFIED TYPE: ICD-10-CM

## 2024-10-09 DIAGNOSIS — Z94.84 HISTORY OF ALLOGENEIC STEM CELL TRANSPLANT: ICD-10-CM

## 2024-10-09 LAB
CMV DNA SPEC QL NAA+PROBE: ABNORMAL
CYTOMEGALOVIRUS LOG (IU/ML): 2.5 LOGIU/ML
CYTOMEGALOVIRUS PCR, QUANT: 320 IU/ML

## 2024-10-09 PROCEDURE — 1159F MED LIST DOCD IN RCRD: CPT | Mod: CPTII,S$GLB,, | Performed by: NURSE PRACTITIONER

## 2024-10-09 PROCEDURE — 3008F BODY MASS INDEX DOCD: CPT | Mod: CPTII,S$GLB,, | Performed by: NURSE PRACTITIONER

## 2024-10-09 PROCEDURE — 99999 PR PBB SHADOW E&M-EST. PATIENT-LVL V: CPT | Mod: PBBFAC,,, | Performed by: NURSE PRACTITIONER

## 2024-10-09 PROCEDURE — 99215 OFFICE O/P EST HI 40 MIN: CPT | Mod: S$GLB,,, | Performed by: NURSE PRACTITIONER

## 2024-10-09 PROCEDURE — 3079F DIAST BP 80-89 MM HG: CPT | Mod: CPTII,S$GLB,, | Performed by: NURSE PRACTITIONER

## 2024-10-09 PROCEDURE — 1160F RVW MEDS BY RX/DR IN RCRD: CPT | Mod: CPTII,S$GLB,, | Performed by: NURSE PRACTITIONER

## 2024-10-09 PROCEDURE — 3074F SYST BP LT 130 MM HG: CPT | Mod: CPTII,S$GLB,, | Performed by: NURSE PRACTITIONER

## 2024-10-09 RX ORDER — ATOVAQUONE 750 MG/5ML
1500 SUSPENSION ORAL 2 TIMES DAILY
Qty: 210 ML | Refills: 2 | Status: SHIPPED | OUTPATIENT
Start: 2024-10-09

## 2024-10-09 NOTE — PROGRESS NOTES
"Thai Villalba  49 y.o. is here to seek an integrative approach to discuss side effects related to AML cancer treatment. Thai Villalba  was referred by Dr. Dudley     HPI  Mrs. Villalba reports she has had a rough 3 years with her  losing his job and her leukemia diagnosis. After her bone marrow transplant they decided to move back "home" from [place] to [place] to be closer to family. They are living with their in laws. She reports she has had a difficult time "bouncing back from my transplant." She reports tremendous neuropathy. She has pain to her back and legs and is planning on a rhizotomy. 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 She uses both heat and ice for relief along with gabapentin She reports she does not sleep well due to not being able to get comfortable.  She will rest or nap during the day as needed. She reports whenever she starts steroids she doesn't sleep well. Her anxiety has been "really bad over the last 3 months due to my liver levels and the pain." She reports she had some panic attacks. She reports at the year jose she started dealing with and processing what happened. She thinks back to when she was very sick and constantly had hospital staff in her room and needing to discuss advanced care planning. Thinking about her health over the last year is anxiety provoking. She has a good appetite and follows a transplant diet. She is walking 3-4 days a week for exercise and helps take care of her grandchildren.   She is  and has 2 grown children. She has grandchildren who she enjoys spending time with.     7 pillars Assessment    Sleep  How many hours of sleep per night? 3-6 hours  Do you have trouble falling asleep, staying asleep or waking up earlier than you need to? no  Do you have " daytime fatigue? yes  Do you need medication for sleep? no  Do you use any supplements or other interventions for sleep? no    Resilience  Rate your current level of stress- high    Purpose  Do you feel you have a vision or a life purpose? Yes    Environment  Any exposures:no known exposures    Spirituality-  Latter-day    Nutrition   Food allergies or sensitivities: no  Do you adhere to a particular type of diet? yes Transplant diet.   Do you have any concerns with your eating habits? no    Exercise  How would you describe your physical activity level? Low/moderate    Past Medical History  Past Medical History:   Diagnosis Date    ADHD (attention deficit hyperactivity disorder)     Anxiety     General anesthetics causing adverse effect in therapeutic use     REFLUX WITH SURGERY AND ASPIRATED, ZOFRAN GIVEN IV      Past Surgical History   Past Surgical History:   Procedure Laterality Date    BONE MARROW BIOPSY Left 3/27/2024    Procedure: Biopsy-bone marrow;  Surgeon: Shemar Franco MD;  Location: [address]);  Service: Oncology;  Laterality: Left;  3/21-LVM for precall-KPvt  3/25-precall complete-Kpvt    BREAST RECONSTRUCTION Bilateral 2007    BREAST SURGERY      CHOLECYSTECTOMY      DIAGNOSTIC LAPAROSCOPY N/A 12/23/2020    Procedure: LAPAROSCOPY, DIAGNOSTIC;  Surgeon: Ed Troy MD;  Location: [facility];  Service: OB/GYN;  Laterality: N/A;    DILATION AND CURETTAGE OF UTERUS      EVACUATION OF HEMATOMA Left 12/23/2020    Procedure: EVACUATION, HEMATOMA;  Surgeon: Ed Troy MD;  Location: [facility] OR;  Service: OB/GYN;  Laterality: Left;    HYSTERECTOMY  12/2020    partial    LAPAROSCOPY-ASSISTED VAGINAL HYSTERECTOMY N/A 12/22/2020    Procedure: HYSTERECTOMY, VAGINAL, LAPAROSCOPY-ASSISTED;  Surgeon: Ed Troy MD;  Location: [facility];  Service: OB/GYN;  Laterality: N/A;    LEFT KNEE      LEFT MENISCUS REPAIR    TONSILLECTOMY        Family History   Family History   Problem Relation Name  Age of Onset    Cancer Mother          Breast    Breast cancer Mother          age unknown    Cancer Maternal Grandmother          Breast    Breast cancer Maternal Grandmother          70s and 80s    COPD Maternal Grandfather      Heart disease Paternal Grandfather        Allergies  Review of patient's allergies indicates:   Allergen Reactions    Promethazine Nausea And Vomiting     Other Reaction(s): VOMITING    Penicillin Hives    Penicillins      Other reaction(s): Unknown    Melatonin Anxiety     Other Reaction(s): FLUSHING      Current Medications:    Current Outpatient Medications:     acyclovir (ZOVIRAX) 200 MG capsule, Take 2 capsules (400 mg total) by mouth 2 (two) times a day for Prophylaxis., Disp: 120 capsule, Rfl: 1    atovaquone (MEPRON) 750 mg/5 mL Susp oral liquid, Take 10 mLs (1,500 mg total) by mouth 2 (two) times daily., Disp: 210 mL, Rfl: 2    cholecalciferol, vitamin D3, 10 mcg (400 unit) Chew, Take 1,000 Int'l Units by mouth once daily., Disp: , Rfl:     cycloSPORINE modified, NEORAL, (NEORAL) 25 MG capsule, Take 3 capsules (75mg) twice a day for 5 days, then take 3 capsules (75mg) in the morning and 2 capsules (50mg) in the evening for 5 days, then take 2 capsules (50mg) twice a day for 5 days, then take 2 capsules (50mg) in the morning and 1 capsule (25mg) in the evening for 5 days, then take 1 capsule (25mg) twice a day for 5 days, then take 1 capsule (25mg) in the morning for 5 days, then stop, Disp: 60 capsule, Rfl: 11    estrogens,esterified,-methyltestosterone 1.25-2.5mg (EEMT) per tablet, Take 1 tablet by mouth once daily., Disp: 90 tablet, Rfl: 1    furosemide (LASIX) 40 MG tablet, Take 1 tablet (40 mg total) by mouth daily as needed (swelling in the legs)., Disp: 30 tablet, Rfl: 2    gabapentin (NEURONTIN) 300 MG capsule, Take 1 capsule by mouth three times a day, Disp: 90 capsule, Rfl: 3    granisetron HCL (KYTRIL) 1 mg Tab, Take 1 mg by mouth every 12 (twelve) hours as needed.,  Disp: , Rfl:     magnesium oxide-Mg AA chelate (MG-PLUS-PROTEIN) 133 mg Tab, Take 1 tablet by mouth 2 times daily for hypomagnesemia., Disp: 60 tablet, Rfl: 1    oxyCODONE (ROXICODONE) 10 mg Tab immediate release tablet, 1 tablet by mouth three times a day as needed for pain, Disp: 90 tablet, Rfl: 0    oxyCODONE (ROXICODONE) 10 mg Tab immediate release tablet, 1 tablet by mouth three times a day as needed for pain, Disp: 90 tablet, Rfl: 0    oxyCODONE (ROXICODONE) 10 mg Tab immediate release tablet, 1 tablet by mouth three times a day as needed for pain, Disp: 90 tablet, Rfl: 0    pantoprazole (PROTONIX) 40 MG tablet, Take 40 mg by mouth every morning., Disp: , Rfl:     prochlorperazine (COMPAZINE) 10 MG tablet, Take 10 mg by mouth every 6 (six) hours as needed., Disp: , Rfl:     ruxolitinib (JAKAFI) 10 mg Tab, Take 10 mg by mouth 2 (two) times a day., Disp: , Rfl:     tacrolimus (PROTOPIC) 0.1 % ointment, Apply topically 2 times daily for Atopic Dermatitis, can apply to lips or inside mouth if needed. (Patient taking differently: Apply 1 application  topically 2 (two) times daily.), Disp: 100 g, Rfl: 1    triamcinolone acetonide 0.1% (KENALOG) 0.1 % cream, Apply topically 2 (two) times daily. To areas of rash/dry skin. (Patient taking differently: Apply 1 g topically 2 (two) times daily. 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 (Patient not taking: Reported on 10/9/2024), Disp: 4 capsule, Rfl: 1    dexAMETHasone 0.5 mg/5 mL Soln solution, Take 10 mLs (1 mg total) by mouth every 12 (twelve) hours. Swish and spit. Do not swallow. (Patient not taking: Reported on 5/15/2024), Disp: 500 mL, Rfl: 1    duke's soln (benadryl 30 mL, mylanta 30 mL, LIDOcaine 30  "mL, nystatin 30 mL) 120mL, Take 10 mLs by mouth 4 (four) times daily. (Patient not taking: Reported on 5/15/2024), Disp: 120 mL, Rfl: 0    EScitalopram oxalate (LEXAPRO) 10 MG tablet, Take 10 mg by mouth. (Patient not taking: Reported on 10/9/2024), Disp: , Rfl:     LORazepam (ATIVAN) 0.5 MG tablet, Take 1 tablet (0.5 mg total) by mouth 2 (two) times daily. for 7 days (Patient not taking: Reported on 10/9/2024), Disp: 14 tablet, Rfl: 0    magnesium oxide (MAG-OX) 400 mg (241.3 mg magnesium) tablet, Take 1 tablet (400 mg total) by mouth 2 (two) times daily., Disp: 60 tablet, Rfl: 11    sodium zirconium cyclosilicate (LOKELMA) 5 gram packet, Take 1 packet (5 g total) by mouth 2 (two) times daily. Mix entire contents of packet(s) into drinking glass containing 3 tablespoons of water; stir well and drink immediately. Add water and repeat until no powder remains to receive entire dose., Disp: 60 packet, Rfl: 2     Review of Systems  Review of Systems   Constitutional:  Positive for malaise/fatigue.   HENT: Negative.     Eyes: Negative.    Respiratory: Negative.     Cardiovascular: Negative.    Gastrointestinal: Negative.    Genitourinary: Negative.    Musculoskeletal:  Positive for back pain and joint pain.   Skin: Negative.    Neurological: Negative.    Endo/Heme/Allergies: Negative.    Psychiatric/Behavioral:  Positive for depression. The patient is nervous/anxious and has insomnia.       Physical Exam      Vitals:    10/09/24 1502   BP: 120/80   BP Location: Left arm   Patient Position: Sitting   Pulse: 92   Temp: 97 °F (36.1 °C)   TempSrc: Temporal   SpO2: 98%   Weight: 65.9 kg (145 lb 4.8 oz)   Height: 5' 5" (1.651 m)     Body mass index is 24.18 kg/m².  Physical Exam  Vitals reviewed.   Constitutional:       Appearance: Normal appearance.   Neurological:      Mental Status: She is alert.   Psychiatric:         Mood and Affect: Mood normal.         Behavior: Behavior normal.        ASSESSMENT :  1. Adjustment " disorder with anxious mood    2. Insomnia, unspecified type    3. Other chronic pain    4. Low back pain, unspecified back pain laterality, unspecified chronicity, unspecified whether sciatica present    5. History of allogeneic stem cell transplant        PLAN:  Reviewed all information discussed at today's visit and all questions were answered.    Counseled on healthy lifestyle and behavior modifications: Continue walking with the goal of 150 minutes a week.   Counseled on sleep hygiene: Recommended insight timer--breathing into sleep and progressive muscle relaxation.   Continue Oncology Psychology  Referral to Acupuncture  I explained acupuncture can reduce fatigue,  pain, and neuropathy. It can also assist with behavioral health such as depression and anxiety and improve overall sleep quality. Acupuncture helps improve overall symptoms from treatment.   I discussed and recommended the following support services:  Jack Chi and Yoga I suggested Jack Chi and/or Yoga as these practices reduce stress, increases flexibility and muscle strength, improves balance and promotes serenity in the power of movement to help fight disease and boost your immune system.   Music and relaxation therapy and Meditation which can decrease stress by lowering blood pressure, slowing breathing, and helping you be more present in the moment. It improves sleep by relaxing the body and mind at the end of the day.Meditation also trains you how to focus on one thing at a time, improving concentration. It also promotes emotional well-being by decreasing depression and anxiety, and helping create a more positive outlook on life.  Art Therapy    Follow up with Integr[facility] Services in 2 months    I spent a total of 64 minutes on the day of the visit.This includes face to face time and non-face to face time preparing to see the patient (eg, review of tests), obtaining and/or reviewing separately obtained history, documenting clinical information in  the electronic or other health record, independently interpreting results and communicating results to the patient/family/caregiver, or care coordinator.

## 2024-10-11 PROBLEM — G47.00 INSOMNIA: Status: ACTIVE | Noted: 2024-10-11

## 2024-10-15 ENCOUNTER — LAB VISIT (OUTPATIENT)
Dept: LAB | Facility: HOSPITAL | Age: 49
End: 2024-10-15
Attending: INTERNAL MEDICINE
Payer: COMMERCIAL

## 2024-10-15 DIAGNOSIS — Z94.84 HISTORY OF ALLOGENEIC STEM CELL TRANSPLANT: ICD-10-CM

## 2024-10-15 LAB
ALBUMIN SERPL BCP-MCNC: 3 G/DL (ref 3.5–5.2)
ALP SERPL-CCNC: 201 U/L (ref 55–135)
ALT SERPL W/O P-5'-P-CCNC: 218 U/L (ref 10–44)
ANION GAP SERPL CALC-SCNC: 13 MMOL/L (ref 8–16)
AST SERPL-CCNC: 38 U/L (ref 10–40)
BASOPHILS # BLD AUTO: 0.03 K/UL (ref 0–0.2)
BASOPHILS NFR BLD: 0.3 % (ref 0–1.9)
BILIRUB SERPL-MCNC: 0.9 MG/DL (ref 0.1–1)
BUN SERPL-MCNC: 19 MG/DL (ref 6–20)
CALCIUM SERPL-MCNC: 8.7 MG/DL (ref 8.7–10.5)
CHLORIDE SERPL-SCNC: 101 MMOL/L (ref 95–110)
CO2 SERPL-SCNC: 23 MMOL/L (ref 23–29)
CREAT SERPL-MCNC: 1.2 MG/DL (ref 0.5–1.4)
DIFFERENTIAL METHOD BLD: ABNORMAL
EOSINOPHIL # BLD AUTO: 0.2 K/UL (ref 0–0.5)
EOSINOPHIL NFR BLD: 1.8 % (ref 0–8)
ERYTHROCYTE [DISTWIDTH] IN BLOOD BY AUTOMATED COUNT: 16 % (ref 11.5–14.5)
EST. GFR  (NO RACE VARIABLE): 55.1 ML/MIN/1.73 M^2
GLUCOSE SERPL-MCNC: 81 MG/DL (ref 70–110)
HCT VFR BLD AUTO: 37.4 % (ref 37–48.5)
HGB BLD-MCNC: 12.7 G/DL (ref 12–16)
IMM GRANULOCYTES # BLD AUTO: 0.22 K/UL (ref 0–0.04)
IMM GRANULOCYTES NFR BLD AUTO: 2 % (ref 0–0.5)
LYMPHOCYTES # BLD AUTO: 3.7 K/UL (ref 1–4.8)
LYMPHOCYTES NFR BLD: 33.5 % (ref 18–48)
MAGNESIUM SERPL-MCNC: 1.9 MG/DL (ref 1.6–2.6)
MCH RBC QN AUTO: 35.6 PG (ref 27–31)
MCHC RBC AUTO-ENTMCNC: 34 G/DL (ref 32–36)
MCV RBC AUTO: 105 FL (ref 82–98)
MONOCYTES # BLD AUTO: 1.2 K/UL (ref 0.3–1)
MONOCYTES NFR BLD: 10.8 % (ref 4–15)
NEUTROPHILS # BLD AUTO: 5.8 K/UL (ref 1.8–7.7)
NEUTROPHILS NFR BLD: 51.6 % (ref 38–73)
NRBC BLD-RTO: 0 /100 WBC
PHOSPHATE SERPL-MCNC: 2.6 MG/DL (ref 2.7–4.5)
PLATELET # BLD AUTO: 238 K/UL (ref 150–450)
PMV BLD AUTO: 9.9 FL (ref 9.2–12.9)
POTASSIUM SERPL-SCNC: 4.4 MMOL/L (ref 3.5–5.1)
PROT SERPL-MCNC: 6.5 G/DL (ref 6–8.4)
RBC # BLD AUTO: 3.57 M/UL (ref 4–5.4)
SODIUM SERPL-SCNC: 137 MMOL/L (ref 136–145)
WBC # BLD AUTO: 11.16 K/UL (ref 3.9–12.7)

## 2024-10-15 PROCEDURE — 80053 COMPREHEN METABOLIC PANEL: CPT | Mod: PN | Performed by: INTERNAL MEDICINE

## 2024-10-15 PROCEDURE — 85025 COMPLETE CBC W/AUTO DIFF WBC: CPT | Mod: PN | Performed by: INTERNAL MEDICINE

## 2024-10-15 PROCEDURE — 83735 ASSAY OF MAGNESIUM: CPT | Mod: PN | Performed by: INTERNAL MEDICINE

## 2024-10-15 PROCEDURE — 84100 ASSAY OF PHOSPHORUS: CPT | Mod: PN | Performed by: INTERNAL MEDICINE

## 2024-10-16 LAB
CMV DNA SPEC QL NAA+PROBE: ABNORMAL
CYTOMEGALOVIRUS LOG (IU/ML): 2.59 LOGIU/ML
CYTOMEGALOVIRUS PCR, QUANT: 385 IU/ML

## 2024-10-22 ENCOUNTER — LAB VISIT (OUTPATIENT)
Dept: LAB | Facility: HOSPITAL | Age: 49
End: 2024-10-22
Attending: INTERNAL MEDICINE
Payer: COMMERCIAL

## 2024-10-22 ENCOUNTER — PATIENT MESSAGE (OUTPATIENT)
Dept: RESEARCH | Facility: HOSPITAL | Age: 49
End: 2024-10-22
Payer: COMMERCIAL

## 2024-10-22 DIAGNOSIS — Z94.84 HISTORY OF ALLOGENEIC STEM CELL TRANSPLANT: ICD-10-CM

## 2024-10-22 LAB
ALBUMIN SERPL BCP-MCNC: 3.1 G/DL (ref 3.5–5.2)
ALP SERPL-CCNC: 169 U/L (ref 40–150)
ALT SERPL W/O P-5'-P-CCNC: 193 U/L (ref 10–44)
ANION GAP SERPL CALC-SCNC: 11 MMOL/L (ref 8–16)
AST SERPL-CCNC: 61 U/L (ref 10–40)
BASOPHILS # BLD AUTO: 0.02 K/UL (ref 0–0.2)
BASOPHILS NFR BLD: 0.2 % (ref 0–1.9)
BILIRUB SERPL-MCNC: 0.9 MG/DL (ref 0.1–1)
BUN SERPL-MCNC: 18 MG/DL (ref 6–20)
CALCIUM SERPL-MCNC: 8.6 MG/DL (ref 8.7–10.5)
CHLORIDE SERPL-SCNC: 104 MMOL/L (ref 95–110)
CO2 SERPL-SCNC: 23 MMOL/L (ref 23–29)
CREAT SERPL-MCNC: 1.2 MG/DL (ref 0.5–1.4)
DIFFERENTIAL METHOD BLD: ABNORMAL
EOSINOPHIL # BLD AUTO: 0.2 K/UL (ref 0–0.5)
EOSINOPHIL NFR BLD: 2.7 % (ref 0–8)
ERYTHROCYTE [DISTWIDTH] IN BLOOD BY AUTOMATED COUNT: 15.9 % (ref 11.5–14.5)
EST. GFR  (NO RACE VARIABLE): 55.1 ML/MIN/1.73 M^2
GLUCOSE SERPL-MCNC: 98 MG/DL (ref 70–110)
HCT VFR BLD AUTO: 37.2 % (ref 37–48.5)
HGB BLD-MCNC: 13 G/DL (ref 12–16)
IMM GRANULOCYTES # BLD AUTO: 0.35 K/UL (ref 0–0.04)
IMM GRANULOCYTES NFR BLD AUTO: 4.1 % (ref 0–0.5)
LYMPHOCYTES # BLD AUTO: 2.5 K/UL (ref 1–4.8)
LYMPHOCYTES NFR BLD: 29.1 % (ref 18–48)
MAGNESIUM SERPL-MCNC: 1.8 MG/DL (ref 1.6–2.6)
MCH RBC QN AUTO: 36.5 PG (ref 27–31)
MCHC RBC AUTO-ENTMCNC: 34.9 G/DL (ref 32–36)
MCV RBC AUTO: 105 FL (ref 82–98)
MONOCYTES # BLD AUTO: 1 K/UL (ref 0.3–1)
MONOCYTES NFR BLD: 11.6 % (ref 4–15)
NEUTROPHILS # BLD AUTO: 4.4 K/UL (ref 1.8–7.7)
NEUTROPHILS NFR BLD: 52.3 % (ref 38–73)
NRBC BLD-RTO: 0 /100 WBC
PHOSPHATE SERPL-MCNC: 2.8 MG/DL (ref 2.7–4.5)
PLATELET # BLD AUTO: 247 K/UL (ref 150–450)
PMV BLD AUTO: 9.3 FL (ref 9.2–12.9)
POTASSIUM SERPL-SCNC: 4.5 MMOL/L (ref 3.5–5.1)
PROT SERPL-MCNC: 6 G/DL (ref 6–8.4)
RBC # BLD AUTO: 3.56 M/UL (ref 4–5.4)
SODIUM SERPL-SCNC: 138 MMOL/L (ref 136–145)
WBC # BLD AUTO: 8.51 K/UL (ref 3.9–12.7)

## 2024-10-22 PROCEDURE — 84100 ASSAY OF PHOSPHORUS: CPT | Mod: PN | Performed by: INTERNAL MEDICINE

## 2024-10-22 PROCEDURE — 80053 COMPREHEN METABOLIC PANEL: CPT | Mod: PN | Performed by: INTERNAL MEDICINE

## 2024-10-22 PROCEDURE — 85025 COMPLETE CBC W/AUTO DIFF WBC: CPT | Mod: PN | Performed by: INTERNAL MEDICINE

## 2024-10-22 PROCEDURE — 83735 ASSAY OF MAGNESIUM: CPT | Mod: PN | Performed by: INTERNAL MEDICINE

## 2024-10-23 LAB
CMV DNA SPEC QL NAA+PROBE: ABNORMAL
CYTOMEGALOVIRUS LOG (IU/ML): 2.41 LOGIU/ML
CYTOMEGALOVIRUS PCR, QUANT: 254 IU/ML

## 2024-10-24 ENCOUNTER — TELEPHONE (OUTPATIENT)
Dept: HEMATOLOGY/ONCOLOGY | Facility: CLINIC | Age: 49
End: 2024-10-24
Payer: COMMERCIAL

## 2024-10-28 ENCOUNTER — PATIENT MESSAGE (OUTPATIENT)
Dept: HEMATOLOGY/ONCOLOGY | Facility: CLINIC | Age: 49
End: 2024-10-28
Payer: COMMERCIAL

## 2024-10-31 ENCOUNTER — LAB VISIT (OUTPATIENT)
Dept: LAB | Facility: HOSPITAL | Age: 49
End: 2024-10-31
Attending: INTERNAL MEDICINE
Payer: COMMERCIAL

## 2024-10-31 DIAGNOSIS — Z94.84 HISTORY OF ALLOGENEIC STEM CELL TRANSPLANT: ICD-10-CM

## 2024-10-31 LAB
ALBUMIN SERPL BCP-MCNC: 3.2 G/DL (ref 3.5–5.2)
ALP SERPL-CCNC: 156 U/L (ref 40–150)
ALT SERPL W/O P-5'-P-CCNC: 92 U/L (ref 10–44)
ANION GAP SERPL CALC-SCNC: 10 MMOL/L (ref 8–16)
AST SERPL-CCNC: 40 U/L (ref 10–40)
BASOPHILS # BLD AUTO: 0.01 K/UL (ref 0–0.2)
BASOPHILS NFR BLD: 0.1 % (ref 0–1.9)
BILIRUB SERPL-MCNC: 0.6 MG/DL (ref 0.1–1)
BUN SERPL-MCNC: 14 MG/DL (ref 6–20)
CALCIUM SERPL-MCNC: 8.7 MG/DL (ref 8.7–10.5)
CHLORIDE SERPL-SCNC: 107 MMOL/L (ref 95–110)
CO2 SERPL-SCNC: 23 MMOL/L (ref 23–29)
CREAT SERPL-MCNC: 1.2 MG/DL (ref 0.5–1.4)
DIFFERENTIAL METHOD BLD: ABNORMAL
EOSINOPHIL # BLD AUTO: 0.3 K/UL (ref 0–0.5)
EOSINOPHIL NFR BLD: 4.1 % (ref 0–8)
ERYTHROCYTE [DISTWIDTH] IN BLOOD BY AUTOMATED COUNT: 14.7 % (ref 11.5–14.5)
EST. GFR  (NO RACE VARIABLE): 55.1 ML/MIN/1.73 M^2
GLUCOSE SERPL-MCNC: 90 MG/DL (ref 70–110)
HCT VFR BLD AUTO: 36.4 % (ref 37–48.5)
HGB BLD-MCNC: 12.8 G/DL (ref 12–16)
IMM GRANULOCYTES # BLD AUTO: 0.06 K/UL (ref 0–0.04)
IMM GRANULOCYTES NFR BLD AUTO: 0.9 % (ref 0–0.5)
LYMPHOCYTES # BLD AUTO: 1.9 K/UL (ref 1–4.8)
LYMPHOCYTES NFR BLD: 27.1 % (ref 18–48)
MAGNESIUM SERPL-MCNC: 1.9 MG/DL (ref 1.6–2.6)
MCH RBC QN AUTO: 36.5 PG (ref 27–31)
MCHC RBC AUTO-ENTMCNC: 35.2 G/DL (ref 32–36)
MCV RBC AUTO: 104 FL (ref 82–98)
MONOCYTES # BLD AUTO: 1 K/UL (ref 0.3–1)
MONOCYTES NFR BLD: 15 % (ref 4–15)
NEUTROPHILS # BLD AUTO: 3.6 K/UL (ref 1.8–7.7)
NEUTROPHILS NFR BLD: 52.8 % (ref 38–73)
NRBC BLD-RTO: 0 /100 WBC
PHOSPHATE SERPL-MCNC: 2.7 MG/DL (ref 2.7–4.5)
PLATELET # BLD AUTO: 232 K/UL (ref 150–450)
PMV BLD AUTO: 9.4 FL (ref 9.2–12.9)
POTASSIUM SERPL-SCNC: 4.1 MMOL/L (ref 3.5–5.1)
PROT SERPL-MCNC: 6.3 G/DL (ref 6–8.4)
RBC # BLD AUTO: 3.51 M/UL (ref 4–5.4)
SODIUM SERPL-SCNC: 140 MMOL/L (ref 136–145)
WBC # BLD AUTO: 6.87 K/UL (ref 3.9–12.7)

## 2024-10-31 PROCEDURE — 85025 COMPLETE CBC W/AUTO DIFF WBC: CPT | Mod: PN | Performed by: INTERNAL MEDICINE

## 2024-10-31 PROCEDURE — 84100 ASSAY OF PHOSPHORUS: CPT | Mod: PN | Performed by: INTERNAL MEDICINE

## 2024-10-31 PROCEDURE — 83735 ASSAY OF MAGNESIUM: CPT | Mod: PN | Performed by: INTERNAL MEDICINE

## 2024-10-31 PROCEDURE — 80053 COMPREHEN METABOLIC PANEL: CPT | Mod: PN | Performed by: INTERNAL MEDICINE

## 2024-11-01 LAB
CMV DNA SPEC QL NAA+PROBE: ABNORMAL
CYTOMEGALOVIRUS LOG (IU/ML): 2 LOGIU/ML
CYTOMEGALOVIRUS PCR, QUANT: 100 IU/ML

## 2024-11-05 ENCOUNTER — LAB VISIT (OUTPATIENT)
Dept: LAB | Facility: HOSPITAL | Age: 49
End: 2024-11-05
Attending: INTERNAL MEDICINE
Payer: COMMERCIAL

## 2024-11-05 DIAGNOSIS — Z94.84 HISTORY OF ALLOGENEIC STEM CELL TRANSPLANT: ICD-10-CM

## 2024-11-05 LAB
ALBUMIN SERPL BCP-MCNC: 3.4 G/DL (ref 3.5–5.2)
ALP SERPL-CCNC: 165 U/L (ref 40–150)
ALT SERPL W/O P-5'-P-CCNC: 84 U/L (ref 10–44)
ANION GAP SERPL CALC-SCNC: 12 MMOL/L (ref 8–16)
AST SERPL-CCNC: 40 U/L (ref 10–40)
BASOPHILS # BLD AUTO: 0.02 K/UL (ref 0–0.2)
BASOPHILS NFR BLD: 0.3 % (ref 0–1.9)
BILIRUB SERPL-MCNC: 0.6 MG/DL (ref 0.1–1)
BUN SERPL-MCNC: 15 MG/DL (ref 6–20)
CALCIUM SERPL-MCNC: 9.1 MG/DL (ref 8.7–10.5)
CHLORIDE SERPL-SCNC: 103 MMOL/L (ref 95–110)
CO2 SERPL-SCNC: 24 MMOL/L (ref 23–29)
CREAT SERPL-MCNC: 1.4 MG/DL (ref 0.5–1.4)
DIFFERENTIAL METHOD BLD: ABNORMAL
EOSINOPHIL # BLD AUTO: 0.2 K/UL (ref 0–0.5)
EOSINOPHIL NFR BLD: 3.1 % (ref 0–8)
ERYTHROCYTE [DISTWIDTH] IN BLOOD BY AUTOMATED COUNT: 14 % (ref 11.5–14.5)
EST. GFR  (NO RACE VARIABLE): 45.8 ML/MIN/1.73 M^2
GLUCOSE SERPL-MCNC: 84 MG/DL (ref 70–110)
HCT VFR BLD AUTO: 35.2 % (ref 37–48.5)
HGB BLD-MCNC: 12.4 G/DL (ref 12–16)
IMM GRANULOCYTES # BLD AUTO: 0.04 K/UL (ref 0–0.04)
IMM GRANULOCYTES NFR BLD AUTO: 0.7 % (ref 0–0.5)
LYMPHOCYTES # BLD AUTO: 2.1 K/UL (ref 1–4.8)
LYMPHOCYTES NFR BLD: 34.5 % (ref 18–48)
MAGNESIUM SERPL-MCNC: 1.8 MG/DL (ref 1.6–2.6)
MCH RBC QN AUTO: 36.2 PG (ref 27–31)
MCHC RBC AUTO-ENTMCNC: 35.2 G/DL (ref 32–36)
MCV RBC AUTO: 103 FL (ref 82–98)
MONOCYTES # BLD AUTO: 0.8 K/UL (ref 0.3–1)
MONOCYTES NFR BLD: 13.1 % (ref 4–15)
NEUTROPHILS # BLD AUTO: 2.9 K/UL (ref 1.8–7.7)
NEUTROPHILS NFR BLD: 48.3 % (ref 38–73)
NRBC BLD-RTO: 0 /100 WBC
PHOSPHATE SERPL-MCNC: 3.7 MG/DL (ref 2.7–4.5)
PLATELET # BLD AUTO: 233 K/UL (ref 150–450)
PMV BLD AUTO: 9.5 FL (ref 9.2–12.9)
POTASSIUM SERPL-SCNC: 4.1 MMOL/L (ref 3.5–5.1)
PROT SERPL-MCNC: 6.3 G/DL (ref 6–8.4)
RBC # BLD AUTO: 3.43 M/UL (ref 4–5.4)
SODIUM SERPL-SCNC: 139 MMOL/L (ref 136–145)
WBC # BLD AUTO: 6.05 K/UL (ref 3.9–12.7)

## 2024-11-05 PROCEDURE — 84100 ASSAY OF PHOSPHORUS: CPT | Mod: PN | Performed by: INTERNAL MEDICINE

## 2024-11-05 PROCEDURE — 80053 COMPREHEN METABOLIC PANEL: CPT | Mod: PN | Performed by: INTERNAL MEDICINE

## 2024-11-05 PROCEDURE — 85025 COMPLETE CBC W/AUTO DIFF WBC: CPT | Mod: PN | Performed by: INTERNAL MEDICINE

## 2024-11-05 PROCEDURE — 83735 ASSAY OF MAGNESIUM: CPT | Mod: PN | Performed by: INTERNAL MEDICINE

## 2024-11-07 LAB
CMV DNA SPEC QL NAA+PROBE: ABNORMAL
CYTOMEGALOVIRUS LOG (IU/ML): 1.71 LOGIU/ML
CYTOMEGALOVIRUS PCR, QUANT: 51 IU/ML

## 2024-11-11 ENCOUNTER — OFFICE VISIT (OUTPATIENT)
Dept: HEMATOLOGY/ONCOLOGY | Facility: CLINIC | Age: 49
End: 2024-11-11
Payer: COMMERCIAL

## 2024-11-11 VITALS
WEIGHT: 152.44 LBS | DIASTOLIC BLOOD PRESSURE: 70 MMHG | HEART RATE: 67 BPM | OXYGEN SATURATION: 98 % | RESPIRATION RATE: 18 BRPM | BODY MASS INDEX: 25.4 KG/M2 | SYSTOLIC BLOOD PRESSURE: 102 MMHG | HEIGHT: 65 IN

## 2024-11-11 DIAGNOSIS — D84.822 IMMUNODEFICIENCY DUE TO EXTERNAL CAUSE: ICD-10-CM

## 2024-11-11 DIAGNOSIS — Z94.84 HISTORY OF ALLOGENEIC STEM CELL TRANSPLANT: ICD-10-CM

## 2024-11-11 DIAGNOSIS — C92.01 ACUTE MYELOID LEUKEMIA IN REMISSION: Primary | ICD-10-CM

## 2024-11-11 PROCEDURE — 3008F BODY MASS INDEX DOCD: CPT | Mod: CPTII,S$GLB,, | Performed by: INTERNAL MEDICINE

## 2024-11-11 PROCEDURE — 3074F SYST BP LT 130 MM HG: CPT | Mod: CPTII,S$GLB,, | Performed by: INTERNAL MEDICINE

## 2024-11-11 PROCEDURE — 99999 PR PBB SHADOW E&M-EST. PATIENT-LVL IV: CPT | Mod: PBBFAC,,, | Performed by: INTERNAL MEDICINE

## 2024-11-11 PROCEDURE — 1159F MED LIST DOCD IN RCRD: CPT | Mod: CPTII,S$GLB,, | Performed by: INTERNAL MEDICINE

## 2024-11-11 PROCEDURE — G2211 COMPLEX E/M VISIT ADD ON: HCPCS | Mod: S$GLB,,, | Performed by: INTERNAL MEDICINE

## 2024-11-11 PROCEDURE — 1160F RVW MEDS BY RX/DR IN RCRD: CPT | Mod: CPTII,S$GLB,, | Performed by: INTERNAL MEDICINE

## 2024-11-11 PROCEDURE — 3078F DIAST BP <80 MM HG: CPT | Mod: CPTII,S$GLB,, | Performed by: INTERNAL MEDICINE

## 2024-11-11 PROCEDURE — 99215 OFFICE O/P EST HI 40 MIN: CPT | Mod: S$GLB,,, | Performed by: INTERNAL MEDICINE

## 2024-11-11 NOTE — PROGRESS NOTES
Route Chart for Scheduling    BMT Chart Routing      Follow up with physician 2 months.   Follow up with KENDALL    Provider visit type Malignant hem   Infusion scheduling note    Injection scheduling note    Labs CBC, CMP, magnesium, phosphorus and CMV   Scheduling:  Preferred lab:  Lab interval: every 2 weeks  on [facility]   Imaging    Pharmacy appointment    Other referrals

## 2024-11-12 ENCOUNTER — TELEPHONE (OUTPATIENT)
Dept: INFECTIOUS DISEASES | Facility: CLINIC | Age: 49
End: 2024-11-12
Payer: COMMERCIAL

## 2024-11-12 ENCOUNTER — LAB VISIT (OUTPATIENT)
Dept: LAB | Facility: HOSPITAL | Age: 49
End: 2024-11-12
Attending: INTERNAL MEDICINE
Payer: COMMERCIAL

## 2024-11-12 DIAGNOSIS — D84.822 IMMUNODEFICIENCY DUE TO EXTERNAL CAUSE: ICD-10-CM

## 2024-11-12 DIAGNOSIS — Z94.84 HISTORY OF ALLOGENEIC STEM CELL TRANSPLANT: ICD-10-CM

## 2024-11-12 LAB
ALBUMIN SERPL BCP-MCNC: 3.4 G/DL (ref 3.5–5.2)
ALP SERPL-CCNC: 166 U/L (ref 40–150)
ALT SERPL W/O P-5'-P-CCNC: 94 U/L (ref 10–44)
ANION GAP SERPL CALC-SCNC: 8 MMOL/L (ref 8–16)
AST SERPL-CCNC: 51 U/L (ref 10–40)
BASOPHILS # BLD AUTO: 0.02 K/UL (ref 0–0.2)
BASOPHILS NFR BLD: 0.4 % (ref 0–1.9)
BILIRUB SERPL-MCNC: 0.6 MG/DL (ref 0.1–1)
BUN SERPL-MCNC: 12 MG/DL (ref 6–20)
CALCIUM SERPL-MCNC: 8.6 MG/DL (ref 8.7–10.5)
CHLORIDE SERPL-SCNC: 109 MMOL/L (ref 95–110)
CO2 SERPL-SCNC: 23 MMOL/L (ref 23–29)
CREAT SERPL-MCNC: 1.2 MG/DL (ref 0.5–1.4)
DIFFERENTIAL METHOD BLD: ABNORMAL
EOSINOPHIL # BLD AUTO: 0.2 K/UL (ref 0–0.5)
EOSINOPHIL NFR BLD: 4.5 % (ref 0–8)
ERYTHROCYTE [DISTWIDTH] IN BLOOD BY AUTOMATED COUNT: 13.7 % (ref 11.5–14.5)
EST. GFR  (NO RACE VARIABLE): 55.1 ML/MIN/1.73 M^2
GLUCOSE SERPL-MCNC: 95 MG/DL (ref 70–110)
HCT VFR BLD AUTO: 35.6 % (ref 37–48.5)
HGB BLD-MCNC: 12.4 G/DL (ref 12–16)
IGA SERPL-MCNC: 85 MG/DL (ref 40–350)
IGG SERPL-MCNC: 603 MG/DL (ref 650–1600)
IGM SERPL-MCNC: 155 MG/DL (ref 50–300)
IMM GRANULOCYTES # BLD AUTO: 0.08 K/UL (ref 0–0.04)
IMM GRANULOCYTES NFR BLD AUTO: 1.5 % (ref 0–0.5)
LYMPHOCYTES # BLD AUTO: 1.8 K/UL (ref 1–4.8)
LYMPHOCYTES NFR BLD: 33.3 % (ref 18–48)
MAGNESIUM SERPL-MCNC: 2.1 MG/DL (ref 1.6–2.6)
MCH RBC QN AUTO: 36.4 PG (ref 27–31)
MCHC RBC AUTO-ENTMCNC: 34.8 G/DL (ref 32–36)
MCV RBC AUTO: 104 FL (ref 82–98)
MONOCYTES # BLD AUTO: 0.8 K/UL (ref 0.3–1)
MONOCYTES NFR BLD: 15.1 % (ref 4–15)
NEUTROPHILS # BLD AUTO: 2.4 K/UL (ref 1.8–7.7)
NEUTROPHILS NFR BLD: 45.2 % (ref 38–73)
NRBC BLD-RTO: 0 /100 WBC
PHOSPHATE SERPL-MCNC: 2.8 MG/DL (ref 2.7–4.5)
PLATELET # BLD AUTO: 232 K/UL (ref 150–450)
PMV BLD AUTO: 9.1 FL (ref 9.2–12.9)
POTASSIUM SERPL-SCNC: 4.6 MMOL/L (ref 3.5–5.1)
PROT SERPL-MCNC: 6.1 G/DL (ref 6–8.4)
RBC # BLD AUTO: 3.41 M/UL (ref 4–5.4)
SODIUM SERPL-SCNC: 140 MMOL/L (ref 136–145)
WBC # BLD AUTO: 5.38 K/UL (ref 3.9–12.7)

## 2024-11-12 PROCEDURE — 84100 ASSAY OF PHOSPHORUS: CPT | Mod: PN | Performed by: INTERNAL MEDICINE

## 2024-11-12 PROCEDURE — 36415 COLL VENOUS BLD VENIPUNCTURE: CPT | Mod: PN | Performed by: INTERNAL MEDICINE

## 2024-11-12 PROCEDURE — 80053 COMPREHEN METABOLIC PANEL: CPT | Mod: PN | Performed by: INTERNAL MEDICINE

## 2024-11-12 PROCEDURE — 83735 ASSAY OF MAGNESIUM: CPT | Mod: PN | Performed by: INTERNAL MEDICINE

## 2024-11-12 PROCEDURE — 82784 ASSAY IGA/IGD/IGG/IGM EACH: CPT | Mod: 59 | Performed by: INTERNAL MEDICINE

## 2024-11-12 PROCEDURE — 86361 T CELL ABSOLUTE COUNT: CPT | Performed by: INTERNAL MEDICINE

## 2024-11-12 PROCEDURE — 85025 COMPLETE CBC W/AUTO DIFF WBC: CPT | Mod: PN | Performed by: INTERNAL MEDICINE

## 2024-11-12 NOTE — TELEPHONE ENCOUNTER
Called patient to schedule an appointment with Dr. Desouza or Dr. Benavides.   No answer  Left a voice message to call me back.               ----- Message from Shemar Franco MD sent at 11/11/2024  4:42 PM CST -----  This patient had allogeneic stem cell transplant (dual cord blood transplant for AML) at [facility] over a year ago. She has not received any post-transplant immunizations. Can one of y'all please see her to discuss post-allo immunizations?    Thanks,    Shemar

## 2024-11-13 ENCOUNTER — TELEPHONE (OUTPATIENT)
Dept: INFECTIOUS DISEASES | Facility: CLINIC | Age: 49
End: 2024-11-13
Payer: COMMERCIAL

## 2024-11-13 LAB
CD3+CD4+ CELLS # BLD: 302 CELLS/UL (ref 300–1400)
CD3+CD4+ CELLS NFR BLD: 15.9 % (ref 28–57)
CMV DNA SPEC QL NAA+PROBE: ABNORMAL
CYTOMEGALOVIRUS LOG (IU/ML): 1.5 LOGIU/ML
CYTOMEGALOVIRUS PCR, QUANT: 32 IU/ML

## 2024-11-13 NOTE — TELEPHONE ENCOUNTER
----- Message from Yamilka Benavides DO sent at 11/13/2024 10:31 AM CST -----  Can add on w/ me week of 12/16, AM only  ----- Message -----  From: Shemar Franco MD  Sent: 11/11/2024   4:43 PM CST  To: Lydia Villagomez MA; Sivan Desouza MD; #    This patient had allogeneic stem cell transplant (dual cord blood transplant for AML) at [facility] over a year ago. She has not received any post-transplant immunizations. Can one of y'all please see her to discuss post-allo immunizations?    Thanks,    Shemar

## 2024-11-13 NOTE — TELEPHONE ENCOUNTER
Called patient to schedule appointment.   No answer  Left a voice message requesting a call back.

## 2024-11-14 DIAGNOSIS — D84.821 IMMUNODEFICIENCY DUE TO DRUG THERAPY: ICD-10-CM

## 2024-11-14 DIAGNOSIS — D84.822 IMMUNODEFICIENCY DUE TO EXTERNAL CAUSE: ICD-10-CM

## 2024-11-14 DIAGNOSIS — Z79.899 IMMUNODEFICIENCY DUE TO DRUG THERAPY: ICD-10-CM

## 2024-11-14 RX ORDER — ATOVAQUONE 750 MG/5ML
1500 SUSPENSION ORAL 2 TIMES DAILY
Qty: 600 ML | Refills: 3 | Status: CANCELLED | OUTPATIENT
Start: 2024-11-14 | End: 2024-12-13

## 2024-11-14 NOTE — TELEPHONE ENCOUNTER
Mimi GARCIA informed pt to stop atovaquone due to CD4 count is over 200 per MD. Pt verbalized understanding and agreeable to stopping medication.

## 2024-11-21 NOTE — PROGRESS NOTES
HEMATOLOGIC MALIGNANCIES PROGRESS NOTE    IDENTIFYING STATEMENT   Thai Villalba (Thai) is a 49 y.o. female with a  of 1975 from [place], [place] with the diagnosis of acute myeloid leukemia.      ONCOLOGY HISTORY:    1. 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 2023: Presented to hospital in [place] with 2 week h/o fevers, night sweats, weight loss, lymphadenopathy, myalgias, stomatitis, bruising, sore throat and found to have WBC 333K with 80% blasts c/f AML vs CML in acute blast crisis               B. 2023: Bone marrow biopsy - AML, FLT3 positive.               C. 2023: Began 7+3+midostaurin induction. Subsequent D14 and D28 BMBxs both showed no evidence of persistent leukemia, in CR1               D. 2023: BMBx showed recurrent AML with flow showing 23% myeloblasts.               E. 2023: Began azacitidine-venetoclax-gilteritinib (off protocol)              F. 2023: Bone marrow biopsy showed CR2              G. 2023: Dual cord blood allogeneic stem cell transplant with Flu/Cy/TT/TBI (4 Gy) conditioning. GVHD ppx with cyclosporine/MMF.               H. 10/19/2023: Bone marrow biopsy on day +28 - IRENE by path and hematologics flow.               I. 2023: Day +75 bone marrow biopsy - MRD-neg by flow, NPM1 ddPCR, FLT3 pcr. Full donor chimerisms   J. 3/27/2024: Day +180 bone marrow biopsy - no definitive morphologic or immunophenotypic evidence of residual AML. No pathogenic mutations detected. VUS MPL      2. Attention deficit disorder  3. Uterine leiomyoma    INTERVAL HISTORY:      Ms. Villalba is seen in follow-up of AML with history of dual cord blood allogeneic stem cell transplant. Today is 1 year, 2 months  after alloSCT.     She continues cyclosporine wean. She is feeling better overall. She is seeing integrative oncology on the [facility] as well.     Past Medical History, Past Social History and Past Family  History have been reviewed and are unchanged except as noted in the interval history.    MEDICATIONS:     Current Outpatient Medications on File Prior to Visit   Medication Sig Dispense Refill    acyclovir (ZOVIRAX) 200 MG capsule Take 2 capsules (400 mg total) by mouth 2 (two) times a day for Prophylaxis. 120 capsule 1    cholecalciferol, vitamin D3, 10 mcg (400 unit) Chew Take 1,000 Int'l Units by mouth once daily.      estrogens,esterified,-methyltestosterone 1.25-2.5mg (EEMT) per tablet Take 1 tablet by mouth once daily. 90 tablet 1    furosemide (LASIX) 40 MG tablet Take 1 tablet (40 mg total) by mouth daily as needed (swelling in the legs). 30 tablet 2    gabapentin (NEURONTIN) 300 MG capsule Take 1 capsule by mouth three times a day 90 capsule 3    gabapentin (NEURONTIN) 300 MG capsule Take 1 capsule by mouth three times a day as needed 90 capsule 5    granisetron HCL (KYTRIL) 1 mg Tab Take 1 mg by mouth every 12 (twelve) hours as needed.      magnesium oxide-Mg AA chelate (MG-PLUS-PROTEIN) 133 mg Tab Take 1 tablet by mouth 2 times daily for hypomagnesemia. 60 tablet 1    [START ON 12/11/2024] oxyCODONE (ROXICODONE) 10 mg Tab immediate release tablet 1 tablet by mouth three times a day as needed for pain 90 tablet 0    oxyCODONE (ROXICODONE) 10 mg Tab immediate release tablet 1 tablet by mouth three times a day as needed for pain 90 tablet 0    pantoprazole (PROTONIX) 40 MG tablet Take 40 mg by mouth every morning.      prochlorperazine (COMPAZINE) 10 MG tablet Take 10 mg by mouth every 6 (six) hours as needed.      ruxolitinib (JAKAFI) 10 mg Tab Take 10 mg by mouth 2 (two) times a day.      tacrolimus (PROTOPIC) 0.1 % ointment Apply topically 2 times daily for Atopic Dermatitis, can apply to lips or inside mouth if needed. (Patient taking differently: Apply 1 application  topically 2 (two) times daily.) 100 g 1    triamcinolone acetonide 0.1% (KENALOG) 0.1 % cream Apply topically 2 (two) times daily. To  "areas of rash/dry skin. (Patient taking differently: Apply 1 g topically 2 (two) times daily. To areas of rash/dry skin.) 80 g 2    triamcinolone acetonide 0.1% (KENALOG) 0.1 % cream Apply topically 2 times daily for GVHD of skin. 454 g 0     No current facility-administered medications on file prior to visit.       ALLERGIES:   Review of patient's allergies indicates:   Allergen Reactions    Promethazine Nausea And Vomiting     Other Reaction(s): VOMITING    Penicillin Hives    Penicillins      Other reaction(s): Unknown    Melatonin Anxiety     Other Reaction(s): FLUSHING        ROS:       Review of Systems   Constitutional:  Negative for appetite change, diaphoresis, fatigue, fever and unexpected weight change.   HENT:   Negative for lump/mass and sore throat.    Eyes:  Negative for icterus.   Respiratory:  Negative for cough and shortness of breath.    Cardiovascular:  Negative for chest pain and palpitations.   Gastrointestinal:  Negative for abdominal distention, diarrhea, nausea and vomiting.   Musculoskeletal:  Negative for arthralgias, gait problem and myalgias.   Skin:  Negative for rash.   Neurological:  Negative for dizziness, gait problem and headaches.   Hematological:  Negative for adenopathy. Does not bruise/bleed easily.   Psychiatric/Behavioral:  Negative for confusion. The patient is not nervous/anxious.        PHYSICAL EXAM:  Vitals:    11/11/24 1610   BP: 102/70   Pulse: 67   Resp: 18   SpO2: 98%   Weight: 69.2 kg (152 lb 7.2 oz)   Height: 5' 5" (1.651 m)   PainSc:   3   PainLoc: Back         Physical Exam  Constitutional:       General: She is not in acute distress.     Appearance: She is well-developed.   HENT:      Head: Normocephalic and atraumatic.      Mouth/Throat:      Mouth: No oral lesions.   Eyes:      Conjunctiva/sclera: Conjunctivae normal.   Neck:      Thyroid: No thyromegaly.   Cardiovascular:      Rate and Rhythm: Normal rate and regular rhythm.      Heart sounds: Normal heart " sounds. No murmur heard.  Pulmonary:      Breath sounds: Normal breath sounds. No wheezing or rales.   Abdominal:      General: There is no distension.      Palpations: Abdomen is soft. There is no hepatomegaly, splenomegaly or mass.      Tenderness: There is no abdominal tenderness.   Lymphadenopathy:      Cervical: No cervical adenopathy.      Right cervical: No deep cervical adenopathy.     Left cervical: No deep cervical adenopathy.   Skin:     Findings: No rash.   Neurological:      Mental Status: She is alert and oriented to person, place, and time.      Cranial Nerves: No cranial nerve deficit.      Coordination: Coordination normal.      Deep Tendon Reflexes: Reflexes are normal and symmetric.         LAB:   Results for orders placed or performed in visit on 11/05/24   CBC Auto Differential    Collection Time: 11/05/24  4:30 PM   Result Value Ref Range    WBC 6.05 3.90 - 12.70 K/uL    RBC 3.43 (L) 4.00 - 5.40 M/uL    Hemoglobin 12.4 12.0 - 16.0 g/dL    Hematocrit 35.2 (L) 37.0 - 48.5 %     (H) 82 - 98 fL    MCH 36.2 (H) 27.0 - 31.0 pg    MCHC 35.2 32.0 - 36.0 g/dL    RDW 14.0 11.5 - 14.5 %    Platelets 233 150 - 450 K/uL    MPV 9.5 9.2 - 12.9 fL    Immature Granulocytes 0.7 (H) 0.0 - 0.5 %    Gran # (ANC) 2.9 1.8 - 7.7 K/uL    Immature Grans (Abs) 0.04 0.00 - 0.04 K/uL    Lymph # 2.1 1.0 - 4.8 K/uL    Mono # 0.8 0.3 - 1.0 K/uL    Eos # 0.2 0.0 - 0.5 K/uL    Baso # 0.02 0.00 - 0.20 K/uL    nRBC 0 0 /100 WBC    Gran % 48.3 38.0 - 73.0 %    Lymph % 34.5 18.0 - 48.0 %    Mono % 13.1 4.0 - 15.0 %    Eosinophil % 3.1 0.0 - 8.0 %    Basophil % 0.3 0.0 - 1.9 %    Differential Method Automated    Comprehensive Metabolic Panel    Collection Time: 11/05/24  4:30 PM   Result Value Ref Range    Sodium 139 136 - 145 mmol/L    Potassium 4.1 3.5 - 5.1 mmol/L    Chloride 103 95 - 110 mmol/L    CO2 24 23 - 29 mmol/L    Glucose 84 70 - 110 mg/dL    BUN 15 6 - 20 mg/dL    Creatinine 1.4 0.5 - 1.4 mg/dL    Calcium 9.1 8.7  - 10.5 mg/dL    Total Protein 6.3 6.0 - 8.4 g/dL    Albumin 3.4 (L) 3.5 - 5.2 g/dL    Total Bilirubin 0.6 0.1 - 1.0 mg/dL    Alkaline Phosphatase 165 (H) 40 - 150 U/L    AST 40 10 - 40 U/L    ALT 84 (H) 10 - 44 U/L    eGFR 45.8 (A) >60 mL/min/1.73 m^2    Anion Gap 12 8 - 16 mmol/L   Magnesium    Collection Time: 11/05/24  4:30 PM   Result Value Ref Range    Magnesium 1.8 1.6 - 2.6 mg/dL   PHOSPHORUS    Collection Time: 11/05/24  4:30 PM   Result Value Ref Range    Phosphorus 3.7 2.7 - 4.5 mg/dL   CMV DNA, QUANTITATIVE, PCR    Collection Time: 11/05/24  4:30 PM   Result Value Ref Range    Cytomegalovirus PCR, Quant 51 (A) <50 IU/mL    Cytomegalovirus DNA CMV DNA detected and quantified (A) Not Detected    Cytomegalovirus Log (IU/mL) 1.71 (A) <1.70 LogIU/mL       PROBLEMS ASSESSED THIS VISIT:    1. Acute myeloid leukemia in remission    2. 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 History of allogeneic stem cell transplant        PLAN:       Acute myeloid leukemia  Diagnosed with FLT3 mutated AML. She achieved CR1 with 7+3+midostaurin induction but relapsed prior to allogeneic stem cell transplant. She achieved CR2 with aza/ameya/gilteritinib. Ms. Villalba received dual cord blood allogeneic stem cell transplant and has maintained CR on follow-up bone marrow biopsy.   - Continue maintenance gilteritinib; plan for 2 years total of maintenance  - Day +180 bone marrow biopsy reviewed, no evidence of residual AML  - 12 month bone marrow at [facility] shows no morphologic evidence of AML; MRD-negative by flow; chimerism is 100% from donor unit number 2; cytogenetics 46,XX[20];      Status post allogeneic stem cell transplantation  - Currently 1 year, 2 months post Flu/Cy/TT/TBI (4Gy) dual cord blood allogeneic stem cell transplant  - Complete remission and full donor chimerism (100% donor 2) assessed on day +75 bone marrow biopsy  - see AML for 1-year marrow results      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 Diff - treated with fidaxomicin              - 10/7/2023: Suspected fungal infection based on abnormal CT, treated with isavuconazonium sulfate              - 11/6/2023: UTI - treated with levofloxacin              - 11/8/2023: BK virus              - 11/13/2023: Pneumonia diagnosed on CXR - treated with cefpodoxime     Cytopenias  - None clinically significant  - Continue to monitor with routine labs monitoring     Chronic pain  - Neuropathic pain secondary to lumbar radiculopathy               - Gabapentin 300 mg PO TID              - Oxycodone 10-15 mg q4prn    Anxiety  Referred to hem/onc psych     Hormone replacement therapy  - on oral estrogen     Follow-up  - Labs every 2 weeks  - return to clinic in 2 months    Shemar Franco MD  Hematology and Stem Cell Transplant    Visit today included increased complexity associated with the care of the episodic problem AML and history of allogeneic stem cell transplantation addressed and managing the longitudinal care of the patient due to the serious and/or complex managed problem(s) AML and history of allogeneic stem cell transplantation.

## 2024-11-25 ENCOUNTER — PATIENT MESSAGE (OUTPATIENT)
Dept: HEMATOLOGY/ONCOLOGY | Facility: CLINIC | Age: 49
End: 2024-11-25
Payer: COMMERCIAL

## 2024-11-25 ENCOUNTER — LAB VISIT (OUTPATIENT)
Dept: LAB | Facility: HOSPITAL | Age: 49
End: 2024-11-25
Attending: INTERNAL MEDICINE
Payer: COMMERCIAL

## 2024-11-25 DIAGNOSIS — C92.01 ACUTE MYELOID LEUKEMIA IN REMISSION: ICD-10-CM

## 2024-11-25 LAB
ALBUMIN SERPL BCP-MCNC: 3.6 G/DL (ref 3.5–5.2)
ALP SERPL-CCNC: 161 U/L (ref 40–150)
ALT SERPL W/O P-5'-P-CCNC: 91 U/L (ref 10–44)
ANION GAP SERPL CALC-SCNC: 8 MMOL/L (ref 8–16)
AST SERPL-CCNC: 59 U/L (ref 10–40)
BASOPHILS # BLD AUTO: 0.02 K/UL (ref 0–0.2)
BASOPHILS NFR BLD: 0.3 % (ref 0–1.9)
BILIRUB SERPL-MCNC: 0.5 MG/DL (ref 0.1–1)
BUN SERPL-MCNC: 12 MG/DL (ref 6–20)
CALCIUM SERPL-MCNC: 8.9 MG/DL (ref 8.7–10.5)
CHLORIDE SERPL-SCNC: 107 MMOL/L (ref 95–110)
CO2 SERPL-SCNC: 25 MMOL/L (ref 23–29)
CREAT SERPL-MCNC: 1.5 MG/DL (ref 0.5–1.4)
DIFFERENTIAL METHOD BLD: ABNORMAL
EOSINOPHIL # BLD AUTO: 0.2 K/UL (ref 0–0.5)
EOSINOPHIL NFR BLD: 3 % (ref 0–8)
ERYTHROCYTE [DISTWIDTH] IN BLOOD BY AUTOMATED COUNT: 12.5 % (ref 11.5–14.5)
EST. GFR  (NO RACE VARIABLE): 42.2 ML/MIN/1.73 M^2
GLUCOSE SERPL-MCNC: 96 MG/DL (ref 70–110)
HCT VFR BLD AUTO: 36.9 % (ref 37–48.5)
HGB BLD-MCNC: 12.6 G/DL (ref 12–16)
IMM GRANULOCYTES # BLD AUTO: 0.06 K/UL (ref 0–0.04)
IMM GRANULOCYTES NFR BLD AUTO: 0.9 % (ref 0–0.5)
LYMPHOCYTES # BLD AUTO: 2.1 K/UL (ref 1–4.8)
LYMPHOCYTES NFR BLD: 31.7 % (ref 18–48)
MAGNESIUM SERPL-MCNC: 2.1 MG/DL (ref 1.6–2.6)
MCH RBC QN AUTO: 35.4 PG (ref 27–31)
MCHC RBC AUTO-ENTMCNC: 34.1 G/DL (ref 32–36)
MCV RBC AUTO: 104 FL (ref 82–98)
MONOCYTES # BLD AUTO: 0.8 K/UL (ref 0.3–1)
MONOCYTES NFR BLD: 12.3 % (ref 4–15)
NEUTROPHILS # BLD AUTO: 3.5 K/UL (ref 1.8–7.7)
NEUTROPHILS NFR BLD: 51.8 % (ref 38–73)
NRBC BLD-RTO: 0 /100 WBC
PHOSPHATE SERPL-MCNC: 4.1 MG/DL (ref 2.7–4.5)
PLATELET # BLD AUTO: 254 K/UL (ref 150–450)
PMV BLD AUTO: 8.9 FL (ref 9.2–12.9)
POTASSIUM SERPL-SCNC: 4.1 MMOL/L (ref 3.5–5.1)
PROT SERPL-MCNC: 6.1 G/DL (ref 6–8.4)
RBC # BLD AUTO: 3.56 M/UL (ref 4–5.4)
SODIUM SERPL-SCNC: 140 MMOL/L (ref 136–145)
WBC # BLD AUTO: 6.76 K/UL (ref 3.9–12.7)

## 2024-11-25 PROCEDURE — 83735 ASSAY OF MAGNESIUM: CPT | Mod: PN | Performed by: INTERNAL MEDICINE

## 2024-11-25 PROCEDURE — 36415 COLL VENOUS BLD VENIPUNCTURE: CPT | Mod: PN | Performed by: INTERNAL MEDICINE

## 2024-11-25 PROCEDURE — 84100 ASSAY OF PHOSPHORUS: CPT | Mod: PN | Performed by: INTERNAL MEDICINE

## 2024-11-25 PROCEDURE — 80053 COMPREHEN METABOLIC PANEL: CPT | Mod: PN | Performed by: INTERNAL MEDICINE

## 2024-11-25 PROCEDURE — 85025 COMPLETE CBC W/AUTO DIFF WBC: CPT | Mod: PN | Performed by: INTERNAL MEDICINE

## 2024-11-26 LAB
CMV DNA SPEC QL NAA+PROBE: NORMAL
CYTOMEGALOVIRUS PCR, QUANT: NOT DETECTED IU/ML

## 2024-12-02 ENCOUNTER — PATIENT MESSAGE (OUTPATIENT)
Dept: HEMATOLOGY/ONCOLOGY | Facility: CLINIC | Age: 49
End: 2024-12-02
Payer: COMMERCIAL

## 2024-12-09 ENCOUNTER — TELEPHONE (OUTPATIENT)
Dept: HEMATOLOGY/ONCOLOGY | Facility: CLINIC | Age: 49
End: 2024-12-09
Payer: COMMERCIAL

## 2024-12-09 NOTE — TELEPHONE ENCOUNTER
Returned pts call back message and r/s her lab appointment today to this upcoming Thursday for 10:15 am per pts request.

## 2024-12-09 NOTE — TELEPHONE ENCOUNTER
----- Message from Dawson sent at 12/9/2024  7:52 AM CST -----  Regarding: Reschedule Appt  Type:  Reschedule Appointment Request    Caller is requesting to reschedule appointment.      Name of Caller: Quinn    Date of previous appointment?12/9    Symptoms:labs/ not feeling well    Would the patient rather a call back or a response via [facility]? Call back    Best Call Back Number:[phone]    Additional Information: Sts pt isn't feeling well needs to reschedule to another day this week.  [employer] wouldn't give any other available days.  Please advise -- Thank you

## 2024-12-11 ENCOUNTER — PATIENT MESSAGE (OUTPATIENT)
Dept: HEMATOLOGY/ONCOLOGY | Facility: CLINIC | Age: 49
End: 2024-12-11
Payer: COMMERCIAL

## 2024-12-11 ENCOUNTER — LAB VISIT (OUTPATIENT)
Dept: LAB | Facility: HOSPITAL | Age: 49
End: 2024-12-11
Attending: INTERNAL MEDICINE
Payer: COMMERCIAL

## 2024-12-11 DIAGNOSIS — C92.01 ACUTE MYELOID LEUKEMIA IN REMISSION: ICD-10-CM

## 2024-12-11 DIAGNOSIS — Z79.899 IMMUNODEFICIENCY DUE TO DRUG THERAPY: Primary | ICD-10-CM

## 2024-12-11 DIAGNOSIS — D84.821 IMMUNODEFICIENCY DUE TO DRUG THERAPY: Primary | ICD-10-CM

## 2024-12-11 LAB
ALBUMIN SERPL BCP-MCNC: 3.5 G/DL (ref 3.5–5.2)
ALP SERPL-CCNC: 155 U/L (ref 40–150)
ALT SERPL W/O P-5'-P-CCNC: 154 U/L (ref 10–44)
ANION GAP SERPL CALC-SCNC: 9 MMOL/L (ref 8–16)
AST SERPL-CCNC: 70 U/L (ref 10–40)
BASOPHILS # BLD AUTO: 0.03 K/UL (ref 0–0.2)
BASOPHILS NFR BLD: 0.4 % (ref 0–1.9)
BILIRUB SERPL-MCNC: 0.4 MG/DL (ref 0.1–1)
BUN SERPL-MCNC: 17 MG/DL (ref 6–20)
CALCIUM SERPL-MCNC: 9 MG/DL (ref 8.7–10.5)
CHLORIDE SERPL-SCNC: 106 MMOL/L (ref 95–110)
CO2 SERPL-SCNC: 24 MMOL/L (ref 23–29)
CREAT SERPL-MCNC: 1.2 MG/DL (ref 0.5–1.4)
DIFFERENTIAL METHOD BLD: ABNORMAL
EOSINOPHIL # BLD AUTO: 0.2 K/UL (ref 0–0.5)
EOSINOPHIL NFR BLD: 2.5 % (ref 0–8)
ERYTHROCYTE [DISTWIDTH] IN BLOOD BY AUTOMATED COUNT: 12.5 % (ref 11.5–14.5)
EST. GFR  (NO RACE VARIABLE): 55.1 ML/MIN/1.73 M^2
GLUCOSE SERPL-MCNC: 82 MG/DL (ref 70–110)
HCT VFR BLD AUTO: 38.2 % (ref 37–48.5)
HGB BLD-MCNC: 13 G/DL (ref 12–16)
IMM GRANULOCYTES # BLD AUTO: 0.17 K/UL (ref 0–0.04)
IMM GRANULOCYTES NFR BLD AUTO: 2.4 % (ref 0–0.5)
LYMPHOCYTES # BLD AUTO: 1.9 K/UL (ref 1–4.8)
LYMPHOCYTES NFR BLD: 25.7 % (ref 18–48)
MAGNESIUM SERPL-MCNC: 2 MG/DL (ref 1.6–2.6)
MCH RBC QN AUTO: 35.2 PG (ref 27–31)
MCHC RBC AUTO-ENTMCNC: 34 G/DL (ref 32–36)
MCV RBC AUTO: 104 FL (ref 82–98)
MONOCYTES # BLD AUTO: 1 K/UL (ref 0.3–1)
MONOCYTES NFR BLD: 13.6 % (ref 4–15)
NEUTROPHILS # BLD AUTO: 4 K/UL (ref 1.8–7.7)
NEUTROPHILS NFR BLD: 55.4 % (ref 38–73)
NRBC BLD-RTO: 0 /100 WBC
PHOSPHATE SERPL-MCNC: 3.2 MG/DL (ref 2.7–4.5)
PLATELET # BLD AUTO: 323 K/UL (ref 150–450)
PMV BLD AUTO: 9 FL (ref 9.2–12.9)
POTASSIUM SERPL-SCNC: 4.4 MMOL/L (ref 3.5–5.1)
PROT SERPL-MCNC: 6.3 G/DL (ref 6–8.4)
RBC # BLD AUTO: 3.69 M/UL (ref 4–5.4)
SODIUM SERPL-SCNC: 139 MMOL/L (ref 136–145)
WBC # BLD AUTO: 7.19 K/UL (ref 3.9–12.7)

## 2024-12-11 PROCEDURE — 36415 COLL VENOUS BLD VENIPUNCTURE: CPT | Mod: PN | Performed by: INTERNAL MEDICINE

## 2024-12-11 PROCEDURE — 84100 ASSAY OF PHOSPHORUS: CPT | Mod: PN | Performed by: INTERNAL MEDICINE

## 2024-12-11 PROCEDURE — 83735 ASSAY OF MAGNESIUM: CPT | Mod: PN | Performed by: INTERNAL MEDICINE

## 2024-12-11 PROCEDURE — 80053 COMPREHEN METABOLIC PANEL: CPT | Mod: PN | Performed by: INTERNAL MEDICINE

## 2024-12-11 PROCEDURE — 85025 COMPLETE CBC W/AUTO DIFF WBC: CPT | Mod: PN | Performed by: INTERNAL MEDICINE

## 2024-12-11 RX ORDER — ACYCLOVIR 200 MG/1
400 CAPSULE ORAL 2 TIMES DAILY
Qty: 120 CAPSULE | Refills: 1 | Status: SHIPPED | OUTPATIENT
Start: 2024-12-11

## 2024-12-12 LAB
CMV DNA SPEC QL NAA+PROBE: NORMAL
CYTOMEGALOVIRUS PCR, QUANT: NOT DETECTED IU/ML

## 2024-12-23 ENCOUNTER — LAB VISIT (OUTPATIENT)
Dept: LAB | Facility: HOSPITAL | Age: 49
End: 2024-12-23
Attending: INTERNAL MEDICINE
Payer: COMMERCIAL

## 2024-12-23 DIAGNOSIS — C92.01 ACUTE MYELOID LEUKEMIA IN REMISSION: ICD-10-CM

## 2024-12-23 LAB
ALBUMIN SERPL BCP-MCNC: 3.4 G/DL (ref 3.5–5.2)
ALP SERPL-CCNC: 134 U/L (ref 40–150)
ALT SERPL W/O P-5'-P-CCNC: 76 U/L (ref 10–44)
ANION GAP SERPL CALC-SCNC: 10 MMOL/L (ref 8–16)
AST SERPL-CCNC: 39 U/L (ref 10–40)
BASOPHILS # BLD AUTO: 0.01 K/UL (ref 0–0.2)
BASOPHILS NFR BLD: 0.2 % (ref 0–1.9)
BILIRUB SERPL-MCNC: 0.3 MG/DL (ref 0.1–1)
BUN SERPL-MCNC: 12 MG/DL (ref 6–20)
CALCIUM SERPL-MCNC: 8.9 MG/DL (ref 8.7–10.5)
CHLORIDE SERPL-SCNC: 106 MMOL/L (ref 95–110)
CO2 SERPL-SCNC: 25 MMOL/L (ref 23–29)
CREAT SERPL-MCNC: 1.2 MG/DL (ref 0.5–1.4)
DIFFERENTIAL METHOD BLD: ABNORMAL
EOSINOPHIL # BLD AUTO: 0.2 K/UL (ref 0–0.5)
EOSINOPHIL NFR BLD: 4.9 % (ref 0–8)
ERYTHROCYTE [DISTWIDTH] IN BLOOD BY AUTOMATED COUNT: 12.3 % (ref 11.5–14.5)
EST. GFR  (NO RACE VARIABLE): 55.1 ML/MIN/1.73 M^2
GLUCOSE SERPL-MCNC: 97 MG/DL (ref 70–110)
HCT VFR BLD AUTO: 33.5 % (ref 37–48.5)
HGB BLD-MCNC: 11.6 G/DL (ref 12–16)
IMM GRANULOCYTES # BLD AUTO: 0.04 K/UL (ref 0–0.04)
IMM GRANULOCYTES NFR BLD AUTO: 0.8 % (ref 0–0.5)
LYMPHOCYTES # BLD AUTO: 2.2 K/UL (ref 1–4.8)
LYMPHOCYTES NFR BLD: 45 % (ref 18–48)
MAGNESIUM SERPL-MCNC: 2 MG/DL (ref 1.6–2.6)
MCH RBC QN AUTO: 35.2 PG (ref 27–31)
MCHC RBC AUTO-ENTMCNC: 34.6 G/DL (ref 32–36)
MCV RBC AUTO: 102 FL (ref 82–98)
MONOCYTES # BLD AUTO: 0.7 K/UL (ref 0.3–1)
MONOCYTES NFR BLD: 14.7 % (ref 4–15)
NEUTROPHILS # BLD AUTO: 1.7 K/UL (ref 1.8–7.7)
NEUTROPHILS NFR BLD: 34.4 % (ref 38–73)
NRBC BLD-RTO: 0 /100 WBC
PHOSPHATE SERPL-MCNC: 3.5 MG/DL (ref 2.7–4.5)
PLATELET # BLD AUTO: 268 K/UL (ref 150–450)
PMV BLD AUTO: 9.4 FL (ref 9.2–12.9)
POTASSIUM SERPL-SCNC: 4.5 MMOL/L (ref 3.5–5.1)
PROT SERPL-MCNC: 5.9 G/DL (ref 6–8.4)
RBC # BLD AUTO: 3.3 M/UL (ref 4–5.4)
SODIUM SERPL-SCNC: 141 MMOL/L (ref 136–145)
WBC # BLD AUTO: 4.89 K/UL (ref 3.9–12.7)

## 2024-12-23 PROCEDURE — 80053 COMPREHEN METABOLIC PANEL: CPT | Mod: PN | Performed by: INTERNAL MEDICINE

## 2024-12-23 PROCEDURE — 83735 ASSAY OF MAGNESIUM: CPT | Mod: PN | Performed by: INTERNAL MEDICINE

## 2024-12-23 PROCEDURE — 84100 ASSAY OF PHOSPHORUS: CPT | Mod: PN | Performed by: INTERNAL MEDICINE

## 2024-12-23 PROCEDURE — 85025 COMPLETE CBC W/AUTO DIFF WBC: CPT | Mod: PN | Performed by: INTERNAL MEDICINE

## 2024-12-24 LAB
CMV DNA SPEC QL NAA+PROBE: NORMAL
CYTOMEGALOVIRUS PCR, QUANT: NOT DETECTED IU/ML

## 2024-12-27 ENCOUNTER — TELEPHONE (OUTPATIENT)
Dept: HEMATOLOGY/ONCOLOGY | Facility: CLINIC | Age: 49
End: 2024-12-27
Payer: COMMERCIAL

## 2024-12-27 NOTE — TELEPHONE ENCOUNTER
Spoke to pts  to remind of appt on 12/30/241 with Keyla Andrade NP.  verbalized understanding and confirmed appt Location was discussed.

## 2025-01-06 ENCOUNTER — LAB VISIT (OUTPATIENT)
Dept: LAB | Facility: HOSPITAL | Age: 50
End: 2025-01-06
Attending: INTERNAL MEDICINE
Payer: COMMERCIAL

## 2025-01-06 DIAGNOSIS — C92.01 ACUTE MYELOID LEUKEMIA IN REMISSION: ICD-10-CM

## 2025-01-06 LAB
ALBUMIN SERPL BCP-MCNC: 3.5 G/DL (ref 3.5–5.2)
ALP SERPL-CCNC: 110 U/L (ref 40–150)
ALT SERPL W/O P-5'-P-CCNC: 57 U/L (ref 10–44)
ANION GAP SERPL CALC-SCNC: 9 MMOL/L (ref 8–16)
AST SERPL-CCNC: 38 U/L (ref 10–40)
BASOPHILS # BLD AUTO: 0.01 K/UL (ref 0–0.2)
BASOPHILS NFR BLD: 0.2 % (ref 0–1.9)
BILIRUB SERPL-MCNC: 0.3 MG/DL (ref 0.1–1)
BUN SERPL-MCNC: 18 MG/DL (ref 6–20)
CALCIUM SERPL-MCNC: 8.5 MG/DL (ref 8.7–10.5)
CHLORIDE SERPL-SCNC: 104 MMOL/L (ref 95–110)
CO2 SERPL-SCNC: 24 MMOL/L (ref 23–29)
CREAT SERPL-MCNC: 1.2 MG/DL (ref 0.5–1.4)
DIFFERENTIAL METHOD BLD: ABNORMAL
EOSINOPHIL # BLD AUTO: 0.1 K/UL (ref 0–0.5)
EOSINOPHIL NFR BLD: 2.3 % (ref 0–8)
ERYTHROCYTE [DISTWIDTH] IN BLOOD BY AUTOMATED COUNT: 12.4 % (ref 11.5–14.5)
EST. GFR  (NO RACE VARIABLE): 55.1 ML/MIN/1.73 M^2
GLUCOSE SERPL-MCNC: 100 MG/DL (ref 70–110)
HCT VFR BLD AUTO: 33.8 % (ref 37–48.5)
HGB BLD-MCNC: 11.7 G/DL (ref 12–16)
IMM GRANULOCYTES # BLD AUTO: 0.04 K/UL (ref 0–0.04)
IMM GRANULOCYTES NFR BLD AUTO: 0.8 % (ref 0–0.5)
LYMPHOCYTES # BLD AUTO: 2.1 K/UL (ref 1–4.8)
LYMPHOCYTES NFR BLD: 39.8 % (ref 18–48)
MAGNESIUM SERPL-MCNC: 2 MG/DL (ref 1.6–2.6)
MCH RBC QN AUTO: 34.6 PG (ref 27–31)
MCHC RBC AUTO-ENTMCNC: 34.6 G/DL (ref 32–36)
MCV RBC AUTO: 100 FL (ref 82–98)
MONOCYTES # BLD AUTO: 0.8 K/UL (ref 0.3–1)
MONOCYTES NFR BLD: 14.6 % (ref 4–15)
NEUTROPHILS # BLD AUTO: 2.2 K/UL (ref 1.8–7.7)
NEUTROPHILS NFR BLD: 42.3 % (ref 38–73)
NRBC BLD-RTO: 0 /100 WBC
PHOSPHATE SERPL-MCNC: 3.3 MG/DL (ref 2.7–4.5)
PLATELET # BLD AUTO: 274 K/UL (ref 150–450)
PMV BLD AUTO: 9.3 FL (ref 9.2–12.9)
POTASSIUM SERPL-SCNC: 4 MMOL/L (ref 3.5–5.1)
PROT SERPL-MCNC: 5.8 G/DL (ref 6–8.4)
RBC # BLD AUTO: 3.38 M/UL (ref 4–5.4)
SODIUM SERPL-SCNC: 137 MMOL/L (ref 136–145)
WBC # BLD AUTO: 5.15 K/UL (ref 3.9–12.7)

## 2025-01-06 PROCEDURE — 83735 ASSAY OF MAGNESIUM: CPT | Mod: PN | Performed by: INTERNAL MEDICINE

## 2025-01-06 PROCEDURE — 80053 COMPREHEN METABOLIC PANEL: CPT | Mod: PN | Performed by: INTERNAL MEDICINE

## 2025-01-06 PROCEDURE — 84100 ASSAY OF PHOSPHORUS: CPT | Mod: PN | Performed by: INTERNAL MEDICINE

## 2025-01-06 PROCEDURE — 85025 COMPLETE CBC W/AUTO DIFF WBC: CPT | Mod: PN | Performed by: INTERNAL MEDICINE

## 2025-01-07 LAB
CMV DNA SPEC QL NAA+PROBE: NORMAL
CYTOMEGALOVIRUS PCR, QUANT: NOT DETECTED IU/ML

## 2025-01-14 ENCOUNTER — TELEPHONE (OUTPATIENT)
Dept: HEMATOLOGY/ONCOLOGY | Facility: CLINIC | Age: 50
End: 2025-01-14
Payer: COMMERCIAL

## 2025-01-14 NOTE — TELEPHONE ENCOUNTER
----- Message from Liam sent at 1/14/2025 11:42 AM CST -----  Regarding: Reschedule Existing Appointment  Contact: [phone]  Reschedule Existing Appointment     Current appt date: 1/17/2025     Type of appt :  f/u in 2 months     Physician: Dr. Franco     Reason for rescheduling: Can't make appt     Caller: Quinn Franco     Contact Preference: [phone]

## 2025-01-27 ENCOUNTER — PATIENT MESSAGE (OUTPATIENT)
Dept: HEMATOLOGY/ONCOLOGY | Facility: CLINIC | Age: 50
End: 2025-01-27
Payer: COMMERCIAL

## 2025-01-28 ENCOUNTER — LAB VISIT (OUTPATIENT)
Dept: LAB | Facility: HOSPITAL | Age: 50
End: 2025-01-28
Attending: INTERNAL MEDICINE
Payer: COMMERCIAL

## 2025-01-28 DIAGNOSIS — R30.0 DYSURIA: ICD-10-CM

## 2025-01-28 DIAGNOSIS — Z94.84 HISTORY OF ALLOGENEIC STEM CELL TRANSPLANT: ICD-10-CM

## 2025-01-28 LAB
ALBUMIN SERPL BCP-MCNC: 3.7 G/DL (ref 3.5–5.2)
ALP SERPL-CCNC: 124 U/L (ref 40–150)
ALT SERPL W/O P-5'-P-CCNC: 50 U/L (ref 10–44)
ANION GAP SERPL CALC-SCNC: 9 MMOL/L (ref 8–16)
AST SERPL-CCNC: 43 U/L (ref 10–40)
BACTERIA #/AREA URNS HPF: ABNORMAL /HPF
BASOPHILS # BLD AUTO: 0.02 K/UL (ref 0–0.2)
BASOPHILS NFR BLD: 0.3 % (ref 0–1.9)
BILIRUB SERPL-MCNC: 0.4 MG/DL (ref 0.1–1)
BILIRUB UR QL STRIP: NEGATIVE
BUN SERPL-MCNC: 11 MG/DL (ref 6–20)
CALCIUM SERPL-MCNC: 9 MG/DL (ref 8.7–10.5)
CHLORIDE SERPL-SCNC: 107 MMOL/L (ref 95–110)
CLARITY UR: CLEAR
CO2 SERPL-SCNC: 26 MMOL/L (ref 23–29)
COLOR UR: YELLOW
CREAT SERPL-MCNC: 1.2 MG/DL (ref 0.5–1.4)
DIFFERENTIAL METHOD BLD: ABNORMAL
EOSINOPHIL # BLD AUTO: 0.1 K/UL (ref 0–0.5)
EOSINOPHIL NFR BLD: 2.2 % (ref 0–8)
ERYTHROCYTE [DISTWIDTH] IN BLOOD BY AUTOMATED COUNT: 13.2 % (ref 11.5–14.5)
EST. GFR  (NO RACE VARIABLE): 55.1 ML/MIN/1.73 M^2
GLUCOSE SERPL-MCNC: 88 MG/DL (ref 70–110)
GLUCOSE UR QL STRIP: NEGATIVE
HCT VFR BLD AUTO: 37.7 % (ref 37–48.5)
HGB BLD-MCNC: 12.6 G/DL (ref 12–16)
HGB UR QL STRIP: NEGATIVE
IMM GRANULOCYTES # BLD AUTO: 0.02 K/UL (ref 0–0.04)
IMM GRANULOCYTES NFR BLD AUTO: 0.3 % (ref 0–0.5)
KETONES UR QL STRIP: NEGATIVE
LEUKOCYTE ESTERASE UR QL STRIP: ABNORMAL
LYMPHOCYTES # BLD AUTO: 2.3 K/UL (ref 1–4.8)
LYMPHOCYTES NFR BLD: 38.9 % (ref 18–48)
MAGNESIUM SERPL-MCNC: 2.1 MG/DL (ref 1.6–2.6)
MCH RBC QN AUTO: 33.5 PG (ref 27–31)
MCHC RBC AUTO-ENTMCNC: 33.4 G/DL (ref 32–36)
MCV RBC AUTO: 100 FL (ref 82–98)
MICROSCOPIC COMMENT: ABNORMAL
MONOCYTES # BLD AUTO: 0.8 K/UL (ref 0.3–1)
MONOCYTES NFR BLD: 13.9 % (ref 4–15)
NEUTROPHILS # BLD AUTO: 2.6 K/UL (ref 1.8–7.7)
NEUTROPHILS NFR BLD: 44.4 % (ref 38–73)
NITRITE UR QL STRIP: NEGATIVE
NRBC BLD-RTO: 0 /100 WBC
PH UR STRIP: 8 [PH] (ref 5–8)
PHOSPHATE SERPL-MCNC: 3.1 MG/DL (ref 2.7–4.5)
PLATELET # BLD AUTO: 277 K/UL (ref 150–450)
PMV BLD AUTO: 8.9 FL (ref 9.2–12.9)
POTASSIUM SERPL-SCNC: 4.7 MMOL/L (ref 3.5–5.1)
PROT SERPL-MCNC: 6.4 G/DL (ref 6–8.4)
PROT UR QL STRIP: NEGATIVE
RBC # BLD AUTO: 3.76 M/UL (ref 4–5.4)
SODIUM SERPL-SCNC: 142 MMOL/L (ref 136–145)
SP GR UR STRIP: 1.01 (ref 1–1.03)
SQUAMOUS #/AREA URNS HPF: 6 /HPF
URN SPEC COLLECT METH UR: ABNORMAL
WBC # BLD AUTO: 5.81 K/UL (ref 3.9–12.7)
WBC #/AREA URNS HPF: 13 /HPF (ref 0–5)

## 2025-01-28 PROCEDURE — 80053 COMPREHEN METABOLIC PANEL: CPT | Mod: PN | Performed by: INTERNAL MEDICINE

## 2025-01-28 PROCEDURE — 87186 SC STD MICRODIL/AGAR DIL: CPT | Performed by: INTERNAL MEDICINE

## 2025-01-28 PROCEDURE — 84100 ASSAY OF PHOSPHORUS: CPT | Mod: PN | Performed by: INTERNAL MEDICINE

## 2025-01-28 PROCEDURE — 83735 ASSAY OF MAGNESIUM: CPT | Mod: PN | Performed by: INTERNAL MEDICINE

## 2025-01-28 PROCEDURE — 36415 COLL VENOUS BLD VENIPUNCTURE: CPT | Mod: PN | Performed by: INTERNAL MEDICINE

## 2025-01-28 PROCEDURE — 81000 URINALYSIS NONAUTO W/SCOPE: CPT | Mod: PN | Performed by: INTERNAL MEDICINE

## 2025-01-28 PROCEDURE — 87086 URINE CULTURE/COLONY COUNT: CPT | Performed by: INTERNAL MEDICINE

## 2025-01-28 PROCEDURE — 85025 COMPLETE CBC W/AUTO DIFF WBC: CPT | Mod: PN | Performed by: INTERNAL MEDICINE

## 2025-01-28 PROCEDURE — 87088 URINE BACTERIA CULTURE: CPT | Performed by: INTERNAL MEDICINE

## 2025-01-29 DIAGNOSIS — N30.00 ACUTE CYSTITIS WITHOUT HEMATURIA: Primary | ICD-10-CM

## 2025-01-29 RX ORDER — NITROFURANTOIN 25; 75 MG/1; MG/1
100 CAPSULE ORAL 2 TIMES DAILY
Qty: 10 CAPSULE | Refills: 0 | Status: SHIPPED | OUTPATIENT
Start: 2025-01-29 | End: 2025-02-04

## 2025-01-30 LAB
CMV DNA SPEC QL NAA+PROBE: NORMAL
CYTOMEGALOVIRUS PCR, QUANT: NOT DETECTED IU/ML

## 2025-01-31 ENCOUNTER — OFFICE VISIT (OUTPATIENT)
Dept: HEMATOLOGY/ONCOLOGY | Facility: CLINIC | Age: 50
End: 2025-01-31
Payer: COMMERCIAL

## 2025-01-31 VITALS
RESPIRATION RATE: 16 BRPM | WEIGHT: 171.88 LBS | SYSTOLIC BLOOD PRESSURE: 97 MMHG | OXYGEN SATURATION: 97 % | DIASTOLIC BLOOD PRESSURE: 61 MMHG | HEART RATE: 65 BPM | BODY MASS INDEX: 28.64 KG/M2 | TEMPERATURE: 98 F | HEIGHT: 65 IN

## 2025-01-31 DIAGNOSIS — C92.01 ACUTE MYELOID LEUKEMIA IN REMISSION: ICD-10-CM

## 2025-01-31 DIAGNOSIS — Z94.84 HISTORY OF ALLOGENEIC STEM CELL TRANSPLANT: Primary | ICD-10-CM

## 2025-01-31 DIAGNOSIS — D89.813 GVHD (GRAFT VERSUS HOST DISEASE): ICD-10-CM

## 2025-01-31 LAB — BACTERIA UR CULT: ABNORMAL

## 2025-01-31 PROCEDURE — G2211 COMPLEX E/M VISIT ADD ON: HCPCS | Mod: S$GLB,,, | Performed by: INTERNAL MEDICINE

## 2025-01-31 PROCEDURE — 3074F SYST BP LT 130 MM HG: CPT | Mod: CPTII,S$GLB,, | Performed by: INTERNAL MEDICINE

## 2025-01-31 PROCEDURE — 99999 PR PBB SHADOW E&M-EST. PATIENT-LVL V: CPT | Mod: PBBFAC,,, | Performed by: INTERNAL MEDICINE

## 2025-01-31 PROCEDURE — 3078F DIAST BP <80 MM HG: CPT | Mod: CPTII,S$GLB,, | Performed by: INTERNAL MEDICINE

## 2025-01-31 PROCEDURE — 99215 OFFICE O/P EST HI 40 MIN: CPT | Mod: S$GLB,,, | Performed by: INTERNAL MEDICINE

## 2025-01-31 PROCEDURE — 1159F MED LIST DOCD IN RCRD: CPT | Mod: CPTII,S$GLB,, | Performed by: INTERNAL MEDICINE

## 2025-01-31 PROCEDURE — 1160F RVW MEDS BY RX/DR IN RCRD: CPT | Mod: CPTII,S$GLB,, | Performed by: INTERNAL MEDICINE

## 2025-01-31 PROCEDURE — 3008F BODY MASS INDEX DOCD: CPT | Mod: CPTII,S$GLB,, | Performed by: INTERNAL MEDICINE

## 2025-01-31 NOTE — PROGRESS NOTES
Route Chart for Scheduling    BMT Chart Routing      Follow up with physician 2 months.   Follow up with KENDALL    Provider visit type Malignant hem   Infusion scheduling note    Injection scheduling note    Labs CBC, CMP, magnesium, phosphorus and CMV   Scheduling:  Preferred lab:  Lab interval: every 2 weeks  on [facility]   Imaging    Pharmacy appointment    Other referrals

## 2025-02-11 ENCOUNTER — LAB VISIT (OUTPATIENT)
Dept: LAB | Facility: HOSPITAL | Age: 50
End: 2025-02-11
Attending: INTERNAL MEDICINE
Payer: COMMERCIAL

## 2025-02-11 DIAGNOSIS — Z94.84 HISTORY OF ALLOGENEIC STEM CELL TRANSPLANT: ICD-10-CM

## 2025-02-11 LAB
ALBUMIN SERPL BCP-MCNC: 4 G/DL (ref 3.5–5.2)
ALP SERPL-CCNC: 102 U/L (ref 40–150)
ALT SERPL W/O P-5'-P-CCNC: 36 U/L (ref 10–44)
ANION GAP SERPL CALC-SCNC: 9 MMOL/L (ref 8–16)
AST SERPL-CCNC: 25 U/L (ref 10–40)
BASOPHILS # BLD AUTO: 0.02 K/UL (ref 0–0.2)
BASOPHILS NFR BLD: 0.4 % (ref 0–1.9)
BILIRUB SERPL-MCNC: 0.6 MG/DL (ref 0.1–1)
BUN SERPL-MCNC: 11 MG/DL (ref 6–20)
CALCIUM SERPL-MCNC: 8.9 MG/DL (ref 8.7–10.5)
CHLORIDE SERPL-SCNC: 105 MMOL/L (ref 95–110)
CO2 SERPL-SCNC: 25 MMOL/L (ref 23–29)
CREAT SERPL-MCNC: 1.3 MG/DL (ref 0.5–1.4)
DIFFERENTIAL METHOD BLD: ABNORMAL
EOSINOPHIL # BLD AUTO: 0.1 K/UL (ref 0–0.5)
EOSINOPHIL NFR BLD: 1.6 % (ref 0–8)
ERYTHROCYTE [DISTWIDTH] IN BLOOD BY AUTOMATED COUNT: 13.1 % (ref 11.5–14.5)
EST. GFR  (NO RACE VARIABLE): 50.1 ML/MIN/1.73 M^2
GLUCOSE SERPL-MCNC: 81 MG/DL (ref 70–110)
HCT VFR BLD AUTO: 36.8 % (ref 37–48.5)
HGB BLD-MCNC: 12.4 G/DL (ref 12–16)
IMM GRANULOCYTES # BLD AUTO: 0.03 K/UL (ref 0–0.04)
IMM GRANULOCYTES NFR BLD AUTO: 0.5 % (ref 0–0.5)
LYMPHOCYTES # BLD AUTO: 1.6 K/UL (ref 1–4.8)
LYMPHOCYTES NFR BLD: 29.9 % (ref 18–48)
MAGNESIUM SERPL-MCNC: 1.9 MG/DL (ref 1.6–2.6)
MCH RBC QN AUTO: 33.4 PG (ref 27–31)
MCHC RBC AUTO-ENTMCNC: 33.7 G/DL (ref 32–36)
MCV RBC AUTO: 99 FL (ref 82–98)
MONOCYTES # BLD AUTO: 0.6 K/UL (ref 0.3–1)
MONOCYTES NFR BLD: 11.7 % (ref 4–15)
NEUTROPHILS # BLD AUTO: 3.1 K/UL (ref 1.8–7.7)
NEUTROPHILS NFR BLD: 55.9 % (ref 38–73)
NRBC BLD-RTO: 0 /100 WBC
PHOSPHATE SERPL-MCNC: 3 MG/DL (ref 2.7–4.5)
PLATELET # BLD AUTO: 261 K/UL (ref 150–450)
PMV BLD AUTO: 8.8 FL (ref 9.2–12.9)
POTASSIUM SERPL-SCNC: 4.9 MMOL/L (ref 3.5–5.1)
PROT SERPL-MCNC: 6.8 G/DL (ref 6–8.4)
RBC # BLD AUTO: 3.71 M/UL (ref 4–5.4)
SODIUM SERPL-SCNC: 139 MMOL/L (ref 136–145)
WBC # BLD AUTO: 5.49 K/UL (ref 3.9–12.7)

## 2025-02-11 PROCEDURE — 85025 COMPLETE CBC W/AUTO DIFF WBC: CPT | Mod: PN | Performed by: INTERNAL MEDICINE

## 2025-02-11 PROCEDURE — 84100 ASSAY OF PHOSPHORUS: CPT | Mod: PN | Performed by: INTERNAL MEDICINE

## 2025-02-11 PROCEDURE — 83735 ASSAY OF MAGNESIUM: CPT | Mod: PN | Performed by: INTERNAL MEDICINE

## 2025-02-11 PROCEDURE — 80053 COMPREHEN METABOLIC PANEL: CPT | Mod: PN | Performed by: INTERNAL MEDICINE

## 2025-02-11 PROCEDURE — 36415 COLL VENOUS BLD VENIPUNCTURE: CPT | Mod: PN | Performed by: INTERNAL MEDICINE

## 2025-02-11 NOTE — PROGRESS NOTES
HEMATOLOGIC MALIGNANCIES PROGRESS NOTE    IDENTIFYING STATEMENT   Thai Villalba (Thai) is a 49 y.o. female with a  of 1975 from [place], [place] with the diagnosis of acute myeloid leukemia.      ONCOLOGY HISTORY:    1. 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 2023: Presented to hospital in [place] with 2 week h/o fevers, night sweats, weight loss, lymphadenopathy, myalgias, stomatitis, bruising, sore throat and found to have WBC 333K with 80% blasts c/f AML vs CML in acute blast crisis               B. 2023: Bone marrow biopsy - AML, FLT3 positive.               C. 2023: Began 7+3+midostaurin induction. Subsequent D14 and D28 BMBxs both showed no evidence of persistent leukemia, in CR1               D. 2023: BMBx showed recurrent AML with flow showing 23% myeloblasts.               E. 2023: Began azacitidine-venetoclax-gilteritinib (off protocol)              F. 2023: Bone marrow biopsy showed CR2              G. 2023: Dual cord blood allogeneic stem cell transplant with Flu/Cy/TT/TBI (4 Gy) conditioning. GVHD ppx with cyclosporine/MMF.               H. 10/19/2023: Bone marrow biopsy on day +28 - IRENE by path and hematologics flow.               I. 2023: Day +75 bone marrow biopsy - MRD-neg by flow, NPM1 ddPCR, FLT3 pcr. Full donor chimerisms   J. 3/27/2024: Day +180 bone marrow biopsy - no definitive morphologic or immunophenotypic evidence of residual AML. No pathogenic mutations detected. VUS MPL      2. Attention deficit disorder  3. Uterine leiomyoma    INTERVAL HISTORY:      Ms. Villalba is seen in follow-up of AML with history of dual cord blood allogeneic stem cell transplant. Today is 1 year, 4 months  after alloSCT.     She is feeling better overall but still struggles with neuropathy.  She continues on ruxolitinib. She has been to [place] since her last visit, and they did not have any major  recommendations for change in management.     Past Medical History, Past Social History and Past Family History have been reviewed and are unchanged except as noted in the interval history.    MEDICATIONS:     Current Outpatient Medications on File Prior to Visit   Medication Sig Dispense Refill    acyclovir (ZOVIRAX) 200 MG capsule Take 2 capsules (400 mg total) by mouth 2 (two) times a day for Prophylaxis. 120 capsule 1    atovaquone (MEPRON) 750 mg/5 mL Susp oral liquid Take 10 mLs by mouth daily for Pneumocystis Jiroveci Pneumonia Prevention. 900 mL 4    cholecalciferol, vitamin D3, 10 mcg (400 unit) Chew Take 1,000 Int'l Units by mouth once daily.      estrogens,esterified,-methyltestosterone 1.25-2.5mg (EEMT) per tablet Take 1 tablet by mouth once daily. 90 tablet 1    estrogens,esterified,-methyltestosterone 1.25-2.5mg (ESTRATEST) per tablet take 1 tablet by mouth once daily 90 tablet 1    furosemide (LASIX) 40 MG tablet Take 1 tablet (40 mg total) by mouth daily as needed (swelling in the legs). 30 tablet 2    gabapentin (NEURONTIN) 600 MG tablet Take 1 tablet by mouth three times a day as directed TAKE 1 TABLET BY MOUTH THREE TIMES A DAY 90 tablet 2    granisetron HCL (KYTRIL) 1 mg Tab Take 1 mg by mouth every 12 (twelve) hours as needed.      magnesium oxide-Mg AA chelate (MG-PLUS-PROTEIN) 133 mg Tab Take 1 tablet by mouth 2 times daily for hypomagnesemia. 100 tablet 1    oxyCODONE (ROXICODONE) 10 mg Tab immediate release tablet 1 tablet by mouth three times a day as needed for pain 90 tablet 0    oxyCODONE (ROXICODONE) 10 mg Tab immediate release tablet 1 tablet by mouth three times a day as needed for pain 90 tablet 0    oxyCODONE (ROXICODONE) 10 mg Tab immediate release tablet 1 tablet by mouth three times a day as needed for pain 90 tablet 0    pantoprazole (PROTONIX) 40 MG tablet Take 40 mg by mouth every morning.      pantoprazole (PROTONIX) 40 MG tablet Take 1 tablet by mouth daily for  "gastroesophageal reflux disease. 90 tablet 3    prochlorperazine (COMPAZINE) 10 MG tablet Take 10 mg by mouth every 6 (six) hours as needed.      senna-docusate 8.6-50 mg (PERICOLACE) 8.6-50 mg per tablet Take 1 tablet by mouth daily as needed for constipation. 90 tablet 3    tacrolimus (PROTOPIC) 0.1 % ointment Apply topically 2 times daily for Atopic Dermatitis, can apply to lips or inside mouth if needed. 100 g 1    triamcinolone acetonide 0.1% (KENALOG) 0.1 % cream Apply topically 2 times daily for GVHD of skin. 454 g 2     No current facility-administered medications on file prior to visit.       ALLERGIES:   Review of patient's allergies indicates:   Allergen Reactions    Promethazine Nausea And Vomiting     Other Reaction(s): VOMITING    Penicillin Hives    Melatonin Anxiety     Other Reaction(s): FLUSHING        ROS:       Review of Systems   Constitutional:  Negative for appetite change, diaphoresis, fatigue, fever and unexpected weight change.   HENT:   Negative for lump/mass and sore throat.    Eyes:  Negative for icterus.   Respiratory:  Negative for cough and shortness of breath.    Cardiovascular:  Negative for chest pain and palpitations.   Gastrointestinal:  Negative for abdominal distention, diarrhea, nausea and vomiting.   Musculoskeletal:  Negative for arthralgias, gait problem and myalgias.   Skin:  Negative for rash.   Neurological:  Negative for dizziness, gait problem and headaches.   Hematological:  Negative for adenopathy. Does not bruise/bleed easily.   Psychiatric/Behavioral:  Negative for confusion. The patient is not nervous/anxious.        PHYSICAL EXAM:  Vitals:    01/31/25 1409   BP: 97/61   Pulse: 65   Resp: 16   Temp: 98.3 °F (36.8 °C)   TempSrc: Oral   SpO2: 97%   Weight: 78 kg (171 lb 13.6 oz)   Height: 5' 5" (1.651 m)   PainSc:   5   PainLoc: Back         Physical Exam  Constitutional:       General: She is not in acute distress.     Appearance: She is well-developed.   HENT:     "  Head: Normocephalic and atraumatic.      Mouth/Throat:      Mouth: No oral lesions.   Eyes:      Conjunctiva/sclera: Conjunctivae normal.   Neck:      Thyroid: No thyromegaly.   Cardiovascular:      Rate and Rhythm: Normal rate and regular rhythm.      Heart sounds: Normal heart sounds. No murmur heard.  Pulmonary:      Breath sounds: Normal breath sounds. No wheezing or rales.   Abdominal:      General: There is no distension.      Palpations: Abdomen is soft. There is no hepatomegaly, splenomegaly or mass.      Tenderness: There is no abdominal tenderness.   Lymphadenopathy:      Cervical: No cervical adenopathy.      Right cervical: No deep cervical adenopathy.     Left cervical: No deep cervical adenopathy.   Skin:     Findings: No rash.   Neurological:      Mental Status: She is alert and oriented to person, place, and time.      Cranial Nerves: No cranial nerve deficit.      Coordination: Coordination normal.      Deep Tendon Reflexes: Reflexes are normal and symmetric.         LAB:   Results for orders placed or performed in visit on 01/28/25   Urine culture    Collection Time: 01/28/25  4:33 PM    Specimen: Urine   Result Value Ref Range    Urine Culture, Routine ESCHERICHIA COLI  10,000 - 49,999 cfu/ml   (A)        Susceptibility    Escherichia coli - CULTURE, URINE     Amp/Sulbactam <=8/4 Sensitive mcg/mL     Ampicillin <=8 Sensitive mcg/mL     Ceftriaxone <=1 Sensitive mcg/mL     Cefazolin <=2 Sensitive mcg/mL     Ciprofloxacin <=0.25 Sensitive mcg/mL     Cefepime <=2 Sensitive mcg/mL     Ertapenem <=0.5 Sensitive mcg/mL     Nitrofurantoin <=32 Sensitive mcg/mL     Gentamicin <=2 Sensitive mcg/mL     Levofloxacin <=0.5 Sensitive mcg/mL     Meropenem <=1 Sensitive mcg/mL     Piperacillin/Tazo <=8 Sensitive mcg/mL     Trimeth/Sulfa <=2/38 Sensitive mcg/mL     Tobramycin <=2 Sensitive mcg/mL   CBC Auto Differential    Collection Time: 01/28/25  4:33 PM   Result Value Ref Range    WBC 5.81 3.90 - 12.70 K/uL     RBC 3.76 (L) 4.00 - 5.40 M/uL    Hemoglobin 12.6 12.0 - 16.0 g/dL    Hematocrit 37.7 37.0 - 48.5 %     (H) 82 - 98 fL    MCH 33.5 (H) 27.0 - 31.0 pg    MCHC 33.4 32.0 - 36.0 g/dL    RDW 13.2 11.5 - 14.5 %    Platelets 277 150 - 450 K/uL    MPV 8.9 (L) 9.2 - 12.9 fL    Immature Granulocytes 0.3 0.0 - 0.5 %    Gran # (ANC) 2.6 1.8 - 7.7 K/uL    Immature Grans (Abs) 0.02 0.00 - 0.04 K/uL    Lymph # 2.3 1.0 - 4.8 K/uL    Mono # 0.8 0.3 - 1.0 K/uL    Eos # 0.1 0.0 - 0.5 K/uL    Baso # 0.02 0.00 - 0.20 K/uL    nRBC 0 0 /100 WBC    Gran % 44.4 38.0 - 73.0 %    Lymph % 38.9 18.0 - 48.0 %    Mono % 13.9 4.0 - 15.0 %    Eosinophil % 2.2 0.0 - 8.0 %    Basophil % 0.3 0.0 - 1.9 %    Differential Method Automated    Comprehensive Metabolic Panel    Collection Time: 01/28/25  4:33 PM   Result Value Ref Range    Sodium 142 136 - 145 mmol/L    Potassium 4.7 3.5 - 5.1 mmol/L    Chloride 107 95 - 110 mmol/L    CO2 26 23 - 29 mmol/L    Glucose 88 70 - 110 mg/dL    BUN 11 6 - 20 mg/dL    Creatinine 1.2 0.5 - 1.4 mg/dL    Calcium 9.0 8.7 - 10.5 mg/dL    Total Protein 6.4 6.0 - 8.4 g/dL    Albumin 3.7 3.5 - 5.2 g/dL    Total Bilirubin 0.4 0.1 - 1.0 mg/dL    Alkaline Phosphatase 124 40 - 150 U/L    AST 43 (H) 10 - 40 U/L    ALT 50 (H) 10 - 44 U/L    eGFR 55.1 (A) >60 mL/min/1.73 m^2    Anion Gap 9 8 - 16 mmol/L   Magnesium    Collection Time: 01/28/25  4:33 PM   Result Value Ref Range    Magnesium 2.1 1.6 - 2.6 mg/dL   PHOSPHORUS    Collection Time: 01/28/25  4:33 PM   Result Value Ref Range    Phosphorus 3.1 2.7 - 4.5 mg/dL   CMV DNA, QUANTITATIVE, PCR    Collection Time: 01/28/25  4:33 PM   Result Value Ref Range    Cytomegalovirus PCR, Quant Not Detected <50 IU/mL    Cytomegalovirus DNA CMV DNA not detected Not Detected   Urinalysis, Reflex to Urine Culture Urine, Clean Catch    Collection Time: 01/28/25  4:33 PM    Specimen: Urine   Result Value Ref Range    Specimen UA Urine, Clean Catch     Color, UA Yellow Yellow, Straw,  Mouna    Appearance, UA Clear Clear    pH, UA 8.0 5.0 - 8.0    Specific Gravity, UA 1.010 1.005 - 1.030    Protein, UA Negative Negative    Glucose, UA Negative Negative    Ketones, UA Negative Negative    Bilirubin (UA) Negative Negative    Occult Blood UA Negative Negative    Nitrite, UA Negative Negative    Leukocytes, UA 1+ (A) Negative   Urinalysis Microscopic    Collection Time: 01/28/25  4:33 PM   Result Value Ref Range    WBC, UA 13 (H) 0 - 5 /hpf    Bacteria Moderate (A) None-Occ /hpf    Squam Epithel, UA 6 /hpf    Microscopic Comment SEE COMMENT        PROBLEMS ASSESSED THIS VISIT:    1. History of allogeneic stem cell transplant    2. GVHD (graft versus host disease)    3. Acute myeloid leukemia in remission        PLAN:       Acute myeloid leukemia  Diagnosed with FLT3 mutated AML. She achieved CR1 with 7+3+midostaurin induction but relapsed prior to allogeneic stem cell transplant. She achieved CR2 with aza/ameya/gilteritinib. Ms. Villalba received dual cord blood allogeneic stem cell transplant and has maintained CR on follow-up bone marrow biopsy.   - Continue maintenance gilteritinib; plan for 2 years total of maintenance  - Day +180 bone marrow biopsy reviewed, no evidence of residual AML  - 12 month [facility] marrow at [facility] shows no morphologic evidence of AML; MRD-negative by flow; chimerism is 100% from donor unit number 2; cytogenetics 46,XX[20];      Status post allogeneic stem cell transplantation  - Currently 1 year, 4 months post Flu/Cy/TT/TBI (4Gy) dual cord blood allogeneic stem cell transplant  - Complete remission and full donor chimerism (100% donor 2) assessed on day +75 bone marrow biopsy  - see AML for 1-year marrow results      Graft versus host disease/Immunosuppresion  - no evidence of aGVHD or cGVHD at this visit  - completed cyclosporine taper  - continue ruxolitinib at 5 mg PO BID      Infectious Disease   antimicrobial ppx as follows:  - acyclovir 400 mg BID until 5  year post-transplant jose  - may stop atovaquone with CD4 greater than 200  - Letermovir ppx until 6 months post tx - completed  - off isavuconazonium sulfate due to transaminitis  - Prior/resolved infections:              - 9/29/2023: C. Diff - treated with fidaxomicin              - 10/7/2023: Suspected fungal infection based on abnormal CT, treated with isavuconazonium sulfate              - 11/6/2023: UTI - treated with levofloxacin              - 11/8/2023: BK virus              - 11/13/2023: Pneumonia diagnosed on CXR - treated with cefpodoxime     Cytopenias  - None clinically significant  - Continue to monitor with routine labs monitoring     Chronic pain  - Neuropathic pain secondary to lumbar radiculopathy               - Gabapentin 300 mg PO TID              - Oxycodone 10-15 mg q4prn    Anxiety  Referred to hem/onc psych     Hormone replacement therapy  - on oral estrogen     Follow-up  - Labs every 2 weeks  - return to clinic in 2 months    Shemar Franco MD  Hematology and Stem Cell Transplant    Visit today included increased complexity associated with the care of the episodic problem AML and history of allogeneic stem cell transplantation addressed and managing the longitudinal care of the patient due to the serious and/or complex managed problem(s) AML and history of allogeneic stem cell transplantation.

## 2025-02-12 LAB
CMV DNA SPEC QL NAA+PROBE: NORMAL
CYTOMEGALOVIRUS PCR, QUANT: NOT DETECTED IU/ML

## 2025-02-23 DIAGNOSIS — C92.01 ACUTE MYELOID LEUKEMIA IN REMISSION: ICD-10-CM

## 2025-02-23 DIAGNOSIS — Z79.899 IMMUNODEFICIENCY DUE TO DRUG THERAPY: ICD-10-CM

## 2025-02-23 DIAGNOSIS — D84.821 IMMUNODEFICIENCY DUE TO DRUG THERAPY: ICD-10-CM

## 2025-02-24 RX ORDER — ACYCLOVIR 200 MG/1
400 CAPSULE ORAL 2 TIMES DAILY
Qty: 120 CAPSULE | Refills: 1 | Status: SHIPPED | OUTPATIENT
Start: 2025-02-24

## 2025-02-26 ENCOUNTER — LAB VISIT (OUTPATIENT)
Dept: LAB | Facility: HOSPITAL | Age: 50
End: 2025-02-26
Attending: INTERNAL MEDICINE
Payer: COMMERCIAL

## 2025-02-26 DIAGNOSIS — Z94.84 HISTORY OF ALLOGENEIC STEM CELL TRANSPLANT: ICD-10-CM

## 2025-02-26 LAB
ALBUMIN SERPL BCP-MCNC: 3.8 G/DL (ref 3.5–5.2)
ALP SERPL-CCNC: 80 U/L (ref 40–150)
ALT SERPL W/O P-5'-P-CCNC: 33 U/L (ref 10–44)
ANION GAP SERPL CALC-SCNC: 8 MMOL/L (ref 8–16)
AST SERPL-CCNC: 33 U/L (ref 10–40)
BASOPHILS # BLD AUTO: 0.01 K/UL (ref 0–0.2)
BASOPHILS NFR BLD: 0.3 % (ref 0–1.9)
BILIRUB SERPL-MCNC: 0.7 MG/DL (ref 0.1–1)
BUN SERPL-MCNC: 11 MG/DL (ref 6–20)
CALCIUM SERPL-MCNC: 8.8 MG/DL (ref 8.7–10.5)
CHLORIDE SERPL-SCNC: 107 MMOL/L (ref 95–110)
CO2 SERPL-SCNC: 25 MMOL/L (ref 23–29)
CREAT SERPL-MCNC: 1.3 MG/DL (ref 0.5–1.4)
DIFFERENTIAL METHOD BLD: ABNORMAL
EOSINOPHIL # BLD AUTO: 0.1 K/UL (ref 0–0.5)
EOSINOPHIL NFR BLD: 2.3 % (ref 0–8)
ERYTHROCYTE [DISTWIDTH] IN BLOOD BY AUTOMATED COUNT: 13.3 % (ref 11.5–14.5)
EST. GFR  (NO RACE VARIABLE): 50.1 ML/MIN/1.73 M^2
GLUCOSE SERPL-MCNC: 90 MG/DL (ref 70–110)
HCT VFR BLD AUTO: 37.4 % (ref 37–48.5)
HGB BLD-MCNC: 12.9 G/DL (ref 12–16)
IMM GRANULOCYTES # BLD AUTO: 0.02 K/UL (ref 0–0.04)
IMM GRANULOCYTES NFR BLD AUTO: 0.5 % (ref 0–0.5)
LYMPHOCYTES # BLD AUTO: 1.3 K/UL (ref 1–4.8)
LYMPHOCYTES NFR BLD: 31.9 % (ref 18–48)
MAGNESIUM SERPL-MCNC: 2 MG/DL (ref 1.6–2.6)
MCH RBC QN AUTO: 33.8 PG (ref 27–31)
MCHC RBC AUTO-ENTMCNC: 34.5 G/DL (ref 32–36)
MCV RBC AUTO: 98 FL (ref 82–98)
MONOCYTES # BLD AUTO: 0.6 K/UL (ref 0.3–1)
MONOCYTES NFR BLD: 14.2 % (ref 4–15)
NEUTROPHILS # BLD AUTO: 2 K/UL (ref 1.8–7.7)
NEUTROPHILS NFR BLD: 50.8 % (ref 38–73)
NRBC BLD-RTO: 0 /100 WBC
PHOSPHATE SERPL-MCNC: 2.6 MG/DL (ref 2.7–4.5)
PLATELET # BLD AUTO: 259 K/UL (ref 150–450)
PMV BLD AUTO: 8.9 FL (ref 9.2–12.9)
POTASSIUM SERPL-SCNC: 4.5 MMOL/L (ref 3.5–5.1)
PROT SERPL-MCNC: 6.8 G/DL (ref 6–8.4)
RBC # BLD AUTO: 3.82 M/UL (ref 4–5.4)
SODIUM SERPL-SCNC: 140 MMOL/L (ref 136–145)
WBC # BLD AUTO: 3.95 K/UL (ref 3.9–12.7)

## 2025-02-26 PROCEDURE — 84100 ASSAY OF PHOSPHORUS: CPT | Mod: PN | Performed by: INTERNAL MEDICINE

## 2025-02-26 PROCEDURE — 80053 COMPREHEN METABOLIC PANEL: CPT | Mod: PN | Performed by: INTERNAL MEDICINE

## 2025-02-26 PROCEDURE — 85025 COMPLETE CBC W/AUTO DIFF WBC: CPT | Mod: PN | Performed by: INTERNAL MEDICINE

## 2025-02-26 PROCEDURE — 83735 ASSAY OF MAGNESIUM: CPT | Mod: PN | Performed by: INTERNAL MEDICINE

## 2025-02-26 PROCEDURE — 36415 COLL VENOUS BLD VENIPUNCTURE: CPT | Mod: PN | Performed by: INTERNAL MEDICINE

## 2025-02-27 LAB
CMV DNA SPEC QL NAA+PROBE: NORMAL
CYTOMEGALOVIRUS PCR, QUANT: NOT DETECTED IU/ML

## 2025-03-11 ENCOUNTER — LAB VISIT (OUTPATIENT)
Dept: LAB | Facility: HOSPITAL | Age: 50
End: 2025-03-11
Attending: STUDENT IN AN ORGANIZED HEALTH CARE EDUCATION/TRAINING PROGRAM
Payer: COMMERCIAL

## 2025-03-11 DIAGNOSIS — R79.89 ELEVATED LFTS: ICD-10-CM

## 2025-03-11 DIAGNOSIS — Z94.84 HISTORY OF ALLOGENEIC STEM CELL TRANSPLANT: ICD-10-CM

## 2025-03-11 LAB
ALBUMIN SERPL BCP-MCNC: 3.9 G/DL (ref 3.5–5.2)
ALP SERPL-CCNC: 76 U/L (ref 40–150)
ALT SERPL W/O P-5'-P-CCNC: 32 U/L (ref 10–44)
ANION GAP SERPL CALC-SCNC: 10 MMOL/L (ref 8–16)
AST SERPL-CCNC: 25 U/L (ref 10–40)
BASOPHILS # BLD AUTO: 0.01 K/UL (ref 0–0.2)
BASOPHILS NFR BLD: 0.2 % (ref 0–1.9)
BILIRUB SERPL-MCNC: 0.6 MG/DL (ref 0.1–1)
BUN SERPL-MCNC: 11 MG/DL (ref 6–20)
CALCIUM SERPL-MCNC: 8.7 MG/DL (ref 8.7–10.5)
CHLORIDE SERPL-SCNC: 102 MMOL/L (ref 95–110)
CO2 SERPL-SCNC: 24 MMOL/L (ref 23–29)
CREAT SERPL-MCNC: 1.3 MG/DL (ref 0.5–1.4)
DIFFERENTIAL METHOD BLD: ABNORMAL
EOSINOPHIL # BLD AUTO: 0.1 K/UL (ref 0–0.5)
EOSINOPHIL NFR BLD: 1.6 % (ref 0–8)
ERYTHROCYTE [DISTWIDTH] IN BLOOD BY AUTOMATED COUNT: 13.1 % (ref 11.5–14.5)
EST. GFR  (NO RACE VARIABLE): 50.1 ML/MIN/1.73 M^2
GLUCOSE SERPL-MCNC: 92 MG/DL (ref 70–110)
HCT VFR BLD AUTO: 36.9 % (ref 37–48.5)
HGB BLD-MCNC: 12.9 G/DL (ref 12–16)
IMM GRANULOCYTES # BLD AUTO: 0.01 K/UL (ref 0–0.04)
IMM GRANULOCYTES NFR BLD AUTO: 0.2 % (ref 0–0.5)
LYMPHOCYTES # BLD AUTO: 1.6 K/UL (ref 1–4.8)
LYMPHOCYTES NFR BLD: 31.7 % (ref 18–48)
MAGNESIUM SERPL-MCNC: 1.9 MG/DL (ref 1.6–2.6)
MCH RBC QN AUTO: 33.9 PG (ref 27–31)
MCHC RBC AUTO-ENTMCNC: 35 G/DL (ref 32–36)
MCV RBC AUTO: 97 FL (ref 82–98)
MONOCYTES # BLD AUTO: 0.6 K/UL (ref 0.3–1)
MONOCYTES NFR BLD: 12 % (ref 4–15)
NEUTROPHILS # BLD AUTO: 2.7 K/UL (ref 1.8–7.7)
NEUTROPHILS NFR BLD: 54.3 % (ref 38–73)
NRBC BLD-RTO: 0 /100 WBC
PHOSPHATE SERPL-MCNC: 2.5 MG/DL (ref 2.7–4.5)
PLATELET # BLD AUTO: 269 K/UL (ref 150–450)
PMV BLD AUTO: 9 FL (ref 9.2–12.9)
POTASSIUM SERPL-SCNC: 4.2 MMOL/L (ref 3.5–5.1)
PROT SERPL-MCNC: 6.6 G/DL (ref 6–8.4)
RBC # BLD AUTO: 3.8 M/UL (ref 4–5.4)
SODIUM SERPL-SCNC: 136 MMOL/L (ref 136–145)
WBC # BLD AUTO: 4.92 K/UL (ref 3.9–12.7)

## 2025-03-11 PROCEDURE — 80053 COMPREHEN METABOLIC PANEL: CPT | Mod: PN | Performed by: STUDENT IN AN ORGANIZED HEALTH CARE EDUCATION/TRAINING PROGRAM

## 2025-03-11 PROCEDURE — 36415 COLL VENOUS BLD VENIPUNCTURE: CPT | Mod: PN | Performed by: STUDENT IN AN ORGANIZED HEALTH CARE EDUCATION/TRAINING PROGRAM

## 2025-03-11 PROCEDURE — 85025 COMPLETE CBC W/AUTO DIFF WBC: CPT | Mod: PN | Performed by: STUDENT IN AN ORGANIZED HEALTH CARE EDUCATION/TRAINING PROGRAM

## 2025-03-11 PROCEDURE — 83735 ASSAY OF MAGNESIUM: CPT | Mod: PN | Performed by: INTERNAL MEDICINE

## 2025-03-11 PROCEDURE — 84100 ASSAY OF PHOSPHORUS: CPT | Mod: PN | Performed by: INTERNAL MEDICINE

## 2025-03-12 ENCOUNTER — RESULTS FOLLOW-UP (OUTPATIENT)
Dept: HEPATOLOGY | Facility: HOSPITAL | Age: 50
End: 2025-03-12

## 2025-03-12 LAB
CMV DNA SPEC QL NAA+PROBE: ABNORMAL
CYTOMEGALOVIRUS LOG (IU/ML): <1.48 LOGIU/ML
CYTOMEGALOVIRUS PCR, QUANT: <30 IU/ML

## 2025-03-25 ENCOUNTER — LAB VISIT (OUTPATIENT)
Dept: LAB | Facility: HOSPITAL | Age: 50
End: 2025-03-25
Attending: STUDENT IN AN ORGANIZED HEALTH CARE EDUCATION/TRAINING PROGRAM
Payer: COMMERCIAL

## 2025-03-25 DIAGNOSIS — Z94.84 HISTORY OF ALLOGENEIC STEM CELL TRANSPLANT: ICD-10-CM

## 2025-03-25 DIAGNOSIS — R30.0 DYSURIA: ICD-10-CM

## 2025-03-25 LAB
ABSOLUTE EOSINOPHIL (OHS): 0.11 K/UL
ABSOLUTE MONOCYTE (OHS): 1.23 K/UL (ref 0.3–1)
ABSOLUTE NEUTROPHIL COUNT (OHS): 7.62 K/UL (ref 1.8–7.7)
ALBUMIN SERPL BCP-MCNC: 3.8 G/DL (ref 3.5–5.2)
ALP SERPL-CCNC: 108 UNIT/L (ref 40–150)
ALT SERPL W/O P-5'-P-CCNC: 32 UNIT/L (ref 10–44)
ANION GAP (OHS): 9 MMOL/L (ref 8–16)
AST SERPL-CCNC: 31 UNIT/L (ref 11–45)
BASOPHILS # BLD AUTO: 0.01 K/UL
BASOPHILS NFR BLD AUTO: 0.1 %
BILIRUB SERPL-MCNC: 0.5 MG/DL (ref 0.1–1)
BUN SERPL-MCNC: 7 MG/DL (ref 6–20)
CALCIUM SERPL-MCNC: 9.3 MG/DL (ref 8.7–10.5)
CHLORIDE SERPL-SCNC: 103 MMOL/L (ref 95–110)
CO2 SERPL-SCNC: 25 MMOL/L (ref 23–29)
CREAT SERPL-MCNC: 1.2 MG/DL (ref 0.5–1.4)
ERYTHROCYTE [DISTWIDTH] IN BLOOD BY AUTOMATED COUNT: 13.2 % (ref 11.5–14.5)
GFR SERPLBLD CREATININE-BSD FMLA CKD-EPI: 55 ML/MIN/1.73/M2
GLUCOSE SERPL-MCNC: 117 MG/DL (ref 70–110)
HCT VFR BLD AUTO: 38.4 % (ref 37–48.5)
HGB BLD-MCNC: 13.1 GM/DL (ref 12–16)
IMM GRANULOCYTES # BLD AUTO: 0.05 K/UL (ref 0–0.04)
IMM GRANULOCYTES NFR BLD AUTO: 0.5 % (ref 0–0.5)
LYMPHOCYTES # BLD AUTO: 1.48 K/UL (ref 1–4.8)
MAGNESIUM SERPL-MCNC: 1.8 MG/DL (ref 1.6–2.6)
MCH RBC QN AUTO: 33.2 PG (ref 27–50)
MCHC RBC AUTO-ENTMCNC: 34.1 G/DL (ref 32–36)
MCV RBC AUTO: 98 FL (ref 82–98)
NUCLEATED RBC (/100WBC) (OHS): 0 /100 WBC
PHOSPHATE SERPL-MCNC: 2.3 MG/DL (ref 2.7–4.5)
PLATELET # BLD AUTO: 296 K/UL (ref 150–450)
PMV BLD AUTO: 8.7 FL (ref 9.2–12.9)
POTASSIUM SERPL-SCNC: 4.6 MMOL/L (ref 3.5–5.1)
PROT SERPL-MCNC: 7.1 GM/DL (ref 6–8.4)
RBC # BLD AUTO: 3.94 M/UL (ref 4–5.4)
RELATIVE EOSINOPHIL (OHS): 1 %
RELATIVE LYMPHOCYTE (OHS): 14.1 % (ref 18–48)
RELATIVE MONOCYTE (OHS): 11.7 % (ref 4–15)
RELATIVE NEUTROPHIL (OHS): 72.6 % (ref 38–73)
SODIUM SERPL-SCNC: 137 MMOL/L (ref 136–145)
WBC # BLD AUTO: 10.5 K/UL (ref 3.9–12.7)

## 2025-03-25 PROCEDURE — 80053 COMPREHEN METABOLIC PANEL: CPT | Mod: PN

## 2025-03-25 PROCEDURE — 36415 COLL VENOUS BLD VENIPUNCTURE: CPT | Mod: PN

## 2025-03-25 PROCEDURE — 87799 DETECT AGENT NOS DNA QUANT: CPT

## 2025-03-25 PROCEDURE — 85025 COMPLETE CBC W/AUTO DIFF WBC: CPT | Mod: PN

## 2025-03-25 PROCEDURE — 84100 ASSAY OF PHOSPHORUS: CPT | Mod: PN

## 2025-03-25 PROCEDURE — 83735 ASSAY OF MAGNESIUM: CPT | Mod: PN

## 2025-03-25 PROCEDURE — 80158 DRUG ASSAY CYCLOSPORINE: CPT

## 2025-03-26 ENCOUNTER — PATIENT MESSAGE (OUTPATIENT)
Dept: HEMATOLOGY/ONCOLOGY | Facility: CLINIC | Age: 50
End: 2025-03-26
Payer: COMMERCIAL

## 2025-03-26 LAB
CYCLOSPORINE BLD LC/MS/MS-MCNC: <10 NG/ML (ref 100–400)
CYTOMEGALOVIRUS DNA, QUAL (OHS): NOT DETECTED
EPSTEIN-BARR VIRUS DNA, QUAL (OHS): NORMAL

## 2025-03-31 ENCOUNTER — OFFICE VISIT (OUTPATIENT)
Dept: HEMATOLOGY/ONCOLOGY | Facility: CLINIC | Age: 50
End: 2025-03-31
Payer: COMMERCIAL

## 2025-03-31 VITALS
BODY MASS INDEX: 25.69 KG/M2 | DIASTOLIC BLOOD PRESSURE: 71 MMHG | OXYGEN SATURATION: 97 % | SYSTOLIC BLOOD PRESSURE: 119 MMHG | HEART RATE: 68 BPM | HEIGHT: 65 IN | RESPIRATION RATE: 16 BRPM | WEIGHT: 154.19 LBS | TEMPERATURE: 99 F

## 2025-03-31 DIAGNOSIS — Z94.84 HISTORY OF ALLOGENEIC STEM CELL TRANSPLANT: Primary | ICD-10-CM

## 2025-03-31 DIAGNOSIS — C92.01 ACUTE MYELOID LEUKEMIA IN REMISSION: ICD-10-CM

## 2025-03-31 PROCEDURE — 3074F SYST BP LT 130 MM HG: CPT | Mod: CPTII,S$GLB,, | Performed by: INTERNAL MEDICINE

## 2025-03-31 PROCEDURE — 99999 PR PBB SHADOW E&M-EST. PATIENT-LVL V: CPT | Mod: PBBFAC,,, | Performed by: INTERNAL MEDICINE

## 2025-03-31 PROCEDURE — 1160F RVW MEDS BY RX/DR IN RCRD: CPT | Mod: CPTII,S$GLB,, | Performed by: INTERNAL MEDICINE

## 2025-03-31 PROCEDURE — 3008F BODY MASS INDEX DOCD: CPT | Mod: CPTII,S$GLB,, | Performed by: INTERNAL MEDICINE

## 2025-03-31 PROCEDURE — 3078F DIAST BP <80 MM HG: CPT | Mod: CPTII,S$GLB,, | Performed by: INTERNAL MEDICINE

## 2025-03-31 PROCEDURE — G2211 COMPLEX E/M VISIT ADD ON: HCPCS | Mod: S$GLB,,, | Performed by: INTERNAL MEDICINE

## 2025-03-31 PROCEDURE — 99215 OFFICE O/P EST HI 40 MIN: CPT | Mod: S$GLB,,, | Performed by: INTERNAL MEDICINE

## 2025-03-31 PROCEDURE — 1159F MED LIST DOCD IN RCRD: CPT | Mod: CPTII,S$GLB,, | Performed by: INTERNAL MEDICINE

## 2025-03-31 NOTE — PROGRESS NOTES
Route Chart for Scheduling    BMT Chart Routing      Follow up with physician 2 months.   Follow up with KENDALL    Provider visit type Malignant hem   Infusion scheduling note    Injection scheduling note    Labs CBC, CMP, magnesium, phosphorus, CMV and EBV   Scheduling:  Preferred lab:  Lab interval:  labs the week before visit on [facility] please   Imaging    Pharmacy appointment    Other referrals

## 2025-04-06 NOTE — PROGRESS NOTES
HEMATOLOGIC MALIGNANCIES PROGRESS NOTE    IDENTIFYING STATEMENT   Thai Villalba (Thai) is a 49 y.o. female with a  of 1975 from [place], [place] with the diagnosis of acute myeloid leukemia.      ONCOLOGY HISTORY:    1. 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 2023: Presented to hospital in [place] with 2 week h/o fevers, night sweats, weight loss, lymphadenopathy, myalgias, stomatitis, bruising, sore throat and found to have WBC 333K with 80% blasts c/f AML vs CML in acute blast crisis               B. 2023: Bone marrow biopsy - AML, FLT3 positive.               C. 2023: Began 7+3+midostaurin induction. Subsequent D14 and D28 BMBxs both showed no evidence of persistent leukemia, in CR1               D. 2023: BMBx showed recurrent AML with flow showing 23% myeloblasts.               E. 2023: Began azacitidine-venetoclax-gilteritinib (off protocol)              F. 2023: Bone marrow biopsy showed CR2              G. 2023: Dual cord blood allogeneic stem cell transplant with Flu/Cy/TT/TBI (4 Gy) conditioning. GVHD ppx with cyclosporine/MMF.               H. 10/19/2023: Bone marrow biopsy on day +28 - IRENE by path and hematologics flow.               I. 2023: Day +75 bone marrow biopsy - MRD-neg by flow, NPM1 ddPCR, FLT3 pcr. Full donor chimerisms   J. 3/27/2024: Day +180 bone marrow biopsy - no definitive morphologic or immunophenotypic evidence of residual AML. No pathogenic mutations detected. VUS MPL      2. Attention deficit disorder  3. Uterine leiomyoma    INTERVAL HISTORY:      Ms. Villalba is seen in follow-up of AML with history of dual cord blood allogeneic stem cell transplant. Today is 1 year, 6 months  after alloSCT.     She is feeling well at this time. No new complaints.     Past Medical History, Past Social History and Past Family History have been reviewed and are unchanged except as noted in the  interval history.    MEDICATIONS:     Current Outpatient Medications on File Prior to Visit   Medication Sig Dispense Refill    acyclovir (ZOVIRAX) 200 MG capsule Take 2 capsules (400 mg total) by mouth 2 (two) times a day for Prophylaxis. 120 capsule 1    atovaquone (MEPRON) 750 mg/5 mL Susp oral liquid Take 10 mLs by mouth daily for Pneumocystis Jiroveci Pneumonia Prevention. 900 mL 4    cholecalciferol, vitamin D3, 10 mcg (400 unit) Chew Take 1,000 Int'l Units by mouth once daily.      estrogens,esterified,-methyltestosterone 1.25-2.5mg (EEMT) per tablet Take 1 tablet by mouth once daily. 90 tablet 1    estrogens,esterified,-methyltestosterone 1.25-2.5mg (ESTRATEST) per tablet take 1 tablet by mouth once daily 90 tablet 1    furosemide (LASIX) 40 MG tablet Take 1 tablet (40 mg total) by mouth daily as needed (swelling in the legs). 30 tablet 2    gabapentin (NEURONTIN) 600 MG tablet Take 1 tablet by mouth three times a day as directed TAKE 1 TABLET BY MOUTH THREE TIMES A DAY 90 tablet 2    gabapentin (NEURONTIN) 600 MG tablet Take 1 tablet by mouth three times a day as directed TAKE 1 TABLET BY MOUTH THREE TIMES A DAY 90 tablet 5    oxyCODONE (ROXICODONE) 10 mg Tab immediate release tablet 1 tablet by mouth three times a day as needed for pain 90 tablet 0    oxyCODONE (ROXICODONE) 10 mg Tab immediate release tablet 1 tablet by mouth three times a day as needed for pain 90 tablet 0    oxyCODONE (ROXICODONE) 10 mg Tab immediate release tablet 1 tablet by mouth three times a day as needed for pain 90 tablet 0    oxyCODONE (ROXICODONE) 10 mg Tab immediate release tablet 1 tablet by mouth three times a day as needed for pain 90 tablet 0    oxyCODONE (ROXICODONE) 10 mg Tab immediate release tablet 1 tablet by mouth three times a day as needed for pain 90 tablet 0    pantoprazole (PROTONIX) 40 MG tablet Take 40 mg by mouth every morning.      pantoprazole (PROTONIX) 40 MG tablet Take 1 tablet by mouth daily for  "gastroesophageal reflux disease. 90 tablet 3    prochlorperazine (COMPAZINE) 10 MG tablet Take 10 mg by mouth every 6 (six) hours as needed.      ruxolitinib (JAKAFI) 5 mg Tab Take 5 mg by mouth 2 (two) times a day. 60 tablet 11    senna-docusate 8.6-50 mg (PERICOLACE) 8.6-50 mg per tablet Take 1 tablet by mouth daily as needed for constipation. 90 tablet 3    tacrolimus (PROTOPIC) 0.1 % ointment Apply topically 2 times daily for Atopic Dermatitis, can apply to lips or inside mouth if needed. 100 g 1    triamcinolone acetonide 0.1% (KENALOG) 0.1 % cream Apply topically 2 times daily for GVHD of skin. 454 g 2     No current facility-administered medications on file prior to visit.       ALLERGIES:   Review of patient's allergies indicates:   Allergen Reactions    Promethazine Nausea And Vomiting     Other Reaction(s): VOMITING    Penicillin Hives    Melatonin Anxiety     Other Reaction(s): FLUSHING        ROS:       Review of Systems   Constitutional:  Negative for appetite change, diaphoresis, fatigue, fever and unexpected weight change.   HENT:   Negative for lump/mass and sore throat.    Eyes:  Negative for icterus.   Respiratory:  Negative for cough and shortness of breath.    Cardiovascular:  Negative for chest pain and palpitations.   Gastrointestinal:  Negative for abdominal distention, diarrhea, nausea and vomiting.   Musculoskeletal:  Negative for arthralgias, gait problem and myalgias.   Skin:  Negative for rash.   Neurological:  Negative for dizziness, gait problem and headaches.   Hematological:  Negative for adenopathy. Does not bruise/bleed easily.   Psychiatric/Behavioral:  Negative for confusion. The patient is not nervous/anxious.        PHYSICAL EXAM:  Vitals:    03/31/25 1559   BP: 119/71   Pulse: 68   Resp: 16   Temp: 98.7 °F (37.1 °C)   TempSrc: Oral   SpO2: 97%   Weight: 70 kg (154 lb 3.4 oz)   Height: 5' 5" (1.651 m)   PainSc:   7   PainLoc: Back         Physical Exam  Constitutional:       " General: She is not in acute distress.     Appearance: She is well-developed.   HENT:      Head: Normocephalic and atraumatic.      Mouth/Throat:      Mouth: No oral lesions.   Eyes:      Conjunctiva/sclera: Conjunctivae normal.   Neck:      Thyroid: No thyromegaly.   Cardiovascular:      Rate and Rhythm: Normal rate and regular rhythm.      Heart sounds: Normal heart sounds. No murmur heard.  Pulmonary:      Breath sounds: Normal breath sounds. No wheezing or rales.   Abdominal:      General: There is no distension.      Palpations: Abdomen is soft. There is no hepatomegaly, splenomegaly or mass.      Tenderness: There is no abdominal tenderness.   Lymphadenopathy:      Cervical: No cervical adenopathy.      Right cervical: No deep cervical adenopathy.     Left cervical: No deep cervical adenopathy.   Skin:     Findings: No rash.   Neurological:      Mental Status: She is alert and oriented to person, place, and time.      Cranial Nerves: No cranial nerve deficit.      Coordination: Coordination normal.      Deep Tendon Reflexes: Reflexes are normal and symmetric.         LAB:   Results for orders placed or performed in visit on 03/25/25   Comprehensive Metabolic Panel    Collection Time: 03/25/25 12:04 PM   Result Value Ref Range    Sodium 137 136 - 145 mmol/L    Potassium 4.6 3.5 - 5.1 mmol/L    Chloride 103 95 - 110 mmol/L    CO2 25 23 - 29 mmol/L    Glucose 117 (H) 70 - 110 mg/dL    BUN 7 6 - 20 mg/dL    Creatinine 1.2 0.5 - 1.4 mg/dL    Calcium 9.3 8.7 - 10.5 mg/dL    Protein Total 7.1 6.0 - 8.4 gm/dL    Albumin 3.8 3.5 - 5.2 g/dL    Bilirubin Total 0.5 0.1 - 1.0 mg/dL     40 - 150 unit/L    AST 31 11 - 45 unit/L    ALT 32 10 - 44 unit/L    Anion Gap 9 8 - 16 mmol/L    eGFR 55 (L) >60 mL/min/1.73/m2   Magnesium    Collection Time: 03/25/25 12:04 PM   Result Value Ref Range    Magnesium  1.8 1.6 - 2.6 mg/dL   PHOSPHORUS    Collection Time: 03/25/25 12:04 PM   Result Value Ref Range    Phosphorus Level  2.3 (L) 2.7 - 4.5 mg/dL   CMV DNA, QUANTITATIVE, PCR    Collection Time: 03/25/25 12:04 PM   Result Value Ref Range    Cytomegalovirus DNA, Qual Not Detected Not Detected, Invalid   EMERITA-BALDWIN VIRUS DNA, QUANTITATIVE (PCR)    Collection Time: 03/25/25 12:04 PM   Result Value Ref Range    Emerita-Barr Virus DNA EBV DNA not detected EBV DNA not detected   CYCLOSPORINE LEVEL    Collection Time: 03/25/25 12:04 PM   Result Value Ref Range    Cyclosporine <10 (L) 100 - 400 ng/mL   CBC with Differential    Collection Time: 03/25/25 12:04 PM   Result Value Ref Range    WBC 10.50 3.90 - 12.70 K/uL    RBC 3.94 (L) 4.00 - 5.40 M/uL    HGB 13.1 12.0 - 16.0 gm/dL    HCT 38.4 37.0 - 48.5 %    MCV 98 82 - 98 fL    MCH 33.2 27.0 - 50.0 pg    MCHC 34.1 32.0 - 36.0 g/dL    RDW 13.2 11.5 - 14.5 %    Platelet Count 296 150 - 450 K/uL    MPV 8.7 (L) 9.2 - 12.9 fL    Nucleated RBC 0 <=0 /100 WBC    Neut % 72.6 38 - 73 %    Lymph % 14.1 (L) 18 - 48 %    Mono % 11.7 4 - 15 %    Eos % 1.0 <=8 %    Basophil % 0.1 <=1.9 %    Imm Grans % 0.5 0.0 - 0.5 %    Neut # 7.62 1.8 - 7.7 K/uL    Lymph # 1.48 1 - 4.8 K/uL    Mono # 1.23 (H) 0.3 - 1 K/uL    Eos # 0.11 <=0.5 K/uL    Baso # 0.01 <=0.2 K/uL    Imm Grans # 0.05 (H) 0.00 - 0.04 K/uL       PROBLEMS ASSESSED THIS VISIT:    1. History of allogeneic stem cell transplant    2. Acute myeloid leukemia in remission      PLAN:       Acute myeloid leukemia  Diagnosed with FLT3 mutated AML. She achieved CR1 with 7+3+midostaurin induction but relapsed prior to allogeneic stem cell transplant. She achieved CR2 with aza/ameya/gilteritinib. Ms. Villalba received dual cord blood allogeneic stem cell transplant and has maintained CR on follow-up bone marrow biopsy.   - Continue maintenance gilteritinib; plan for 2 years total of maintenance  - Day +180 bone marrow biopsy reviewed, no evidence of residual AML  - 12 month bone marrow at [facility] shows no morphologic evidence of AML; MRD-negative by  flow; chimerism is 100% from donor unit number 2; cytogenetics 46,XX[20];      Status post allogeneic stem cell transplantation  - Currently 1 year, 6 months post Flu/Cy/TT/TBI (4Gy) dual cord blood allogeneic stem cell transplant  - Complete remission and full donor chimerism (100% donor 2) assessed on day +75 bone marrow biopsy  - see AML for 1-year marrow results      Graft versus host disease/Immunosuppresion  - no evidence of aGVHD or cGVHD at this visit  - completed cyclosporine taper  - continue ruxolitinib at 5 mg PO BID      Infectious Disease   antimicrobial ppx as follows:  - acyclovir 400 mg BID until 5 year post-transplant jose  - may stop atovaquone with CD4 greater than 200  - Letermovir ppx until 6 months post tx - completed  - off isavuconazonium sulfate due to transaminitis  - Prior/resolved infections:              - 9/29/2023: C. Diff - treated with fidaxomicin              - 10/7/2023: Suspected fungal infection based on abnormal CT, treated with isavuconazonium sulfate              - 11/6/2023: UTI - treated with levofloxacin              - 11/8/2023: BK virus              - 11/13/2023: Pneumonia diagnosed on CXR - treated with cefpodoxime     Cytopenias  - None clinically significant  - Continue to monitor with routine labs monitoring     Chronic pain  - Neuropathic pain secondary to lumbar radiculopathy               - Gabapentin 300 mg PO TID              - Oxycodone 10-15 mg q4prn    Anxiety  Referred to hem/onc psych     Hormone replacement therapy  - on oral estrogen     Follow-up  - return to clinic in 2 months    Shemar Franco MD  Hematology and Stem Cell Transplant    Visit today included increased complexity associated with the care of the episodic problem AML and history of allogeneic stem cell transplantation addressed and managing the longitudinal care of the patient due to the serious and/or complex managed problem(s) AML and history of allogeneic stem cell  transplantation.

## 2025-04-08 ENCOUNTER — PATIENT MESSAGE (OUTPATIENT)
Dept: HEMATOLOGY/ONCOLOGY | Facility: CLINIC | Age: 50
End: 2025-04-08
Payer: COMMERCIAL

## 2025-04-08 NOTE — TELEPHONE ENCOUNTER
Called pt's  per request. Pt's  reports pt was discharged today with antibiotics after admitted due to pneumonia. Advised pt's  will inform MD. Pt's  verbalized understanding and has no further questions.

## 2025-05-12 ENCOUNTER — PATIENT MESSAGE (OUTPATIENT)
Dept: HEMATOLOGY/ONCOLOGY | Facility: CLINIC | Age: 50
End: 2025-05-12
Payer: COMMERCIAL

## 2025-05-12 DIAGNOSIS — D84.821 IMMUNODEFICIENCY DUE TO DRUG THERAPY: ICD-10-CM

## 2025-05-12 DIAGNOSIS — C92.01 ACUTE MYELOID LEUKEMIA IN REMISSION: ICD-10-CM

## 2025-05-12 DIAGNOSIS — Z79.899 IMMUNODEFICIENCY DUE TO DRUG THERAPY: ICD-10-CM

## 2025-05-12 RX ORDER — ACYCLOVIR 200 MG/1
400 CAPSULE ORAL 2 TIMES DAILY
Qty: 120 CAPSULE | Refills: 1 | Status: SHIPPED | OUTPATIENT
Start: 2025-05-12

## 2025-06-23 ENCOUNTER — LAB VISIT (OUTPATIENT)
Dept: LAB | Facility: HOSPITAL | Age: 50
End: 2025-06-23
Attending: STUDENT IN AN ORGANIZED HEALTH CARE EDUCATION/TRAINING PROGRAM
Payer: COMMERCIAL

## 2025-06-23 DIAGNOSIS — Z94.84 HISTORY OF ALLOGENEIC STEM CELL TRANSPLANT: ICD-10-CM

## 2025-06-23 LAB
ABSOLUTE EOSINOPHIL (OHS): 0.19 K/UL
ABSOLUTE MONOCYTE (OHS): 0.76 K/UL (ref 0.3–1)
ABSOLUTE NEUTROPHIL COUNT (OHS): 4.14 K/UL (ref 1.8–7.7)
ALBUMIN SERPL BCP-MCNC: 3.8 G/DL (ref 3.5–5.2)
ALP SERPL-CCNC: 64 UNIT/L (ref 40–150)
ALT SERPL W/O P-5'-P-CCNC: 57 UNIT/L (ref 10–44)
ANION GAP (OHS): 7 MMOL/L (ref 8–16)
AST SERPL-CCNC: 44 UNIT/L (ref 11–45)
BASOPHILS # BLD AUTO: 0.01 K/UL
BASOPHILS NFR BLD AUTO: 0.1 %
BILIRUB SERPL-MCNC: 0.5 MG/DL (ref 0.1–1)
BUN SERPL-MCNC: 8 MG/DL (ref 6–20)
CALCIUM SERPL-MCNC: 8.6 MG/DL (ref 8.7–10.5)
CHLORIDE SERPL-SCNC: 105 MMOL/L (ref 95–110)
CO2 SERPL-SCNC: 27 MMOL/L (ref 23–29)
CREAT SERPL-MCNC: 0.9 MG/DL (ref 0.5–1.4)
ERYTHROCYTE [DISTWIDTH] IN BLOOD BY AUTOMATED COUNT: 14.4 % (ref 11.5–14.5)
GFR SERPLBLD CREATININE-BSD FMLA CKD-EPI: >60 ML/MIN/1.73/M2
GLUCOSE SERPL-MCNC: 83 MG/DL (ref 70–110)
HCT VFR BLD AUTO: 36.3 % (ref 37–48.5)
HGB BLD-MCNC: 12.7 GM/DL (ref 12–16)
IMM GRANULOCYTES # BLD AUTO: 0.04 K/UL (ref 0–0.04)
IMM GRANULOCYTES NFR BLD AUTO: 0.6 % (ref 0–0.5)
LYMPHOCYTES # BLD AUTO: 1.92 K/UL (ref 1–4.8)
MAGNESIUM SERPL-MCNC: 2 MG/DL (ref 1.6–2.6)
MCH RBC QN AUTO: 33.9 PG (ref 27–31)
MCHC RBC AUTO-ENTMCNC: 35 G/DL (ref 32–36)
MCV RBC AUTO: 97 FL (ref 82–98)
NUCLEATED RBC (/100WBC) (OHS): 0 /100 WBC
PHOSPHATE SERPL-MCNC: 2 MG/DL (ref 2.7–4.5)
PLATELET # BLD AUTO: 190 K/UL (ref 150–450)
PMV BLD AUTO: 9 FL (ref 9.2–12.9)
POTASSIUM SERPL-SCNC: 4.3 MMOL/L (ref 3.5–5.1)
PROT SERPL-MCNC: 6.5 GM/DL (ref 6–8.4)
RBC # BLD AUTO: 3.75 M/UL (ref 4–5.4)
RELATIVE EOSINOPHIL (OHS): 2.7 %
RELATIVE LYMPHOCYTE (OHS): 27.2 % (ref 18–48)
RELATIVE MONOCYTE (OHS): 10.8 % (ref 4–15)
RELATIVE NEUTROPHIL (OHS): 58.6 % (ref 38–73)
SODIUM SERPL-SCNC: 139 MMOL/L (ref 136–145)
WBC # BLD AUTO: 7.06 K/UL (ref 3.9–12.7)

## 2025-06-23 PROCEDURE — 84075 ASSAY ALKALINE PHOSPHATASE: CPT | Mod: PN

## 2025-06-23 PROCEDURE — 85025 COMPLETE CBC W/AUTO DIFF WBC: CPT | Mod: PN

## 2025-06-23 PROCEDURE — 87799 DETECT AGENT NOS DNA QUANT: CPT

## 2025-06-23 PROCEDURE — 84100 ASSAY OF PHOSPHORUS: CPT | Mod: PN

## 2025-06-23 PROCEDURE — 36415 COLL VENOUS BLD VENIPUNCTURE: CPT | Mod: PN

## 2025-06-23 PROCEDURE — 83735 ASSAY OF MAGNESIUM: CPT | Mod: PN

## 2025-06-24 LAB
CYTOMEGALOVIRUS DNA, QUAL (OHS): NOT DETECTED
EPSTEIN-BARR VIRUS DNA, QUAL (OHS): NORMAL

## 2025-06-30 ENCOUNTER — OFFICE VISIT (OUTPATIENT)
Dept: HEMATOLOGY/ONCOLOGY | Facility: CLINIC | Age: 50
End: 2025-06-30
Payer: COMMERCIAL

## 2025-06-30 VITALS
WEIGHT: 139 LBS | RESPIRATION RATE: 16 BRPM | OXYGEN SATURATION: 99 % | BODY MASS INDEX: 23.16 KG/M2 | TEMPERATURE: 98 F | HEIGHT: 65 IN | HEART RATE: 64 BPM

## 2025-06-30 DIAGNOSIS — C92.01 ACUTE MYELOID LEUKEMIA IN REMISSION: Primary | ICD-10-CM

## 2025-06-30 DIAGNOSIS — Z94.84 HISTORY OF ALLOGENEIC STEM CELL TRANSPLANT: ICD-10-CM

## 2025-06-30 DIAGNOSIS — D89.813 GVHD (GRAFT VERSUS HOST DISEASE): ICD-10-CM

## 2025-06-30 DIAGNOSIS — E83.42 HYPOMAGNESEMIA: ICD-10-CM

## 2025-06-30 PROCEDURE — 3008F BODY MASS INDEX DOCD: CPT | Mod: CPTII,S$GLB,, | Performed by: INTERNAL MEDICINE

## 2025-06-30 PROCEDURE — 1160F RVW MEDS BY RX/DR IN RCRD: CPT | Mod: CPTII,S$GLB,, | Performed by: INTERNAL MEDICINE

## 2025-06-30 PROCEDURE — 1159F MED LIST DOCD IN RCRD: CPT | Mod: CPTII,S$GLB,, | Performed by: INTERNAL MEDICINE

## 2025-06-30 PROCEDURE — 99215 OFFICE O/P EST HI 40 MIN: CPT | Mod: S$GLB,,, | Performed by: INTERNAL MEDICINE

## 2025-06-30 PROCEDURE — G2211 COMPLEX E/M VISIT ADD ON: HCPCS | Mod: S$GLB,,, | Performed by: INTERNAL MEDICINE

## 2025-06-30 PROCEDURE — 99999 PR PBB SHADOW E&M-EST. PATIENT-LVL V: CPT | Mod: PBBFAC,,, | Performed by: INTERNAL MEDICINE

## 2025-06-30 RX ORDER — POLYETHYLENE GLYCOL 3350 17 G/17G
17 POWDER, FOR SOLUTION ORAL
COMMUNITY

## 2025-06-30 RX ORDER — GRANISETRON HYDROCHLORIDE 1 MG/1
TABLET, FILM COATED ORAL
COMMUNITY
Start: 2025-04-11

## 2025-06-30 NOTE — PROGRESS NOTES
HEMATOLOGIC MALIGNANCIES PROGRESS NOTE    IDENTIFYING STATEMENT   Thai Villalba (Thai) is a 50 y.o. female with a  of 1975 from [place], [place] with the diagnosis of acute myeloid leukemia.      ONCOLOGY HISTORY:    1. 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 2023: Presented to hospital in [place] with 2 week h/o fevers, night sweats, weight loss, lymphadenopathy, myalgias, stomatitis, bruising, sore throat and found to have WBC 333K with 80% blasts c/f AML vs CML in acute blast crisis               B. 2023: Bone marrow biopsy - AML, FLT3 positive.               C. 2023: Began 7+3+midostaurin induction. Subsequent D14 and D28 BMBxs both showed no evidence of persistent leukemia, in CR1               D. 2023: BMBx showed recurrent AML with flow showing 23% myeloblasts.               E. 2023: Began azacitidine-venetoclax-gilteritinib (off protocol)              F. 2023: Bone marrow biopsy showed CR2              G. 2023: Dual cord blood allogeneic stem cell transplant with Flu/Cy/TT/TBI (4 Gy) conditioning. GVHD ppx with cyclosporine/MMF.               H. 10/19/2023: Bone marrow biopsy on day +28 - IRENE by path and hematologics flow.               I. 2023: Day +75 bone marrow biopsy - MRD-neg by flow, NPM1 ddPCR, FLT3 pcr. Full donor chimerisms   J. 3/27/2024: Day +180 bone marrow biopsy - no definitive morphologic or immunophenotypic evidence of residual AML. No pathogenic mutations detected. VUS MPL      2. Attention deficit disorder  3. Uterine leiomyoma    INTERVAL HISTORY:      Ms. Villalba is seen in follow-up of AML with history of dual cord blood allogeneic stem cell transplant. Today is 1 year, 9 months  after alloSCT.     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    MEDICATIONS:     Current Outpatient Medications on File Prior to Visit   Medication Sig Dispense Refill    acyclovir (ZOVIRAX) 200 MG capsule Take 2 capsules (400 mg total) by mouth 2 (two) times a day for Prophylaxis. 120 capsule 1    atovaquone (MEPRON) 750 mg/5 mL Susp oral liquid Take 10 mLs by mouth daily for Pneumocystis Jiroveci Pneumonia Prevention. 900 mL 4    atovaquone (MEPRON) 750 mg/5 mL Susp oral liquid Take 10 mLs by mouth daily for Pneumocystis Jiroveci Pneumonia Prevention. 840 mL 3    budesonide (ENTOCORT EC) 3 mg capsule Take 1 capsule by mouth 3 times daily (0000, 0800, 1600) for GVH. 90 capsule 12    cholecalciferol, vitamin D3, 10 mcg (400 unit) Chew Take 1,000 Int'l Units by mouth once daily.      dexAMETHasone 0.5 mg/5 mL Soln oral liquid Take 10 mLs by mouth 4 times daily for gvhd. 480 mL 1    estrogens, conjugated, (PREMARIN) 1.25 MG tablet Take 1.25 mg by mouth.      estrogens,esterified,-methyltestosterone 1.25-2.5mg (ESTRATEST) per tablet Take 1 tablet by mouth once daily. 90 tablet 1    gabapentin (NEURONTIN) 600 MG tablet Take 1 tablet by mouth three times a day as directed TAKE 1 TABLET BY MOUTH THREE TIMES A DAY 90 tablet 2    gabapentin (NEURONTIN) 600 MG tablet Take 1 tablet by mouth three times a day as directed TAKE 1 TABLET BY MOUTH THREE TIMES A DAY 90 tablet 5    granisetron HCL (KYTRIL) 1 mg Tab Take by mouth.      [START ON 7/25/2025] oxyCODONE (ROXICODONE) 10 mg Tab immediate release tablet 1 tablet by mouth three times a day as needed for pain 90 tablet 0    oxyCODONE (ROXICODONE) 10 mg Tab immediate release tablet Take 1 tablet by mouth three times a day as needed for pain 90 tablet 0    pantoprazole (PROTONIX) 40 MG tablet Take 40 mg by mouth every morning.      pantoprazole (PROTONIX) 40 MG  tablet Take 1 tablet by mouth daily for gastroesophageal reflux disease. 90 tablet 3    polyethylene glycol (GLYCOLAX) 17 gram PwPk Take 17 g by mouth.      prochlorperazine (COMPAZINE) 10 MG tablet Take 10 mg by mouth every 6 (six) hours as needed.      senna-docusate (PERICOLACE) 8.6-50 mg per tablet Take 1 tablet by mouth daily as needed for constipation. 90 tablet 3    tacrolimus (PROTOPIC) 0.1 % ointment Apply topically 2 times daily for Atopic Dermatitis, can apply to lips or inside mouth if needed. 100 g 1    triamcinolone acetonide 0.1% (KENALOG) 0.1 % cream Apply topically 2 times daily for GVHD of skin. 454 g 2    triamcinolone acetonide 0.1% (KENALOG) 0.1 % cream Apply topically 2 times daily for GVHD of skin. 454 g 3     No current facility-administered medications on file prior to visit.       ALLERGIES:   Review of patient's allergies indicates:   Allergen Reactions    Promethazine Nausea And Vomiting     Other Reaction(s): VOMITING    Penicillin Hives    Melatonin Anxiety     Other Reaction(s): FLUSHING        ROS:       Review of Systems   Constitutional:  Negative for appetite change, diaphoresis, fatigue, fever and unexpected weight change.   HENT:   Negative for lump/mass and sore throat.    Eyes:  Negative for icterus.   Respiratory:  Negative for cough and shortness of breath.    Cardiovascular:  Negative for chest pain and palpitations.   Gastrointestinal:  Negative for abdominal distention, diarrhea, nausea and vomiting.   Musculoskeletal:  Negative for arthralgias, gait problem and myalgias.   Skin:  Negative for rash.   Neurological:  Negative for dizziness, gait problem and headaches.   Hematological:  Negative for adenopathy. Does not bruise/bleed easily.   Psychiatric/Behavioral:  Negative for confusion. The patient is not nervous/anxious.        PHYSICAL EXAM:  Vitals:    06/30/25 1510   BP: (!) (P) 112/57   Pulse: 64   Resp: 16   Temp: 97.7 °F (36.5 °C)   TempSrc: Oral   SpO2: 99%  "  Weight: 63 kg (139 lb)   Height: 5' 5" (1.651 m)   PainSc:   4   PainLoc: Back         Physical Exam  Constitutional:       General: She is not in acute distress.     Appearance: She is well-developed.   HENT:      Head: Normocephalic and atraumatic.      Mouth/Throat:      Mouth: No oral lesions.   Eyes:      Conjunctiva/sclera: Conjunctivae normal.   Neck:      Thyroid: No thyromegaly.   Cardiovascular:      Rate and Rhythm: Normal rate and regular rhythm.      Heart sounds: Normal heart sounds. No murmur heard.  Pulmonary:      Breath sounds: Normal breath sounds. No wheezing or rales.   Abdominal:      General: There is no distension.      Palpations: Abdomen is soft. There is no hepatomegaly, splenomegaly or mass.      Tenderness: There is no abdominal tenderness.   Lymphadenopathy:      Cervical: No cervical adenopathy.      Right cervical: No deep cervical adenopathy.     Left cervical: No deep cervical adenopathy.   Skin:     Findings: No rash.   Neurological:      Mental Status: She is alert and oriented to person, place, and time.      Cranial Nerves: No cranial nerve deficit.      Coordination: Coordination normal.      Deep Tendon Reflexes: Reflexes are normal and symmetric.         LAB:   Results for orders placed or performed in visit on 06/23/25   Comprehensive Metabolic Panel    Collection Time: 06/23/25  2:01 PM   Result Value Ref Range    Sodium 139 136 - 145 mmol/L    Potassium 4.3 3.5 - 5.1 mmol/L    Chloride 105 95 - 110 mmol/L    CO2 27 23 - 29 mmol/L    Glucose 83 70 - 110 mg/dL    BUN 8 6 - 20 mg/dL    Creatinine 0.9 0.5 - 1.4 mg/dL    Calcium 8.6 (L) 8.7 - 10.5 mg/dL    Protein Total 6.5 6.0 - 8.4 gm/dL    Albumin 3.8 3.5 - 5.2 g/dL    Bilirubin Total 0.5 0.1 - 1.0 mg/dL    ALP 64 40 - 150 unit/L    AST 44 11 - 45 unit/L    ALT 57 (H) 10 - 44 unit/L    Anion Gap 7 (L) 8 - 16 mmol/L    eGFR >60 >60 mL/min/1.73/m2   Magnesium    Collection Time: 06/23/25  2:01 PM   Result Value Ref Range    " Magnesium  2.0 1.6 - 2.6 mg/dL   PHOSPHORUS    Collection Time: 06/23/25  2:01 PM   Result Value Ref Range    Phosphorus Level 2.0 (L) 2.7 - 4.5 mg/dL   CMV DNA, QUANTITATIVE, PCR    Collection Time: 06/23/25  2:01 PM   Result Value Ref Range    Cytomegalovirus DNA, Qual Not Detected Not Detected, Invalid   CBC with Differential    Collection Time: 06/23/25  2:01 PM   Result Value Ref Range    WBC 7.06 3.90 - 12.70 K/uL    RBC 3.75 (L) 4.00 - 5.40 M/uL    HGB 12.7 12.0 - 16.0 gm/dL    HCT 36.3 (L) 37.0 - 48.5 %    MCV 97 82 - 98 fL    MCH 33.9 (H) 27.0 - 31.0 pg    MCHC 35.0 32.0 - 36.0 g/dL    RDW 14.4 11.5 - 14.5 %    Platelet Count 190 150 - 450 K/uL    MPV 9.0 (L) 9.2 - 12.9 fL    Nucleated RBC 0 <=0 /100 WBC    Neut % 58.6 38 - 73 %    Lymph % 27.2 18 - 48 %    Mono % 10.8 4 - 15 %    Eos % 2.7 <=8 %    Basophil % 0.1 <=1.9 %    Imm Grans % 0.6 (H) 0.0 - 0.5 %    Neut # 4.14 1.8 - 7.7 K/uL    Lymph # 1.92 1 - 4.8 K/uL    Mono # 0.76 0.3 - 1 K/uL    Eos # 0.19 <=0.5 K/uL    Baso # 0.01 <=0.2 K/uL    Imm Grans # 0.04 0.00 - 0.04 K/uL   EMERITA-BALDWIN VIRUS DNA, QUANTITATIVE (PCR)    Collection Time: 06/23/25  2:01 PM   Result Value Ref Range    Emerita-Barr Virus DNA EBV DNA not detected EBV DNA not detected       PROBLEMS ASSESSED THIS VISIT:    1. Acute myeloid leukemia in remission    2. GVHD (graft versus host disease)    3. History of allogeneic stem cell transplant    4. Hypomagnesemia        PLAN:       Acute myeloid leukemia  Diagnosed with FLT3 mutated AML. She achieved CR1 with 7+3+midostaurin induction but relapsed prior to allogeneic stem cell transplant. She achieved CR2 with aza/ameya/gilteritinib. Ms. Villalba received dual cord blood allogeneic stem cell transplant and has maintained CR on follow-up bone marrow biopsy.   - Continue maintenance gilteritinib; plan for 2 years total of maintenance  - Day +180 bone marrow biopsy reviewed, no evidence of residual AML  - 12 month bone marrow at [facility] shows no morphologic evidence of AML; MRD-negative by flow; chimerism is 100% from donor unit number 2; cytogenetics 46,XX[20];      Status post allogeneic stem cell transplantation  - Currently 1 year, 9 months post Flu/Cy/TT/TBI (4Gy) dual cord blood allogeneic stem cell transplant  - Complete remission and full donor chimerism (100% donor 2) assessed on day +75 bone marrow biopsy  - see AML for 1-year marrow results      Graft versus host disease/Immunosuppresion  - no evidence of aGVHD or cGVHD at this visit  - completed cyclosporine taper  - off steroids at this time  - continue ruxolitinib at 10 mg PO BID      Infectious Disease   antimicrobial ppx as follows:  - acyclovir 400 mg BID until 5 year post-transplant jose  - may stop atovaquone with CD4 greater than 200  - Letermovir ppx until 6 months post tx - completed  - off isavuconazonium sulfate due to transaminitis  - Prior/resolved infections:              - 9/29/2023: C. Diff - treated with fidaxomicin              - 10/7/2023: Suspected fungal infection based on abnormal CT, treated with isavuconazonium sulfate              - 11/6/2023: UTI - treated with levofloxacin              - 11/8/2023: BK virus              - 11/13/2023: Pneumonia diagnosed on CXR - treated with cefpodoxime     Cytopenias  - None clinically significant  - Continue to monitor with routine labs monitoring     Chronic pain  - Neuropathic pain secondary to lumbar radiculopathy               - Gabapentin 300 mg PO TID              - Oxycodone 10-15 mg q4prn    Anxiety  Referred to hem/onc psych     Hormone replacement therapy  - on oral estrogen     Follow-up  - return to clinic in 3 months    Shemar Franco MD  Malignant Hematology, Stem Cell Transplantation and Cellular Therapy    Visit today included increased complexity associated with the care of the episodic problem AML and history of allogeneic stem cell transplantation addressed and managing the longitudinal care  of the patient due to the serious and/or complex managed problem(s) AML and history of allogeneic stem cell transplantation.

## 2025-06-30 NOTE — PROGRESS NOTES
Route Chart for Scheduling    BMT Chart Routing      Follow up with physician 3 months.   Follow up with KENDALL    Provider visit type Malignant hem   Infusion scheduling note    Injection scheduling note    Labs CBC, CMP, magnesium, phosphorus, LDH, uric acid, CMV and EBV   Scheduling:  Preferred lab:  Lab interval:  labs the week before visit on [facility] please   Imaging    Pharmacy appointment    Other referrals

## 2025-07-01 DIAGNOSIS — D89.813 GVHD (GRAFT VERSUS HOST DISEASE): Primary | ICD-10-CM

## 2025-07-01 DIAGNOSIS — Z94.84 HISTORY OF ALLOGENEIC STEM CELL TRANSPLANT: ICD-10-CM

## 2025-07-01 DIAGNOSIS — C92.01 ACUTE MYELOID LEUKEMIA IN REMISSION: ICD-10-CM

## 2025-07-08 DIAGNOSIS — D84.821 IMMUNODEFICIENCY DUE TO DRUG THERAPY: ICD-10-CM

## 2025-07-08 DIAGNOSIS — Z79.899 IMMUNODEFICIENCY DUE TO DRUG THERAPY: ICD-10-CM

## 2025-07-08 DIAGNOSIS — C92.01 ACUTE MYELOID LEUKEMIA IN REMISSION: ICD-10-CM

## 2025-07-08 RX ORDER — ACYCLOVIR 200 MG/1
400 CAPSULE ORAL 2 TIMES DAILY
Qty: 120 CAPSULE | Refills: 1 | Status: SHIPPED | OUTPATIENT
Start: 2025-07-08